# Patient Record
Sex: MALE | Race: WHITE | NOT HISPANIC OR LATINO | Employment: OTHER | RURAL
[De-identification: names, ages, dates, MRNs, and addresses within clinical notes are randomized per-mention and may not be internally consistent; named-entity substitution may affect disease eponyms.]

---

## 2017-02-16 PROBLEM — J44.9 COPD (CHRONIC OBSTRUCTIVE PULMONARY DISEASE): Chronic | Status: ACTIVE | Noted: 2017-02-16

## 2017-02-16 PROBLEM — F41.9 ANXIETY: Chronic | Status: ACTIVE | Noted: 2017-02-16

## 2017-02-16 PROBLEM — F43.10 POSTTRAUMATIC STRESS DISORDER: Status: ACTIVE | Noted: 2017-02-16

## 2017-02-16 PROBLEM — D75.89 MACROCYTOSIS: Status: ACTIVE | Noted: 2017-02-16

## 2017-02-16 PROBLEM — M48.50XA VERTEBRAL COMPRESSION FRACTURE (HCC): Status: ACTIVE | Noted: 2017-02-16

## 2017-02-16 PROBLEM — J30.9 ALLERGIC RHINITIS: Chronic | Status: ACTIVE | Noted: 2017-02-16

## 2017-02-16 PROBLEM — R25.1 TREMOR: Status: ACTIVE | Noted: 2017-02-16

## 2017-02-16 PROBLEM — E87.6 HYPOPOTASSEMIA: Chronic | Status: ACTIVE | Noted: 2017-02-16

## 2017-02-16 PROBLEM — F10.10 ALCOHOL ABUSE: Chronic | Status: ACTIVE | Noted: 2017-02-16

## 2017-02-16 PROBLEM — R09.02 HYPOXEMIA: Status: ACTIVE | Noted: 2017-02-16

## 2017-02-16 PROBLEM — R60.0 EDEMA LEG: Status: ACTIVE | Noted: 2017-02-16

## 2017-02-16 PROBLEM — I10 HYPERTENSION: Chronic | Status: ACTIVE | Noted: 2017-02-16

## 2017-02-16 PROBLEM — R73.01 ELEVATED FASTING GLUCOSE: Chronic | Status: ACTIVE | Noted: 2017-02-16

## 2017-02-16 PROBLEM — Z00.00 WELLNESS EXAMINATION: Status: ACTIVE | Noted: 2017-02-16

## 2017-02-16 PROBLEM — Z85.528 HISTORY OF RENAL CELL CANCER: Status: ACTIVE | Noted: 2017-02-16

## 2017-02-16 PROBLEM — K21.9 GASTROESOPHAGEAL REFLUX DISEASE: Chronic | Status: ACTIVE | Noted: 2017-02-16

## 2017-02-16 PROBLEM — E78.5 HYPERLIPIDEMIA: Chronic | Status: ACTIVE | Noted: 2017-02-16

## 2017-02-16 PROBLEM — M81.0 OSTEOPOROSIS: Status: ACTIVE | Noted: 2017-02-16

## 2017-02-16 PROBLEM — K76.0 FATTY LIVER: Chronic | Status: ACTIVE | Noted: 2017-02-16

## 2017-02-17 ENCOUNTER — OFFICE VISIT (OUTPATIENT)
Dept: FAMILY MEDICINE CLINIC | Facility: CLINIC | Age: 67
End: 2017-02-17

## 2017-02-17 VITALS
RESPIRATION RATE: 18 BRPM | WEIGHT: 270 LBS | DIASTOLIC BLOOD PRESSURE: 74 MMHG | HEIGHT: 72 IN | TEMPERATURE: 97.8 F | OXYGEN SATURATION: 96 % | BODY MASS INDEX: 36.57 KG/M2 | HEART RATE: 88 BPM | SYSTOLIC BLOOD PRESSURE: 136 MMHG

## 2017-02-17 DIAGNOSIS — J44.9 CHRONIC OBSTRUCTIVE PULMONARY DISEASE, UNSPECIFIED COPD TYPE (HCC): Chronic | ICD-10-CM

## 2017-02-17 DIAGNOSIS — E78.5 HYPERLIPIDEMIA, UNSPECIFIED HYPERLIPIDEMIA TYPE: Chronic | ICD-10-CM

## 2017-02-17 DIAGNOSIS — F10.10 ALCOHOL ABUSE: Chronic | ICD-10-CM

## 2017-02-17 DIAGNOSIS — D75.89 MACROCYTOSIS: ICD-10-CM

## 2017-02-17 DIAGNOSIS — R73.01 ELEVATED FASTING GLUCOSE: Chronic | ICD-10-CM

## 2017-02-17 DIAGNOSIS — I10 ESSENTIAL HYPERTENSION: Primary | Chronic | ICD-10-CM

## 2017-02-17 DIAGNOSIS — M81.0 OSTEOPOROSIS: ICD-10-CM

## 2017-02-17 DIAGNOSIS — R25.1 TREMOR: ICD-10-CM

## 2017-02-17 PROCEDURE — 99213 OFFICE O/P EST LOW 20 MIN: CPT | Performed by: FAMILY MEDICINE

## 2017-02-17 RX ORDER — FLUTICASONE PROPIONATE 110 UG/1
1 AEROSOL, METERED RESPIRATORY (INHALATION)
Qty: 12 G | Refills: 0 | Status: SHIPPED | OUTPATIENT
Start: 2017-02-17 | End: 2017-08-09 | Stop reason: SDUPTHER

## 2017-02-17 RX ORDER — FLUTICASONE PROPIONATE 220 UG/1
1 AEROSOL, METERED RESPIRATORY (INHALATION)
COMMUNITY
End: 2017-02-17 | Stop reason: DRUGHIGH

## 2017-02-18 LAB
25(OH)D3+25(OH)D2 SERPL-MCNC: 30.9 NG/ML (ref 30–100)
ALBUMIN SERPL-MCNC: 4.1 G/DL (ref 3.5–5)
ALBUMIN/GLOB SERPL: 1.6 G/DL (ref 1.1–2.5)
ALP SERPL-CCNC: 119 U/L (ref 24–120)
ALT SERPL-CCNC: 67 U/L (ref 0–54)
AST SERPL-CCNC: 40 U/L (ref 7–45)
BASOPHILS # BLD AUTO: 0.03 10*3/MM3 (ref 0–0.2)
BASOPHILS NFR BLD AUTO: 0.2 % (ref 0–2)
BILIRUB SERPL-MCNC: 0.4 MG/DL (ref 0.1–1)
BUN SERPL-MCNC: 15 MG/DL (ref 5–21)
BUN/CREAT SERPL: 15.8 (ref 7–25)
CALCIUM SERPL-MCNC: 9.4 MG/DL (ref 8.4–10.4)
CHLORIDE SERPL-SCNC: 101 MMOL/L (ref 98–110)
CHOLEST SERPL-MCNC: 192 MG/DL (ref 130–200)
CO2 SERPL-SCNC: 31 MMOL/L (ref 24–31)
CREAT SERPL-MCNC: 0.95 MG/DL (ref 0.5–1.4)
EOSINOPHIL # BLD AUTO: 0.33 10*3/MM3 (ref 0–0.7)
EOSINOPHIL NFR BLD AUTO: 2.7 % (ref 0–4)
ERYTHROCYTE [DISTWIDTH] IN BLOOD BY AUTOMATED COUNT: 13.8 % (ref 12–15)
FOLATE SERPL-MCNC: >20 NG/ML
GLOBULIN SER CALC-MCNC: 2.6 GM/DL
GLUCOSE SERPL-MCNC: 89 MG/DL (ref 70–100)
HBA1C MFR BLD: 6.3 %
HCT VFR BLD AUTO: 35.9 % (ref 40–52)
HDLC SERPL-MCNC: 34 MG/DL
HGB BLD-MCNC: 11.4 G/DL (ref 14–18)
IMM GRANULOCYTES # BLD: 0.02 10*3/MM3 (ref 0–0.03)
IMM GRANULOCYTES NFR BLD: 0.2 % (ref 0–5)
LDLC SERPL CALC-MCNC: 113 MG/DL (ref 0–99)
LYMPHOCYTES # BLD AUTO: 4.24 10*3/MM3 (ref 0.72–4.86)
LYMPHOCYTES NFR BLD AUTO: 34.4 % (ref 15–45)
MCH RBC QN AUTO: 31.6 PG (ref 28–32)
MCHC RBC AUTO-ENTMCNC: 31.8 G/DL (ref 33–36)
MCV RBC AUTO: 99.4 FL (ref 82–95)
MONOCYTES # BLD AUTO: 1.11 10*3/MM3 (ref 0.19–1.3)
MONOCYTES NFR BLD AUTO: 9 % (ref 4–12)
NEUTROPHILS # BLD AUTO: 6.58 10*3/MM3 (ref 1.87–8.4)
NEUTROPHILS NFR BLD AUTO: 53.5 % (ref 39–78)
PLATELET # BLD AUTO: 292 10*3/MM3 (ref 130–400)
POTASSIUM SERPL-SCNC: 4.5 MMOL/L (ref 3.5–5.3)
PROT SERPL-MCNC: 6.7 G/DL (ref 6.3–8.7)
RBC # BLD AUTO: 3.61 10*6/MM3 (ref 4.8–5.9)
SODIUM SERPL-SCNC: 138 MMOL/L (ref 135–145)
TRIGL SERPL-MCNC: 224 MG/DL (ref 0–149)
VIT B12 SERPL-MCNC: 654 PG/ML (ref 239–931)
VLDLC SERPL CALC-MCNC: 44.8 MG/DL
WBC # BLD AUTO: 12.31 10*3/MM3 (ref 4.8–10.8)

## 2017-02-19 NOTE — PROGRESS NOTES
Subjective   Felix Bartlett is a 66 y.o. male presenting with chief complaint of:   Chief Complaint   Patient presents with   • COPD   • Hyperlipidemia   • Hypertension   • Allergic Rhinitis       History of Present Illness :  With wife.  Has multiple chronic problems; interval appointment.  One of these problems is HTN.      The HTN has been present for years/it is chronic.  The HTN is assumed essential/without testing needed to look for other.  The HTN is controlled manifest by todays blood pressure and no home monitoring.   Other chronic problem/s to consider:  Asthma/COPD: The has been present for years/over a year.  It is chronic.  The problem is stable. The patient has smoked for years and still smokes. < ppd.  Elevated fasting glucose: This has been present for years/over a year.  It is chronic.  It is controlled. No home accuchecks have yet been requested.  No signs hypoglycmeia manifest as syncope/near syncope or diaphoretic/sweating spisodes.   Hyperlipidemia: This has been present for years/over a year.  It is chronic.   It is controlled.   Labs are done regularly and prove control  Osteoporosis:  This has been present for years/over a year.  It is chronic.  Agrees to another bone density when due.   Tremor: Has had for years/it is chronic.  It is felt to be related to his alcohol use.  He still drinks.   Macrocytosis: This has been known for years/it is chronic.  It is felt to be related to his alcohol use.  We follow B12 levels.     The following portions of the patient's history were reviewed and updated as appropriate: allergies, current medications, past family history, past medical history, past social history, past surgical history and problem list.  TCC migrated if needed/reviewed.      Current Outpatient Prescriptions:   •  calcium-vitamin D (OSCAL-500) 500-200 MG-UNIT per tablet, Take 1 tablet by mouth 2 times daily.  , Disp: , Rfl:   •  Chlorpheniramine-DM (CORICIDIN HBP COUGH/COLD PO), Take 1  tablet by mouth Daily., Disp: , Rfl:   •  colestipol (COLESTID) 1 G tablet, Take 1 tablet by mouth 2 times daily., Disp: , Rfl:   •  fluticasone (FLOVENT HFA) 110 MCG/ACT inhaler, Inhale 1 puff 2 (Two) Times a Day., Disp: 12 g, Rfl: 0  •  losartan (COZAAR) 50 MG tablet, TAKE 1 TABLET BY MOUTH ONCE DAILY, Disp: 90 tablet, Rfl: 1  •  metoprolol tartrate (LOPRESSOR) 50 MG tablet, TAKE ONE-HALF TABLET BY MOUTH TWICE DAILY, Disp: 90 tablet, Rfl: 1  •  olopatadine (PATANASE) 0.6 % solution nasal solution, , Disp: , Rfl:   •  omeprazole (priLOSEC) 20 MG capsule, 20 mg 2 (Two) Times a Day., Disp: , Rfl:   •  potassium chloride (K-DUR,KLOR-CON) 10 MEQ CR tablet, TAKE 1 TABLET BY MOUTH EVERY DAY, Disp: 30 tablet, Rfl: 5  •  simvastatin (ZOCOR) 20 MG tablet, TAKE 1 TABLET BY MOUTH DAILY, Disp: 90 tablet, Rfl: 1  •  tamsulosin (FLOMAX) 0.4 MG capsule 24 hr capsule, TK ONE C PO D AFTER THE SAME MEAL, Disp: , Rfl: 8  •  therapeutic multivitamin-minerals (THERAGRAN-M) tablet, Take 1 tablet by mouth daily., Disp: , Rfl:     No Known Allergies    Review of Systems  GENERAL:  Active/slower with limits, speed, samni for age and desire. Sleep is ok with apnea denied. No fever.  ENDO:  No syncope, near or diaphoretic sweaty spells..  HEENT: No head injury  headache,  No vision change, No hearing loss.  Ears without pain/drainage.  No sore throat.  No nasal/sinus congestion/drainage. No epistaxis.  CHEST: No chest wall tenderness or mass.  Usual cough,  without wheeze, SOB; no hemoptysis.  CV: No chest pain, palpatations, ankle edema.  GI: No heartburn, dysphagia.  No abdominal pain, diarrhea, constipation, rectal bleeding, or melena.    :  Voids without dysuria, or incontience to completion.  ORTHO: No painful/swollen joints but various on /off sore.  No  sore neck or back.  No acute neck or back pain without recent injury.   NEURO: No dizziness, weakness of extremities.  No numbness/parethesias.   PSYCH: No memory loss.  Mood good;  "not anxious, depressed but/and not suicidal.  Tolerated stress.                 No results found for: PSA     Objective   Visit Vitals   • /74 (BP Location: Right leg, Patient Position: Sitting, Cuff Size: Large Adult)   • Pulse 88   • Temp 97.8 °F (36.6 °C) (Oral)   • Resp 18   • Ht 72\" (182.9 cm)   • Wt 270 lb (122 kg)   • SpO2 96%   • BMI 36.62 kg/m2       Physical Exam  GENERAL:  Well nourished/developed in no acute distress. Obese  SKIN: Turgor excellent, without wound, rash, lesion.  HEENT: Normal cephalic without trauma.  Pupils equal round reactive to light. Extraocular motions full without nystagmus.   External canals nonobstructive nontender without reddness. Tymphatic membranes anders with david structures intact.   Oral cavity without growths, exudates, and moist.  Posterior pharnyx without mass, obstruction, reddness.  No thyroidmegaly, mass, tenderness, lymphadenopathy and supple.  CV: Regular rhythm.  No murmur, gallop,  edema. Posterior pulses intact.  No carotid bruits.  CHEST: No chest wall tenderness or mass.   LUNGS: Symmetric motion with clear to auscultation.  No dullness to percussion  ABD: Soft, nontender without mass.   ORTHO: Symmetric extremities without swelling/point tenderness.  Full gross range of motion.    NEURO: CN 2-12 grossly intact.  Symmetric facies. 1/4 x bicep knee equal reflexes.  Rest/intention tremors mostly seen UE.   UE/LE   4/5 strength throughout.  Nonfocal use extremities. Speech clear.  Reduced light touch with monofilament, vibratory sensation with tuning fork; equal toes/distal feet.    PSYCH: Oriented x 3.  Pleasant calm, well kept.  Purposeful/directed conservation with intact short/long gross memory.     Assessment/Plan     1. Essential hypertension  - CBC Auto Differential  - Comprehensive Metabolic Panel  - Hemoglobin A1c  - Lipid Panel    2. Hyperlipidemia, unspecified hyperlipidemia type    3. Chronic obstructive pulmonary disease, unspecified COPD " type  - fluticasone (FLOVENT HFA) 110 MCG/ACT inhaler; Inhale 1 puff 2 (Two) Times a Day.  Dispense: 12 g; Refill: 0    4. Elevated fasting glucose  - CBC Auto Differential  - Comprehensive Metabolic Panel  - Hemoglobin A1c  - Lipid Panel    5. Osteoporosis  - DEXA Bone Density Axial  - Vitamin D 25 hydroxy    6. Alcohol abuse  - DEXA Bone Density Axial  - Vitamin D 25 hydroxy    7. Macrocytosis  - Vitamin B12  - Folate  - CBC Auto Differential    8. Tremor  Alcohol/familial  - Vitamin B12  - Folate  - CBC Auto Differential  - Comprehensive Metabolic Panel  - Hemoglobin A1c  - Lipid Panel      Rx: reviewed.  Changes if above  LAB: reviewed/above, and if orders; today  Wrap-up/other instructions  Regular cardio exercise something everyone should consider and try to do; discussed  Normal weight a goal for everyone; discussed  Healthy diet helpful for weight management, illness prevention; discussed  If over 50-screening exams include men PSA/rectal exam, women mammograms, and  everyone colonoscopy screening for colon cancer.   There are no Patient Instructions on file for this visit.    Follow up: Return in about 6 months (around 8/17/2017).

## 2017-02-24 ENCOUNTER — OFFICE VISIT (OUTPATIENT)
Dept: FAMILY MEDICINE CLINIC | Facility: CLINIC | Age: 67
End: 2017-02-24

## 2017-02-24 VITALS
RESPIRATION RATE: 18 BRPM | HEIGHT: 72 IN | DIASTOLIC BLOOD PRESSURE: 64 MMHG | TEMPERATURE: 98 F | BODY MASS INDEX: 36.57 KG/M2 | WEIGHT: 270 LBS | OXYGEN SATURATION: 96 % | SYSTOLIC BLOOD PRESSURE: 146 MMHG

## 2017-02-24 DIAGNOSIS — G45.9 TRANSIENT CEREBRAL ISCHEMIA, UNSPECIFIED TYPE: ICD-10-CM

## 2017-02-24 DIAGNOSIS — F10.10 ALCOHOL ABUSE: Chronic | ICD-10-CM

## 2017-02-24 DIAGNOSIS — G89.29 CHRONIC BACK PAIN, UNSPECIFIED BACK LOCATION, UNSPECIFIED BACK PAIN LATERALITY: ICD-10-CM

## 2017-02-24 DIAGNOSIS — R09.02 HYPOXEMIA: Primary | ICD-10-CM

## 2017-02-24 DIAGNOSIS — M54.9 CHRONIC BACK PAIN, UNSPECIFIED BACK LOCATION, UNSPECIFIED BACK PAIN LATERALITY: ICD-10-CM

## 2017-02-24 DIAGNOSIS — I10 ESSENTIAL HYPERTENSION: Chronic | ICD-10-CM

## 2017-02-24 PROCEDURE — 99214 OFFICE O/P EST MOD 30 MIN: CPT | Performed by: FAMILY MEDICINE

## 2017-02-24 NOTE — PROGRESS NOTES
Subjective   Felix Bartlett is a 66 y.o. male presenting with chief complaint of:   Chief Complaint   Patient presents with   • Syncope       History of Present Illness :  Alone. Recent visit. Has an acute issue today: came back as he wanted me to know he has nearly passed out 3 times last 6m.    Spells are vague; but feeling of no focus; not going out.   Denies chest pain, palpitations.      Other chronic problem/s to consider:  HTN.  The HTN has been present for years/it is chronic.  The HTN is assumed essential/without testing needed to look for other.  The HTN is controlled manifest by todays blood pressure and some home monitoring.   Alcoholism:  Has had for years/a chronic problem.  Has allegedly cut back.     The following portions of the patient's history were reviewed and updated as appropriate: allergies, current medications, past family history, past medical history, past social history, past surgical history and problem list.  TCC migrated if needed/reviewed.      Current Outpatient Prescriptions:   •  calcium-vitamin D (OSCAL-500) 500-200 MG-UNIT per tablet, Take 1 tablet by mouth 2 times daily.  , Disp: , Rfl:   •  Chlorpheniramine-DM (CORICIDIN HBP COUGH/COLD PO), Take 1 tablet by mouth Daily., Disp: , Rfl:   •  colestipol (COLESTID) 1 G tablet, Take 1 tablet by mouth 2 times daily., Disp: , Rfl:   •  fluticasone (FLOVENT HFA) 110 MCG/ACT inhaler, Inhale 1 puff 2 (Two) Times a Day., Disp: 12 g, Rfl: 0  •  losartan (COZAAR) 50 MG tablet, TAKE 1 TABLET BY MOUTH ONCE DAILY, Disp: 90 tablet, Rfl: 1  •  metoprolol tartrate (LOPRESSOR) 50 MG tablet, TAKE ONE-HALF TABLET BY MOUTH TWICE DAILY, Disp: 90 tablet, Rfl: 1  •  olopatadine (PATANASE) 0.6 % solution nasal solution, , Disp: , Rfl:   •  omeprazole (priLOSEC) 20 MG capsule, 20 mg 2 (Two) Times a Day., Disp: , Rfl:   •  potassium chloride (K-DUR,KLOR-CON) 10 MEQ CR tablet, TAKE 1 TABLET BY MOUTH EVERY DAY, Disp: 30 tablet, Rfl: 5  •  simvastatin (ZOCOR)  20 MG tablet, TAKE 1 TABLET BY MOUTH DAILY, Disp: 90 tablet, Rfl: 1  •  tamsulosin (FLOMAX) 0.4 MG capsule 24 hr capsule, TK ONE C PO D AFTER THE SAME MEAL, Disp: , Rfl: 8  •  therapeutic multivitamin-minerals (THERAGRAN-M) tablet, Take 1 tablet by mouth daily., Disp: , Rfl:     No Known Allergies    Review of Systems  GENERAL:  Active/slower with limits, speed, samni for age and extremity weakness. Sleep is ok. No fever.  ENDO:  No diaphoretic sweaty spells.   HEENT: No head injury or headache,  No vision change,  Same hearing loss.  Ears without pain/drainage.  No sore throat.  No nasal/sinus congestion/drainage. No epistaxis.  CHEST: No chest wall tenderness or mass. No cough, with usual wheeze and mild SOB; no hemoptysis.  CV: No chest pain, palpatations always alittle LE ankle edema.  GI: No heartburn, dysphagia.  No abdominal pain, diarrhea, constipation, rectal bleeding, or melena.    :  Voids without dysuria, or incontience to completion.  ORTHO: No painful/swollen joints but various on /off sore.  No change occ sore neck or back.  No acute neck or back pain without recent injury.   NEURO: No dizziness, weakness of extremities.  No change occ LE numbness/parethesias.   PSYCH: ? On/off memory loss.  Mood good; not anxious, depressed but/and not suicidal.  Tolerated stress.     Results for orders placed or performed in visit on 02/17/17   Vitamin D 25 hydroxy   Result Value Ref Range    25 Hydroxy, Vitamin D 30.9 30.0 - 100.0 ng/mL   Vitamin B12   Result Value Ref Range    Vitamin B-12 654 239 - 931 pg/mL   Folate   Result Value Ref Range    Folate >20.00 >2.75 ng/mL   Comprehensive Metabolic Panel   Result Value Ref Range    Glucose 89 70 - 100 mg/dL    BUN 15 5 - 21 mg/dL    Creatinine 0.95 0.50 - 1.40 mg/dL    eGFR Non African Am 79 >60 mL/min/1.73    eGFR African Am 96 >60 mL/min/1.73    BUN/Creatinine Ratio 15.8 7.0 - 25.0    Sodium 138 135 - 145 mmol/L    Potassium 4.5 3.5 - 5.3 mmol/L    Chloride 101  98 - 110 mmol/L    Total CO2 31.0 24.0 - 31.0 mmol/L    Calcium 9.4 8.4 - 10.4 mg/dL    Total Protein 6.7 6.3 - 8.7 g/dL    Albumin 4.10 3.50 - 5.00 g/dL    Globulin 2.6 gm/dL    A/G Ratio 1.6 1.1 - 2.5 g/dL    Total Bilirubin 0.4 0.1 - 1.0 mg/dL    Alkaline Phosphatase 119 24 - 120 U/L    AST (SGOT) 40 7 - 45 U/L    ALT (SGPT) 67 (H) 0 - 54 U/L   Hemoglobin A1c   Result Value Ref Range    Hemoglobin A1C 6.30 %   Lipid Panel   Result Value Ref Range    Total Cholesterol 192 130 - 200 mg/dL    Triglycerides 224 (H) 0 - 149 mg/dL    HDL Cholesterol 34 (L) >=40 mg/dL    VLDL Cholesterol 44.8 mg/dL    LDL Cholesterol  113 (H) 0 - 99 mg/dL   CBC & Differential   Result Value Ref Range    WBC 12.31 (H) 4.80 - 10.80 10*3/mm3    RBC 3.61 (L) 4.80 - 5.90 10*6/mm3    Hemoglobin 11.4 (L) 14.0 - 18.0 g/dL    Hematocrit 35.9 (L) 40.0 - 52.0 %    MCV 99.4 (H) 82.0 - 95.0 fL    MCH 31.6 28.0 - 32.0 pg    MCHC 31.8 (L) 33.0 - 36.0 g/dL    RDW 13.8 12.0 - 15.0 %    Platelets 292 130 - 400 10*3/mm3    Neutrophil Rel % 53.5 39.0 - 78.0 %    Lymphocyte Rel % 34.4 15.0 - 45.0 %    Monocyte Rel % 9.0 4.0 - 12.0 %    Eosinophil Rel % 2.7 0.0 - 4.0 %    Basophil Rel % 0.2 0.0 - 2.0 %    Neutrophils Absolute 6.58 1.87 - 8.40 10*3/mm3    Lymphocytes Absolute 4.24 0.72 - 4.86 10*3/mm3    Monocytes Absolute 1.11 0.19 - 1.30 10*3/mm3    Eosinophils Absolute 0.33 0.00 - 0.70 10*3/mm3    Basophils Absolute 0.03 0.00 - 0.20 10*3/mm3    Immature Granulocyte Rel % 0.2 0.0 - 5.0 %    Immature Grans Absolute 0.02 0.00 - 0.03 10*3/mm3       Lab Results   Component Value Date    HGBA1C 6.30 02/17/2017       Lab Results   Component Value Date     02/17/2017     05/09/2016     (L) 08/15/2014    K 4.5 02/17/2017    K 4.2 05/09/2016    K 4.2 08/15/2014     02/17/2017     05/09/2016    CL 98 08/15/2014    CO2 31.0 02/17/2017    CO2 27 05/09/2016    CO2 28 08/15/2014    GLUCOSE 120 (H) 05/09/2016    GLUCOSE 152 (H) 08/15/2014     "GLUCOSE 84 08/11/2014    BUN 15 02/17/2017    BUN 11 05/09/2016    BUN 14 08/15/2014    CREATININE 0.95 02/17/2017    CREATININE 1.00 11/22/2016    CREATININE 0.73 05/09/2016    CALCIUM 9.4 02/17/2017    CALCIUM 8.8 05/09/2016    CALCIUM 8.1 (L) 08/15/2014    EGFRCLEARA 107 05/09/2016       No results found for: PSA     Objective   Visit Vitals   • /64 (BP Location: Left arm, Patient Position: Sitting, Cuff Size: Adult)   • Temp 98 °F (36.7 °C) (Oral)   • Resp 18   • Ht 72\" (182.9 cm)   • Wt 270 lb (122 kg)   • SpO2 96%   • BMI 36.62 kg/m2       Physical Exam  GENERAL:  Well nourished/developed in no acute distress. Obese   SKIN: Turgor excellent, without wound, rash, lesion  HEENT: Normal cephalic without trauma.  Pupils equal round reactive to light. Extraocular motions full without nystagmus.   External canals nonobstructive nontender without reddness. Tymphatic membranes anders with david structures intact.   Oral cavity without growths, exudates, and moist.  Posterior pharnyx without mass, obstruction, reddness.  No thyroidmegaly, mass, tenderness, lymphadenopathy and supple.  CV: Regular rhythm.  No murmur, gallop,  edema. Posterior pulses intact.  No carotid bruits.  CHEST: No chest wall tenderness or mass.   LUNGS: Symmetric motion with clear/reduced to auscultation.  ABD: Soft, nontender without mass.   ORTHO: Symmetric extremities without swelling/point tenderness.  Full gross range of motion.    NEURO: CN 2-12 grossly intact.  Symmetric facies. 1/4 x bicep knee equal reflexes.  UE/LE   3/5 strength throughout.  Nonfocal use extremities. Speech clear. Resting/intention tremors UE bilaterally.     PSYCH: Oriented x 3.  Pleasant calm, well kept.  Purposeful/directed conservation with intact short/long gross memory.     Assessment/Plan     1. Hypoxemia  chronic    2. Alcohol abuse  chronic    3. Essential hypertension  controlled    4. Transient cerebral ischemia, unspecified type  ?   - Holter monitor - " 24 hour  - CT Head Without Contrast    5. Chronic back pain, unspecified back location, unspecified back pain laterality  DDD/DJD      Rx: reviewed.  Changes if above  LAB: reviewed/above, and if orders    Patient Instructions   Advise no driving through this problem  Keep apt planned with cardiology      Follow up: Return for 3.27.17 as planned.

## 2017-03-20 ENCOUNTER — TELEPHONE (OUTPATIENT)
Dept: FAMILY MEDICINE CLINIC | Facility: CLINIC | Age: 67
End: 2017-03-20

## 2017-03-20 NOTE — TELEPHONE ENCOUNTER
Pt is wanting Holter report results. We do not have anything yet on this. I called and left a message to cardiology at [facility] to let them know we haven't received results of this yet, and to fax us a copy. I called Pt to let them know maybe tomorrow we may have something to tell them

## 2017-05-18 RX ORDER — METOPROLOL TARTRATE 50 MG/1
TABLET, FILM COATED ORAL
Qty: 90 TABLET | Refills: 1 | Status: SHIPPED | OUTPATIENT
Start: 2017-05-18 | End: 2017-11-13 | Stop reason: SDUPTHER

## 2017-05-18 RX ORDER — SIMVASTATIN 20 MG
TABLET ORAL
Qty: 90 TABLET | Refills: 1 | Status: SHIPPED | OUTPATIENT
Start: 2017-05-18 | End: 2017-11-13 | Stop reason: SDUPTHER

## 2017-06-15 RX ORDER — LOSARTAN POTASSIUM 50 MG/1
TABLET ORAL
Qty: 90 TABLET | Refills: 1 | Status: SHIPPED | OUTPATIENT
Start: 2017-06-15 | End: 2017-12-14 | Stop reason: SDUPTHER

## 2017-06-15 RX ORDER — POTASSIUM CHLORIDE 750 MG/1
TABLET, EXTENDED RELEASE ORAL
Qty: 90 TABLET | Refills: 1 | Status: SHIPPED | OUTPATIENT
Start: 2017-06-15 | End: 2018-01-10 | Stop reason: SDUPTHER

## 2017-07-11 DIAGNOSIS — N40.1 BENIGN NON-NODULAR PROSTATIC HYPERPLASIA WITH LOWER URINARY TRACT SYMPTOMS: Primary | ICD-10-CM

## 2017-07-11 RX ORDER — TAMSULOSIN HYDROCHLORIDE 0.4 MG/1
CAPSULE ORAL
Qty: 30 CAPSULE | Refills: 0 | Status: SHIPPED | OUTPATIENT
Start: 2017-07-11 | End: 2017-08-09 | Stop reason: SDUPTHER

## 2017-08-09 DIAGNOSIS — N40.1 BENIGN NON-NODULAR PROSTATIC HYPERPLASIA WITH LOWER URINARY TRACT SYMPTOMS: ICD-10-CM

## 2017-08-09 DIAGNOSIS — J44.9 CHRONIC OBSTRUCTIVE PULMONARY DISEASE, UNSPECIFIED COPD TYPE (HCC): Chronic | ICD-10-CM

## 2017-08-10 RX ORDER — DEXAMETHASONE 4 MG/1
TABLET ORAL
Qty: 12 G | Refills: 5 | Status: SHIPPED | OUTPATIENT
Start: 2017-08-10

## 2017-08-10 RX ORDER — TAMSULOSIN HYDROCHLORIDE 0.4 MG/1
CAPSULE ORAL
Qty: 30 CAPSULE | Refills: 0 | Status: SHIPPED | OUTPATIENT
Start: 2017-08-10 | End: 2017-09-06 | Stop reason: SDUPTHER

## 2017-08-18 ENCOUNTER — OFFICE VISIT (OUTPATIENT)
Dept: FAMILY MEDICINE CLINIC | Facility: CLINIC | Age: 67
End: 2017-08-18

## 2017-08-18 VITALS
SYSTOLIC BLOOD PRESSURE: 138 MMHG | HEART RATE: 102 BPM | TEMPERATURE: 97.8 F | OXYGEN SATURATION: 96 % | WEIGHT: 294 LBS | HEIGHT: 72 IN | RESPIRATION RATE: 18 BRPM | DIASTOLIC BLOOD PRESSURE: 74 MMHG | BODY MASS INDEX: 39.82 KG/M2

## 2017-08-18 DIAGNOSIS — F10.11 HISTORY OF ALCOHOL ABUSE: ICD-10-CM

## 2017-08-18 DIAGNOSIS — R73.01 ELEVATED FASTING GLUCOSE: Chronic | ICD-10-CM

## 2017-08-18 DIAGNOSIS — K76.0 FATTY LIVER: Chronic | ICD-10-CM

## 2017-08-18 DIAGNOSIS — I10 ESSENTIAL HYPERTENSION: Primary | Chronic | ICD-10-CM

## 2017-08-18 DIAGNOSIS — F41.9 ANXIETY: Chronic | ICD-10-CM

## 2017-08-18 DIAGNOSIS — R60.0 EDEMA OF LOWER EXTREMITY, UNSPECIFIED LATERALITY: ICD-10-CM

## 2017-08-18 DIAGNOSIS — R25.1 TREMOR: ICD-10-CM

## 2017-08-18 DIAGNOSIS — G89.29 CHRONIC BACK PAIN, UNSPECIFIED BACK LOCATION, UNSPECIFIED BACK PAIN LATERALITY: ICD-10-CM

## 2017-08-18 DIAGNOSIS — J44.9 CHRONIC OBSTRUCTIVE PULMONARY DISEASE, UNSPECIFIED COPD TYPE (HCC): Chronic | ICD-10-CM

## 2017-08-18 DIAGNOSIS — M54.9 CHRONIC BACK PAIN, UNSPECIFIED BACK LOCATION, UNSPECIFIED BACK PAIN LATERALITY: ICD-10-CM

## 2017-08-18 PROCEDURE — 99214 OFFICE O/P EST MOD 30 MIN: CPT | Performed by: FAMILY MEDICINE

## 2017-08-18 RX ORDER — FEXOFENADINE HYDROCHLORIDE 60 MG/1
60 TABLET, FILM COATED ORAL 2 TIMES DAILY
COMMUNITY
End: 2018-08-27

## 2017-08-19 LAB
ALBUMIN SERPL-MCNC: 4 G/DL (ref 3.5–5)
ALBUMIN/GLOB SERPL: 1.4 G/DL (ref 1.1–2.5)
ALP SERPL-CCNC: 96 U/L (ref 24–120)
ALT SERPL-CCNC: 64 U/L (ref 0–54)
AST SERPL-CCNC: 41 U/L (ref 7–45)
BASOPHILS # BLD AUTO: 0.03 10*3/MM3 (ref 0–0.2)
BASOPHILS NFR BLD AUTO: 0.2 % (ref 0–2)
BILIRUB SERPL-MCNC: 0.4 MG/DL (ref 0.1–1)
BUN SERPL-MCNC: 10 MG/DL (ref 5–21)
BUN/CREAT SERPL: 11.4 (ref 7–25)
CALCIUM SERPL-MCNC: 9.3 MG/DL (ref 8.4–10.4)
CHLORIDE SERPL-SCNC: 102 MMOL/L (ref 98–110)
CO2 SERPL-SCNC: 25 MMOL/L (ref 24–31)
CREAT SERPL-MCNC: 0.88 MG/DL (ref 0.5–1.4)
EOSINOPHIL # BLD AUTO: 0.43 10*3/MM3 (ref 0–0.7)
EOSINOPHIL NFR BLD AUTO: 3.3 % (ref 0–4)
ERYTHROCYTE [DISTWIDTH] IN BLOOD BY AUTOMATED COUNT: 14.1 % (ref 12–15)
GLOBULIN SER CALC-MCNC: 2.9 GM/DL
GLUCOSE SERPL-MCNC: 109 MG/DL (ref 70–100)
HBA1C MFR BLD: 6.3 %
HCT VFR BLD AUTO: 34.3 % (ref 40–52)
HGB BLD-MCNC: 10.9 G/DL (ref 14–18)
IMM GRANULOCYTES # BLD: 0.03 10*3/MM3 (ref 0–0.03)
IMM GRANULOCYTES NFR BLD: 0.2 % (ref 0–5)
LYMPHOCYTES # BLD AUTO: 5.32 10*3/MM3 (ref 0.72–4.86)
LYMPHOCYTES NFR BLD AUTO: 41.4 % (ref 15–45)
MCH RBC QN AUTO: 31.7 PG (ref 28–32)
MCHC RBC AUTO-ENTMCNC: 31.8 G/DL (ref 33–36)
MCV RBC AUTO: 99.7 FL (ref 82–95)
MONOCYTES # BLD AUTO: 0.99 10*3/MM3 (ref 0.19–1.3)
MONOCYTES NFR BLD AUTO: 7.7 % (ref 4–12)
NEUTROPHILS # BLD AUTO: 6.06 10*3/MM3 (ref 1.87–8.4)
NEUTROPHILS NFR BLD AUTO: 47.2 % (ref 39–78)
PLATELET # BLD AUTO: 303 10*3/MM3 (ref 130–400)
POTASSIUM SERPL-SCNC: 4.4 MMOL/L (ref 3.5–5.3)
PROT SERPL-MCNC: 6.9 G/DL (ref 6.3–8.7)
RBC # BLD AUTO: 3.44 10*6/MM3 (ref 4.8–5.9)
SODIUM SERPL-SCNC: 138 MMOL/L (ref 135–145)
WBC # BLD AUTO: 12.86 10*3/MM3 (ref 4.8–10.8)

## 2017-08-21 DIAGNOSIS — R06.02 SOB (SHORTNESS OF BREATH): Primary | ICD-10-CM

## 2017-08-21 DIAGNOSIS — R60.0 EDEMA OF LOWER EXTREMITY, UNSPECIFIED LATERALITY: ICD-10-CM

## 2017-08-21 PROBLEM — Z01.89 LABORATORY TEST: Status: ACTIVE | Noted: 2017-08-21

## 2017-08-21 RX ORDER — FUROSEMIDE 20 MG/1
20 TABLET ORAL DAILY
Qty: 30 TABLET | Refills: 0 | Status: SHIPPED | OUTPATIENT
Start: 2017-08-21 | End: 2017-09-15 | Stop reason: SDUPTHER

## 2017-08-24 DIAGNOSIS — K76.0 FATTY LIVER: Chronic | ICD-10-CM

## 2017-08-24 DIAGNOSIS — I10 ESSENTIAL HYPERTENSION: Primary | Chronic | ICD-10-CM

## 2017-08-28 ENCOUNTER — RESULTS ENCOUNTER (OUTPATIENT)
Dept: FAMILY MEDICINE CLINIC | Facility: CLINIC | Age: 67
End: 2017-08-28

## 2017-08-28 DIAGNOSIS — I10 ESSENTIAL HYPERTENSION: Chronic | ICD-10-CM

## 2017-08-28 DIAGNOSIS — K76.0 FATTY LIVER: Chronic | ICD-10-CM

## 2017-08-28 LAB
BUN SERPL-MCNC: 14 MG/DL (ref 5–21)
BUN/CREAT SERPL: 14.9 (ref 7–25)
CALCIUM SERPL-MCNC: 9.4 MG/DL (ref 8.4–10.4)
CHLORIDE SERPL-SCNC: 104 MMOL/L (ref 98–110)
CO2 SERPL-SCNC: 28 MMOL/L (ref 24–31)
CREAT SERPL-MCNC: 0.94 MG/DL (ref 0.5–1.4)
GLUCOSE SERPL-MCNC: 102 MG/DL (ref 70–100)
POTASSIUM SERPL-SCNC: 4.2 MMOL/L (ref 3.5–5.3)
SODIUM SERPL-SCNC: 142 MMOL/L (ref 135–145)

## 2017-08-29 ENCOUNTER — TELEPHONE (OUTPATIENT)
Dept: FAMILY MEDICINE CLINIC | Facility: CLINIC | Age: 67
End: 2017-08-29

## 2017-08-29 ENCOUNTER — HOSPITAL ENCOUNTER (OUTPATIENT)
Dept: CARDIOLOGY | Facility: HOSPITAL | Age: 67
Discharge: HOME | End: 2017-08-29
Attending: FAMILY MEDICINE | Admitting: FAMILY MEDICINE

## 2017-08-29 VITALS
BODY MASS INDEX: 39.82 KG/M2 | HEIGHT: 72 IN | DIASTOLIC BLOOD PRESSURE: 74 MMHG | WEIGHT: 294 LBS | SYSTOLIC BLOOD PRESSURE: 138 MMHG

## 2017-08-29 PROCEDURE — 93306 TTE W/DOPPLER COMPLETE: CPT

## 2017-08-29 PROCEDURE — 93306 TTE W/DOPPLER COMPLETE: CPT | Performed by: INTERNAL MEDICINE

## 2017-08-29 NOTE — TELEPHONE ENCOUNTER
[phone]- on callers list; called Nadya.     Told her biggest problems copd, alcohol use past/or now; and below.        Patient Active Problem List    Diagnosis   • Laboratory test* [Z01.89]     Overview Note:     6m CBC, CMP, A1c, iron  12m CBC, CMP, A1c LIPID, TSH, T4, Vit D, PSAs, B12, folate, iron, ferritin, % sat iron, retic count     • TIA (transient ischemic attack) [G45.9]     Overview Note:     2016- two times  2017-one     • Chronic back pain [M54.9, G89.29]   • Wellness examination-done [Z00.00]     Overview Note:     PROCEDURES:  Prostate exam/Rachel/confirmed/1.27.16    Colonoscopy-nl/Gil//7-28-10/5y  EGD/[facility]/Gil/3.4.16  Colonoscopy-polyp/Gil/[facility]/3.18.16/5 yr    SURGERIES:  T&A  Appendix/1959  Scrotum-cyst/1980's  Lap gb+liver bx/Raya/[facility]/7.23.12  L robotic partial nephrectomy (clear cell grade 1 L3vN4L8)/Wykoff/[facility]/8.14.14  Unbmqf-lifzwtgi-arrmw+mesh+omen/Micah//5.9.16    FAMILY HISTORY:  HTN/unknown  Heart/m  DM/none  CA-colon/f  CA-prostate/none  CA-breast/none  CA-other/none    HABITS:  Tobacco-smoker/age 16/1ppd/advised  Tobacco-2nd handed/none  Alcohol/1969-beer/9 a day  Drugs/none    SOCIAL HISTORY:  /1968, 1971  /1970  /  Employment/[employer]/disability  Disability/7-  [employer] vet  [employer] patient  Children/6+1-3    PATIENT EDUCATION:    ADVISED/OFFERED:  Alcohol treatment/11-+  PFTs with-without/12.13.11+    OTHER:  last flu shot/1-27-16//[facility]  last pheumonia/12-12-13/[facility]  last prevnar 13/1-27-16/[facility]    HOSPITAL NOTES: [facility]  5.8.16-5.11.16  abdominal pain-hernia related/post op related  umbilical hernia-incarcarated  hypertension  elevated blood pressure-asymptomatic  renal cell cancer-history/surgically removed  anemia-chronic-benign to date    nephrectomy/renal, ? gi loss influences..  tremor-? alcohol related  insomnia-he says PTSD  post traumatic stress disorcer  anxiety-chronic  LE edema- risks for liver, renal...obviously also      cardiac/cardio-pulomonary...  elevated fasting glucose-watched  obesity  macrocytosis-B12, folate ok..likely alcohol involved  alcohol use      5.8.16/5.9.16 [facility] ER CMP Glu 116 CBC W 12.58 R 3.52 Hgb 11.3 Hct 34.0 MCV 96.6 MCH 32.1  5.8.16/5.9.16 [facility] ER CT abd/pelvis 7 x 9 periumbilical hernia..no obstruction/strangulation  5.8.16/5.9.16 [facility] ER/rashad umbilical hernia    Alleve/naproxen OTC  Benadryl/diphenhydramine OTC  Calcium with Vit D 600 mg/400 international units one by mouth twice each day  Cozaar/losartan 50 mg one by mouth each day  Flonase/fluticasone nasal spray two spray each nostril each day  Kdur/potassium chloride 10 meq one by mouth each day  Lopressor/metoprolol 50 mg one half by mouth twice each day  Oxygen two liters at bedtime  Prilosec/omeprazole 20 mg one by mouth twice each day  ProAir/albuterol HFA 90 mcq inhalations two inhalations four times a day as needed for cough or shortness of breath  Vitamin-mutiple one by mouth each day  Zocor/simvastatin 20 mg one by mouth each day     • History of renal cell cancer [Z85.528]     Overview Note:     9.4.14/9.8.14 - Dr Ramos - Office Notes-path low grade renal cell..negative margins..doing well    9.10.14..ro visit...surgery follow up  hypertension-  post op L nephrectomy-incidential renal cell found on CT  abdominal pain-feeling better  fatty liver-bx proven...  tremor-familial, alcohol  Rx-reviewed  Cozaar 50 mg one half a day  LAB-next time hepatitis A, B, C       • COPD (chronic obstructive pulmonary disease) [J44.9]   • Elevated fasting glucose [R73.01]   • History of alcohol abuse [Z87.898]   • Allergic rhinitis [J30.9]   • Anxiety [F41.9]   • Vertebral compression fracture [M48.50XA]     Overview Note:     12.13.11-since last visit-bachache  6.13.12   ro encounter  UA  IVP  D spine  LS spine  US liver    6.19.12/6.20.12 IVP..neg..flank pain likely gb..referring gb to surgeon  6.18.12/6.20.12 sono liver..gall stones/some gb  thickening..needs/has to see surgeon...surgeon asked to  do liver bx while doing lap gb/re: alcoholism..  6.18.12/6.20.12 LS mild spondylosis..enough for occ lower back pain  6.18.12/6.20.12 plain T spine..maybe mild wedging..would get bone density sometime..minor DJD  7.10.12/7.23.12 - [facility]-bone density L4 -2.6, candidate for treatment..     • Gastroesophageal reflux disease [K21.9]   • Edema leg [R60.0]     Overview Note:     No TCC echo    6.19.14 ro visit..6 month follow up  copd   shortness of breath    orthopnea    leg edema..mutiple likely causes (lung, liver, ? others)    alcohol use    elevated blood pressure-asymptomtic    tremor-benign    hyperlipidemia-zocor treated    overnight oxygen study//will send order, they will schedule.dfw  LAB-reviewed..same  Rx-reviewed..same  CT chest-with/without..SOB, copd    8.2017   ro visit  Lasix 20 added/get echo     • Fatty liver [K76.0]   • Hyperlipidemia [E78.5]   • Hypertension [I10]   • Hypopotassemia [E87.6]   • Hypoxemia [R09.02]     Overview Note:     4.1.15/4.2.15- [facility] home oximetry < equal 88 1.8%, 89% 4.2%..to lilly  4.1.15/4.2.15- [facility]- says recent oximetry in error...await later copy     • Macrocytosis [D75.89]     Overview Note:     05.25.10  initial ro encounter  05.25.10/6.1.10- B12, - Hb 13.4.  12- [facility] - PD flowed dk- CMP B 9 Cr 0.84 W8.7 Hb 12.5 M 101, B12, 355, foalte > 24     • Osteoporosis bd 3.2017 ? 2y [M81.0]     Overview Note:     12.13.11-since last visit-bachache  6.13.12   ro encounter  UA  IVP  D spine  LS spine  US liver    6.19.12/6.20.12 IVP..neg..flank pain likely gb..referring gb to surgeon  6.18.12/6.20.12 sono liver..gall stones/some gb thickening..needs/has to see surgeon...surgeon asked to  do liver bx while doing lap gb/re: alcoholism..  6.18.12/6.20.12 LS mild spondylosis..enough for occ lower back pain  6.18.12/6.20.12 plain T spine..maybe mild wedging..would get  bone density sometime..minor DJD  7.10.12/7.23.12 - [facility]-bone density L4 -2.6, candidate for treatment..  7.10.12 bone density L hip T -1.3  LS4 T -2.6..needs Ca/Vit D and get levels normal..needs Boniva monthly or equal   and nir 18m  3.1.17/Bone density / LS T  -1.8 and hip -0.7- better than last; use Ca/Vit D 600/400 bid some walking, no/little alcohol      • Tremor [R25.1]     Overview Note:     [place]- no cause  Uncle/? dad  5.25.10- initial Ro encounter-awaiting labs..to consider B blocker/other.     5.26.10...Lopressor 50 mg 1/2 bid..trial    12.12.13 ro encounter...tremor worse..  Mysoline 50 one a day    6.19.14 ro visit..6 month follow up  copd   shortness of breath    orthopnea    leg edema..mutiple likely causes (lung, liver, ? others)    alcohol use    elevated blood pressure-asymptomtic    tremor-benign    hyperlipidemia-zocor treated    overnight oxygen study//will send order, they will schedule.dfw  LAB-reviewed..same  Rx-reviewed..same  CT chest-with/without..SOB, copd    9.10.14..ro visit...surgery follow up  hypertension-  post op L nephrectomy-incidential renal cell found on CT  abdominal pain-feeling better  fatty liver-bx proven...  tremor-familial, alcohol  Rx-reviewed  Cozaar 50 mg one half a day  LAB-next time hepatitis A, B, C     • Posttraumatic stress disorder [F43.10]

## 2017-08-30 LAB
BH CV ECHO MEAS - AO MAX PG (FULL): 2.8 MMHG
BH CV ECHO MEAS - AO MAX PG: 10.9 MMHG
BH CV ECHO MEAS - AO MEAN PG (FULL): 2 MMHG
BH CV ECHO MEAS - AO MEAN PG: 6 MMHG
BH CV ECHO MEAS - AO ROOT AREA (BSA CORRECTED): 1.3
BH CV ECHO MEAS - AO ROOT AREA: 8 CM^2
BH CV ECHO MEAS - AO ROOT DIAM: 3.2 CM
BH CV ECHO MEAS - AO V2 MAX: 165 CM/SEC
BH CV ECHO MEAS - AO V2 MEAN: 113 CM/SEC
BH CV ECHO MEAS - AO V2 VTI: 34 CM
BH CV ECHO MEAS - AVA(I,A): 3 CM^2
BH CV ECHO MEAS - AVA(I,D): 3 CM^2
BH CV ECHO MEAS - AVA(V,A): 3.3 CM^2
BH CV ECHO MEAS - AVA(V,D): 3.3 CM^2
BH CV ECHO MEAS - BSA(HAYCOCK): 2.7 M^2
BH CV ECHO MEAS - BSA: 2.5 M^2
BH CV ECHO MEAS - BZI_BMI: 39.9 KILOGRAMS/M^2
BH CV ECHO MEAS - BZI_METRIC_HEIGHT: 182.9 CM
BH CV ECHO MEAS - BZI_METRIC_WEIGHT: 133.4 KG
BH CV ECHO MEAS - CONTRAST EF 4CH: 61.5 ML/M^2
BH CV ECHO MEAS - EDV(CUBED): 79 ML
BH CV ECHO MEAS - EDV(MOD-SP4): 83.6 ML
BH CV ECHO MEAS - EDV(TEICH): 82.6 ML
BH CV ECHO MEAS - EF(CUBED): 67.2 %
BH CV ECHO MEAS - EF(MOD-SP4): 61.5 %
BH CV ECHO MEAS - EF(TEICH): 59 %
BH CV ECHO MEAS - ESV(CUBED): 25.9 ML
BH CV ECHO MEAS - ESV(MOD-SP4): 32.2 ML
BH CV ECHO MEAS - ESV(TEICH): 33.9 ML
BH CV ECHO MEAS - FS: 31 %
BH CV ECHO MEAS - IVS/LVPW: 1
BH CV ECHO MEAS - IVSD: 1.3 CM
BH CV ECHO MEAS - LA DIMENSION: 3.9 CM
BH CV ECHO MEAS - LA/AO: 1.2
BH CV ECHO MEAS - LAT PEAK E' VEL: 8.8 CM/SEC
BH CV ECHO MEAS - LV DIASTOLIC VOL/BSA (35-75): 33.3 ML/M^2
BH CV ECHO MEAS - LV MASS(C)D: 207 GRAMS
BH CV ECHO MEAS - LV MASS(C)DI: 82.5 GRAMS/M^2
BH CV ECHO MEAS - LV MAX PG: 8.1 MMHG
BH CV ECHO MEAS - LV MEAN PG: 4 MMHG
BH CV ECHO MEAS - LV SYSTOLIC VOL/BSA (12-30): 12.8 ML/M^2
BH CV ECHO MEAS - LV V1 MAX: 142 CM/SEC
BH CV ECHO MEAS - LV V1 MEAN: 86.7 CM/SEC
BH CV ECHO MEAS - LV V1 VTI: 26.7 CM
BH CV ECHO MEAS - LVIDD: 4.3 CM
BH CV ECHO MEAS - LVIDS: 3 CM
BH CV ECHO MEAS - LVLD AP4: 8.8 CM
BH CV ECHO MEAS - LVLS AP4: 7.7 CM
BH CV ECHO MEAS - LVOT AREA (M): 3.8 CM^2
BH CV ECHO MEAS - LVOT AREA: 3.8 CM^2
BH CV ECHO MEAS - LVOT DIAM: 2.2 CM
BH CV ECHO MEAS - LVPWD: 1.3 CM
BH CV ECHO MEAS - MED PEAK E' VEL: 7.29 CM/SEC
BH CV ECHO MEAS - MV A MAX VEL: 99.7 CM/SEC
BH CV ECHO MEAS - MV DEC TIME: 0.25 SEC
BH CV ECHO MEAS - MV E MAX VEL: 103 CM/SEC
BH CV ECHO MEAS - MV E/A: 1
BH CV ECHO MEAS - RAP SYSTOLE: 5 MMHG
BH CV ECHO MEAS - RVSP: 20.2 MMHG
BH CV ECHO MEAS - SI(AO): 109 ML/M^2
BH CV ECHO MEAS - SI(CUBED): 21.1 ML/M^2
BH CV ECHO MEAS - SI(LVOT): 40.4 ML/M^2
BH CV ECHO MEAS - SI(MOD-SP4): 20.5 ML/M^2
BH CV ECHO MEAS - SI(TEICH): 19.4 ML/M^2
BH CV ECHO MEAS - SV(AO): 273.4 ML
BH CV ECHO MEAS - SV(CUBED): 53 ML
BH CV ECHO MEAS - SV(LVOT): 101.5 ML
BH CV ECHO MEAS - SV(MOD-SP4): 51.4 ML
BH CV ECHO MEAS - SV(TEICH): 48.7 ML
BH CV ECHO MEAS - TR MAX VEL: 195 CM/SEC
LEFT ATRIUM VOLUME INDEX: 27.5 ML/M2
LEFT ATRIUM VOLUME: 69.1 CM3
LV EF 2D ECHO EST: 60 %

## 2017-08-31 PROBLEM — I50.9 CONGESTIVE HEART FAILURE (HCC): Status: ACTIVE | Noted: 2017-08-31

## 2017-09-06 DIAGNOSIS — N40.1 BENIGN NON-NODULAR PROSTATIC HYPERPLASIA WITH LOWER URINARY TRACT SYMPTOMS: ICD-10-CM

## 2017-09-07 RX ORDER — TAMSULOSIN HYDROCHLORIDE 0.4 MG/1
CAPSULE ORAL
Qty: 30 CAPSULE | Refills: 0 | Status: SHIPPED | OUTPATIENT
Start: 2017-09-07 | End: 2017-10-04 | Stop reason: SDUPTHER

## 2017-09-11 ENCOUNTER — TELEPHONE (OUTPATIENT)
Dept: FAMILY MEDICINE CLINIC | Facility: CLINIC | Age: 67
End: 2017-09-11

## 2017-09-11 PROBLEM — J96.90 RESPIRATORY FAILURE: Status: ACTIVE | Noted: 2017-02-16

## 2017-09-11 NOTE — TELEPHONE ENCOUNTER
Pt's wife called and states pt is having a hard time breathing at night and wants to know if could have a C-Pap order

## 2017-09-11 NOTE — TELEPHONE ENCOUNTER
Yes, has nocturnal O2.  Wife says problem is it comes off at night due to tossing and turning.  [facility] is who he uses.  Will need order for overnight oximetry to send to them

## 2017-09-15 RX ORDER — FUROSEMIDE 20 MG/1
20 TABLET ORAL DAILY
Qty: 30 TABLET | Refills: 5 | Status: SHIPPED | OUTPATIENT
Start: 2017-09-15 | End: 2018-03-04 | Stop reason: SDUPTHER

## 2017-10-04 DIAGNOSIS — N40.1 BENIGN NON-NODULAR PROSTATIC HYPERPLASIA WITH LOWER URINARY TRACT SYMPTOMS: ICD-10-CM

## 2017-10-05 RX ORDER — TAMSULOSIN HYDROCHLORIDE 0.4 MG/1
CAPSULE ORAL
Qty: 30 CAPSULE | Refills: 0 | Status: SHIPPED | OUTPATIENT
Start: 2017-10-05 | End: 2017-11-13 | Stop reason: SDUPTHER

## 2017-10-31 ENCOUNTER — FLU SHOT (OUTPATIENT)
Dept: FAMILY MEDICINE CLINIC | Facility: CLINIC | Age: 67
End: 2017-10-31

## 2017-10-31 DIAGNOSIS — Z23 NEED FOR INFLUENZA VACCINE: ICD-10-CM

## 2017-10-31 PROCEDURE — 90686 IIV4 VACC NO PRSV 0.5 ML IM: CPT | Performed by: FAMILY MEDICINE

## 2017-10-31 PROCEDURE — G0008 ADMIN INFLUENZA VIRUS VAC: HCPCS | Performed by: FAMILY MEDICINE

## 2017-11-13 DIAGNOSIS — N40.1 BENIGN NON-NODULAR PROSTATIC HYPERPLASIA WITH LOWER URINARY TRACT SYMPTOMS: ICD-10-CM

## 2017-11-13 RX ORDER — TAMSULOSIN HYDROCHLORIDE 0.4 MG/1
CAPSULE ORAL
Qty: 30 CAPSULE | Refills: 0 | Status: SHIPPED | OUTPATIENT
Start: 2017-11-13 | End: 2018-01-10 | Stop reason: SDUPTHER

## 2017-11-14 RX ORDER — METOPROLOL TARTRATE 50 MG/1
TABLET, FILM COATED ORAL
Qty: 90 TABLET | Refills: 0 | Status: SHIPPED | OUTPATIENT
Start: 2017-11-14 | End: 2018-02-07 | Stop reason: SDUPTHER

## 2017-11-14 RX ORDER — SIMVASTATIN 20 MG
TABLET ORAL
Qty: 90 TABLET | Refills: 0 | Status: SHIPPED | OUTPATIENT
Start: 2017-11-14 | End: 2018-02-07 | Stop reason: SDUPTHER

## 2017-11-15 ENCOUNTER — OFFICE VISIT (OUTPATIENT)
Dept: FAMILY MEDICINE CLINIC | Facility: CLINIC | Age: 67
End: 2017-11-15

## 2017-11-15 VITALS
WEIGHT: 293 LBS | BODY MASS INDEX: 39.68 KG/M2 | HEIGHT: 72 IN | SYSTOLIC BLOOD PRESSURE: 120 MMHG | DIASTOLIC BLOOD PRESSURE: 60 MMHG | HEART RATE: 92 BPM | OXYGEN SATURATION: 98 % | RESPIRATION RATE: 18 BRPM | TEMPERATURE: 97.9 F

## 2017-11-15 DIAGNOSIS — I50.9 CONGESTIVE HEART FAILURE, UNSPECIFIED CONGESTIVE HEART FAILURE CHRONICITY, UNSPECIFIED CONGESTIVE HEART FAILURE TYPE: ICD-10-CM

## 2017-11-15 DIAGNOSIS — Z72.0 TOBACCO USE: ICD-10-CM

## 2017-11-15 DIAGNOSIS — J44.9 CHRONIC OBSTRUCTIVE PULMONARY DISEASE, UNSPECIFIED COPD TYPE (HCC): Chronic | ICD-10-CM

## 2017-11-15 DIAGNOSIS — J96.91 RESPIRATORY FAILURE WITH HYPOXIA, UNSPECIFIED CHRONICITY (HCC): Primary | ICD-10-CM

## 2017-11-15 DIAGNOSIS — R73.01 ELEVATED FASTING GLUCOSE: Chronic | ICD-10-CM

## 2017-11-15 DIAGNOSIS — R06.02 SHORTNESS OF BREATH: ICD-10-CM

## 2017-11-15 DIAGNOSIS — J30.89 NON-SEASONAL ALLERGIC RHINITIS, UNSPECIFIED CHRONICITY, UNSPECIFIED TRIGGER: Chronic | ICD-10-CM

## 2017-11-15 DIAGNOSIS — M54.9 CHRONIC BACK PAIN, UNSPECIFIED BACK LOCATION, UNSPECIFIED BACK PAIN LATERALITY: ICD-10-CM

## 2017-11-15 DIAGNOSIS — G89.29 CHRONIC BACK PAIN, UNSPECIFIED BACK LOCATION, UNSPECIFIED BACK PAIN LATERALITY: ICD-10-CM

## 2017-11-15 PROCEDURE — 99214 OFFICE O/P EST MOD 30 MIN: CPT | Performed by: FAMILY MEDICINE

## 2017-11-15 NOTE — PATIENT INSTRUCTIONS
"Steps to Quit tobacco     Smoking or other ways of using tobacco can be harmful to your health and can affect almost every organ in your body.  Tobacco puts you, and those around you, at risk for developing many serious chronic diseases. Quitting tobacco is difficult, but it is one of the best things that you can do for your health. It is never too late to quit.    WHAT ARE THE BENEFITS OF QUITTING TOBACCO?  When you quit smoking, you lower your risk of developing serious diseases and conditions, such as:  · Lung cancer or lung disease, such as COPD.  · Heart disease.  · Stroke.  · Heart attack.  · Infertility.  · Osteoporosis and bone fractures.  · Cancer of the mouth  Additionally, symptoms such as coughing, wheezing, and shortness of breath may get better when you quit. You may also find that you get sick less often because your body is stronger at fighting off colds and infections. If you are female/pregnant, quitting smoking can help to reduce your chances of having a baby of low birth weight.    HOW DO I GET READY TO QUIT?  When you decide to quit tobacco, create a plan to make sure that you are successful. Before you quit:  · Pick a date to start the process. Set a date within the next two weeks to give you time to prepare.  · Write down the reasons why you are quitting. Keep this list in places where you will see it often, such as on your bathroom mirror or in your car or wallet.  · Identify the people, places, things, and activities that make you want to smoke (triggers) and avoid them.  ·  If you decide to quit \"cold turkey\" make sure to take these actions:  ¨ Throw away all cigarettes/tobacco at home, at work, and in your car.  ¨ Throw away smoking accessories, such as ashtrays and lighters.  ¨ Clean your car and make sure to empty the ashtray.  ¨ Clean your home, including curtains and carpets.  · If you decide to quit by smoking while using nicotene products  ¨ Get your nicotene patches, gum (or " "alternative) together and ready for the process.   ¨ As you start the patch; 3-4 days later start slowly weaning the cigarette; every time you want a cigarette chew nicotene gum instead.    ¨ Slowly over several weeks wean the cigarettes away; then begin to wean away the nicotene patch which starts a 21 mg, and then comes in 14 mg, and 7 mg strengths.   ¨ Every time you want a cigarette; chew a nicotene gum.   ¨ If this takes 6m; dont stop.   · Tell your family, friends, and coworkers that you are quitting. Support from your loved ones can make quitting easier.  · Call the [place] TOBACCO QUITLINE 0-605-GAIM-YES (1-121.179.1171) or go to their web site www.[facility].il.us/smokefree/sf_quit htm  · We stand ready to help you with medications that you might be able to take that could make your quitting a lot easier and more likely successful.   · Consider a return/repeat visit to go over all of this and be successful at quitting.     WHAT STRATEGIES CAN I USE TO QUIT TOBACCO?   Talk with us about different strategies to quit tobacco. Some strategies include:  · Quitting tobacco altogether instead of gradually lessening how much you smoke over a period of time. Research shows that quitting \"cold turkey\" is more successful than gradually quitting.  But if you chose the slower approach above while smoking; that is ok.   · Attending in-person counseling to help you build problem-solving skills. You are more likely to have success in quitting if you attend several counseling sessions. Even short sessions of 10 minutes can be effective.  · Finding resources and support systems that can help you to quit tobacco and remain smoke-free after you quit. These resources are most helpful when you use them often. They can include:  ¨ Online chats with a counselor.  ¨ Telephone quitlines.  ¨ Printed self-help materials.  ¨ Support groups or group counseling.  ¨ Text messaging programs.  ¨ Mobile phone applications.  · Taking " medicines to help you quit tobacco. (If you are pregnant or breastfeeding, talk with your health care provider first.) Some medicines contain nicotine and some do not. Both types of medicines help with cravings, but the medicines that include nicotine help to relieve withdrawal symptoms. Your health care provider may recommend:  ¨ Nicotine patches, gum, or lozenges.  ¨ Nicotine inhalers or sprays.  ¨ Non-nicotine medicine that is taken by mouth.  Talk with us about combining strategies, such as taking medicines while you are also receiving in-person counseling. Using these two strategies together makes you more likely to succeed in quitting than if you used either strategy on its own.  If you are pregnant or breastfeeding, talk with us about finding counseling or other support strategies to quit smoking. Do not take medicine to help you quit smoking unless told to do so by us or another health care provider.     WHAT THINGS CAN I DO TO MAKE IT EASIER TO QUIT?  Quitting tobacco might feel overwhelming at first, but there is a lot that you can do to make it easier. Take these important actions:  · Reach out to your family and friends and ask that they support and encourage you during this time. Call telephone quitlines, reach out to support groups, or work with a counselor for support.  · Ask people who use tobacco to avoid using it around you.  · Avoid places that trigger you to use tobacco, such as bars, parties, or smoke-break areas at work.  · Spend time around people who do not use tobacco.  · Lessen stress in your life, because stress can be a tobacco trigger for some people. To lessen stress, try:  ¨ Exercising regularly.  ¨ Deep-breathing exercises.  ¨ Yoga.  ¨ Meditating.  ¨ Performing a body scan. This involves closing your eyes, scanning your body from head to toe, and noticing which parts of your body are particularly tense. Purposefully relax the muscles in those areas.  · Download or purchase mobile  phone or tablet apps (applications) that can help you stick to your quit plan by providing reminders, tips, and encouragement. There are many free apps, such as QuitGuide from the CDC (Centers for Disease Control and Prevention). You can find other support for quitting tobacco (tobacco cessation) through smokefree.gov and other websites.    HOW WILL I FEEL WHEN I QUIT  TOBACCO?  Within the first 24 hours of quitting tobacco, you may start to feel some withdrawal symptoms. These symptoms are usually most noticeable 2-3 days after quitting, but they usually do not last beyond 2-3 weeks. Changes or symptoms that you might experience include:  · Mood swings.  · Restlessness, anxiety, or irritation.  · Difficulty concentrating.  · Dizziness.  · Strong cravings for sugary foods in addition to nicotine.  · Mild weight gain.  · Constipation.  · Nausea.  · Coughing or a sore throat.  · Changes in how your medicines work in your body.  · A depressed mood.  · Difficulty sleeping (insomnia).  After the first 2-3 weeks of quitting, you may start to notice more positive results, such as:  · Improved sense of smell and taste.  · Decreased coughing and sore throat.  · Slower heart rate.  · Lower blood pressure.  · Clearer skin.  · The ability to breathe more easily.  · Fewer sick days.  Quitting tobacco is very challenging for most people. Do not get discouraged if you are not successful the first time. Some people need to make many attempts to quit before they achieve long-term success. Do your best to stick to your quit plan, and talk with us if you have questions, problems, or concerns.        Use your prescription nasal spray regularily; especially at going to bed.

## 2017-11-15 NOTE — PROGRESS NOTES
Subjective   Felix Bartlett Tyron is a 67 y.o. male presenting with chief complaint of:   Chief Complaint   Patient presents with   • Shortness of Breath     O2 recert only wears at night        History of Present Illness :  Alone.  Here for primarily an acute issue today; needs ok to continue his oxygen; recent overnight confirmed need asleep.  He says still has SOB on/off during the day especially when active.   Has multiple problems to consider; not interval appointment.  One of these problems is HTN    The HTN has been present for years/it is chronic.  The HTN is assumed essential/without testing needed to look for other.  The HTN is controlled manifest by todays blood pressure and no home monitoring.  Associated illness below.   Other chronic problem/s to consider:   Congestive heart failure/echo changes: This has been present for years/over a year.  It is chronic.  The CHF had features on echo of diastolic dysfunction.  This has been associated with SOB, LE edema on/off.  Allergic rhinitis:  This has been present for years/over a year.  The nasal/sinus congestion on/off has been present for years and is currently stable/ same as usual.  Medications used on/off.  Asthma/COPD/chronic lung disease: The has been present for years/over a year.  It is chronic.  The problem is stable. The patient smoked for years and still smokes.  Respiratory failure:  This has been present over 6 months; it is a chronic condition. It is hypoxic in nature.  It is stable.  It was associated with history of smoking.   It is treated with oxygen.    Chronic back pain:  The pain has been present for years/over a year.   It is chronic.   The pain is stable.  2-3 /10 pain most of time and usually is worse after activities.  The pain limits activities.  The problem has been evaulated and the patient has no desire for more testing/change in approach.   Elevated fasting glucose: This has been present for years/over a year.  It is chronic.  It  is controlled. No home accuchecks have yet been requested yet as only impaired.  No signs hypoglycmeia manifest as syncope/near syncope or diaphoretic/sweating spisodes.  A1c below if available.  Diet loose  Hyperlipidemia: This has been present for years/over a year.  It is chronic.   It is generally controlled.   .  Labs are needed periodic to monitor condition/safetly.   Rx therapy Zocor.    Has an/another acute issue today: none.    The following portions of the patient's history were reviewed and updated as appropriate: allergies, current medications, past family history, past medical history, past social history, past surgical history and problem list.  Records acquired and reviewed; TCC migrated.      Current Outpatient Prescriptions:   •  colestipol (COLESTID) 1 G tablet, Take 1 tablet by mouth 2 times daily., Disp: , Rfl:   •  fexofenadine (ALLEGRA) 60 MG tablet, Take 60 mg by mouth 2 (Two) Times a Day., Disp: , Rfl:   •  FLOVENT  MCG/ACT inhaler, INHALE 1 PUFF BY MOUTH TWICE DAILY, Disp: 12 g, Rfl: 5  •  furosemide (LASIX) 20 MG tablet, Take 1 tablet by mouth Daily., Disp: 30 tablet, Rfl: 5  •  losartan (COZAAR) 50 MG tablet, TAKE 1 TABLET BY MOUTH ONCE DAILY, Disp: 90 tablet, Rfl: 1  •  metoprolol tartrate (LOPRESSOR) 50 MG tablet, TAKE ONE-HALF TABLET BY MOUTH TWICE DAILY, Disp: 90 tablet, Rfl: 0  •  omeprazole (priLOSEC) 20 MG capsule, 20 mg 2 (Two) Times a Day., Disp: , Rfl:   •  potassium chloride (K-DUR,KLOR-CON) 10 MEQ CR tablet, TAKE 1 TABLET BY MOUTH EVERY DAY, Disp: 90 tablet, Rfl: 1  •  simvastatin (ZOCOR) 20 MG tablet, TAKE 1 TABLET BY MOUTH DAILY, Disp: 90 tablet, Rfl: 0  •  tamsulosin (FLOMAX) 0.4 MG capsule 24 hr capsule, TAKE ONE CAPSULE BY MOUTH DAILY AFTER THE SAME MEAL, Disp: 30 capsule, Rfl: 0  •  therapeutic multivitamin-minerals (THERAGRAN-M) tablet, Take 1 tablet by mouth daily., Disp: , Rfl:   •  calcium-vitamin D (OSCAL-500) 500-200 MG-UNIT per tablet, Take 1 tablet by mouth  2 times daily.  , Disp: , Rfl:     No Known Allergies    Review of Systems  GENERAL:  Active/slower with limits, speed, stamina for age and SOB. Sleep is ok. No fever now.  ENDO:  No syncope, near or diaphoretic sweaty spells.  HEENT: No head injury same/occ headache,  No vision change,  Same significant hearing loss.  Ears without pain/drainage.  No sore throat.   Usual nasal/sinus congestion/drainage. No epistaxis.  CHEST: No chest wall tenderness or mass. No cough, without wheeze.  No SOB; no hemoptysis.  CV: No chest pain, palpitations, ankle edema.  GI: No heartburn, dysphagia.  No abdominal pain, diarrhea, constipation.  No rectal bleeding, or melena.    :  Voids without dysuria, or incontinence to completion.  ORTHO: No painful/swollen joints but various on /off sore.  No change daily sore neck or back.  No acute neck or back pain without recent injury.   NEURO: No dizziness, weakness of extremities.  No change some UE/LE numbness/paresthesias.   PSYCH: No memory loss.  Mood good; occ anxious, depressed but/and not suicidal.  Tries to tolerate stress .     Results for orders placed or performed in visit on 08/28/17   Basic metabolic panel   Result Value Ref Range    Glucose 102 (H) 70 - 100 mg/dL    BUN 14 5 - 21 mg/dL    Creatinine 0.94 0.50 - 1.40 mg/dL    eGFR Non African Am 80 >60 mL/min/1.73    eGFR African Am 96 >60 mL/min/1.73    BUN/Creatinine Ratio 14.9 7.0 - 25.0    Sodium 142 135 - 145 mmol/L    Potassium 4.2 3.5 - 5.3 mmol/L    Chloride 104 98 - 110 mmol/L    Total CO2 28.0 24.0 - 31.0 mmol/L    Calcium 9.4 8.4 - 10.4 mg/dL       Lab Results   Component Value Date    HGBA1C 6.30 08/18/2017    HGBA1C 6.30 02/17/2017       Lab Results   Component Value Date     08/28/2017     08/18/2017     02/17/2017    K 4.2 08/28/2017    K 4.4 08/18/2017    K 4.5 02/17/2017     08/28/2017     08/18/2017     02/17/2017    CO2 28.0 08/28/2017    CO2 25.0 08/18/2017    CO2 31.0  "02/17/2017    GLUCOSE 120 (H) 05/09/2016    GLUCOSE 152 (H) 08/15/2014    GLUCOSE 84 08/11/2014    BUN 14 08/28/2017    BUN 10 08/18/2017    BUN 15 02/17/2017    CREATININE 0.94 08/28/2017    CREATININE 0.88 08/18/2017    CREATININE 0.95 02/17/2017    CALCIUM 9.4 08/28/2017    CALCIUM 9.3 08/18/2017    CALCIUM 9.4 02/17/2017    EGFRCLEARA 107 05/09/2016       No results found for: PSA     Lab Results:  CBC:    Lab Results - Last 18 Months  Lab Units 08/18/17  1252 02/17/17  1025   WBC 10*3/mm3 12.86* 12.31*   HEMATOCRIT % 34.3* 35.9*     CMP:    Lab Results - Last 18 Months  Lab Units 08/28/17  1212 08/18/17  1252 02/17/17  1025 11/22/16  0748   SODIUM mmol/L 142 138 138  --    CHLORIDE mmol/L 104 102 101  --    TOTAL CO2, ARTERIAL mmol/L 28.0 25.0 31.0  --    BUN mg/dL 14 10 15  --    CREATININE mg/dL 0.94 0.88 0.95 1.00   EGFR IF NONAFRICN AM mL/min/1.73 80 86 79  --    EGFR IF AFRICN AM mL/min/1.73 96 104 96  --    CALCIUM mg/dL 9.4 9.3 9.4  --      THYROID:No results for input(s): TSH, T3FREE, FREET4 in the last 58218 hours.    Invalid input(s): T3, T4  A1C:    Lab Results - Last 18 Months  Lab Units 08/18/17  1252 02/17/17  1025   HEMOGLOBIN A1C % 6.30 6.30       Objective   /60  Pulse 92  Temp 97.9 °F (36.6 °C) (Oral)   Resp 18  Ht 72\" (182.9 cm)  Wt 293 lb (133 kg)  SpO2 98%  BMI 39.74 kg/m2    Physical Exam  GENERAL:  Well nourished/developed in no acute distress. Obese  SKIN: Turgor excellent, without wound, rash, lesion.  HEENT: Normal cephalic without trauma.  Pupils equal round reactive to light. Extraocular motions full without nystagmus.   External canals nonobstructive nontender without reddness. Tymphatic membranes anders with david structures intact.   Oral cavity without growths, exudates, and moist.  Posterior pharynx without mass, obstruction, redness.  No thyromegaly, mass, tenderness, lymphadenopathy and supple.  CV: Regular rhythm.  No murmur, gallop,  edema. Posterior pulses " intact.  No carotid bruits.  CHEST: No chest wall tenderness or mass.   LUNGS: Symmetric motion with clear to auscultation.  No dullness to percussion  ABD: Soft, nontender without mass.   ORTHO: Symmetric extremities without swelling/point tenderness.  Full gross range of motion.  NEURO: CN 2-12 grossly intact.  Symmetric facies. 1/4 x bicep knee equal reflexes.  UE/LE   3/5 strength throughout.  Nonfocal use extremities. Speech clear.  Reduced light touch with monofilament, vibratory sensation with tuning fork; equal toes/distal feet.    PSYCH: Oriented x 3.  Pleasant calm, well kept.  Purposeful/directed conservation with intact short/long gross memory.     Assessment/Plan     1. Respiratory failure with hypoxia, unspecified chronicity  Chronic/nocturnal and ? other  - Walking Oximetry    2. Non-seasonal allergic rhinitis, unspecified chronicity, unspecified trigger    3. Chronic obstructive pulmonary disease, unspecified COPD type    4. Tobacco use  Still smoking    5. Elevated fasting glucose  watched    6. Shortness of breath  CHF, COPD  - Walking Oximetry    7. Congestive heart failure, unspecified congestive heart failure chronicity, unspecified congestive heart failure type    8. Chronic back pain, unspecified back location, unspecified back pain laterality      Rx: reviewed.  Any other changes above and:   LAB: reviewed/above.  Orders above and:   Wrap-up/other instructions: discussed as applicable  Regular cardio exercise something everyone should consider and try to do; even if health limitations (ie find that exercise UE/LE/cardio that they can tolerate).  Normal weight a goal for everyone.  Healthy diet helpful for weight management, illness prevention.  If over 50-screening exams include men PSA/rectal exam, women mammograms, and  everyone colonoscopy screening for colon cancer.   If a smoker; we actively reviewed the handout sent home with patient (>5 min)  Patient Instructions   Steps to Quit  "tobacco     Smoking or other ways of using tobacco can be harmful to your health and can affect almost every organ in your body.  Tobacco puts you, and those around you, at risk for developing many serious chronic diseases. Quitting tobacco is difficult, but it is one of the best things that you can do for your health. It is never too late to quit.    WHAT ARE THE BENEFITS OF QUITTING TOBACCO?  When you quit smoking, you lower your risk of developing serious diseases and conditions, such as:  · Lung cancer or lung disease, such as COPD.  · Heart disease.  · Stroke.  · Heart attack.  · Infertility.  · Osteoporosis and bone fractures.  · Cancer of the mouth  Additionally, symptoms such as coughing, wheezing, and shortness of breath may get better when you quit. You may also find that you get sick less often because your body is stronger at fighting off colds and infections. If you are female/pregnant, quitting smoking can help to reduce your chances of having a baby of low birth weight.    HOW DO I GET READY TO QUIT?  When you decide to quit tobacco, create a plan to make sure that you are successful. Before you quit:  · Pick a date to start the process. Set a date within the next two weeks to give you time to prepare.  · Write down the reasons why you are quitting. Keep this list in places where you will see it often, such as on your bathroom mirror or in your car or wallet.  · Identify the people, places, things, and activities that make you want to smoke (triggers) and avoid them.  ·  If you decide to quit \"cold turkey\" make sure to take these actions:  ¨ Throw away all cigarettes/tobacco at home, at work, and in your car.  ¨ Throw away smoking accessories, such as ashtrays and lighters.  ¨ Clean your car and make sure to empty the ashtray.  ¨ Clean your home, including curtains and carpets.  · If you decide to quit by smoking while using nicotene products  ¨ Get your nicotene patches, gum (or alternative) together " "and ready for the process.   ¨ As you start the patch; 3-4 days later start slowly weaning the cigarette; every time you want a cigarette chew nicotene gum instead.    ¨ Slowly over several weeks wean the cigarettes away; then begin to wean away the nicotene patch which starts a 21 mg, and then comes in 14 mg, and 7 mg strengths.   ¨ Every time you want a cigarette; chew a nicotene gum.   ¨ If this takes 6m; dont stop.   · Tell your family, friends, and coworkers that you are quitting. Support from your loved ones can make quitting easier.  · Call the [place] TOBACCO QUITLINE 4-268-BPEI-YES (1-248.168.8140) or go to their web site www.idph.[facility].il.us/smokefree/sf_quit htm  · We stand ready to help you with medications that you might be able to take that could make your quitting a lot easier and more likely successful.   · Consider a return/repeat visit to go over all of this and be successful at quitting.     WHAT STRATEGIES CAN I USE TO QUIT TOBACCO?   Talk with us about different strategies to quit tobacco. Some strategies include:  · Quitting tobacco altogether instead of gradually lessening how much you smoke over a period of time. Research shows that quitting \"cold turkey\" is more successful than gradually quitting.  But if you chose the slower approach above while smoking; that is ok.   · Attending in-person counseling to help you build problem-solving skills. You are more likely to have success in quitting if you attend several counseling sessions. Even short sessions of 10 minutes can be effective.  · Finding resources and support systems that can help you to quit tobacco and remain smoke-free after you quit. These resources are most helpful when you use them often. They can include:  ¨ Online chats with a counselor.  ¨ Telephone quitlines.  ¨ Printed self-help materials.  ¨ Support groups or group counseling.  ¨ Text messaging programs.  ¨ Mobile phone applications.  · Taking medicines to help you quit " tobacco. (If you are pregnant or breastfeeding, talk with your health care provider first.) Some medicines contain nicotine and some do not. Both types of medicines help with cravings, but the medicines that include nicotine help to relieve withdrawal symptoms. Your health care provider may recommend:  ¨ Nicotine patches, gum, or lozenges.  ¨ Nicotine inhalers or sprays.  ¨ Non-nicotine medicine that is taken by mouth.  Talk with us about combining strategies, such as taking medicines while you are also receiving in-person counseling. Using these two strategies together makes you more likely to succeed in quitting than if you used either strategy on its own.  If you are pregnant or breastfeeding, talk with us about finding counseling or other support strategies to quit smoking. Do not take medicine to help you quit smoking unless told to do so by us or another health care provider.     WHAT THINGS CAN I DO TO MAKE IT EASIER TO QUIT?  Quitting tobacco might feel overwhelming at first, but there is a lot that you can do to make it easier. Take these important actions:  · Reach out to your family and friends and ask that they support and encourage you during this time. Call telephone quitlines, reach out to support groups, or work with a counselor for support.  · Ask people who use tobacco to avoid using it around you.  · Avoid places that trigger you to use tobacco, such as bars, parties, or smoke-break areas at work.  · Spend time around people who do not use tobacco.  · Lessen stress in your life, because stress can be a tobacco trigger for some people. To lessen stress, try:  ¨ Exercising regularly.  ¨ Deep-breathing exercises.  ¨ Yoga.  ¨ Meditating.  ¨ Performing a body scan. This involves closing your eyes, scanning your body from head to toe, and noticing which parts of your body are particularly tense. Purposefully relax the muscles in those areas.  · Download or purchase mobile phone or tablet apps  (applications) that can help you stick to your quit plan by providing reminders, tips, and encouragement. There are many free apps, such as QuitGuide from the CDC (Centers for Disease Control and Prevention). You can find other support for quitting tobacco (tobacco cessation) through smokefree.gov and other websites.    HOW WILL I FEEL WHEN I QUIT  TOBACCO?  Within the first 24 hours of quitting tobacco, you may start to feel some withdrawal symptoms. These symptoms are usually most noticeable 2-3 days after quitting, but they usually do not last beyond 2-3 weeks. Changes or symptoms that you might experience include:  · Mood swings.  · Restlessness, anxiety, or irritation.  · Difficulty concentrating.  · Dizziness.  · Strong cravings for sugary foods in addition to nicotine.  · Mild weight gain.  · Constipation.  · Nausea.  · Coughing or a sore throat.  · Changes in how your medicines work in your body.  · A depressed mood.  · Difficulty sleeping (insomnia).  After the first 2-3 weeks of quitting, you may start to notice more positive results, such as:  · Improved sense of smell and taste.  · Decreased coughing and sore throat.  · Slower heart rate.  · Lower blood pressure.  · Clearer skin.  · The ability to breathe more easily.  · Fewer sick days.  Quitting tobacco is very challenging for most people. Do not get discouraged if you are not successful the first time. Some people need to make many attempts to quit before they achieve long-term success. Do your best to stick to your quit plan, and talk with us if you have questions, problems, or concerns.        Use your prescription nasal spray regularily; especially at going to bed.       Follow up: Return for lab during/or just before next apt;, Dr Rosario-, as planned;.  Future Appointments  Date Time Provider Department Center   12/18/2017 10:00 AM MD MADELYN [facility] None   2/21/2018 8:10 AM LAB [facility] None   2/23/2018 1:00 PM [facility]  Humberto Rosario MD [facility] None

## 2017-11-17 ENCOUNTER — TELEPHONE (OUTPATIENT)
Dept: FAMILY MEDICINE CLINIC | Facility: CLINIC | Age: 67
End: 2017-11-17

## 2017-11-17 RX ORDER — ESCITALOPRAM OXALATE 20 MG/1
20 TABLET ORAL DAILY
Qty: 30 TABLET | Refills: 5 | Status: SHIPPED | OUTPATIENT
Start: 2017-11-17 | End: 2018-04-24 | Stop reason: SDUPTHER

## 2017-11-17 NOTE — TELEPHONE ENCOUNTER
Pt notified and states that Prozac is a bad medicine and he refuses to take that willing to take another kind would like to try Lexapro since that is what wife takes

## 2017-11-17 NOTE — TELEPHONE ENCOUNTER
Current Outpatient Prescriptions:   •  calcium-vitamin D (OSCAL-500) 500-200 MG-UNIT per tablet, Take 1 tablet by mouth 2 times daily.  , Disp: , Rfl:   •  colestipol (COLESTID) 1 G tablet, Take 1 tablet by mouth 2 times daily., Disp: , Rfl:   •  fexofenadine (ALLEGRA) 60 MG tablet, Take 60 mg by mouth 2 (Two) Times a Day., Disp: , Rfl:   •  FLOVENT  MCG/ACT inhaler, INHALE 1 PUFF BY MOUTH TWICE DAILY, Disp: 12 g, Rfl: 5  •  furosemide (LASIX) 20 MG tablet, Take 1 tablet by mouth Daily., Disp: 30 tablet, Rfl: 5  •  losartan (COZAAR) 50 MG tablet, TAKE 1 TABLET BY MOUTH ONCE DAILY, Disp: 90 tablet, Rfl: 1  •  metoprolol tartrate (LOPRESSOR) 50 MG tablet, TAKE ONE-HALF TABLET BY MOUTH TWICE DAILY, Disp: 90 tablet, Rfl: 0  •  omeprazole (priLOSEC) 20 MG capsule, 20 mg 2 (Two) Times a Day., Disp: , Rfl:   •  potassium chloride (K-DUR,KLOR-CON) 10 MEQ CR tablet, TAKE 1 TABLET BY MOUTH EVERY DAY, Disp: 90 tablet, Rfl: 1  •  simvastatin (ZOCOR) 20 MG tablet, TAKE 1 TABLET BY MOUTH DAILY, Disp: 90 tablet, Rfl: 0  •  tamsulosin (FLOMAX) 0.4 MG capsule 24 hr capsule, TAKE ONE CAPSULE BY MOUTH DAILY AFTER THE SAME MEAL, Disp: 30 capsule, Rfl: 0  •  therapeutic multivitamin-minerals (THERAGRAN-M) tablet, Take 1 tablet by mouth daily., Disp: , Rfl:     prozac 20 mg one a day #30 x 5

## 2017-11-17 NOTE — TELEPHONE ENCOUNTER
Cannot do it unless patient himself agrees/agrees to use a Rx  Is he feeling anxious  Is he feeling depressed  (confirm not suicidal)

## 2017-11-17 NOTE — TELEPHONE ENCOUNTER
Pt notified and states he would be willing to try a med states the little things get to him a lot of the times and feels anxious a lot of the times [facility]

## 2017-11-17 NOTE — TELEPHONE ENCOUNTER
"Vm \"can Dr Rosario give him something to chill? He is mad all the time and and nacho, I cant it no more\"  "

## 2017-11-21 NOTE — PROGRESS NOTES
Does not qualify for continuous oxygen  If SOB bothering him too much  Back to lung and to heart MDs to review.

## 2017-12-14 RX ORDER — LOSARTAN POTASSIUM 50 MG/1
TABLET ORAL
Qty: 90 TABLET | Refills: 1 | Status: SHIPPED | OUTPATIENT
Start: 2017-12-14 | End: 2018-05-20 | Stop reason: SDUPTHER

## 2018-01-10 DIAGNOSIS — N40.1 BENIGN NON-NODULAR PROSTATIC HYPERPLASIA WITH LOWER URINARY TRACT SYMPTOMS: ICD-10-CM

## 2018-01-10 RX ORDER — POTASSIUM CHLORIDE 750 MG/1
TABLET, EXTENDED RELEASE ORAL
Qty: 90 TABLET | Refills: 1 | Status: SHIPPED | OUTPATIENT
Start: 2018-01-10 | End: 2018-07-16 | Stop reason: SDUPTHER

## 2018-01-10 RX ORDER — TAMSULOSIN HYDROCHLORIDE 0.4 MG/1
CAPSULE ORAL
Qty: 30 CAPSULE | Refills: 0 | Status: SHIPPED | OUTPATIENT
Start: 2018-01-10 | End: 2018-02-07 | Stop reason: SDUPTHER

## 2018-01-23 DIAGNOSIS — N28.89 RENAL MASS: Primary | ICD-10-CM

## 2018-01-25 ENCOUNTER — HOSPITAL ENCOUNTER (OUTPATIENT)
Dept: CT IMAGING | Facility: HOSPITAL | Age: 68
Discharge: HOME | End: 2018-01-25
Attending: UROLOGY | Admitting: UROLOGY

## 2018-01-25 LAB — CREAT BLDA-MCNC: 0.9 MG/DL (ref 0.6–1.3)

## 2018-01-25 PROCEDURE — 74178 CT ABD&PLV WO CNTR FLWD CNTR: CPT

## 2018-01-25 PROCEDURE — 0 IOPAMIDOL 61 % SOLUTION: Performed by: UROLOGY

## 2018-01-25 PROCEDURE — 82565 ASSAY OF CREATININE: CPT

## 2018-01-25 RX ADMIN — IOPAMIDOL 100 ML: 612 INJECTION, SOLUTION INTRAVENOUS at 09:15

## 2018-01-29 ENCOUNTER — OFFICE VISIT (OUTPATIENT)
Dept: UROLOGY | Facility: CLINIC | Age: 68
End: 2018-01-29

## 2018-01-29 VITALS — BODY MASS INDEX: 37.93 KG/M2 | TEMPERATURE: 97.5 F | WEIGHT: 280 LBS | HEIGHT: 72 IN

## 2018-01-29 DIAGNOSIS — N28.1 RENAL CYST, ACQUIRED, RIGHT: ICD-10-CM

## 2018-01-29 DIAGNOSIS — C64.9 RENAL CELL CARCINOMA, UNSPECIFIED LATERALITY (HCC): Primary | ICD-10-CM

## 2018-01-29 DIAGNOSIS — R35.1 NOCTURIA: ICD-10-CM

## 2018-01-29 LAB
BILIRUB BLD-MCNC: NEGATIVE MG/DL
CLARITY, POC: CLEAR
COLOR UR: YELLOW
GLUCOSE UR STRIP-MCNC: NEGATIVE MG/DL
KETONES UR QL: NEGATIVE
LEUKOCYTE EST, POC: NEGATIVE
NITRITE UR-MCNC: NEGATIVE MG/ML
PH UR: 5.5 [PH] (ref 5–8)
PROT UR STRIP-MCNC: NEGATIVE MG/DL
RBC # UR STRIP: ABNORMAL /UL
SP GR UR: 1.02 (ref 1–1.03)
UROBILINOGEN UR QL: NORMAL

## 2018-01-29 PROCEDURE — 81003 URINALYSIS AUTO W/O SCOPE: CPT | Performed by: UROLOGY

## 2018-01-29 PROCEDURE — 99214 OFFICE O/P EST MOD 30 MIN: CPT | Performed by: UROLOGY

## 2018-01-29 NOTE — PROGRESS NOTES
Mr. Bartlett is 67 y.o. male    CHIEF COMPLAINT: I am here to follow up on my renal cell carcinoma. I completed my CT.     HPI  Renal Cell Carcinoma  Patient is here for follow up for Renal Cell Carcinoma that in initiated in the left kidney.  It was diagnosed approximately 3 year(s) ago. Severity of disease is described by organ confined disease.    Pathologic Histology is clear cell carcinoma. Previous management includes partial nephrectomy  with a robot assisted laparoscopic technique.   Associated symptoms include no symptoms. He is bothered by frequency and nocturia made worse by his Lasix.         The following portions of the patient's history were reviewed and updated as appropriate: allergies, current medications, past family history, past medical history, past social history, past surgical history and problem list.    Review of Systems   Constitutional: Negative for chills and fever.   Gastrointestinal: Negative for abdominal pain, anal bleeding and blood in stool.   Genitourinary: Negative for flank pain and hematuria.         Current Outpatient Prescriptions:   •  calcium-vitamin D (OSCAL-500) 500-200 MG-UNIT per tablet, Take 1 tablet by mouth 2 times daily.  , Disp: , Rfl:   •  colestipol (COLESTID) 1 G tablet, Take 1 tablet by mouth 2 times daily., Disp: , Rfl:   •  escitalopram (LEXAPRO) 20 MG tablet, Take 1 tablet by mouth Daily., Disp: 30 tablet, Rfl: 5  •  fexofenadine (ALLEGRA) 60 MG tablet, Take 60 mg by mouth 2 (Two) Times a Day., Disp: , Rfl:   •  FLOVENT  MCG/ACT inhaler, INHALE 1 PUFF BY MOUTH TWICE DAILY, Disp: 12 g, Rfl: 5  •  furosemide (LASIX) 20 MG tablet, Take 1 tablet by mouth Daily., Disp: 30 tablet, Rfl: 5  •  losartan (COZAAR) 50 MG tablet, TAKE 1 TABLET BY MOUTH ONCE DAILY, Disp: 90 tablet, Rfl: 1  •  metoprolol tartrate (LOPRESSOR) 50 MG tablet, TAKE ONE-HALF TABLET BY MOUTH TWICE DAILY, Disp: 90 tablet, Rfl: 0  •  omeprazole (priLOSEC) 20 MG capsule, 20 mg 2 (Two) Times a  "Day., Disp: , Rfl:   •  potassium chloride (K-DUR,KLOR-CON) 10 MEQ CR tablet, TAKE 1 TABLET BY MOUTH EVERY DAY, Disp: 90 tablet, Rfl: 1  •  simvastatin (ZOCOR) 20 MG tablet, TAKE 1 TABLET BY MOUTH DAILY, Disp: 90 tablet, Rfl: 0  •  tamsulosin (FLOMAX) 0.4 MG capsule 24 hr capsule, TAKE ONE CAPSULE BY MOUTH DAILY AFTER THE SAME MEAL, Disp: 30 capsule, Rfl: 0  •  therapeutic multivitamin-minerals (THERAGRAN-M) tablet, Take 1 tablet by mouth daily., Disp: , Rfl:     Past Medical History:   Diagnosis Date   • Acid reflux    • Hypertension    • Renal cancer 08/2014    Lt RALPart'l Nx; t1a Clear Cell with negative margin       Past Surgical History:   Procedure Laterality Date   • LAPAROSCOPIC PARTIAL NEPHRECTOMY Left 08/14/2014    Robot Assisted       Social History     Social History   • Marital status:      Spouse name: Melanie   • Number of children: 1   • Years of education: 12     Occupational History   • retired      post office     Social History Main Topics   • Smoking status: Current Every Day Smoker     Packs/day: 2.00     Types: Cigarettes     Start date: 4/30/1966   • Smokeless tobacco: Never Used      Comment: 2-17-17 \"down to 9 cigarettes a day\"   • Alcohol use No   • Drug use: No   • Sexual activity: Defer     Other Topics Concern   • None     Social History Narrative       Family History   Problem Relation Age of Onset   • No Known Problems Father    • No Known Problems Mother        Temp 97.5 °F (36.4 °C)  Ht 182.9 cm (72\")  Wt 127 kg (280 lb)  BMI 37.97 kg/m2    Physical Exam  Constitutional: The patient  is oriented to person, place, and time. They  appear obese without deformity. No distress.   Pulmonary/Chest: Effort normal.   Abdominal: Soft. The patient exhibits no distension and no mass. There is no tenderness. There is no rebound and no guarding. No hernia.   Neurological: Patient is alert and oriented to person, place, and time.   Skin: Skin is warm and dry. Not diaphoretic. "   Psychiatric:  normal mood and affect.   Vitals reviewed.      Results for orders placed or performed in visit on 01/29/18   POC Urinalysis Dipstick, Automated   Result Value Ref Range    Color Yellow Yellow, Straw, Dark Yellow, Chery    Clarity, UA Clear Clear    Glucose, UA Negative Negative, 1000 mg/dL (3+) mg/dL    Bilirubin Negative Negative    Ketones, UA Negative Negative    Specific Gravity  1.020 1.005 - 1.030    Blood, UA Trace (A) Negative    pH, Urine 5.5 5.0 - 8.0    Protein, POC Negative Negative mg/dL    Urobilinogen, UA Normal Normal    Leukocytes Negative Negative    Nitrite, UA Negative Negative     Independent review of CT scan of the abdomen/pelvis The patient has undergone a CT scan of the abdomen and pelvis With contrast. The images are available for me to review as an independent interpretation for evaluation and management.  Assessment of the renal parenchyma with regards to thickness, scarring, symmetry in appearance and function, presence of masses both pre-and postcontrast, and calcifications are noted.  The collecting system with regard to dilatation, presence of calcifications, and masses were reviewed.  The course and caliber the ureters also noted.  The renal vessels and retroperitoneum is inspected for pathology.  The solid viscera and bowel pattern are briefly reviewed, but will also be inspected by the radiologist. The renal pelvis is inspected.  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 This is a stable finding. The right kidney has a simple renal cyst. .      Assessment and Plan  Diagnoses and all orders for this visit:    Renal cell carcinoma, unspecified laterality  -     POC Urinalysis Dipstick, Automated  -     CT Abdomen Kidney With & Without Contrast; Future    Nocturia    Renal cyst, acquired, right    1.  He has no evidence recurrent disease.  #2.Nocturia is explained to the patient is a  manifestation of one the following or a combination of voiding dysfunction, other medical conditions, or a sleep disorder.  Nocturia as a completely isolated symptommore often represents a non-urologic problem or medical condition.  It is explained that as patient's age, they often have a reverse Circadian rhythm voiding pattern in which up to two thirds of the urine in a 24-hour period can be excreted at night.  We also went over sleep disorders of the patient which may affect them.  Volume-related disorders the cause mobilization of peripheral edema are also possible causes of nocturia including the medications used to treat them.  Therefore, treatment must be directed at the cause of the symptom.  I explained we will do our best to evaluate any urologic cause of symptoms, but oftentimes we find no abnormality in voiding dysfunction to correct.  Evaluation options are explained to the patient.  #3. His simple renal cyst is stable.           Felix Ramos MD  01/29/18  10:08 AM      Cc: Bassam Rosario MD

## 2018-02-07 DIAGNOSIS — N40.1 BENIGN NON-NODULAR PROSTATIC HYPERPLASIA WITH LOWER URINARY TRACT SYMPTOMS: ICD-10-CM

## 2018-02-07 RX ORDER — SIMVASTATIN 20 MG
TABLET ORAL
Qty: 90 TABLET | Refills: 0 | Status: SHIPPED | OUTPATIENT
Start: 2018-02-07 | End: 2018-04-24 | Stop reason: SDUPTHER

## 2018-02-07 RX ORDER — TAMSULOSIN HYDROCHLORIDE 0.4 MG/1
CAPSULE ORAL
Qty: 30 CAPSULE | Refills: 0 | Status: SHIPPED | OUTPATIENT
Start: 2018-02-07 | End: 2018-03-04 | Stop reason: SDUPTHER

## 2018-02-07 RX ORDER — METOPROLOL TARTRATE 50 MG/1
TABLET, FILM COATED ORAL
Qty: 90 TABLET | Refills: 0 | Status: SHIPPED | OUTPATIENT
Start: 2018-02-07 | End: 2018-03-06 | Stop reason: SDUPTHER

## 2018-02-16 DIAGNOSIS — M81.0 OSTEOPOROSIS, UNSPECIFIED OSTEOPOROSIS TYPE, UNSPECIFIED PATHOLOGICAL FRACTURE PRESENCE: ICD-10-CM

## 2018-02-16 DIAGNOSIS — F43.10 POSTTRAUMATIC STRESS DISORDER: ICD-10-CM

## 2018-02-16 DIAGNOSIS — E78.5 HYPERLIPIDEMIA, UNSPECIFIED HYPERLIPIDEMIA TYPE: Primary | Chronic | ICD-10-CM

## 2018-02-16 DIAGNOSIS — K76.0 FATTY LIVER: Chronic | ICD-10-CM

## 2018-02-16 DIAGNOSIS — E87.6 HYPOPOTASSEMIA: Chronic | ICD-10-CM

## 2018-02-16 DIAGNOSIS — F41.9 ANXIETY: Chronic | ICD-10-CM

## 2018-02-16 DIAGNOSIS — I50.9 CONGESTIVE HEART FAILURE, UNSPECIFIED CONGESTIVE HEART FAILURE CHRONICITY, UNSPECIFIED CONGESTIVE HEART FAILURE TYPE: ICD-10-CM

## 2018-02-16 DIAGNOSIS — F10.11 HISTORY OF ALCOHOL ABUSE: ICD-10-CM

## 2018-02-16 DIAGNOSIS — Z85.528 HISTORY OF RENAL CELL CANCER: ICD-10-CM

## 2018-02-16 DIAGNOSIS — D75.89 MACROCYTOSIS: ICD-10-CM

## 2018-02-16 DIAGNOSIS — R25.1 TREMOR: ICD-10-CM

## 2018-02-16 DIAGNOSIS — R60.0 EDEMA LEG: ICD-10-CM

## 2018-02-16 DIAGNOSIS — R39.12 POOR URINARY STREAM: ICD-10-CM

## 2018-02-16 DIAGNOSIS — R73.01 ELEVATED FASTING GLUCOSE: Chronic | ICD-10-CM

## 2018-02-16 DIAGNOSIS — I10 ESSENTIAL HYPERTENSION: Chronic | ICD-10-CM

## 2018-02-16 DIAGNOSIS — Z00.00 WELLNESS EXAMINATION: ICD-10-CM

## 2018-02-21 ENCOUNTER — RESULTS ENCOUNTER (OUTPATIENT)
Dept: FAMILY MEDICINE CLINIC | Facility: CLINIC | Age: 68
End: 2018-02-21

## 2018-02-21 DIAGNOSIS — I10 ESSENTIAL HYPERTENSION: Chronic | ICD-10-CM

## 2018-02-21 DIAGNOSIS — I50.9 CONGESTIVE HEART FAILURE, UNSPECIFIED CONGESTIVE HEART FAILURE CHRONICITY, UNSPECIFIED CONGESTIVE HEART FAILURE TYPE: ICD-10-CM

## 2018-02-21 DIAGNOSIS — M81.0 OSTEOPOROSIS, UNSPECIFIED OSTEOPOROSIS TYPE, UNSPECIFIED PATHOLOGICAL FRACTURE PRESENCE: ICD-10-CM

## 2018-02-21 DIAGNOSIS — F43.10 POSTTRAUMATIC STRESS DISORDER: ICD-10-CM

## 2018-02-21 DIAGNOSIS — R25.1 TREMOR: ICD-10-CM

## 2018-02-21 DIAGNOSIS — R60.0 EDEMA LEG: ICD-10-CM

## 2018-02-21 DIAGNOSIS — R39.12 POOR URINARY STREAM: ICD-10-CM

## 2018-02-21 DIAGNOSIS — Z00.00 WELLNESS EXAMINATION: ICD-10-CM

## 2018-02-21 DIAGNOSIS — Z85.528 HISTORY OF RENAL CELL CANCER: ICD-10-CM

## 2018-02-21 DIAGNOSIS — R73.01 ELEVATED FASTING GLUCOSE: Chronic | ICD-10-CM

## 2018-02-21 DIAGNOSIS — E87.6 HYPOPOTASSEMIA: Chronic | ICD-10-CM

## 2018-02-21 DIAGNOSIS — D75.89 MACROCYTOSIS: ICD-10-CM

## 2018-02-21 DIAGNOSIS — F41.9 ANXIETY: Chronic | ICD-10-CM

## 2018-02-21 DIAGNOSIS — F10.11 HISTORY OF ALCOHOL ABUSE: ICD-10-CM

## 2018-02-21 DIAGNOSIS — K76.0 FATTY LIVER: Chronic | ICD-10-CM

## 2018-02-21 DIAGNOSIS — E78.5 HYPERLIPIDEMIA, UNSPECIFIED HYPERLIPIDEMIA TYPE: Chronic | ICD-10-CM

## 2018-02-22 LAB
25(OH)D3+25(OH)D2 SERPL-MCNC: 33.3 NG/ML (ref 30–100)
ALBUMIN SERPL-MCNC: 3.9 G/DL (ref 3.5–5)
ALBUMIN/GLOB SERPL: 1.4 G/DL (ref 1.1–2.5)
ALP SERPL-CCNC: 95 U/L (ref 24–120)
ALT SERPL-CCNC: 28 U/L (ref 0–54)
AST SERPL-CCNC: 22 U/L (ref 7–45)
BASOPHILS # BLD AUTO: 0.05 10*3/MM3 (ref 0–0.2)
BASOPHILS NFR BLD AUTO: 0.4 % (ref 0–2)
BILIRUB SERPL-MCNC: 0.4 MG/DL (ref 0.1–1)
BUN SERPL-MCNC: 9 MG/DL (ref 5–21)
BUN/CREAT SERPL: 11.8 (ref 7–25)
CALCIUM SERPL-MCNC: 9.3 MG/DL (ref 8.4–10.4)
CHLORIDE SERPL-SCNC: 102 MMOL/L (ref 98–110)
CHOLEST SERPL-MCNC: 178 MG/DL (ref 130–200)
CO2 SERPL-SCNC: 30 MMOL/L (ref 24–31)
CREAT SERPL-MCNC: 0.76 MG/DL (ref 0.5–1.4)
EOSINOPHIL # BLD AUTO: 0.32 10*3/MM3 (ref 0–0.7)
EOSINOPHIL NFR BLD AUTO: 2.8 % (ref 0–4)
ERYTHROCYTE [DISTWIDTH] IN BLOOD BY AUTOMATED COUNT: 13.7 % (ref 12–15)
FERRITIN SERPL-MCNC: 11.1 NG/ML (ref 17.9–464)
FOLATE SERPL-MCNC: 6.77 NG/ML
GFR SERPLBLD CREATININE-BSD FMLA CKD-EPI: 102 ML/MIN/1.73
GFR SERPLBLD CREATININE-BSD FMLA CKD-EPI: 123 ML/MIN/1.73
GLOBULIN SER CALC-MCNC: 2.8 GM/DL
GLUCOSE SERPL-MCNC: 103 MG/DL (ref 70–100)
HBA1C MFR BLD: 6.1 %
HCT VFR BLD AUTO: 32 % (ref 40–52)
HCV AB S/CO SERPL IA: <0.1 S/CO RATIO (ref 0–0.9)
HDLC SERPL-MCNC: 31 MG/DL
HGB BLD-MCNC: 10.4 G/DL (ref 14–18)
IMM GRANULOCYTES # BLD: 0.03 10*3/MM3 (ref 0–0.03)
IMM GRANULOCYTES NFR BLD: 0.3 % (ref 0–5)
IRON SATN MFR SERPL: 17 % (ref 20–45)
IRON SERPL-MCNC: 65 MCG/DL (ref 42–180)
LDLC SERPL CALC-MCNC: 112 MG/DL (ref 0–99)
LYMPHOCYTES # BLD AUTO: 3.8 10*3/MM3 (ref 0.72–4.86)
LYMPHOCYTES NFR BLD AUTO: 32.9 % (ref 15–45)
MCH RBC QN AUTO: 30.7 PG (ref 28–32)
MCHC RBC AUTO-ENTMCNC: 32.5 G/DL (ref 33–36)
MCV RBC AUTO: 94.4 FL (ref 82–95)
MONOCYTES # BLD AUTO: 0.84 10*3/MM3 (ref 0.19–1.3)
MONOCYTES NFR BLD AUTO: 7.3 % (ref 4–12)
NEUTROPHILS # BLD AUTO: 6.52 10*3/MM3 (ref 1.87–8.4)
NEUTROPHILS NFR BLD AUTO: 56.3 % (ref 39–78)
NRBC BLD AUTO-RTO: 0 /100 WBC (ref 0–0)
PLATELET # BLD AUTO: 262 10*3/MM3 (ref 130–400)
POTASSIUM SERPL-SCNC: 3.6 MMOL/L (ref 3.5–5.3)
PROT SERPL-MCNC: 6.7 G/DL (ref 6.3–8.7)
PSA SERPL-MCNC: 0.84 NG/ML (ref 0–4)
RBC # BLD AUTO: 3.39 10*6/MM3 (ref 4.8–5.9)
RETICS/RBC NFR AUTO: 2 % (ref 0.6–1.8)
SODIUM SERPL-SCNC: 144 MMOL/L (ref 135–145)
T4 SERPL-MCNC: 6.2 UG/DL (ref 4.5–12)
TIBC SERPL-MCNC: 390 MCG/DL (ref 225–420)
TRIGL SERPL-MCNC: 173 MG/DL (ref 0–149)
TSH SERPL DL<=0.005 MIU/L-ACNC: 0.65 MIU/ML (ref 0.47–4.68)
UIBC SERPL-MCNC: 325 MCG/DL
VIT B12 SERPL-MCNC: 489 PG/ML (ref 239–931)
VLDLC SERPL CALC-MCNC: 34.6 MG/DL
WBC # BLD AUTO: 11.56 10*3/MM3 (ref 4.8–10.8)

## 2018-02-23 ENCOUNTER — OFFICE VISIT (OUTPATIENT)
Dept: FAMILY MEDICINE CLINIC | Facility: CLINIC | Age: 68
End: 2018-02-23

## 2018-02-23 VITALS
RESPIRATION RATE: 18 BRPM | SYSTOLIC BLOOD PRESSURE: 160 MMHG | DIASTOLIC BLOOD PRESSURE: 60 MMHG | WEIGHT: 282 LBS | OXYGEN SATURATION: 94 % | TEMPERATURE: 98.3 F | BODY MASS INDEX: 38.19 KG/M2 | HEART RATE: 100 BPM | HEIGHT: 72 IN

## 2018-02-23 DIAGNOSIS — I10 ESSENTIAL HYPERTENSION: Primary | Chronic | ICD-10-CM

## 2018-02-23 DIAGNOSIS — E87.6 HYPOPOTASSEMIA: Chronic | ICD-10-CM

## 2018-02-23 DIAGNOSIS — J96.91 RESPIRATORY FAILURE WITH HYPOXIA, UNSPECIFIED CHRONICITY (HCC): ICD-10-CM

## 2018-02-23 DIAGNOSIS — J44.9 CHRONIC OBSTRUCTIVE PULMONARY DISEASE, UNSPECIFIED COPD TYPE (HCC): Chronic | ICD-10-CM

## 2018-02-23 DIAGNOSIS — E78.5 HYPERLIPIDEMIA, UNSPECIFIED HYPERLIPIDEMIA TYPE: Chronic | ICD-10-CM

## 2018-02-23 DIAGNOSIS — R73.01 ELEVATED FASTING GLUCOSE: Chronic | ICD-10-CM

## 2018-02-23 DIAGNOSIS — Z86.73 HISTORY OF TIA (TRANSIENT ISCHEMIC ATTACK): ICD-10-CM

## 2018-02-23 DIAGNOSIS — R25.1 TREMOR: ICD-10-CM

## 2018-02-23 DIAGNOSIS — I50.9 CONGESTIVE HEART FAILURE, UNSPECIFIED CONGESTIVE HEART FAILURE CHRONICITY, UNSPECIFIED CONGESTIVE HEART FAILURE TYPE: ICD-10-CM

## 2018-02-23 DIAGNOSIS — K76.0 FATTY LIVER: Chronic | ICD-10-CM

## 2018-02-23 DIAGNOSIS — D64.9 ANEMIA, UNSPECIFIED TYPE: ICD-10-CM

## 2018-02-23 PROCEDURE — 99214 OFFICE O/P EST MOD 30 MIN: CPT | Performed by: FAMILY MEDICINE

## 2018-02-23 NOTE — PROGRESS NOTES
Subjective   Felix Bartlett . is a 67 y.o. male presenting with chief complaint of:   Chief Complaint   Patient presents with   • Hypertension   • Hyperlipidemia   • Congestive Heart Failure   • Transient Ischemic Attack   • Osteoporosis   • Anxiety   • Edema       History of Present Illness :  With aide and wife.   Has multiple chronic problems to consider that might have a bearing on today's issues;  an interval appointment.       1. Essential hypertension    2. Hypopotassemia    3. Hyperlipidemia, unspecified hyperlipidemia type    4. Fatty liver    5. Elevated fasting glucose    6. Chronic obstructive pulmonary disease, unspecified COPD type    7. Anemia, unspecified type    8. Respiratory failure with hypoxia, unspecified chronicity    9. History of TIA (transient ischemic attack)    10. Tremor    11. Congestive heart failure, unspecified congestive heart failure chronicity, unspecified congestive heart failure type        Other chronic problem/s to consider:   HTN.  The HTN has been present for years/it is chronic.  The HTN is assumed essential/without testing needed to look for other.  The HTN is ?controlled manifest by todays blood pressure and no home monitoring.  Associated illness below.   Anemia/iron deficiency: This has been present for years/over a year.  It is chronic.  These has been GI evaulation in the past. There is no current melena, hematochezia. It has been benign to date and stable/watching.  Anxiety: This has been present for years/over a year.  It is chronic.  It is variable.  It is associated with stressors: home/his and his wife health.  Medications being used help.  Medications/Rx change not requested.  Has component of PTSD/from .   Depression: This has been present for years/over a year.  It is chronic.  It is variable.  It is associated with stressors: same.  Medications being used help.  Medications/Rx change not requested.   No suicide ideation/intent.   CHF: Chronic for  years; diastolic on echo. Stable LE edema.    Asthma/COPD/chronic lung disease: The has been present for years/over a year.  It is chronic.  The problem is stabl. The patient smoked for years and has stopped.  Respiratory failure:  This has been present over 6 months; it is a chronic condition. It is hypoxic in nature.  It is stable.  It was associated with history of smoking.   It is treated with oxygen.    Elevated fasting glucose: This has been present for years/over a year.  It is chronic.  It is controlled. No home accuchecks have yet been requested yet as only impaired.  No signs hypoglycmeia manifest as syncope/near syncope or diaphoretic/sweating spisodes.  A1c below if available.  Diet loose.  Hyperlipidemia: This has been present for years/over a year.  It is chronic.   It is generally controlled.   .  Labs are needed periodic to monitor condition/safetly.   Rx therapy Zocor treated.   Fatty liver: This was discovered years ago/it is chronic.  This problem is asymptomatic for pain, nausea.  Previously advised regular labs to follow this and there can be connection with cirrhosis/life threating liver disease.  Advised often helpful to normalize weight.   History CVA/or TIA:  This happened 6359-5244 (two).  This makes it a chronic concern.  It was without bleed.  The neuro deficits are resolved:      Has an/another acute issue today: none.    The following portions of the patient's history were reviewed and updated as appropriate: allergies, current medications, past family history, past medical history, past social history, past surgical history and problem list.  Records acquired and reviewed; TCC migrated.      Current Outpatient Prescriptions:   •  calcium-vitamin D (OSCAL-500) 500-200 MG-UNIT per tablet, Take 1 tablet by mouth 2 times daily.  , Disp: , Rfl:   •  colestipol (COLESTID) 1 G tablet, Take 1 tablet by mouth 2 times daily., Disp: , Rfl:   •  escitalopram (LEXAPRO) 20 MG tablet, Take 1 tablet  by mouth Daily., Disp: 30 tablet, Rfl: 5  •  fexofenadine (ALLEGRA) 60 MG tablet, Take 60 mg by mouth 2 (Two) Times a Day., Disp: , Rfl:   •  FLOVENT  MCG/ACT inhaler, INHALE 1 PUFF BY MOUTH TWICE DAILY, Disp: 12 g, Rfl: 5  •  furosemide (LASIX) 20 MG tablet, Take 1 tablet by mouth Daily., Disp: 30 tablet, Rfl: 5  •  losartan (COZAAR) 50 MG tablet, TAKE 1 TABLET BY MOUTH ONCE DAILY, Disp: 90 tablet, Rfl: 1  •  metoprolol tartrate (LOPRESSOR) 50 MG tablet, TAKE ONE-HALF TABLET BY MOUTH TWICE DAILY, Disp: 90 tablet, Rfl: 0  •  omeprazole (priLOSEC) 20 MG capsule, 20 mg 2 (Two) Times a Day., Disp: , Rfl:   •  potassium chloride (K-DUR,KLOR-CON) 10 MEQ CR tablet, TAKE 1 TABLET BY MOUTH EVERY DAY, Disp: 90 tablet, Rfl: 1  •  simvastatin (ZOCOR) 20 MG tablet, TAKE 1 TABLET BY MOUTH DAILY, Disp: 90 tablet, Rfl: 0  •  tamsulosin (FLOMAX) 0.4 MG capsule 24 hr capsule, TAKE ONE CAPSULE BY MOUTH DAILY AFTER THE SAME MEAL, Disp: 30 capsule, Rfl: 0  •  therapeutic multivitamin-minerals (THERAGRAN-M) tablet, Take 1 tablet by mouth daily., Disp: , Rfl:     No problems with medications.  Refills if needed done    No Known Allergies    Review of Systems  GENERAL:  Inactive/slower with limits, speed, stamina for age and fatigue. Sleep is ok. No fever now.  ENDO:  No syncope, near or diaphoretic sweaty spells.  HEENT: No change occ headache,  No vision change, Same significant hearing loss.  Ears without pain/drainage.  No sore throat.  No significant nasal/sinus congestion/drainage. No epistaxis.  CHEST: No chest wall tenderness or mass. No change cough, with occ wheeze.  Stable/occ SOB; no hemoptysis.  CV: No chest pain, palpitations, stable daily ankle edema.  GI: No heartburn, dysphagia.  No abdominal pain, diarrhea, constipation.  No rectal bleeding, or melena.    EGD/BHP/Gil/3.4.16  Colonoscopy-polyp/Gil//3.18.16/5 yr    :  Voids without dysuria, or  incontinence to completion; sees sony  ORTHO: No  painful/swollen joints but various on /off sore.  No change sore neck or back.  No acute neck or back pain without recent injury.  NEURO: No dizziness, weakness of extremities.  No change UE/LE numbness/paresthesias.   PSYCH: No memory loss.  Mood good; occ anxious, depressed but/and not suicidal.  Tries to tolerate stress .     Results for orders placed or performed in visit on 02/21/18   Hepatitis C Antibody   Result Value Ref Range    Hep C Virus Ab <0.1 0.0 - 0.9 s/co ratio   Vitamin D 25 Hydroxy   Result Value Ref Range    25 Hydroxy, Vitamin D 33.3 30.0 - 100.0 ng/ml   Hemoglobin A1c   Result Value Ref Range    Hemoglobin A1C 6.10 %   Comprehensive metabolic panel   Result Value Ref Range    Glucose 103 (H) 70 - 100 mg/dL    BUN 9 5 - 21 mg/dL    Creatinine 0.76 0.50 - 1.40 mg/dL    eGFR Non African Am 102 >60 mL/min/1.73    eGFR African Am 123 >60 mL/min/1.73    BUN/Creatinine Ratio 11.8 7.0 - 25.0    Sodium 144 135 - 145 mmol/L    Potassium 3.6 3.5 - 5.3 mmol/L    Chloride 102 98 - 110 mmol/L    Total CO2 30.0 24.0 - 31.0 mmol/L    Calcium 9.3 8.4 - 10.4 mg/dL    Total Protein 6.7 6.3 - 8.7 g/dL    Albumin 3.90 3.50 - 5.00 g/dL    Globulin 2.8 gm/dL    A/G Ratio 1.4 1.1 - 2.5 g/dL    Total Bilirubin 0.4 0.1 - 1.0 mg/dL    Alkaline Phosphatase 95 24 - 120 U/L    AST (SGOT) 22 7 - 45 U/L    ALT (SGPT) 28 0 - 54 U/L   Lipid panel   Result Value Ref Range    Total Cholesterol 178 130 - 200 mg/dL    Triglycerides 173 (H) 0 - 149 mg/dL    HDL Cholesterol 31 (L) >=40 mg/dL    VLDL Cholesterol 34.6 mg/dL    LDL Cholesterol  112 (H) 0 - 99 mg/dL   TSH   Result Value Ref Range    TSH 0.652 0.470 - 4.680 mIU/mL   PSA   Result Value Ref Range    PSA 0.838 0.000 - 4.000 ng/mL   T4   Result Value Ref Range    T4, Total 6.2 4.5 - 12.0 ug/dL   Vitamin B12 and Folate   Result Value Ref Range    Vitamin B-12 489 239 - 931 pg/mL    Folate 6.77 >2.75 ng/mL   Ferritin   Result Value Ref Range    Ferritin 11.10 (L) 17.90 - 464.00  ng/mL   Reticulocytes   Result Value Ref Range    Reticulocyte Absolute 2.00 (H) 0.60 - 1.80 %   Iron Profile   Result Value Ref Range    TIBC 390 225 - 420 mcg/dL    UIBC 325 mcg/dL    Iron 65 42 - 180 mcg/dL    Iron Saturation 17 (L) 20 - 45 %   CBC and Differential   Result Value Ref Range    WBC 11.56 (H) 4.80 - 10.80 10*3/mm3    RBC 3.39 (L) 4.80 - 5.90 10*6/mm3    Hemoglobin 10.4 (L) 14.0 - 18.0 g/dL    Hematocrit 32.0 (L) 40.0 - 52.0 %    MCV 94.4 82.0 - 95.0 fL    MCH 30.7 28.0 - 32.0 pg    MCHC 32.5 (L) 33.0 - 36.0 g/dL    RDW 13.7 12.0 - 15.0 %    Platelets 262 130 - 400 10*3/mm3    Neutrophil Rel % 56.3 39.0 - 78.0 %    Lymphocyte Rel % 32.9 15.0 - 45.0 %    Monocyte Rel % 7.3 4.0 - 12.0 %    Eosinophil Rel % 2.8 0.0 - 4.0 %    Basophil Rel % 0.4 0.0 - 2.0 %    Neutrophils Absolute 6.52 1.87 - 8.40 10*3/mm3    Lymphocytes Absolute 3.80 0.72 - 4.86 10*3/mm3    Monocytes Absolute 0.84 0.19 - 1.30 10*3/mm3    Eosinophils Absolute 0.32 0.00 - 0.70 10*3/mm3    Basophils Absolute 0.05 0.00 - 0.20 10*3/mm3    Immature Granulocyte Rel % 0.3 0.0 - 5.0 %    Immature Grans Absolute 0.03 0.00 - 0.03 10*3/mm3    nRBC 0.0 0.0 - 0.0 /100 WBC       Lab Results   Component Value Date    HGBA1C 6.10 02/21/2018    HGBA1C 6.30 08/18/2017    HGBA1C 6.30 02/17/2017       Lab Results   Component Value Date     02/21/2018     08/28/2017     08/18/2017    K 3.6 02/21/2018    K 4.2 08/28/2017    K 4.4 08/18/2017     02/21/2018     08/28/2017     08/18/2017    CO2 30.0 02/21/2018    CO2 28.0 08/28/2017    CO2 25.0 08/18/2017    GLUCOSE 120 (H) 05/09/2016    GLUCOSE 152 (H) 08/15/2014    GLUCOSE 84 08/11/2014    BUN 9 02/21/2018    BUN 14 08/28/2017    BUN 10 08/18/2017    CREATININE 0.76 02/21/2018    CREATININE 0.90 01/25/2018    CREATININE 0.94 08/28/2017    CALCIUM 9.3 02/21/2018    CALCIUM 9.4 08/28/2017    CALCIUM 9.3 08/18/2017    EGFRCLEARA 107 05/09/2016       Lab Results   Component  "Value Date    PSA 0.838 02/21/2018        Lab Results:  CBC:    Lab Results - Last 18 Months  Lab Units 02/21/18  0719 08/18/17  1252 02/17/17  1025   WBC 10*3/mm3 11.56* 12.86* 12.31*   HEMATOCRIT % 32.0* 34.3* 35.9*     CMP:    Lab Results - Last 18 Months  Lab Units 02/21/18  0719 01/25/18  0935 08/28/17  1212 08/18/17  1252 02/17/17  1025 11/22/16  0748   SODIUM mmol/L 144  --  142 138 138  --    CHLORIDE mmol/L 102  --  104 102 101  --    TOTAL CO2, ARTERIAL mmol/L 30.0  --  28.0 25.0 31.0  --    BUN mg/dL 9  --  14 10 15  --    CREATININE mg/dL 0.76 0.90 0.94 0.88 0.95 1.00   EGFR IF NONAFRICN AM mL/min/1.73 102  --  80 86 79  --    EGFR IF AFRICN AM mL/min/1.73 123  --  96 104 96  --    CALCIUM mg/dL 9.3  --  9.4 9.3 9.4  --      THYROID:    Lab Results - Last 18 Months  Lab Units 02/21/18  0719   TSH mIU/mL 0.652     A1C:    Lab Results - Last 18 Months  Lab Units 02/21/18  0719 08/18/17  1252 02/17/17  1025   HEMOGLOBIN A1C % 6.10 6.30 6.30       Objective   /60  Pulse 100  Temp 98.3 °F (36.8 °C) (Oral)   Resp 18  Ht 182.9 cm (72\")  Wt 128 kg (282 lb)  SpO2 94%  BMI 38.25 kg/m2    Physical Exam  GENERAL:  Well nourished/developed in no acute distress. Obese  SKIN: Turgor excellent, without wound, rash, lesion.  HEENT: Normal cephalic without trauma.  Pupils equal round reactive to light. Extraocular motions full without nystagmus.   External canals nonobstructive nontender without reddness. Tymphatic membranes anders with david structures intact.   Oral cavity without growths, exudates, and moist.  Posterior pharynx without mass, obstruction, redness.  No thyromegaly, mass, tenderness, lymphadenopathy and supple.  CV: Regular rhythm.  No murmur, gallop,  edema. Posterior pulses intact.  No carotid bruits.  CHEST: No chest wall tenderness or mass.   LUNGS: Symmetric motion with clear/reduced to auscultation.  No dullness to percussion  ABD: Soft, nontender without mass.   ORTHO: Symmetric " extremities without swelling/point tenderness.  Full gross range of motion.  NEURO: CN 2-12 grossly intact.  Symmetric facies. 1/4 x bicep equal reflexes.  UE/LE   3/5 strength throughout.  Nonfocal use extremities. Speech clear.  Reduced light touch with monofilament, vibratory sensation with tuning fork; equal toes/distal feet.  Intention tremor.     PSYCH: Oriented x 3.  Pleasant calm, well kept.  Purposeful/directed conservation with intact short/long gross memory.     Assessment/Plan     1. Essential hypertension    2. Hypopotassemia    3. Hyperlipidemia, unspecified hyperlipidemia type    4. Fatty liver    5. Elevated fasting glucose    6. Chronic obstructive pulmonary disease, unspecified COPD type    7. Anemia, unspecified type    8. Respiratory failure with hypoxia, unspecified chronicity    9. History of TIA (transient ischemic attack)    10. Tremor    11. Congestive heart failure, unspecified congestive heart failure chronicity, unspecified congestive heart failure type        Rx: reviewed/changes:  Change Lopressor 50 1/2 bid to one bid    LAB: reviewed/orders:   6m CBC, CMP, A1c, iron  12m CBC, CMP, A1c LIPID, TSH, T4, Vit D, PSAs, B12, folate, iron, ferritin, % sat iron, retic count  hemocult x 3    Discussions:   Patient's BMI is above normal parameters. Follow-up plan includes:  exercise counseling and nutrition counseling.  Advised again he needs to work on weight loss.         Patient Instructions   Goal for your blood pressure is 130 range top and 80 range bottom and you feeling better. Use us/or home monitoring to prove this.     Regular cardio exercise something everyone should consider and try to do; even if health limitations (ie find that exercise UE/LE/cardio that they can tolerate). (as we discussed)  Normal weight a goal for everyone (as we discussed)  Healthy diet helpful for weight management, illness prevention (as we discussed)  If over 50-screening exams include men PSA/rectal exam,  women mammograms, and  everyone colonoscopy screening for colon cancer.    The fundamentals for wanting to lose weight are:   1. You need a plan.  Your plan might/should likely not be the same as other people (it's what works for you that works)  2. You need to know how much you want to lose.  For health purposes 10% of your body weight is a good start and often a reasonable amount to lose AND keep off.   3. Your plan will need a start date (after the wedding, etc).    4. I advise a plan for what you want to lose each week and therefore you can come close to knowing how long it will take for you to reach your goal.  5. With the above; then you need to decide how many calories you may eat each day.  Using a phone jonas like [employer], or [employer] will calculate that for you.  We will be happy to do this if you need our help.  We do not advise fasting; calories below 1000 ez a day for anyone.  Stick to your daily calories by controlling sweets, snacks, portion size, and binge eating.   6. You will want to decide how much exercise you can, want or like to do to make this weight loss happen.  Exercise is always important for a weight loss program.   Be sure however and pick one that will not interere with your other health problems.  We would be happy to give you suggestions.  The most effective exercise is something you enjoy as you need to do this regularily.   7. Consider an accountability aide whether it be the phone jonas, a friend joining you on this endeavor, a membership to a gym, a referral to a weight loss program; whatever works for you.               Follow up: Return for lab during/or just before next apt;, Dr Rosario-, 6 m;.  Future Appointments  Date Time Provider Department Center   8/23/2018 8:20 AM LAB [facility] None   8/27/2018 1:00 PM MD MADELYN Judge [facility] None

## 2018-02-23 NOTE — PATIENT INSTRUCTIONS
Goal for your blood pressure is 130 range top and 80 range bottom and you feeling better. Use us/or home monitoring to prove this.     Regular cardio exercise something everyone should consider and try to do; even if health limitations (ie find that exercise UE/LE/cardio that they can tolerate). (as we discussed)  Normal weight a goal for everyone (as we discussed)  Healthy diet helpful for weight management, illness prevention (as we discussed)  If over 50-screening exams include men PSA/rectal exam, women mammograms, and  everyone colonoscopy screening for colon cancer.    The fundamentals for wanting to lose weight are:   1. You need a plan.  Your plan might/should likely not be the same as other people (it's what works for you that works)  2. You need to know how much you want to lose.  For health purposes 10% of your body weight is a good start and often a reasonable amount to lose AND keep off.   3. Your plan will need a start date (after the wedding, etc).    4. I advise a plan for what you want to lose each week and therefore you can come close to knowing how long it will take for you to reach your goal.  5. With the above; then you need to decide how many calories you may eat each day.  Using a phone jonas like [employer], or [employer] will calculate that for you.  We will be happy to do this if you need our help.  We do not advise fasting; calories below 1000 ez a day for anyone.  Stick to your daily calories by controlling sweets, snacks, portion size, and binge eating.   6. You will want to decide how much exercise you can, want or like to do to make this weight loss happen.  Exercise is always important for a weight loss program.   Be sure however and pick one that will not interere with your other health problems.  We would be happy to give you suggestions.  The most effective exercise is something you enjoy as you need to do this regularily.   7. Consider an accountability aide whether it be the  phone jonas, a friend joining you on this endeavor, a membership to a gym, a referral to a weight loss program; whatever works for you.

## 2018-02-25 PROBLEM — Z86.73 HISTORY OF TIA (TRANSIENT ISCHEMIC ATTACK): Status: ACTIVE | Noted: 2017-02-24

## 2018-03-04 DIAGNOSIS — N40.1 BENIGN NON-NODULAR PROSTATIC HYPERPLASIA WITH LOWER URINARY TRACT SYMPTOMS: ICD-10-CM

## 2018-03-05 RX ORDER — TAMSULOSIN HYDROCHLORIDE 0.4 MG/1
CAPSULE ORAL
Qty: 30 CAPSULE | Refills: 0 | Status: SHIPPED | OUTPATIENT
Start: 2018-03-05 | End: 2018-03-30 | Stop reason: SDUPTHER

## 2018-03-06 RX ORDER — FUROSEMIDE 20 MG/1
TABLET ORAL
Qty: 30 TABLET | Refills: 5 | Status: SHIPPED | OUTPATIENT
Start: 2018-03-06 | End: 2018-09-10 | Stop reason: SDUPTHER

## 2018-03-06 RX ORDER — METOPROLOL TARTRATE 50 MG/1
50 TABLET, FILM COATED ORAL EVERY 12 HOURS SCHEDULED
Qty: 180 TABLET | Refills: 1 | Status: SHIPPED | OUTPATIENT
Start: 2018-03-06 | End: 2019-03-05 | Stop reason: SDUPTHER

## 2018-03-06 RX ORDER — METOPROLOL TARTRATE 50 MG/1
50 TABLET, FILM COATED ORAL EVERY 12 HOURS SCHEDULED
Qty: 180 TABLET | Refills: 1 | Status: SHIPPED | OUTPATIENT
Start: 2018-03-06 | End: 2018-03-06 | Stop reason: SDUPTHER

## 2018-03-09 ENCOUNTER — CLINICAL SUPPORT (OUTPATIENT)
Dept: FAMILY MEDICINE CLINIC | Facility: CLINIC | Age: 68
End: 2018-03-09

## 2018-03-09 DIAGNOSIS — D64.9 ANEMIA, UNSPECIFIED TYPE: Primary | ICD-10-CM

## 2018-03-09 DIAGNOSIS — K92.1 BLOOD IN STOOL: Primary | ICD-10-CM

## 2018-03-09 LAB
HEMOCCULT STL QL IA: NEGATIVE
HEMOCCULT STL QL IA: NEGATIVE
HEMOCCULT STL QL IA: POSITIVE

## 2018-03-09 PROCEDURE — G0328 FECAL BLOOD SCRN IMMUNOASSAY: HCPCS | Performed by: FAMILY MEDICINE

## 2018-03-26 NOTE — PROGRESS NOTES
Chief Complaint   Patient presents with   • Rectal Bleeding     had blood in stool per stool card had colon 3-18-16 two polyps       Subjective     HPI    Routine lab work at PCP office found pt to be anemic.  Stool testing for further evaluation showed 1 out 3 samples to be pos for blood.  As the day progresses he experiences abdominal bloating. He has daily BM.  He has experienced fecal urgency since cholecystomy.  Not uncommon for bowels to move up to 4 times per day.  No BRBPR or melena.  Hx of GERD.   Use of Omeprazole controls heartburn/indigestion.  He does have chronic pain to lower abdomen since surgery.    CScope (Dr Cordero) 2016-tubular adenoma- 65 cm  CScope (Dr Cordero) 2010 normal    Endoscopy (Dr Cordero) 2016-duodenal nodule, active esophagitis-bx of nodule showed small intestinal type villi with nonspecific inflammation,     Stools heme positive 1 out of 3    Lab Results   Component Value Date    HGB 10.4 (L) 02/21/2018    HGB 10.9 (L) 08/18/2017    HGB 11.4 (L) 02/17/2017     Lab Results   Component Value Date    MCV 94.4 02/21/2018    MCV 99.7 (H) 08/18/2017    MCV 99.4 (H) 02/17/2017     Lab Results   Component Value Date    BUN 9 02/21/2018    BUN 14 08/28/2017    BUN 10 08/18/2017     Lab Results   Component Value Date    CREATININE 0.76 02/21/2018    CREATININE 0.90 01/25/2018    CREATININE 0.94 08/28/2017         Past Medical History:   Diagnosis Date   • Acid reflux    • Hypertension    • Renal cancer 08/2014    Lt RALPart'l Nx; t1a Clear Cell with negative margin       Past Surgical History:   Procedure Laterality Date   • APPENDECTOMY     • CHOLECYSTECTOMY     • HERNIA REPAIR     • KIDNEY SURGERY     • LAPAROSCOPIC PARTIAL NEPHRECTOMY Left 08/14/2014    Robot Assisted       Outpatient Prescriptions Marked as Taking for the 3/27/18 encounter (Office Visit) with RUBIO Scott   Medication Sig Dispense Refill   • calcium-vitamin D (OSCAL-500) 500-200 MG-UNIT per tablet Take 1 tablet  "by mouth 2 times daily.       • colestipol (COLESTID) 1 G tablet Take 1 tablet by mouth 2 times daily.     • escitalopram (LEXAPRO) 20 MG tablet Take 1 tablet by mouth Daily. 30 tablet 5   • fexofenadine (ALLEGRA) 60 MG tablet Take 60 mg by mouth 2 (Two) Times a Day.     • furosemide (LASIX) 20 MG tablet TAKE 1 TABLET BY MOUTH DAILY 30 tablet 5   • losartan (COZAAR) 50 MG tablet TAKE 1 TABLET BY MOUTH ONCE DAILY 90 tablet 1   • metoprolol tartrate (LOPRESSOR) 50 MG tablet Take 1 tablet by mouth Every 12 (Twelve) Hours. 180 tablet 1   • omeprazole (priLOSEC) 20 MG capsule 20 mg 2 (Two) Times a Day.     • potassium chloride (K-DUR,KLOR-CON) 10 MEQ CR tablet TAKE 1 TABLET BY MOUTH EVERY DAY 90 tablet 1   • simvastatin (ZOCOR) 20 MG tablet TAKE 1 TABLET BY MOUTH DAILY 90 tablet 0   • tamsulosin (FLOMAX) 0.4 MG capsule 24 hr capsule TAKE ONE CAPSULE BY MOUTH DAILY AFTER THE SAME MEAL 30 capsule 0   • therapeutic multivitamin-minerals (THERAGRAN-M) tablet Take 1 tablet by mouth daily.         No Known Allergies    Social History     Social History   • Marital status:      Spouse name: Melanie   • Number of children: 1   • Years of education: 12     Occupational History   • retired      post office     Social History Main Topics   • Smoking status: Current Every Day Smoker     Packs/day: 0.25     Years: 50.00     Types: Cigarettes     Start date: 4/30/1966   • Smokeless tobacco: Never Used      Comment: 2-17-17 \"down to 9 cigarettes a day\"   • Alcohol use No   • Drug use: No   • Sexual activity: Defer     Other Topics Concern   • Not on file     Social History Narrative   • No narrative on file       Family History   Problem Relation Age of Onset   • Colon cancer Father    • No Known Problems Mother        Review of Systems   Constitutional: Negative for fatigue, fever and unexpected weight change.   HENT: Negative for hearing loss, sore throat and voice change.    Eyes: Negative for visual disturbance.   Respiratory: " "Negative for cough, shortness of breath and wheezing.    Cardiovascular: Negative for chest pain and palpitations.   Gastrointestinal: Positive for blood in stool. Negative for abdominal pain and vomiting.   Endocrine: Negative for polydipsia and polyuria.   Genitourinary: Negative for difficulty urinating, dysuria, hematuria and urgency.   Musculoskeletal: Negative for joint swelling and myalgias.   Skin: Negative for color change, rash and wound.   Neurological: Negative for dizziness, tremors, seizures and syncope.   Hematological: Does not bruise/bleed easily.   Psychiatric/Behavioral: Negative for agitation and confusion. The patient is not nervous/anxious.        Objective     Vitals:    03/27/18 0934   BP: 150/70   Pulse: 64   Temp: 97.2 °F (36.2 °C)   SpO2: 97%   Weight: 125 kg (275 lb)   Height: 182.9 cm (72\")     Body mass index is 37.3 kg/m².    Physical Exam   Constitutional: He is oriented to person, place, and time. He appears well-developed and well-nourished. He is cooperative.   HENT:   Head: Normocephalic and atraumatic.   Eyes: Conjunctivae are normal. Pupils are equal, round, and reactive to light. No scleral icterus.   Neck: Normal range of motion. Neck supple. No JVD present. No thyroid mass and no thyromegaly present.   Cardiovascular: Normal rate, regular rhythm and normal heart sounds.  Exam reveals no gallop and no friction rub.    No murmur heard.  Pulmonary/Chest: Effort normal and breath sounds normal. No accessory muscle usage. No respiratory distress. He has no wheezes. He has no rales.   Abdominal: Soft. Normal appearance and bowel sounds are normal. He exhibits no distension, no ascites and no mass. There is no hepatosplenomegaly. There is no tenderness. There is no rebound and no guarding.   Musculoskeletal: Normal range of motion. He exhibits no edema or tenderness.     Vascular Status -  His right foot exhibits normal right foot vasculature  and normal right foot edema. His left " foot exhibits normal left foot vasculature  and normal left foot edema.  Lymphadenopathy:     He has no cervical adenopathy.   Neurological: He is alert and oriented to person, place, and time. He has normal strength. Gait normal.   Skin: Skin is warm, dry and intact. No rash noted.       Imaging Results (most recent)     None          Body mass index is 37.3 kg/m².    Assessment/Plan     Felix was seen today for rectal bleeding.    Diagnoses and all orders for this visit:    Heme positive stool  -     Case Request; Standing  -     Implement Anesthesia Orders Day of Procedure; Standing  -     Obtain Informed Consent; Standing  -     Case Request    History of adenomatous polyp of colon    Family history of colon cancer  Comments:  father dx with rectal cancer age of 62    Other orders  -     Cancel: polyethylene glycol (GoLYTELY) 236 g solution; Take 3,785 mL by mouth 1 (One) Time for 1 dose. Take as directed  -     Cancel: Case Request; Standing  -     Cancel: Implement Anesthesia Orders Day of Procedure; Standing  -     Cancel: Obtain Informed Consent; Standing        ESOPHAGOGASTRODUODENOSCOPY WITH ANESTHESIA (N/A)   If EGD neg may consider further eval with cscope    Patient is to continue all blood pressure and cardiac medications prior to procedure and has been advised to take medications morning of procedure   Pt verbalized understanding    Advised pt to stop ASA day prior to procedure and to stop use of NSAIDs, Fish Oil, and MV 5 days prior to procedure.  Tylenol based products are ok to take.  Pt verbalized understanding.    The risk of the endoscopy were discussed in detail.  We discussed the risk of perforation (one out of 1749-3043, riskier with dilation), bleeding (one out of 500), and the rare risks of infection, adverse reaction to anesthesia, respiratory failure, cardiac failure including MI and adverse reaction to medications, etc.  We discussed consequences that could occur if a risk were to  develop such as the need for hospitalization, blood transfusion, surgical intervention, medications, pain and disability and death.  Alternatives include not doing anything, or pursuing an UGI series which only offers a diagnosis with potential less accuracy compared to egd.  The patient verbalizes understanding and agrees to proceed.    I advised the patient of the risks in continuing to use tobacco, and I provided this patient with smoking cessation educational materials.    During this visit, I spent > 3-10 minutes counseling the patient regarding smoking cessation.    Discussed the patient's BMI with him. BMI is above normal parameters. Follow-up plan includes:  no follow-up required.    There are no Patient Instructions on file for this visit.

## 2018-03-27 ENCOUNTER — OFFICE VISIT (OUTPATIENT)
Dept: GASTROENTEROLOGY | Facility: CLINIC | Age: 68
End: 2018-03-27

## 2018-03-27 VITALS
HEART RATE: 64 BPM | SYSTOLIC BLOOD PRESSURE: 150 MMHG | TEMPERATURE: 97.2 F | WEIGHT: 275 LBS | DIASTOLIC BLOOD PRESSURE: 70 MMHG | BODY MASS INDEX: 37.25 KG/M2 | OXYGEN SATURATION: 97 % | HEIGHT: 72 IN

## 2018-03-27 DIAGNOSIS — Z86.010 HISTORY OF ADENOMATOUS POLYP OF COLON: ICD-10-CM

## 2018-03-27 DIAGNOSIS — Z80.0 FAMILY HISTORY OF COLON CANCER: ICD-10-CM

## 2018-03-27 DIAGNOSIS — R19.5 HEME POSITIVE STOOL: Primary | ICD-10-CM

## 2018-03-27 PROCEDURE — 99214 OFFICE O/P EST MOD 30 MIN: CPT | Performed by: NURSE PRACTITIONER

## 2018-03-28 PROBLEM — R19.5 HEME POSITIVE STOOL: Status: ACTIVE | Noted: 2018-03-28

## 2018-03-30 DIAGNOSIS — N40.1 BENIGN NON-NODULAR PROSTATIC HYPERPLASIA WITH LOWER URINARY TRACT SYMPTOMS: ICD-10-CM

## 2018-03-30 RX ORDER — TAMSULOSIN HYDROCHLORIDE 0.4 MG/1
CAPSULE ORAL
Qty: 30 CAPSULE | Refills: 0 | Status: SHIPPED | OUTPATIENT
Start: 2018-03-30 | End: 2018-04-24 | Stop reason: SDUPTHER

## 2018-04-24 DIAGNOSIS — N40.1 BENIGN NON-NODULAR PROSTATIC HYPERPLASIA WITH LOWER URINARY TRACT SYMPTOMS: ICD-10-CM

## 2018-04-24 RX ORDER — TAMSULOSIN HYDROCHLORIDE 0.4 MG/1
CAPSULE ORAL
Qty: 30 CAPSULE | Refills: 0 | Status: SHIPPED | OUTPATIENT
Start: 2018-04-24 | End: 2018-05-20 | Stop reason: SDUPTHER

## 2018-04-24 RX ORDER — ESCITALOPRAM OXALATE 20 MG/1
20 TABLET ORAL DAILY
Qty: 90 TABLET | Refills: 1 | Status: SHIPPED | OUTPATIENT
Start: 2018-04-24 | End: 2018-11-05 | Stop reason: SDUPTHER

## 2018-04-24 RX ORDER — SIMVASTATIN 20 MG
TABLET ORAL
Qty: 90 TABLET | Refills: 1 | Status: SHIPPED | OUTPATIENT
Start: 2018-04-24 | End: 2018-11-05 | Stop reason: SDUPTHER

## 2018-05-01 ENCOUNTER — TELEPHONE (OUTPATIENT)
Dept: GASTROENTEROLOGY | Facility: CLINIC | Age: 68
End: 2018-05-01

## 2018-05-01 ENCOUNTER — ANESTHESIA EVENT (OUTPATIENT)
Dept: GASTROENTEROLOGY | Facility: HOSPITAL | Age: 68
End: 2018-05-01

## 2018-05-01 ENCOUNTER — ANESTHESIA (OUTPATIENT)
Dept: GASTROENTEROLOGY | Facility: HOSPITAL | Age: 68
End: 2018-05-01

## 2018-05-01 ENCOUNTER — HOSPITAL ENCOUNTER (OUTPATIENT)
Facility: HOSPITAL | Age: 68
Setting detail: HOSPITAL OUTPATIENT SURGERY
Discharge: HOME | End: 2018-05-01
Attending: INTERNAL MEDICINE | Admitting: INTERNAL MEDICINE

## 2018-05-01 VITALS
BODY MASS INDEX: 37.93 KG/M2 | RESPIRATION RATE: 18 BRPM | OXYGEN SATURATION: 99 % | HEART RATE: 65 BPM | DIASTOLIC BLOOD PRESSURE: 57 MMHG | TEMPERATURE: 97.9 F | HEIGHT: 72 IN | WEIGHT: 280 LBS | SYSTOLIC BLOOD PRESSURE: 141 MMHG

## 2018-05-01 DIAGNOSIS — R19.5 HEME POSITIVE STOOL: ICD-10-CM

## 2018-05-01 PROCEDURE — 87081 CULTURE SCREEN ONLY: CPT | Performed by: INTERNAL MEDICINE

## 2018-05-01 PROCEDURE — 25010000002 PROPOFOL 10 MG/ML EMULSION: Performed by: NURSE ANESTHETIST, CERTIFIED REGISTERED

## 2018-05-01 PROCEDURE — 43239 EGD BIOPSY SINGLE/MULTIPLE: CPT | Performed by: INTERNAL MEDICINE

## 2018-05-01 RX ORDER — LIDOCAINE HYDROCHLORIDE 20 MG/ML
INJECTION, SOLUTION INFILTRATION; PERINEURAL AS NEEDED
Status: DISCONTINUED | OUTPATIENT
Start: 2018-05-01 | End: 2018-05-01 | Stop reason: SURG

## 2018-05-01 RX ORDER — SODIUM CHLORIDE 0.9 % (FLUSH) 0.9 %
3 SYRINGE (ML) INJECTION AS NEEDED
Status: DISCONTINUED | OUTPATIENT
Start: 2018-05-01 | End: 2018-05-01 | Stop reason: HOSPADM

## 2018-05-01 RX ORDER — SODIUM CHLORIDE 9 MG/ML
500 INJECTION, SOLUTION INTRAVENOUS CONTINUOUS PRN
Status: DISCONTINUED | OUTPATIENT
Start: 2018-05-01 | End: 2018-05-01 | Stop reason: HOSPADM

## 2018-05-01 RX ORDER — PROPOFOL 10 MG/ML
VIAL (ML) INTRAVENOUS AS NEEDED
Status: DISCONTINUED | OUTPATIENT
Start: 2018-05-01 | End: 2018-05-01 | Stop reason: SURG

## 2018-05-01 RX ADMIN — PROPOFOL 50 MG: 10 INJECTION, EMULSION INTRAVENOUS at 08:51

## 2018-05-01 RX ADMIN — SODIUM CHLORIDE 500 ML: 9 INJECTION, SOLUTION INTRAVENOUS at 08:39

## 2018-05-01 RX ADMIN — PROPOFOL 50 MG: 10 INJECTION, EMULSION INTRAVENOUS at 08:48

## 2018-05-01 RX ADMIN — PROPOFOL 50 MG: 10 INJECTION, EMULSION INTRAVENOUS at 08:47

## 2018-05-01 RX ADMIN — PROPOFOL 50 MG: 10 INJECTION, EMULSION INTRAVENOUS at 08:49

## 2018-05-01 RX ADMIN — LIDOCAINE HYDROCHLORIDE 40 MG: 20 INJECTION, SOLUTION INFILTRATION; PERINEURAL at 08:46

## 2018-05-01 RX ADMIN — PROPOFOL 50 MG: 10 INJECTION, EMULSION INTRAVENOUS at 08:46

## 2018-05-01 NOTE — TELEPHONE ENCOUNTER
"Can we ask the ref  to \"clear him from the cardio/pulm perspective for c-scope  His egd was neg today  W/u for anemia/heme positive  "

## 2018-05-01 NOTE — ANESTHESIA PREPROCEDURE EVALUATION
Anesthesia Evaluation     Patient summary reviewed   history of anesthetic complications: prolonged sedation  NPO Solid Status: > 8 hours  NPO Liquid Status: > 8 hours           Airway   Mallampati: I  TM distance: >3 FB  Neck ROM: full  No difficulty expected  Dental    (+) edentulous    Pulmonary    (+) a smoker Current Smoked day of surgery, COPD,   (-) asthma, sleep apnea  Cardiovascular     Patient on routine beta blocker and Beta blocker given within 24 hours of surgery    (+) hypertension, CHF (diastolic), hyperlipidemia,   (-) past MI, CAD, cardiac stents    ROS comment: Echo:  ·Left ventricular systolic function is normal. Estimated EF = 60%.  ·Left ventricular wall thickness is consistent with mild concentric hypertrophy.  ·Left ventricular diastolic dysfunction (grade I) consistent with impaired relaxation.  ·No evidence of pulmonary hypertension is present.    Neuro/Psych  (+) tremors (agent orange),     (-) seizures, TIA, CVA  GI/Hepatic/Renal/Endo    (+)  GERD,  liver disease,   (-) no renal disease (cancer), diabetes    Musculoskeletal     (+) back pain,   Abdominal    Substance History      OB/GYN          Other      history of cancer (rectal)                    Anesthesia Plan    ASA 3     general   total IV anesthesia  intravenous induction   Anesthetic plan and risks discussed with patient.

## 2018-05-01 NOTE — ANESTHESIA POSTPROCEDURE EVALUATION
Patient: Felix Bartlett .    Procedure Summary     Date:  05/01/18 Room / Location:  [facility] ENDOSCOPY 2 / [facility]    Anesthesia Start:  0841 Anesthesia Stop:  0852    Procedure:  ESOPHAGOGASTRODUODENOSCOPY WITH ANESTHESIA (N/A Esophagus) Diagnosis:       Heme positive stool      (Heme positive stool [R19.5])    Surgeon:  Donnell Cordero DO Provider:  Blake Foster CRNA    Anesthesia Type:  general ASA Status:  3          Anesthesia Type: general  Last vitals  BP   128/60 (05/01/18 0900)   Temp   97.9 °F (36.6 °C) (05/01/18 0823)   Pulse   76 (05/01/18 0900)   Resp   12 (05/01/18 0900)     SpO2   97 % (05/01/18 0900)     Post Anesthesia Care and Evaluation    Patient location during evaluation: PACU  Patient participation: complete - patient participated  Level of consciousness: awake and alert  Pain score: 0  Pain management: adequate  Airway patency: patent  Anesthetic complications: No anesthetic complications  PONV Status: none  Cardiovascular status: hemodynamically stable and acceptable  Respiratory status: acceptable and unassisted  Hydration status: acceptable

## 2018-05-02 LAB — UREASE TISS QL: NEGATIVE

## 2018-05-04 ENCOUNTER — TELEPHONE (OUTPATIENT)
Dept: FAMILY MEDICINE CLINIC | Facility: CLINIC | Age: 68
End: 2018-05-04

## 2018-05-04 DIAGNOSIS — J44.9 CHRONIC OBSTRUCTIVE PULMONARY DISEASE, UNSPECIFIED COPD TYPE (HCC): Primary | Chronic | ICD-10-CM

## 2018-05-20 DIAGNOSIS — N40.1 BENIGN NON-NODULAR PROSTATIC HYPERPLASIA WITH LOWER URINARY TRACT SYMPTOMS: ICD-10-CM

## 2018-05-21 RX ORDER — TAMSULOSIN HYDROCHLORIDE 0.4 MG/1
CAPSULE ORAL
Qty: 30 CAPSULE | Refills: 0 | Status: SHIPPED | OUTPATIENT
Start: 2018-05-21 | End: 2018-07-16 | Stop reason: SDUPTHER

## 2018-05-21 RX ORDER — LOSARTAN POTASSIUM 50 MG/1
TABLET ORAL
Qty: 90 TABLET | Refills: 1 | Status: SHIPPED | OUTPATIENT
Start: 2018-05-21 | End: 2018-12-05 | Stop reason: SDUPTHER

## 2018-06-13 NOTE — TELEPHONE ENCOUNTER
Cassandra, can you get me an order. MD didn't have one in EPIC. (They called here checking on it.) Thanks!

## 2018-06-15 NOTE — TELEPHONE ENCOUNTER
Appointment for Pulmonary done as follows:     7.12.18  PFT at 1:30pm and Dr. Wilkins at 2pm.   Patient is notified. Pulmonology has requested a letter from the  [facility] doctors office stating 1) what the procedure is, 2) the type of anesthesia  and 3) the surgery date or that the surgery will be scheduled after clearance. If you want to do the letter and let me know when done and I'll be happy to fax to them at [phone]. (FYI Their fax is very hard to send to.) Thank you.

## 2018-07-16 DIAGNOSIS — N40.1 BENIGN NON-NODULAR PROSTATIC HYPERPLASIA WITH LOWER URINARY TRACT SYMPTOMS: ICD-10-CM

## 2018-07-17 RX ORDER — POTASSIUM CHLORIDE 750 MG/1
TABLET, EXTENDED RELEASE ORAL
Qty: 90 TABLET | Refills: 1 | Status: SHIPPED | OUTPATIENT
Start: 2018-07-17 | End: 2019-01-07 | Stop reason: SDUPTHER

## 2018-07-17 RX ORDER — TAMSULOSIN HYDROCHLORIDE 0.4 MG/1
CAPSULE ORAL
Qty: 30 CAPSULE | Refills: 0 | Status: SHIPPED | OUTPATIENT
Start: 2018-07-17 | End: 2018-08-12 | Stop reason: SDUPTHER

## 2018-08-12 DIAGNOSIS — N40.1 BENIGN NON-NODULAR PROSTATIC HYPERPLASIA WITH LOWER URINARY TRACT SYMPTOMS: ICD-10-CM

## 2018-08-13 RX ORDER — TAMSULOSIN HYDROCHLORIDE 0.4 MG/1
CAPSULE ORAL
Qty: 30 CAPSULE | Refills: 0 | Status: SHIPPED | OUTPATIENT
Start: 2018-08-13 | End: 2019-03-28 | Stop reason: SDUPTHER

## 2018-08-13 RX ORDER — TAMSULOSIN HYDROCHLORIDE 0.4 MG/1
CAPSULE ORAL
Qty: 30 CAPSULE | Refills: 0 | Status: SHIPPED | OUTPATIENT
Start: 2018-08-13 | End: 2018-08-27 | Stop reason: SDUPTHER

## 2018-08-21 DIAGNOSIS — R73.01 ELEVATED FASTING GLUCOSE: Chronic | ICD-10-CM

## 2018-08-21 DIAGNOSIS — D64.9 ANEMIA, UNSPECIFIED TYPE: ICD-10-CM

## 2018-08-21 DIAGNOSIS — E87.6 HYPOPOTASSEMIA: Primary | Chronic | ICD-10-CM

## 2018-08-23 ENCOUNTER — RESULTS ENCOUNTER (OUTPATIENT)
Dept: FAMILY MEDICINE CLINIC | Facility: CLINIC | Age: 68
End: 2018-08-23

## 2018-08-23 DIAGNOSIS — D64.9 ANEMIA, UNSPECIFIED TYPE: ICD-10-CM

## 2018-08-23 DIAGNOSIS — R73.01 ELEVATED FASTING GLUCOSE: Chronic | ICD-10-CM

## 2018-08-23 DIAGNOSIS — E87.6 HYPOPOTASSEMIA: Chronic | ICD-10-CM

## 2018-08-23 LAB
ALBUMIN SERPL-MCNC: 3.8 G/DL (ref 3.5–5)
ALBUMIN/GLOB SERPL: 1.3 G/DL (ref 1.1–2.5)
ALP SERPL-CCNC: 97 U/L (ref 24–120)
ALT SERPL-CCNC: 22 U/L (ref 0–54)
AST SERPL-CCNC: 20 U/L (ref 7–45)
BASOPHILS # BLD AUTO: 0.04 10*3/MM3 (ref 0–0.2)
BASOPHILS NFR BLD AUTO: 0.4 % (ref 0–2)
BILIRUB SERPL-MCNC: 0.3 MG/DL (ref 0.1–1)
BUN SERPL-MCNC: 12 MG/DL (ref 5–21)
BUN/CREAT SERPL: 16.7 (ref 7–25)
CALCIUM SERPL-MCNC: 9.2 MG/DL (ref 8.4–10.4)
CHLORIDE SERPL-SCNC: 103 MMOL/L (ref 98–110)
CO2 SERPL-SCNC: 31 MMOL/L (ref 24–31)
CREAT SERPL-MCNC: 0.72 MG/DL (ref 0.5–1.4)
EOSINOPHIL # BLD AUTO: 0.19 10*3/MM3 (ref 0–0.7)
EOSINOPHIL NFR BLD AUTO: 2.1 % (ref 0–4)
ERYTHROCYTE [DISTWIDTH] IN BLOOD BY AUTOMATED COUNT: 14 % (ref 12–15)
GLOBULIN SER CALC-MCNC: 2.9 GM/DL
GLUCOSE SERPL-MCNC: 118 MG/DL (ref 70–100)
HBA1C MFR BLD: 6.1 %
HCT VFR BLD AUTO: 33.1 % (ref 40–52)
HGB BLD-MCNC: 10.4 G/DL (ref 14–18)
IMM GRANULOCYTES # BLD: 0.04 10*3/MM3 (ref 0–0.03)
IMM GRANULOCYTES NFR BLD: 0.4 % (ref 0–5)
IRON SERPL-MCNC: 55 MCG/DL (ref 42–180)
LYMPHOCYTES # BLD AUTO: 2.87 10*3/MM3 (ref 0.72–4.86)
LYMPHOCYTES NFR BLD AUTO: 31.3 % (ref 15–45)
MCH RBC QN AUTO: 30.3 PG (ref 28–32)
MCHC RBC AUTO-ENTMCNC: 31.4 G/DL (ref 33–36)
MCV RBC AUTO: 96.5 FL (ref 82–95)
MONOCYTES # BLD AUTO: 0.59 10*3/MM3 (ref 0.19–1.3)
MONOCYTES NFR BLD AUTO: 6.4 % (ref 4–12)
NEUTROPHILS # BLD AUTO: 5.45 10*3/MM3 (ref 1.87–8.4)
NEUTROPHILS NFR BLD AUTO: 59.4 % (ref 39–78)
NRBC BLD AUTO-RTO: 0 /100 WBC (ref 0–0)
PLATELET # BLD AUTO: 284 10*3/MM3 (ref 130–400)
POTASSIUM SERPL-SCNC: 3.9 MMOL/L (ref 3.5–5.3)
PROT SERPL-MCNC: 6.7 G/DL (ref 6.3–8.7)
RBC # BLD AUTO: 3.43 10*6/MM3 (ref 4.8–5.9)
SODIUM SERPL-SCNC: 143 MMOL/L (ref 135–145)
WBC # BLD AUTO: 9.18 10*3/MM3 (ref 4.8–10.8)

## 2018-08-27 ENCOUNTER — OFFICE VISIT (OUTPATIENT)
Dept: FAMILY MEDICINE CLINIC | Facility: CLINIC | Age: 68
End: 2018-08-27

## 2018-08-27 VITALS
WEIGHT: 281 LBS | HEIGHT: 72 IN | DIASTOLIC BLOOD PRESSURE: 80 MMHG | SYSTOLIC BLOOD PRESSURE: 150 MMHG | BODY MASS INDEX: 38.06 KG/M2 | HEART RATE: 92 BPM | OXYGEN SATURATION: 96 % | TEMPERATURE: 97.9 F | RESPIRATION RATE: 18 BRPM

## 2018-08-27 DIAGNOSIS — I10 HYPERTENSION, UNSPECIFIED TYPE: Chronic | ICD-10-CM

## 2018-08-27 DIAGNOSIS — K21.9 GASTROESOPHAGEAL REFLUX DISEASE, ESOPHAGITIS PRESENCE NOT SPECIFIED: Chronic | ICD-10-CM

## 2018-08-27 DIAGNOSIS — E78.5 HYPERLIPIDEMIA, UNSPECIFIED HYPERLIPIDEMIA TYPE: Chronic | ICD-10-CM

## 2018-08-27 DIAGNOSIS — R73.01 ELEVATED FASTING GLUCOSE: Chronic | ICD-10-CM

## 2018-08-27 DIAGNOSIS — M79.606 PAIN OF LOWER EXTREMITY, UNSPECIFIED LATERALITY: ICD-10-CM

## 2018-08-27 DIAGNOSIS — M81.0 OSTEOPOROSIS, UNSPECIFIED OSTEOPOROSIS TYPE, UNSPECIFIED PATHOLOGICAL FRACTURE PRESENCE: ICD-10-CM

## 2018-08-27 DIAGNOSIS — J44.9 CHRONIC OBSTRUCTIVE PULMONARY DISEASE, UNSPECIFIED COPD TYPE (HCC): Primary | Chronic | ICD-10-CM

## 2018-08-27 DIAGNOSIS — D64.9 ANEMIA, UNSPECIFIED TYPE: ICD-10-CM

## 2018-08-27 DIAGNOSIS — I50.9 CONGESTIVE HEART FAILURE, UNSPECIFIED CONGESTIVE HEART FAILURE CHRONICITY, UNSPECIFIED CONGESTIVE HEART FAILURE TYPE: ICD-10-CM

## 2018-08-27 DIAGNOSIS — IMO0001 COMPRESSION FRACTURE OF VERTEBRA WITH ROUTINE HEALING, SUBSEQUENT ENCOUNTER: ICD-10-CM

## 2018-08-27 DIAGNOSIS — F41.9 ANXIETY: Chronic | ICD-10-CM

## 2018-08-27 DIAGNOSIS — K76.0 FATTY LIVER: Chronic | ICD-10-CM

## 2018-08-27 DIAGNOSIS — Z72.0 TOBACCO USE: ICD-10-CM

## 2018-08-27 PROCEDURE — 99214 OFFICE O/P EST MOD 30 MIN: CPT | Performed by: FAMILY MEDICINE

## 2018-08-27 NOTE — PROGRESS NOTES
Subjective   Felix Bartlett Sr. is a 68 y.o. male presenting with chief complaint of:   Chief Complaint   Patient presents with   • Hypertension     Patient is here for a 6 month follow-up.   • Hyperlipidemia   • Congestive Heart Failure   • Knee Pain     Patient states that he is having bilateral knee pain that hurts more when he stands up.       History of Present Illness :  With sitter/wife.   Has multiple chronic problems to consider that might have a bearing on today's issues;  an interval appointment.       Chronic/acute problems to review today:   1. Chronic obstructive pulmonary disease, unspecified COPD type (CMS/HCC): chronic/stable with same occ cough, mild sob with exertion (sedentary nature); still some smoking.    2. Osteoporosis, unspecified osteoporosis type, unspecified pathological fracture presence: chronic/stable: 3.1.17/Bone density / LS T  -1.8 and hip -0.7- better than last; use Ca/Vit D 600/400 bid some walking, no/little alcohol.  Not using alcohol.     3. Elevated fasting glucose: chronic/stable and watching with suggestion less sweets/diet and some walking.    4. Anxiety: chronic/stable despite stressors and feels lexapro helps.    5. Gastroesophageal reflux disease, esophagitis presence not specified: chronic/stable without dysphagia, heartburn.    6. Fatty liver: chronic/stable liver labs since not drinking.  Labs followed.    7. Hypertension, unspecified type: chronic/variable manifest by bps here and last.  No pattern high.   8. Hyperlipidemia, unspecified hyperlipidemia type: chronic/stable with zocor treatment and lab proof improved numbers.    9. Compression fracture of vertebra with routine healing, subsequent encounter: chronic/stable without new back pain.     10. Congestive heart failure, unspecified congestive heart failure chronicity, unspecified congestive heart failure type (CMS/HCC): chronic/stable with improved LE edema, tolerated mild SOB.    11. Anemia, unspecified type:  chronic/stable and benign to date.  No melena, hematochezia.    12. Tobacco use: chronic/improved as smoking less.    13. Pain of lower extremity, unspecified laterality: acute/chronic; having more pain in lower back, running down back of R leg and knee.       Has an/another acute issue today: none.    The following portions of the patient's history were reviewed and updated as appropriate: allergies, current medications, past family history, past medical history, past social history, past surgical history and problem list.  Records acquired and reviewed; TCC migrated.      Current Outpatient Prescriptions:   •  calcium-vitamin D (OSCAL-500) 500-200 MG-UNIT per tablet, Take 1 tablet by mouth 2 times daily.  , Disp: , Rfl:   •  colestipol (COLESTID) 1 G tablet, Take 1 tablet by mouth 2 times daily., Disp: , Rfl:   •  escitalopram (LEXAPRO) 20 MG tablet, TAKE 1 TABLET BY MOUTH DAILY, Disp: 90 tablet, Rfl: 1  •  FLOVENT  MCG/ACT inhaler, INHALE 1 PUFF BY MOUTH TWICE DAILY, Disp: 12 g, Rfl: 5  •  furosemide (LASIX) 20 MG tablet, TAKE 1 TABLET BY MOUTH DAILY, Disp: 30 tablet, Rfl: 5  •  losartan (COZAAR) 50 MG tablet, TAKE 1 TABLET BY MOUTH ONCE DAILY, Disp: 90 tablet, Rfl: 1  •  metoprolol tartrate (LOPRESSOR) 50 MG tablet, Take 1 tablet by mouth Every 12 (Twelve) Hours., Disp: 180 tablet, Rfl: 1  •  omeprazole (priLOSEC) 20 MG capsule, 20 mg 2 (Two) Times a Day., Disp: , Rfl:   •  potassium chloride (K-DUR,KLOR-CON) 10 MEQ CR tablet, TAKE 1 TABLET BY MOUTH EVERY DAY, Disp: 90 tablet, Rfl: 1  •  simvastatin (ZOCOR) 20 MG tablet, TAKE 1 TABLET BY MOUTH DAILY, Disp: 90 tablet, Rfl: 1  •  tamsulosin (FLOMAX) 0.4 MG capsule 24 hr capsule, TAKE ONE CAPSULE BY MOUTH DAILY AFTER THE SAME MEAL, Disp: 30 capsule, Rfl: 0  •  therapeutic multivitamin-minerals (THERAGRAN-M) tablet, Take 1 tablet by mouth daily., Disp: , Rfl:      No problems with medications  Refills if needed done    No Known Allergies    Review of  Systems  GENERAL:  Inactive/slower with limits, speed, stamina for age and fatigue. Sleep is ok. No fever now.  ENDO:  No syncope, near or diaphoretic sweaty spells.  HEENT: No change occ headache,  No vision change, Same significant hearing loss.  Ears without pain/drainage.  No sore throat.  No significant nasal/sinus congestion/drainage. No epistaxis.  CHEST: No chest wall tenderness or mass. No change cough, with occ wheeze.  Stable/occ SOB; no hemoptysis.  CV: No chest pain, palpitations, stable daily ankle edema.  GI: No heartburn, dysphagia.  No abdominal pain, diarrhea, constipation.  No rectal bleeding, or melena.    :  Voids without dysuria, or  incontinence to completion; sees sony  ORTHO: No painful/swollen joints but various on /off sore.  No change sore neck; more pain lower back.  No acute neck or back pain without recent injury.  NEURO: No dizziness, weakness of extremities.  No change UE/LE numbness/paresthesias.   PSYCH: No memory loss.  Mood good; occ anxious, depressed but/and not suicidal.  Tries to tolerate stress   Screening:  Mammogram: NA  Bone density: 3.2.2017 [facility]/Ts LS -1.8 hip -0.5  Low dose CT chest: none-offered  GI:   Colonoscopy-polyp/Gil/CARLY/3.18.16/5 yr  EGD-neg/Gil/CARLY/5.1.18  Prostate: sony  Usual lab order  6m CBC, CMP, A1c, iron  12m CBC, CMP, A1c LIPID, TSH, T4, Vit D, PSAs, B12, folate, iron, ferritin, % sat iron, retic count    Results for orders placed or performed in visit on 08/23/18   Hemoglobin A1c   Result Value Ref Range    Hemoglobin A1C 6.10 %   Iron   Result Value Ref Range    Iron 55 42 - 180 mcg/dL   Comprehensive Metabolic Panel   Result Value Ref Range    Glucose 118 (H) 70 - 100 mg/dL    BUN 12 5 - 21 mg/dL    Creatinine 0.72 0.50 - 1.40 mg/dL    eGFR Non African Am 108 >60 mL/min/1.73    eGFR African Am 131 >60 mL/min/1.73    BUN/Creatinine Ratio 16.7 7.0 - 25.0    Sodium 143 135 - 145 mmol/L    Potassium 3.9 3.5 - 5.3 mmol/L    Chloride 103 98 -  110 mmol/L    Total CO2 31.0 24.0 - 31.0 mmol/L    Calcium 9.2 8.4 - 10.4 mg/dL    Total Protein 6.7 6.3 - 8.7 g/dL    Albumin 3.80 3.50 - 5.00 g/dL    Globulin 2.9 gm/dL    A/G Ratio 1.3 1.1 - 2.5 g/dL    Total Bilirubin 0.3 0.1 - 1.0 mg/dL    Alkaline Phosphatase 97 24 - 120 U/L    AST (SGOT) 20 7 - 45 U/L    ALT (SGPT) 22 0 - 54 U/L   CBC & Differential   Result Value Ref Range    WBC 9.18 4.80 - 10.80 10*3/mm3    RBC 3.43 (L) 4.80 - 5.90 10*6/mm3    Hemoglobin 10.4 (L) 14.0 - 18.0 g/dL    Hematocrit 33.1 (L) 40.0 - 52.0 %    MCV 96.5 (H) 82.0 - 95.0 fL    MCH 30.3 28.0 - 32.0 pg    MCHC 31.4 (L) 33.0 - 36.0 g/dL    RDW 14.0 12.0 - 15.0 %    Platelets 284 130 - 400 10*3/mm3    Neutrophil Rel % 59.4 39.0 - 78.0 %    Lymphocyte Rel % 31.3 15.0 - 45.0 %    Monocyte Rel % 6.4 4.0 - 12.0 %    Eosinophil Rel % 2.1 0.0 - 4.0 %    Basophil Rel % 0.4 0.0 - 2.0 %    Neutrophils Absolute 5.45 1.87 - 8.40 10*3/mm3    Lymphocytes Absolute 2.87 0.72 - 4.86 10*3/mm3    Monocytes Absolute 0.59 0.19 - 1.30 10*3/mm3    Eosinophils Absolute 0.19 0.00 - 0.70 10*3/mm3    Basophils Absolute 0.04 0.00 - 0.20 10*3/mm3    Immature Granulocyte Rel % 0.4 0.0 - 5.0 %    Immature Grans Absolute 0.04 (H) 0.00 - 0.03 10*3/mm3    nRBC 0.0 0.0 - 0.0 /100 WBC       Lab Results   Component Value Date    PSA 0.838 02/21/2018        Lab Results:  CBC:    Lab Results - Last 18 Months  Lab Units 08/23/18  0816 02/21/18  0719 08/18/17  1252   WBC 10*3/mm3  --  11.56* 12.86*   HEMOGLOBIN g/dL 10.4* 10.4* 10.9*   HEMATOCRIT % 33.1* 32.0* 34.3*   PLATELETS 10*3/mm3 284 262 303   IRON mcg/dL 55 65  --       BMP/CMP:    Lab Results - Last 18 Months  Lab Units 08/23/18  0816 02/21/18  0719 01/25/18  0935 08/28/17  1212 08/18/17  1252   SODIUM mmol/L 143 144  --  142 138   POTASSIUM mmol/L 3.9 3.6  --  4.2 4.4   CHLORIDE mmol/L 103 102  --  104 102   TOTAL CO2, ARTERIAL mmol/L 31.0 30.0  --  28.0 25.0   GLUCOSE mg/dL 118* 103*  --  102* 109*   BUN mg/dL 12 9   "--  14 10   CREATININE mg/dL 0.72 0.76 0.90 0.94 0.88   EGFR IF NONAFRICN AM mL/min/1.73 108 102  --  80 86   EGFR IF AFRICN AM mL/min/1.73 131 123  --  96 104   CALCIUM mg/dL 9.2 9.3  --  9.4 9.3     HEPATIC:    Lab Results - Last 18 Months  Lab Units 08/23/18  0816 02/21/18  0719 08/18/17  1252   ALT (SGPT) U/L 22 28 64*   AST (SGOT) U/L 20 22 41   ALK PHOS U/L 97 95 96     THYROID:    Lab Results - Last 18 Months  Lab Units 02/21/18  0719   TSH mIU/mL 0.652     A1C:    Lab Results - Last 18 Months  Lab Units 08/23/18  0816 02/21/18  0719 08/18/17  1252   HEMOGLOBIN A1C % 6.10 6.10 6.30     PSA:    Lab Results - Last 18 Months  Lab Units 02/21/18  0719   PSA ng/mL 0.838       Objective   /80 (BP Location: Left arm, Patient Position: Sitting, Cuff Size: Adult)   Pulse 92   Temp 97.9 °F (36.6 °C) (Oral)   Resp 18   Ht 182.9 cm (72\")   Wt 127 kg (281 lb)   SpO2 96%   BMI 38.11 kg/m²   Body mass index is 38.11 kg/m².    Physical Exam  GENERAL:  Well nourished/developed in no acute distress. Obese  SKIN: Turgor excellent, without wound, rash, lesion.  HEENT: Normal cephalic without trauma.  Pupils equal round reactive to light. Extraocular motions full without nystagmus.   External canals nonobstructive nontender without reddness. Tymphatic membranes anders with david structures intact.   Oral cavity without growths, exudates, and moist.  Posterior pharynx without mass, obstruction, redness.  No thyromegaly, mass, tenderness, lymphadenopathy and supple.  CV: Regular rhythm.  No murmur, gallop,  edema. Posterior pulses intact.  No carotid bruits.  CHEST: No chest wall tenderness or mass.   LUNGS: Symmetric motion with clear/reduced to auscultation.  No dullness to percussion  ABD: Soft, nontender without mass.   ORTHO: Symmetric extremities without swelling/point tenderness.  Full gross range of motion.  NEURO: CN 2-12 grossly intact.  Symmetric facies. 1/4 x bicep equal reflexes.  UE/LE   3/5 strength " throughout.  Nonfocal use extremities. Speech clear.  Reduced light touch with monofilament, vibratory sensation with tuning fork; equal toes/distal feet.  Intention tremor.     PSYCH: Oriented x 3.  Pleasant calm, well kept.  Purposeful/directed conservation with intact short/long gross    Assessment/Plan     1. Chronic obstructive pulmonary disease, unspecified COPD type (CMS/HCC)    2. Osteoporosis, unspecified osteoporosis type, unspecified pathological fracture presence    3. Elevated fasting glucose    4. Anxiety    5. Gastroesophageal reflux disease, esophagitis presence not specified    6. Fatty liver    7. Hypertension, unspecified type    8. Hyperlipidemia, unspecified hyperlipidemia type    9. Compression fracture of vertebra with routine healing, subsequent encounter    10. Congestive heart failure, unspecified congestive heart failure chronicity, unspecified congestive heart failure type (CMS/HCC)    11. Anemia, unspecified type    12. Tobacco use    13. Pain of lower extremity, unspecified laterality    overall chronic problems are stable  Acute pain back, RLE, knee; ? How much knee/back.     Rx: reviewed/changes:  same    LAB/Testing/Referrals: reviewed/orders:   Today:   Orders Placed This Encounter   Procedures   • Ambulatory Referral to Orthopedic Surgery       Discussions:     Body mass index is 38.11 kg/m².   Patient's Body mass index is 38.11 kg/m². BMI is above normal parameters. Recommendations include: exercise counseling, nutrition counseling and as able.  smoker  I advised Felix of the risks of continuing to use tobacco, and I provided him with tobacco cessation educational materials in the After Visit Summary.     During this visit, I spent 5 minutes counseling the patient regarding tobacco cessation.      Patient Instructions   Cut off for your Tylenol would be 2000 mg /24 hr      Follow up: No Follow-up on file.  Future Appointments  Date Time Provider Department Center   2/22/2019 8:35  AM [facility] None   3/1/2019 1:15 PM Bassam Rosario MD [facility] None

## 2018-09-10 RX ORDER — FUROSEMIDE 20 MG/1
TABLET ORAL
Qty: 30 TABLET | Refills: 5 | Status: SHIPPED | OUTPATIENT
Start: 2018-09-10 | End: 2019-06-26 | Stop reason: SDUPTHER

## 2018-10-10 DIAGNOSIS — N40.1 BENIGN NON-NODULAR PROSTATIC HYPERPLASIA WITH LOWER URINARY TRACT SYMPTOMS: ICD-10-CM

## 2018-10-10 RX ORDER — TAMSULOSIN HYDROCHLORIDE 0.4 MG/1
CAPSULE ORAL
Qty: 30 CAPSULE | Refills: 4 | Status: SHIPPED | OUTPATIENT
Start: 2018-10-10 | End: 2019-04-25 | Stop reason: SDUPTHER

## 2018-11-06 RX ORDER — ESCITALOPRAM OXALATE 20 MG/1
20 TABLET ORAL DAILY
Qty: 90 TABLET | Refills: 3 | Status: SHIPPED | OUTPATIENT
Start: 2018-11-06 | End: 2019-11-20 | Stop reason: SDUPTHER

## 2018-11-06 RX ORDER — SIMVASTATIN 20 MG
TABLET ORAL
Qty: 90 TABLET | Refills: 3 | Status: SHIPPED | OUTPATIENT
Start: 2018-11-06 | End: 2019-11-20 | Stop reason: SDUPTHER

## 2018-12-05 RX ORDER — LOSARTAN POTASSIUM 50 MG/1
TABLET ORAL
Qty: 90 TABLET | Refills: 3 | Status: SHIPPED | OUTPATIENT
Start: 2018-12-05 | End: 2019-12-23

## 2019-01-08 RX ORDER — POTASSIUM CHLORIDE 750 MG/1
TABLET, EXTENDED RELEASE ORAL
Qty: 90 TABLET | Refills: 0 | Status: SHIPPED | OUTPATIENT
Start: 2019-01-08 | End: 2019-04-08 | Stop reason: SDUPTHER

## 2019-02-21 DIAGNOSIS — C64.9 RENAL CELL CARCINOMA, UNSPECIFIED LATERALITY (HCC): Primary | ICD-10-CM

## 2019-02-22 DIAGNOSIS — M81.0 OSTEOPOROSIS, UNSPECIFIED OSTEOPOROSIS TYPE, UNSPECIFIED PATHOLOGICAL FRACTURE PRESENCE: ICD-10-CM

## 2019-02-22 DIAGNOSIS — D64.9 ANEMIA, UNSPECIFIED TYPE: ICD-10-CM

## 2019-02-22 DIAGNOSIS — Z12.5 SCREENING PSA (PROSTATE SPECIFIC ANTIGEN): ICD-10-CM

## 2019-02-22 DIAGNOSIS — E78.5 HYPERLIPIDEMIA, UNSPECIFIED HYPERLIPIDEMIA TYPE: Primary | Chronic | ICD-10-CM

## 2019-02-22 DIAGNOSIS — I10 HYPERTENSION, UNSPECIFIED TYPE: Chronic | ICD-10-CM

## 2019-02-22 DIAGNOSIS — R73.01 ELEVATED FASTING GLUCOSE: Chronic | ICD-10-CM

## 2019-02-22 DIAGNOSIS — D75.89 MACROCYTOSIS: ICD-10-CM

## 2019-02-26 ENCOUNTER — HOSPITAL ENCOUNTER (OUTPATIENT)
Dept: CT IMAGING | Facility: HOSPITAL | Age: 69
End: 2019-02-26

## 2019-02-28 ENCOUNTER — HOSPITAL ENCOUNTER (OUTPATIENT)
Dept: CT IMAGING | Facility: HOSPITAL | Age: 69
End: 2019-02-28

## 2019-03-05 DIAGNOSIS — N40.1 BENIGN NON-NODULAR PROSTATIC HYPERPLASIA WITH LOWER URINARY TRACT SYMPTOMS: ICD-10-CM

## 2019-03-05 RX ORDER — METOPROLOL TARTRATE 50 MG/1
TABLET, FILM COATED ORAL
Qty: 180 TABLET | Refills: 3 | Status: SHIPPED | OUTPATIENT
Start: 2019-03-05 | End: 2020-03-18

## 2019-03-05 RX ORDER — TAMSULOSIN HYDROCHLORIDE 0.4 MG/1
CAPSULE ORAL
Qty: 30 CAPSULE | Refills: 0 | OUTPATIENT
Start: 2019-03-05

## 2019-03-23 LAB
25(OH)D3+25(OH)D2 SERPL-MCNC: 27.7 NG/ML (ref 30–100)
ALBUMIN SERPL-MCNC: 3.4 G/DL (ref 3.5–5)
ALBUMIN/GLOB SERPL: 1.3 G/DL (ref 1.1–2.5)
ALP SERPL-CCNC: 99 U/L (ref 24–120)
ALT SERPL-CCNC: 33 U/L (ref 0–54)
AST SERPL-CCNC: 37 U/L (ref 7–45)
BASOPHILS # BLD AUTO: 0.04 10*3/MM3 (ref 0–0.2)
BASOPHILS NFR BLD AUTO: 0.4 % (ref 0–2)
BILIRUB SERPL-MCNC: 0.5 MG/DL (ref 0.1–1)
BUN SERPL-MCNC: 9 MG/DL (ref 5–21)
BUN/CREAT SERPL: 12.3 (ref 7–25)
CALCIUM SERPL-MCNC: 8.9 MG/DL (ref 8.4–10.4)
CHLORIDE SERPL-SCNC: 101 MMOL/L (ref 98–110)
CHOLEST SERPL-MCNC: 191 MG/DL (ref 130–200)
CHOLEST/HDLC SERPL: 6.82 {RATIO}
CO2 SERPL-SCNC: 32 MMOL/L (ref 24–31)
CREAT SERPL-MCNC: 0.73 MG/DL (ref 0.5–1.4)
EOSINOPHIL # BLD AUTO: 0.33 10*3/MM3 (ref 0–0.7)
EOSINOPHIL NFR BLD AUTO: 3.3 % (ref 0–4)
ERYTHROCYTE [DISTWIDTH] IN BLOOD BY AUTOMATED COUNT: 13.2 % (ref 12–15)
FERRITIN SERPL-MCNC: 48.9 NG/ML (ref 17.9–464)
FOLATE SERPL-MCNC: 8.8 NG/ML (ref 4.78–24.2)
GLOBULIN SER CALC-MCNC: 2.7 GM/DL
GLUCOSE SERPL-MCNC: 105 MG/DL (ref 70–100)
HBA1C MFR BLD: 6.1 % (ref 4.8–5.6)
HCT VFR BLD AUTO: 33.7 % (ref 40–52)
HDLC SERPL-MCNC: 28 MG/DL
HGB BLD-MCNC: 11 G/DL (ref 14–18)
IMM GRANULOCYTES # BLD AUTO: 0.03 10*3/MM3 (ref 0–0.05)
IMM GRANULOCYTES NFR BLD AUTO: 0.3 % (ref 0–5)
IRON SATN MFR SERPL: 36 % (ref 20–45)
IRON SERPL-MCNC: 109 MCG/DL (ref 42–180)
LDLC SERPL CALC-MCNC: 118 MG/DL (ref 0–99)
LYMPHOCYTES # BLD AUTO: 4.08 10*3/MM3 (ref 0.72–4.86)
LYMPHOCYTES NFR BLD AUTO: 40.2 % (ref 15–45)
MCH RBC QN AUTO: 33.1 PG (ref 28–32)
MCHC RBC AUTO-ENTMCNC: 32.6 G/DL (ref 33–36)
MCV RBC AUTO: 101.5 FL (ref 82–95)
MONOCYTES # BLD AUTO: 0.66 10*3/MM3 (ref 0.19–1.3)
MONOCYTES NFR BLD AUTO: 6.5 % (ref 4–12)
NEUTROPHILS # BLD AUTO: 5.01 10*3/MM3 (ref 1.87–8.4)
NEUTROPHILS NFR BLD AUTO: 49.3 % (ref 39–78)
NRBC BLD AUTO-RTO: 0 /100 WBC (ref 0–0)
PLATELET # BLD AUTO: 254 10*3/MM3 (ref 130–400)
POTASSIUM SERPL-SCNC: 3.9 MMOL/L (ref 3.5–5.3)
PROT SERPL-MCNC: 6.1 G/DL (ref 6.3–8.7)
PSA SERPL-MCNC: 0.77 NG/ML (ref 0–4)
RBC # BLD AUTO: 3.32 10*6/MM3 (ref 4.8–5.9)
RETICS/RBC NFR AUTO: 1.93 % (ref 0.6–1.8)
SODIUM SERPL-SCNC: 141 MMOL/L (ref 135–145)
T4 SERPL-MCNC: 5.9 UG/DL (ref 4.5–12)
TIBC SERPL-MCNC: 302 MCG/DL (ref 225–420)
TRIGL SERPL-MCNC: 224 MG/DL (ref 0–149)
TSH SERPL DL<=0.005 MIU/L-ACNC: 0.41 MIU/ML (ref 0.47–4.68)
UIBC SERPL-MCNC: 193 MCG/DL
VIT B12 SERPL-MCNC: 497 PG/ML (ref 239–931)
VLDLC SERPL CALC-MCNC: 44.8 MG/DL
WBC # BLD AUTO: 10.15 10*3/MM3 (ref 4.8–10.8)

## 2019-03-28 ENCOUNTER — OFFICE VISIT (OUTPATIENT)
Dept: FAMILY MEDICINE CLINIC | Facility: CLINIC | Age: 69
End: 2019-03-28

## 2019-03-28 VITALS
TEMPERATURE: 97.7 F | DIASTOLIC BLOOD PRESSURE: 76 MMHG | OXYGEN SATURATION: 95 % | HEART RATE: 100 BPM | SYSTOLIC BLOOD PRESSURE: 140 MMHG | WEIGHT: 293 LBS | RESPIRATION RATE: 18 BRPM | HEIGHT: 72 IN | BODY MASS INDEX: 39.68 KG/M2

## 2019-03-28 DIAGNOSIS — D64.9 ANEMIA, UNSPECIFIED TYPE: ICD-10-CM

## 2019-03-28 DIAGNOSIS — E78.2 MIXED HYPERLIPIDEMIA: Chronic | ICD-10-CM

## 2019-03-28 DIAGNOSIS — R60.0 EDEMA LEG: ICD-10-CM

## 2019-03-28 DIAGNOSIS — Z12.2 ENCOUNTER FOR SCREENING FOR LUNG CANCER: ICD-10-CM

## 2019-03-28 DIAGNOSIS — I10 ESSENTIAL HYPERTENSION: Chronic | ICD-10-CM

## 2019-03-28 DIAGNOSIS — K21.9 GASTROESOPHAGEAL REFLUX DISEASE, ESOPHAGITIS PRESENCE NOT SPECIFIED: Chronic | ICD-10-CM

## 2019-03-28 DIAGNOSIS — R73.01 ELEVATED FASTING GLUCOSE: Chronic | ICD-10-CM

## 2019-03-28 DIAGNOSIS — J44.9 CHRONIC OBSTRUCTIVE PULMONARY DISEASE, UNSPECIFIED COPD TYPE (HCC): Chronic | ICD-10-CM

## 2019-03-28 DIAGNOSIS — R25.1 TREMOR: ICD-10-CM

## 2019-03-28 DIAGNOSIS — J96.91 RESPIRATORY FAILURE WITH HYPOXIA, UNSPECIFIED CHRONICITY (HCC): ICD-10-CM

## 2019-03-28 DIAGNOSIS — Z87.891 PERSONAL HISTORY OF NICOTINE DEPENDENCE: ICD-10-CM

## 2019-03-28 DIAGNOSIS — K76.0 FATTY LIVER: Chronic | ICD-10-CM

## 2019-03-28 DIAGNOSIS — I50.32 CHRONIC DIASTOLIC CONGESTIVE HEART FAILURE (HCC): ICD-10-CM

## 2019-03-28 PROCEDURE — 99213 OFFICE O/P EST LOW 20 MIN: CPT | Performed by: FAMILY MEDICINE

## 2019-03-28 RX ORDER — FERROUS SULFATE 325(65) MG
325 TABLET ORAL
COMMUNITY

## 2019-03-28 RX ORDER — SENNOSIDES 8.6 MG
650 CAPSULE ORAL 2 TIMES DAILY
COMMUNITY

## 2019-03-28 RX ORDER — PRIMIDONE 50 MG/1
50 TABLET ORAL NIGHTLY
Qty: 30 TABLET | Refills: 5 | Status: SHIPPED | OUTPATIENT
Start: 2019-03-28 | End: 2019-09-19 | Stop reason: SDUPTHER

## 2019-03-28 RX ORDER — DIPHENHYDRAMINE HCL 25 MG
25 CAPSULE ORAL 2 TIMES DAILY
COMMUNITY

## 2019-03-28 NOTE — PROGRESS NOTES
Subjective   Felix Bartlett Tyron is a 68 y.o. male presenting with chief complaint of:   Chief Complaint   Patient presents with   • COPD     6 month follow-up.   • Hypertension       History of Present Illness :  With wife.   Has multiple chronic problems to consider that might have a bearing on today's issues;  an interval appointment.       Chronic/acute problems reviewed today:   1. Mixed hyperlipidemia: Chronic/stable.  Tolerated use of statin with labs showing improved lipid values and tolerant liver labs. No muscle aches unexpected.      2. Essential hypertension: Chronic/stable. Stable here/if home blood pressures.  No significant chest pain, SOB, LE edema, orthopnea, near syncope, dizziness/light headness.      3. Chronic diastolic congestive heart failure (CMS/HCC): Chronic/stable.  Denies significant sob, orthopnea, leg edema, weight gain. Tolerates leg edema.  Aware of influence diet/salt and watching weight at home.       4. Chronic obstructive pulmonary disease, unspecified COPD type (CMS/HCC): Chronic/stable mild occ cough, sob, wheeze.  Rx helps.  No longer smoking.        5. Respiratory failure with hypoxia, unspecified chronicity (CMS/HCC): chronic/stable.  Gets by with simply using oxygen when he is asleep.   6. Gastroesophageal reflux disease, esophagitis presence not specified: Chronic/stable.  Controlled heartburn, reflux; no dysphagia, melena.  Rx helps.     7. Fatty liver : Chronic/stable.  Aware treatment involves normalizing weight; usually including low fat diet and regular exercise.  Aware condition can go on to cirrhosis and death.  Stable occ liver labs and past imaging.  This was contributed to in the past by drinking; something he stopped 3 years ago.     8. Anemia, unspecified type: chronic/benign to date.  NO rectal bleeding, melena.     9. Elevated fasting glucose: Chronic/stable. No problem/pattern hypoglycemia/hyperglycemia manifest by poly- dypsia, phagia, uria, or sweats,  diaphoretic episodes, syncope/near.     10. Tremor: Chronic/worse.  He used to use of medicine that made him drowsy; it helped and he ran out.  He got from [facility].  Course he used to use alcohol and had an alcohol tremor.   11. Edema leg-CHF/d, obesity, copd, cirrhosis chronic/variable.  He is tolerant of today's leg edema and does not want to add additional Lasix from the 20 he is on now.   12. Personal history of nicotine dependence chronic/less; he is only smoking 5 cigarettes a day now.  He does qualify for low-dose CT.   13. Encounter for screening for lung cancer chronic/40+-pack-year history.     Has an/another acute issue today: none.    The following portions of the patient's history were reviewed and updated as appropriate: allergies, current medications, past family history, past medical history, past social history, past surgical history and problem list.      Current Outpatient Medications:   •  acetaminophen (TYLENOL) 650 MG 8 hr tablet, Take 650 mg by mouth 2 (Two) Times a Day., Disp: , Rfl:   •  calcium-vitamin D (OSCAL-500) 500-200 MG-UNIT per tablet, Take 1 tablet by mouth 2 times daily.  , Disp: , Rfl:   •  diphenhydrAMINE (BENADRYL) 25 mg capsule, Take 25 mg by mouth 2 (Two) Times a Day., Disp: , Rfl:   •  escitalopram (LEXAPRO) 20 MG tablet, TAKE 1 TABLET BY MOUTH DAILY, Disp: 90 tablet, Rfl: 3  •  ferrous sulfate 325 (65 FE) MG tablet, Take 325 mg by mouth Daily With Breakfast., Disp: , Rfl:   •  FLOVENT  MCG/ACT inhaler, INHALE 1 PUFF BY MOUTH TWICE DAILY, Disp: 12 g, Rfl: 5  •  FLUTICASONE PROPIONATE, NASAL, NA, 1 spray into the nostril(s) as directed by provider Daily., Disp: , Rfl:   •  furosemide (LASIX) 20 MG tablet, TAKE 1 TABLET BY MOUTH DAILY, Disp: 30 tablet, Rfl: 5  •  KETOTIFEN FUMARATE OP, Apply 1 drop to eye(s) as directed by provider As Needed., Disp: , Rfl:   •  losartan (COZAAR) 50 MG tablet, TAKE 1 TABLET BY MOUTH ONCE DAILY, Disp: 90 tablet, Rfl: 3  •  metoprolol  tartrate (LOPRESSOR) 50 MG tablet, TAKE 1 TABLET BY MOUTH EVERY 12 HOURS, Disp: 180 tablet, Rfl: 3  •  Multiple Vitamins-Minerals (CENTRUM SILVER 50+MEN) tablet, Take 1 tablet by mouth Daily., Disp: , Rfl:   •  omeprazole (priLOSEC) 20 MG capsule, 20 mg 2 (Two) Times a Day., Disp: , Rfl:   •  potassium chloride (K-DUR,KLOR-CON) 10 MEQ CR tablet, TAKE 1 TABLET BY MOUTH EVERY DAY, Disp: 90 tablet, Rfl: 0  •  simvastatin (ZOCOR) 20 MG tablet, TAKE 1 TABLET BY MOUTH DAILY, Disp: 90 tablet, Rfl: 3  •  tamsulosin (FLOMAX) 0.4 MG capsule 24 hr capsule, TAKE ONE CAPSULE BY MOUTH DAILY AFTER THE SAME MEAL, Disp: 30 capsule, Rfl: 4    No problems with medications.  Refills if needed done    No Known Allergies    Review of Systems  GENERAL:  Inactive/slower with limits, speed, stamina for age and fatigue. Sleep is ok. No fever now.  ENDO:  No syncope, near or diaphoretic sweaty spells.  HEENT: No change occ headache,  No vision change, Same significant hearing loss.  Ears without pain/drainage.  No sore throat.  No significant nasal/sinus congestion/drainage. No epistaxis.  CHEST: No chest wall tenderness or mass. No change cough, with occ wheeze.  Stable/occ SOB; no hemoptysis.  CV: No chest pain, palpitations, stable daily ankle edema.  GI: No heartburn, dysphagia.  No abdominal pain, diarrhea, constipation.  No rectal bleeding, or melena.    :  Voids without dysuria, or  incontinence to completion; sees sony  ORTHO: No painful/swollen joints but various on /off sore.  No change sore neck; more pain lower back.  No acute neck or back pain without recent injury.  NEURO: No dizziness, weakness of extremities.  No change UE/LE numbness/paresthesias.   Continued RUE > LUE tremor.   PSYCH: No memory loss.  Mood good; occ anxious, depressed but/and not suicidal.  Tries to tolerate stress   Screening:  Mammogram: NA  Bone density: 3.2.2017 [facility]/Ts LS -1.8 hip -0.5  Low dose CT chest: regular 2014-offered  GI:    Colonoscopy-polyp/[facility]//3.18.16/5 yr  EGD-neg/[facility]//5.1.18  Prostate: sony confirmed 3.28.19  Usual lab order  6m CBC, CMP, A1c, iron  12m CBC, CMP, A1c LIPID, TSH, T4, Vit D, PSAs, B12, folate, iron, ferritin, % sat iron, retic count       Lab Results:  Results for orders placed or performed in visit on 02/22/19   Comprehensive Metabolic Panel   Result Value Ref Range    Glucose 105 (H) 70 - 100 mg/dL    BUN 9 5 - 21 mg/dL    Creatinine 0.73 0.50 - 1.40 mg/dL    eGFR Non African Am 106 >60 mL/min/1.73    eGFR African Am 129 >60 mL/min/1.73    BUN/Creatinine Ratio 12.3 7.0 - 25.0    Sodium 141 135 - 145 mmol/L    Potassium 3.9 3.5 - 5.3 mmol/L    Chloride 101 98 - 110 mmol/L    Total CO2 32.0 (H) 24.0 - 31.0 mmol/L    Calcium 8.9 8.4 - 10.4 mg/dL    Total Protein 6.1 (L) 6.3 - 8.7 g/dL    Albumin 3.40 (L) 3.50 - 5.00 g/dL    Globulin 2.7 gm/dL    A/G Ratio 1.3 1.1 - 2.5 g/dL    Total Bilirubin 0.5 0.1 - 1.0 mg/dL    Alkaline Phosphatase 99 24 - 120 U/L    AST (SGOT) 37 7 - 45 U/L    ALT (SGPT) 33 0 - 54 U/L   Hemoglobin A1c   Result Value Ref Range    Hemoglobin A1C 6.10 (H) 4.80 - 5.60 %   Lipid Panel w/ Chol/HDL Ratio   Result Value Ref Range    Total Cholesterol 191 130 - 200 mg/dL    Triglycerides 224 (H) 0 - 149 mg/dL    HDL Cholesterol 28 (L) >=40 mg/dL    VLDL Cholesterol 44.8 mg/dL    LDL Cholesterol  118 (H) 0 - 99 mg/dL    Chol/HDL Ratio 6.82    TSH   Result Value Ref Range    TSH 0.409 (L) 0.470 - 4.680 mIU/mL   T4   Result Value Ref Range    T4, Total 5.9 4.5 - 12.0 ug/dL   Vitamin D 25 hydroxy   Result Value Ref Range    25 Hydroxy, Vitamin D 27.7 (L) 30.0 - 100.0 ng/ml   PSA Screen   Result Value Ref Range    PSA 0.767 0.000 - 4.000 ng/mL   Folate   Result Value Ref Range    Folate 8.80 4.78 - 24.20 ng/mL   Vitamin B12   Result Value Ref Range    Vitamin B-12 497 239 - 931 pg/mL   Iron and TIBC   Result Value Ref Range    TIBC 302 225 - 420 mcg/dL    UIBC 193 mcg/dL    Iron 109 42 - 180  mcg/dL    Iron Saturation 36 20 - 45 %   Ferritin   Result Value Ref Range    Ferritin 48.90 17.90 - 464.00 ng/mL   Reticulocytes   Result Value Ref Range    Reticulocyte Absolute 1.93 (H) 0.60 - 1.80 %   CBC & Differential   Result Value Ref Range    WBC 10.15 4.80 - 10.80 10*3/mm3    RBC 3.32 (L) 4.80 - 5.90 10*6/mm3    Hemoglobin 11.0 (L) 14.0 - 18.0 g/dL    Hematocrit 33.7 (L) 40.0 - 52.0 %    .5 (H) 82.0 - 95.0 fL    MCH 33.1 (H) 28.0 - 32.0 pg    MCHC 32.6 (L) 33.0 - 36.0 g/dL    RDW 13.2 12.0 - 15.0 %    Platelets 254 130 - 400 10*3/mm3    Neutrophil Rel % 49.3 39.0 - 78.0 %    Lymphocyte Rel % 40.2 15.0 - 45.0 %    Monocyte Rel % 6.5 4.0 - 12.0 %    Eosinophil Rel % 3.3 0.0 - 4.0 %    Basophil Rel % 0.4 0.0 - 2.0 %    Neutrophils Absolute 5.01 1.87 - 8.40 10*3/mm3    Lymphocytes Absolute 4.08 0.72 - 4.86 10*3/mm3    Monocytes Absolute 0.66 0.19 - 1.30 10*3/mm3    Eosinophils Absolute 0.33 0.00 - 0.70 10*3/mm3    Basophils Absolute 0.04 0.00 - 0.20 10*3/mm3    Immature Granulocyte Rel % 0.3 0.0 - 5.0 %    Immature Grans Absolute 0.03 0.00 - 0.05 10*3/mm3    nRBC 0.0 0.0 - 0.0 /100 WBC       A1C:  Lab Results - Last 18 Months   Lab Units 03/22/19  0948 08/23/18  0816 02/21/18  0719   HEMOGLOBIN A1C % 6.10* 6.10 6.10     PSA:  Lab Results - Last 18 Months   Lab Units 03/22/19  0948 02/21/18  0719   PSA ng/mL 0.767 0.838     CBC:  Lab Results - Last 18 Months   Lab Units 03/22/19  0948 08/23/18  0816 02/21/18  0719   WBC 10*3/mm3 10.15 9.18 11.56*   HEMOGLOBIN g/dL 11.0* 10.4* 10.4*   HEMATOCRIT % 33.7* 33.1* 32.0*   PLATELETS 10*3/mm3 254 284 262   IRON mcg/dL 109 55 65      BMP/CMP:  Lab Results - Last 18 Months   Lab Units 03/22/19  0948 08/23/18  0816 02/21/18  0719 01/25/18  0935   SODIUM mmol/L 141 143 144  --    POTASSIUM mmol/L 3.9 3.9 3.6  --    CHLORIDE mmol/L 101 103 102  --    TOTAL CO2 mmol/L 32.0* 31.0 30.0  --    GLUCOSE mg/dL 105* 118* 103*  --    BUN mg/dL 9 12 9  --    CREATININE mg/dL  "0.73 0.72 0.76 0.90   EGFR IF NONAFRICN AM mL/min/1.73 106 108 102  --    EGFR IF AFRICN AM mL/min/1.73 129 131 123  --    CALCIUM mg/dL 8.9 9.2 9.3  --      HEPATIC:  Lab Results - Last 18 Months   Lab Units 03/22/19  0948 08/23/18  0816 02/21/18  0719   ALT (SGPT) U/L 33 22 28   AST (SGOT) U/L 37 20 22   ALK PHOS U/L 99 97 95     THYROID:  Lab Results - Last 18 Months   Lab Units 03/22/19  0948 02/21/18  0719   TSH mIU/mL 0.409* 0.652       Objective   /76 (BP Location: Left arm, Patient Position: Sitting, Cuff Size: Large Adult)   Pulse 100   Temp 97.7 °F (36.5 °C) (Oral)   Resp 18   Ht 182.9 cm (72\")   Wt 133 kg (293 lb)   SpO2 95%   BMI 39.74 kg/m²   Body mass index is 39.74 kg/m².    Physical Exam  GENERAL:  Well nourished/developed in no acute distress. Obese  SKIN: Turgor excellent, without wound, rash, lesion.  HEENT: Normal cephalic without trauma.  Pupils equal round reactive to light. Extraocular motions full without nystagmus.   External canals nonobstructive nontender without reddness. Tymphatic membranes anders with david structures intact.   Oral cavity without growths, exudates, and moist.  Posterior pharynx without mass, obstruction, redness.  No thyromegaly, mass, tenderness, lymphadenopathy and supple.  CV: Regular rhythm.  No murmur, gallop,  edema. Posterior pulses intact.  No carotid bruits.  CHEST: No chest wall tenderness or mass.   LUNGS: Symmetric motion with clear/reduced to auscultation.  No dullness to percussion  ABD: Soft, nontender without mass.   ORTHO: Symmetric extremities without swelling/point tenderness.  Full gross range of motion.  NEURO: CN 2-12 grossly intact.  Symmetric facies. 1/4 x bicep equal reflexes.  UE/LE   3/5 strength throughout.  Nonfocal use extremities. Speech clear.  Reduced light touch with monofilament, vibratory sensation with tuning fork; equal toes/distal feet.  Intention tremor.     PSYCH: Oriented x 3.  Pleasant calm, well kept. "  Purposeful/directed conservation with intact short/long gross    Assessment/Plan     1. Mixed hyperlipidemia    2. Essential hypertension    3. Chronic diastolic congestive heart failure (CMS/HCC)    4. Chronic obstructive pulmonary disease, unspecified COPD type (CMS/HCC)    5. Respiratory failure with hypoxia, unspecified chronicity (CMS/HCC)    6. Gastroesophageal reflux disease, esophagitis presence not specified    7. Fatty liver    8. Anemia, unspecified type    9. Elevated fasting glucose    10. Tremor    11. Edema leg-CHF/d, obesity, copd, cirrhosis    12. Personal history of nicotine dependence    13. Encounter for screening for lung cancer        Rx: reviewed/changes:  New Medications Ordered This Visit   Medications   • primidone (MYSOLINE) 50 MG tablet     Sig: Take 1 tablet by mouth Every Night.     Dispense:  30 tablet     Refill:  5       LAB/Testing/Referrals: reviewed/orders:   Today:   Orders Placed This Encounter   Procedures   • CT Chest Low Dose Wo     Chronic/recurrent labs above or change to:   same     Discussions:   Doing better  Decrease smoking and stay off alcohol  Body mass index is 39.74 kg/m².  Patient's Body mass index is 39.74 kg/m². BMI is above normal parameters. Recommendations include: exercise counseling, nutrition counseling and as able.    Tobacco use reviewed:  smoker  Smoker (uses cigarettes) I advised Felix of the risks of continuing to use tobacco, and I provided him with tobacco cessation educational materials in the After Visit Summary.     During this visit, I spent 5 minutes counseling the patient regarding tobacco cessation.      There are no Patient Instructions on file for this visit.    Follow up: Return for lab;, Dr Rosario-, 6 m;.  Future Appointments   Date Time Provider Department Center   9/30/2019 10:00 AM [facility] None   10/3/2019  2:15 PM Bassam Rosario [place] [facility] [place] [facility] [place]

## 2019-04-08 RX ORDER — POTASSIUM CHLORIDE 750 MG/1
TABLET, EXTENDED RELEASE ORAL
Qty: 90 TABLET | Refills: 1 | Status: SHIPPED | OUTPATIENT
Start: 2019-04-08 | End: 2019-10-21 | Stop reason: SDUPTHER

## 2019-04-15 ENCOUNTER — HOSPITAL ENCOUNTER (OUTPATIENT)
Dept: CT IMAGING | Facility: HOSPITAL | Age: 69
Discharge: HOME | End: 2019-04-15

## 2019-04-15 ENCOUNTER — HOSPITAL ENCOUNTER (OUTPATIENT)
Dept: CT IMAGING | Facility: HOSPITAL | Age: 69
Discharge: HOME | End: 2019-04-15
Admitting: FAMILY MEDICINE

## 2019-04-15 DIAGNOSIS — C64.9 RENAL CELL CARCINOMA, UNSPECIFIED LATERALITY (HCC): ICD-10-CM

## 2019-04-15 LAB — CREAT BLDA-MCNC: 0.8 MG/DL (ref 0.6–1.3)

## 2019-04-15 PROCEDURE — G0297 LDCT FOR LUNG CA SCREEN: HCPCS

## 2019-04-15 PROCEDURE — 74178 CT ABD&PLV WO CNTR FLWD CNTR: CPT

## 2019-04-15 PROCEDURE — 82565 ASSAY OF CREATININE: CPT

## 2019-04-15 PROCEDURE — 25010000002 IOPAMIDOL 61 % SOLUTION: Performed by: UROLOGY

## 2019-04-15 RX ADMIN — IOPAMIDOL 100 ML: 612 INJECTION, SOLUTION INTRAVENOUS at 14:45

## 2019-04-17 NOTE — PROGRESS NOTES
Mr. Bartlett is 68 y.o. male    CHIEF COMPLAINT: I am here for follow up on renal cell carcinoma.     HPI  Follow-up renal cell carcinoma  Location: Left kidney  Quality: Clear-cell renal cell carcinoma  Severity: Organ confined disease with negative margin at time of surgery no evidence of recurrence.  Duration: Diagnosed in 2014  Timing: Unknown onset  Context: CT done for non-urologic consideration.  This was an incidental finding.  Modifying factors: Patient underwent partial nephrectomy said no evidence of recurrent disease.  Associated signs or symptoms: No flank pain or hematuria.  History related to this issue:   -08/2014: Left robot-assisted laparoscopic partial nephrectomy  Final Diagnosis  Left kidney, wedge resection:        A.     Renal clear cell carcinoma, Racheal nuclear grade 1.        B.     Greatest tumor dimension equals 3.9 cm.         C.      All margins are free of tumor with closest tumor measured at 0.1 mm  from the                     parenchymal margin.        D.     No extension beyond renal capsule.         E.     Pathologic classification:  pT1a.    Other issues to be addressed at this visit:   -Patient with known BPH and nocturia.  Both of these are stable for him.  He has some frequency urgency decreased force of his stream is improved on tamsulosin.  He does think this affects the nocturia as he gets up at least 3-4 times each night.        The following portions of the patient's history were reviewed and updated as appropriate: allergies, current medications, past family history, past medical history, past social history, past surgical history and problem list.      Review of Systems   Constitutional: Negative for chills and fever.   Gastrointestinal: Negative for abdominal pain, anal bleeding and blood in stool.   Genitourinary: Negative for dysuria, frequency, hematuria and urgency.           Current Outpatient Medications:   •  acetaminophen (TYLENOL) 650 MG 8 hr tablet, Take 650  mg by mouth 2 (Two) Times a Day., Disp: , Rfl:   •  calcium-vitamin D (OSCAL-500) 500-200 MG-UNIT per tablet, Take 1 tablet by mouth 2 times daily.  , Disp: , Rfl:   •  diphenhydrAMINE (BENADRYL) 25 mg capsule, Take 25 mg by mouth 2 (Two) Times a Day., Disp: , Rfl:   •  escitalopram (LEXAPRO) 20 MG tablet, TAKE 1 TABLET BY MOUTH DAILY, Disp: 90 tablet, Rfl: 3  •  ferrous sulfate 325 (65 FE) MG tablet, Take 325 mg by mouth Daily With Breakfast., Disp: , Rfl:   •  FLOVENT  MCG/ACT inhaler, INHALE 1 PUFF BY MOUTH TWICE DAILY, Disp: 12 g, Rfl: 5  •  FLUTICASONE PROPIONATE, NASAL, NA, 1 spray into the nostril(s) as directed by provider Daily., Disp: , Rfl:   •  furosemide (LASIX) 20 MG tablet, TAKE 1 TABLET BY MOUTH DAILY, Disp: 30 tablet, Rfl: 5  •  KETOTIFEN FUMARATE OP, Apply 1 drop to eye(s) as directed by provider As Needed., Disp: , Rfl:   •  losartan (COZAAR) 50 MG tablet, TAKE 1 TABLET BY MOUTH ONCE DAILY, Disp: 90 tablet, Rfl: 3  •  metoprolol tartrate (LOPRESSOR) 50 MG tablet, TAKE 1 TABLET BY MOUTH EVERY 12 HOURS, Disp: 180 tablet, Rfl: 3  •  Multiple Vitamins-Minerals (CENTRUM SILVER 50+MEN) tablet, Take 1 tablet by mouth Daily., Disp: , Rfl:   •  omeprazole (priLOSEC) 20 MG capsule, 20 mg 2 (Two) Times a Day., Disp: , Rfl:   •  potassium chloride (K-DUR,KLOR-CON) 10 MEQ CR tablet, TAKE 1 TABLET BY MOUTH EVERY DAY, Disp: 90 tablet, Rfl: 1  •  primidone (MYSOLINE) 50 MG tablet, Take 1 tablet by mouth Every Night., Disp: 30 tablet, Rfl: 5  •  simvastatin (ZOCOR) 20 MG tablet, TAKE 1 TABLET BY MOUTH DAILY, Disp: 90 tablet, Rfl: 3  •  tamsulosin (FLOMAX) 0.4 MG capsule 24 hr capsule, Take 1 capsule by mouth Daily., Disp: 30 capsule, Rfl: 11    Past Medical History:   Diagnosis Date   • Acid reflux    • Arthritis    • Asthma    • COPD (chronic obstructive pulmonary disease) (CMS/HCC)    • Hyperlipidemia    • Hypertension    • Oxygen dependent     at hs   • Renal cancer (CMS/HCC) 08/2014    Lt RALPart'l Nx;  "t1a Clear Cell with negative margin       Past Surgical History:   Procedure Laterality Date   • APPENDECTOMY     • CHOLECYSTECTOMY     • ENDOSCOPY N/A 5/1/2018    Procedure: ESOPHAGOGASTRODUODENOSCOPY WITH ANESTHESIA;  Surgeon: Donnell Cordero DO;  Location: [facility] ENDOSCOPY;  Service: Gastroenterology   • HERNIA REPAIR     • KIDNEY SURGERY     • LAPAROSCOPIC PARTIAL NEPHRECTOMY Left 08/14/2014    Robot Assisted       Social History     Socioeconomic History   • Marital status:      Spouse name: Melanie   • Number of children: 1   • Years of education: 12   • Highest education level: Not on file   Occupational History   • Occupation: retired     Comment: post office   Tobacco Use   • Smoking status: Current Every Day Smoker     Packs/day: 0.25     Years: 50.00     Pack years: 12.50     Types: Cigarettes     Start date: 4/30/1966   • Smokeless tobacco: Never Used   • Tobacco comment: 8/27/18 Smoking 5 cigarettes per day.   Substance and Sexual Activity   • Alcohol use: No   • Drug use: No   • Sexual activity: Defer       Family History   Problem Relation Age of Onset   • Colon cancer Father    • Heart disease Mother          Temp 98.2 °F (36.8 °C)   Ht 182.9 cm (72\")   Wt 132 kg (290 lb)   BMI 39.33 kg/m²       Physical Exam  Constitutional:  They  appear well-developed and well-nourished. There are no obvious deformities. No distress.   Pulmonary/Chest: Effort normal.   GI: Soft. The patient exhibits no distension and no mass. There is no tenderness. There is no rebound and no guarding. No hernia.   Neurological: Patient is alert and oriented to person, place, and time.   Skin: Skin is warm and dry. Not diaphoretic.   Psychiatric:  normal mood and affect. Not agitated.         Data  Results for orders placed or performed in visit on 04/25/19   POC Urinalysis Dipstick, Multipro   Result Value Ref Range    Color Yellow Yellow, Straw, Dark Yellow, Chery    Clarity, UA Clear Clear    Glucose, UA Negative " Negative, 1000 mg/dL (3+) mg/dL    Bilirubin Negative Negative    Ketones, UA Negative Negative    Specific Gravity  1.025 1.005 - 1.030    Blood, UA Small (A) Negative    pH, Urine 6.0 5.0 - 8.0    Protein, POC Trace (A) Negative mg/dL    Urobilinogen, UA Normal Normal    Nitrite, UA Negative Negative    Leukocytes Negative Negative     CT ABDOMEN PELVIS W WO CONTRAST- 4/15/2019 2:43 PM CDT     HISTORY: renal cell carcinoma; C64.9-Malignant neoplasm of unspecified  kidney, except renal pelvis      COMPARISON: 01/25/2018     DOSE LENGTH PRODUCT: 3286 mGy cm. Automated exposure control was also  utilized to decrease patient radiation dose.     TECHNIQUE: Axial images of the abdomen and pelvis are obtained pre- and  post-IV contrast. Dual phase imaging through the kidneys performed after  IV contrast.     FINDINGS:  There are nonobstructing right intrarenal stones measuring 5  mm or less. No ureteral stones or hydronephrosis is identified. There is  a stable 2.5 cm nonenhancing right renal cyst. There is a stable fatty  attenuated change of the inferior left kidney. At the site of wedge  resection likely due to fat necrosis. There are no new renal lesions.  Fat is again seen within the mildly distended bladder. Prostate is  borderline prominent measuring 5.3 cm in width. There is no pathologic  retroperitoneal lymphadenopathy. There are stable left adrenal nodules.  There is no right adrenal nodule.      There are calcified granuloma within the liver and the spleen. Stable  accessory splenule seen adjacent to the tail of the pancreas.  Gallbladder is absent. There is no free intraperitoneal air or loculated  abscess. There is no evidence for bowel obstruction. Evidence of the  umbilical hernia repair with mesh in place. Fatty containing ventral  midline hernia defects are seen above the umbilicus. There is mild  vascular calcification.     The visible lung bases are clear.     No focal destructive osseous lesions are  identified.     IMPRESSION:  1. Stable fat necrosis of the inferior pole of the left kidney. Similar  right renal cyst. Nonobstructing right intrarenal stones. No  hydronephrosis. No enhancing renal lesion. Similar chyluria.  2. Stable left adrenal nodules.   This report was finalized on 04/15/2019 15:21 by Dr. Rosa Morales MD.    These images were made available to me to review independently.  I did review the radiologist's report described above with regard to the urologic findings. No new masses.   /Felix Ramos MD        4/15/19 Chest CT  Technique:  Unenhanced CT chest performed utilizing low-dose protocol technique.     For this CT exam, one or more of the following dose reduction techniques  was employed:  -automated exposure control  -mA and/or kVp adjustment for patient size  -iterative reconstruction     DLP 84 mGy-cm     Findings:  No lymphadenopathy. Granulomatous jo calcification in the mediastinum  and subcarinal space. Minimal atherosclerosis of the thoracic aorta  without aneurysm or dissection. Coronary artery atherosclerotic  calcification.     No pericardial effusion.     Several tiny scattered calcified granulomas. No noncalcified nodules. No  endobronchial mass. No consolidation. No pleural mass or pleural fluid.     Imaging through the upper abdomen shows a small left adrenal adenoma,  stable and consistent with adenoma. Cholecystectomy changes.     Scoliosis. No suspicious bone lesions.        IMPRESSION:  Lung RADS category 1, negative. Follow-up in 12 months is advised. Old  granulomatous disease.  This report was finalized on 04/15/2019 14:48 by Dr Raoul Miller, .    Patient's Body mass index is 39.33 kg/m². BMI is above normal parameters. Recommendations include: educational material.      Assessment and Plan  Diagnoses and all orders for this visit:    Renal cell carcinoma, unspecified laterality (CMS/HCC)  -     POC Urinalysis Dipstick, Multipro  -     US Renal Bilateral;  Future    Nocturia    Benign non-nodular prostatic hyperplasia with lower urinary tract symptoms  -     tamsulosin (FLOMAX) 0.4 MG capsule 24 hr capsule; Take 1 capsule by mouth Daily.    -He has no evidence of recurrent disease.  -He has benign prostatic hyperplasia with obstructive symptoms as well as nocturia that is only partially related to this.  We will continue him on tamsulosin.  Both of these chronic urologic issues are stable at this point.    (Please note that portions of this note were completed with a voice recognition program.)  Felix Ramos MD  04/25/19  2:55 PM      Cc: Bassam Rosario MD

## 2019-04-25 ENCOUNTER — OFFICE VISIT (OUTPATIENT)
Dept: UROLOGY | Facility: CLINIC | Age: 69
End: 2019-04-25

## 2019-04-25 VITALS — TEMPERATURE: 98.2 F | BODY MASS INDEX: 39.28 KG/M2 | WEIGHT: 290 LBS | HEIGHT: 72 IN

## 2019-04-25 DIAGNOSIS — C64.9 RENAL CELL CARCINOMA, UNSPECIFIED LATERALITY (HCC): Primary | ICD-10-CM

## 2019-04-25 DIAGNOSIS — R35.1 NOCTURIA: ICD-10-CM

## 2019-04-25 DIAGNOSIS — N40.1 BENIGN NON-NODULAR PROSTATIC HYPERPLASIA WITH LOWER URINARY TRACT SYMPTOMS: ICD-10-CM

## 2019-04-25 LAB
BILIRUB BLD-MCNC: NEGATIVE MG/DL
CLARITY, POC: CLEAR
COLOR UR: YELLOW
GLUCOSE UR STRIP-MCNC: NEGATIVE MG/DL
KETONES UR QL: NEGATIVE
LEUKOCYTE EST, POC: NEGATIVE
NITRITE UR-MCNC: NEGATIVE MG/ML
PH UR: 6 [PH] (ref 5–8)
PROT UR STRIP-MCNC: ABNORMAL MG/DL
RBC # UR STRIP: ABNORMAL /UL
SP GR UR: 1.02 (ref 1–1.03)
UROBILINOGEN UR QL: NORMAL

## 2019-04-25 PROCEDURE — 99214 OFFICE O/P EST MOD 30 MIN: CPT | Performed by: UROLOGY

## 2019-04-25 PROCEDURE — 81003 URINALYSIS AUTO W/O SCOPE: CPT | Performed by: UROLOGY

## 2019-04-25 RX ORDER — TAMSULOSIN HYDROCHLORIDE 0.4 MG/1
1 CAPSULE ORAL DAILY
Qty: 30 CAPSULE | Refills: 11 | Status: SHIPPED | OUTPATIENT
Start: 2019-04-25 | End: 2019-05-28 | Stop reason: SDUPTHER

## 2019-04-25 NOTE — PATIENT INSTRUCTIONS

## 2019-04-26 DIAGNOSIS — N40.1 BENIGN NON-NODULAR PROSTATIC HYPERPLASIA WITH LOWER URINARY TRACT SYMPTOMS: ICD-10-CM

## 2019-04-29 RX ORDER — TAMSULOSIN HYDROCHLORIDE 0.4 MG/1
CAPSULE ORAL
Qty: 30 CAPSULE | Refills: 0 | Status: SHIPPED | OUTPATIENT
Start: 2019-04-29 | End: 2019-05-28 | Stop reason: SDUPTHER

## 2019-05-02 DIAGNOSIS — N40.1 BENIGN NON-NODULAR PROSTATIC HYPERPLASIA WITH LOWER URINARY TRACT SYMPTOMS: ICD-10-CM

## 2019-05-03 RX ORDER — TAMSULOSIN HYDROCHLORIDE 0.4 MG/1
CAPSULE ORAL
Qty: 30 CAPSULE | Refills: 0 | Status: SHIPPED | OUTPATIENT
Start: 2019-05-03 | End: 2019-05-27 | Stop reason: SDUPTHER

## 2019-05-27 DIAGNOSIS — N40.1 BENIGN NON-NODULAR PROSTATIC HYPERPLASIA WITH LOWER URINARY TRACT SYMPTOMS: ICD-10-CM

## 2019-05-28 RX ORDER — TAMSULOSIN HYDROCHLORIDE 0.4 MG/1
CAPSULE ORAL
Qty: 30 CAPSULE | Refills: 2 | Status: SHIPPED | OUTPATIENT
Start: 2019-05-28 | End: 2019-08-18 | Stop reason: SDUPTHER

## 2019-06-27 RX ORDER — FUROSEMIDE 20 MG/1
TABLET ORAL
Qty: 30 TABLET | Refills: 0 | Status: SHIPPED | OUTPATIENT
Start: 2019-06-27 | End: 2019-07-21 | Stop reason: SDUPTHER

## 2019-07-22 RX ORDER — FUROSEMIDE 20 MG/1
TABLET ORAL
Qty: 30 TABLET | Refills: 0 | Status: SHIPPED | OUTPATIENT
Start: 2019-07-22 | End: 2019-08-18 | Stop reason: SDUPTHER

## 2019-07-23 NOTE — TELEPHONE ENCOUNTER
Problem: Swallowing Difficulty (NC-1.1)  Goal: Minimize aspiration risk  Outcome: Progressing She is wanting to know if we can tell her anything about his condition. She lives 6 hrs away and wants to know what all is wrong with him.

## 2019-08-18 DIAGNOSIS — N40.1 BENIGN NON-NODULAR PROSTATIC HYPERPLASIA WITH LOWER URINARY TRACT SYMPTOMS: ICD-10-CM

## 2019-08-19 RX ORDER — FUROSEMIDE 20 MG/1
TABLET ORAL
Qty: 30 TABLET | Refills: 0 | Status: SHIPPED | OUTPATIENT
Start: 2019-08-19 | End: 2019-09-19 | Stop reason: SDUPTHER

## 2019-08-19 RX ORDER — TAMSULOSIN HYDROCHLORIDE 0.4 MG/1
CAPSULE ORAL
Qty: 30 CAPSULE | Refills: 5 | Status: SHIPPED | OUTPATIENT
Start: 2019-08-19 | End: 2020-02-17

## 2019-09-19 DIAGNOSIS — R25.1 TREMOR: ICD-10-CM

## 2019-09-19 RX ORDER — FUROSEMIDE 20 MG/1
TABLET ORAL
Qty: 30 TABLET | Refills: 5 | Status: SHIPPED | OUTPATIENT
Start: 2019-09-19 | End: 2020-03-18

## 2019-09-19 RX ORDER — PRIMIDONE 50 MG/1
50 TABLET ORAL NIGHTLY
Qty: 30 TABLET | Refills: 5 | Status: SHIPPED | OUTPATIENT
Start: 2019-09-19 | End: 2020-03-18

## 2019-09-24 DIAGNOSIS — I50.32 CHRONIC DIASTOLIC CONGESTIVE HEART FAILURE (HCC): ICD-10-CM

## 2019-09-24 DIAGNOSIS — J44.9 CHRONIC OBSTRUCTIVE PULMONARY DISEASE, UNSPECIFIED COPD TYPE (HCC): ICD-10-CM

## 2019-09-24 DIAGNOSIS — R73.01 ELEVATED FASTING GLUCOSE: ICD-10-CM

## 2019-09-24 DIAGNOSIS — D64.9 ANEMIA, UNSPECIFIED TYPE: ICD-10-CM

## 2019-09-24 DIAGNOSIS — I10 ESSENTIAL HYPERTENSION: Primary | ICD-10-CM

## 2019-09-30 ENCOUNTER — RESULTS ENCOUNTER (OUTPATIENT)
Dept: FAMILY MEDICINE CLINIC | Facility: CLINIC | Age: 69
End: 2019-09-30

## 2019-09-30 DIAGNOSIS — I10 ESSENTIAL HYPERTENSION: ICD-10-CM

## 2019-09-30 DIAGNOSIS — D64.9 ANEMIA, UNSPECIFIED TYPE: ICD-10-CM

## 2019-09-30 DIAGNOSIS — J44.9 CHRONIC OBSTRUCTIVE PULMONARY DISEASE, UNSPECIFIED COPD TYPE (HCC): ICD-10-CM

## 2019-09-30 DIAGNOSIS — R73.01 ELEVATED FASTING GLUCOSE: ICD-10-CM

## 2019-09-30 DIAGNOSIS — I50.32 CHRONIC DIASTOLIC CONGESTIVE HEART FAILURE (HCC): ICD-10-CM

## 2019-10-02 LAB
ALBUMIN SERPL-MCNC: 3.9 G/DL (ref 3.5–5.2)
ALBUMIN/GLOB SERPL: 1.6 G/DL
ALP SERPL-CCNC: 115 U/L (ref 39–117)
ALT SERPL-CCNC: 18 U/L (ref 1–41)
AST SERPL-CCNC: 23 U/L (ref 1–40)
BASOPHILS # BLD AUTO: 0.05 10*3/MM3 (ref 0–0.2)
BASOPHILS NFR BLD AUTO: 0.4 % (ref 0–1.5)
BILIRUB SERPL-MCNC: 0.3 MG/DL (ref 0.2–1.2)
BUN SERPL-MCNC: 4 MG/DL (ref 8–23)
BUN/CREAT SERPL: 5.3 (ref 7–25)
CALCIUM SERPL-MCNC: 8.8 MG/DL (ref 8.6–10.5)
CHLORIDE SERPL-SCNC: 101 MMOL/L (ref 98–107)
CO2 SERPL-SCNC: 32.6 MMOL/L (ref 22–29)
CREAT SERPL-MCNC: 0.75 MG/DL (ref 0.76–1.27)
EOSINOPHIL # BLD AUTO: 0.21 10*3/MM3 (ref 0–0.4)
EOSINOPHIL NFR BLD AUTO: 1.9 % (ref 0.3–6.2)
ERYTHROCYTE [DISTWIDTH] IN BLOOD BY AUTOMATED COUNT: 12.9 % (ref 12.3–15.4)
GLOBULIN SER CALC-MCNC: 2.5 GM/DL
GLUCOSE SERPL-MCNC: 121 MG/DL (ref 65–99)
HBA1C MFR BLD: 5.8 % (ref 4.8–5.6)
HCT VFR BLD AUTO: 35.2 % (ref 37.5–51)
HGB BLD-MCNC: 11.6 G/DL (ref 13–17.7)
IMM GRANULOCYTES # BLD AUTO: 0.05 10*3/MM3 (ref 0–0.05)
IMM GRANULOCYTES NFR BLD AUTO: 0.4 % (ref 0–0.5)
IRON SERPL-MCNC: 78 MCG/DL (ref 59–158)
LYMPHOCYTES # BLD AUTO: 3.58 10*3/MM3 (ref 0.7–3.1)
LYMPHOCYTES NFR BLD AUTO: 32.1 % (ref 19.6–45.3)
MCH RBC QN AUTO: 32.6 PG (ref 26.6–33)
MCHC RBC AUTO-ENTMCNC: 33 G/DL (ref 31.5–35.7)
MCV RBC AUTO: 98.9 FL (ref 79–97)
MONOCYTES # BLD AUTO: 0.58 10*3/MM3 (ref 0.1–0.9)
MONOCYTES NFR BLD AUTO: 5.2 % (ref 5–12)
NEUTROPHILS # BLD AUTO: 6.67 10*3/MM3 (ref 1.7–7)
NEUTROPHILS NFR BLD AUTO: 60 % (ref 42.7–76)
NRBC BLD AUTO-RTO: 0 /100 WBC (ref 0–0.2)
PLATELET # BLD AUTO: 273 10*3/MM3 (ref 140–450)
POTASSIUM SERPL-SCNC: 3.5 MMOL/L (ref 3.5–5.2)
PROT SERPL-MCNC: 6.4 G/DL (ref 6–8.5)
RBC # BLD AUTO: 3.56 10*6/MM3 (ref 4.14–5.8)
SODIUM SERPL-SCNC: 147 MMOL/L (ref 136–145)
WBC # BLD AUTO: 11.14 10*3/MM3 (ref 3.4–10.8)

## 2019-10-03 ENCOUNTER — OFFICE VISIT (OUTPATIENT)
Dept: FAMILY MEDICINE CLINIC | Facility: CLINIC | Age: 69
End: 2019-10-03

## 2019-10-03 VITALS
HEART RATE: 68 BPM | HEIGHT: 72 IN | SYSTOLIC BLOOD PRESSURE: 122 MMHG | DIASTOLIC BLOOD PRESSURE: 66 MMHG | OXYGEN SATURATION: 99 % | BODY MASS INDEX: 38.6 KG/M2 | RESPIRATION RATE: 16 BRPM | WEIGHT: 285 LBS | TEMPERATURE: 97.9 F

## 2019-10-03 DIAGNOSIS — E78.2 MIXED HYPERLIPIDEMIA: Chronic | ICD-10-CM

## 2019-10-03 DIAGNOSIS — E87.6 HYPOPOTASSEMIA: Chronic | ICD-10-CM

## 2019-10-03 DIAGNOSIS — I10 ESSENTIAL HYPERTENSION: Primary | Chronic | ICD-10-CM

## 2019-10-03 DIAGNOSIS — K21.9 GASTROESOPHAGEAL REFLUX DISEASE, ESOPHAGITIS PRESENCE NOT SPECIFIED: Chronic | ICD-10-CM

## 2019-10-03 DIAGNOSIS — R73.01 ELEVATED FASTING GLUCOSE: Chronic | ICD-10-CM

## 2019-10-03 DIAGNOSIS — J44.9 CHRONIC OBSTRUCTIVE PULMONARY DISEASE, UNSPECIFIED COPD TYPE (HCC): Chronic | ICD-10-CM

## 2019-10-03 DIAGNOSIS — R60.0 EDEMA LEG: ICD-10-CM

## 2019-10-03 DIAGNOSIS — R25.1 TREMOR: ICD-10-CM

## 2019-10-03 DIAGNOSIS — I50.32 CHRONIC DIASTOLIC CONGESTIVE HEART FAILURE (HCC): ICD-10-CM

## 2019-10-03 DIAGNOSIS — D64.9 ANEMIA, UNSPECIFIED TYPE: ICD-10-CM

## 2019-10-03 DIAGNOSIS — Z23 NEEDS FLU SHOT: ICD-10-CM

## 2019-10-03 DIAGNOSIS — K76.0 FATTY LIVER: Chronic | ICD-10-CM

## 2019-10-03 DIAGNOSIS — M81.0 OSTEOPOROSIS, UNSPECIFIED OSTEOPOROSIS TYPE, UNSPECIFIED PATHOLOGICAL FRACTURE PRESENCE: ICD-10-CM

## 2019-10-03 DIAGNOSIS — F41.9 ANXIETY: Chronic | ICD-10-CM

## 2019-10-03 PROCEDURE — 90653 IIV ADJUVANT VACCINE IM: CPT | Performed by: FAMILY MEDICINE

## 2019-10-03 PROCEDURE — 99213 OFFICE O/P EST LOW 20 MIN: CPT | Performed by: FAMILY MEDICINE

## 2019-10-03 PROCEDURE — G0008 ADMIN INFLUENZA VIRUS VAC: HCPCS | Performed by: FAMILY MEDICINE

## 2019-10-03 NOTE — PROGRESS NOTES
Subjective   Felix Bartlett Tyron is a 69 y.o. male presenting with chief complaint of:   Chief Complaint   Patient presents with   • COPD   • Hyperlipidemia   • Hypertension       History of Present Illness :  With wife.       Has multiple chronic problems to consider that might have a bearing on today's issues;  an interval appointment.       Chronic/acute problems reviewed today:   1. Essential hypertension: Chronic/stable. Stable here/if home blood pressures.  No significant chest pain, SOB, LE edema, orthopnea, near syncope, dizziness/light headness.      2. Mixed hyperlipidemia: Chronic/stable.  Tolerated use of Rx with labs showing improved lipid values and tolerant liver labs. No muscle aches unexpected.      3. Chronic diastolic congestive heart failure (CMS/HCC): Chronic/stable.  Denies significant sob, orthopnea, leg edema, weight gain.  Aware of influence diet/salt and watching weight at home.       4. Chronic obstructive pulmonary disease, unspecified COPD type (CMS/HCC): Chronic/stable mild occ cough, sob, wheeze.  Rx helps.   After years; no smoking.       5. Gastroesophageal reflux disease, esophagitis presence not specified: Chronic/stable.  Controlled heartburn, reflux; no dysphagia, melena.  Rx helps.     6. Fatty liver: Chronic/stable.  Aware treatment involves normalizing weight; usually including low fat diet and regular exercise.  Aware condition can go on to cirrhosis and death.  Stable occ liver labs and past imaging.       7. Osteoporosis, unspecified osteoporosis type, unspecified pathological fracture presence: Chronic/stable.  Last bone density/if below.   Has briefly used Rx.  ?  further bone density screening when due.      8. Anemia, unspecified type: Chronic/variable: This has been present for years/over a year.  It is chronic.  There has been GI evaulation in the past. There is no current melena, hematochezia. It has been benign to date and stable/treating when needed/watching as  primary source appears to be chronic gi loss/benign.     9. Tremor: chronic/better since less drinking.    10. Hypopotassemia: chronic/variable and influenced by lasix   11. Elevated fasting glucose: Chronic/variable over time.   No problem/pattern hypoglycemia/hyperglycemia manifest by poly- dypsia, phagia, uria, or sweats, diaphoretic episodes, syncope/near recently.      12. Edema leg-CHF/d, obesity, copd, cirrhosis: chronic/better lately.  Recently better.  Causes include: those listed.      13. Anxiet: Chronic/stable.  On/off anxiety tolerated somewhat.  Rx helps and not interested in Rx change. Stress ongoing taking care of wife.      14. Needs flu shot      Has an/another acute issue today: none.    The following portions of the patient's history were reviewed and updated as appropriate: allergies, current medications, past family history, past medical history, past social history, past surgical history and problem list.      Current Outpatient Medications:   •  acetaminophen (TYLENOL) 650 MG 8 hr tablet, Take 650 mg by mouth 2 (Two) Times a Day., Disp: , Rfl:   •  calcium-vitamin D (OSCAL-500) 500-200 MG-UNIT per tablet, Take 1 tablet by mouth 2 times daily.  , Disp: , Rfl:   •  diphenhydrAMINE (BENADRYL) 25 mg capsule, Take 25 mg by mouth 2 (Two) Times a Day., Disp: , Rfl:   •  escitalopram (LEXAPRO) 20 MG tablet, TAKE 1 TABLET BY MOUTH DAILY, Disp: 90 tablet, Rfl: 3  •  ferrous sulfate 325 (65 FE) MG tablet, Take 325 mg by mouth Daily With Breakfast., Disp: , Rfl:   •  FLUTICASONE PROPIONATE, NASAL, NA, 1 spray into the nostril(s) as directed by provider Daily., Disp: , Rfl:   •  furosemide (LASIX) 20 MG tablet, TAKE 1 TABLET BY MOUTH DAILY, Disp: 30 tablet, Rfl: 5  •  KETOTIFEN FUMARATE OP, Apply 1 drop to eye(s) as directed by provider As Needed., Disp: , Rfl:   •  losartan (COZAAR) 50 MG tablet, TAKE 1 TABLET BY MOUTH ONCE DAILY, Disp: 90 tablet, Rfl: 3  •  metoprolol tartrate (LOPRESSOR) 50 MG tablet,  TAKE 1 TABLET BY MOUTH EVERY 12 HOURS, Disp: 180 tablet, Rfl: 3  •  Multiple Vitamins-Minerals (CENTRUM SILVER 50+MEN) tablet, Take 1 tablet by mouth Daily., Disp: , Rfl:   •  omeprazole (priLOSEC) 20 MG capsule, 20 mg 2 (Two) Times a Day., Disp: , Rfl:   •  potassium chloride (K-DUR,KLOR-CON) 10 MEQ CR tablet, TAKE 1 TABLET BY MOUTH EVERY DAY, Disp: 90 tablet, Rfl: 1  •  primidone (MYSOLINE) 50 MG tablet, TAKE 1 TABLET BY MOUTH EVERY NIGHT, Disp: 30 tablet, Rfl: 5  •  simvastatin (ZOCOR) 20 MG tablet, TAKE 1 TABLET BY MOUTH DAILY, Disp: 90 tablet, Rfl: 3  •  tamsulosin (FLOMAX) 0.4 MG capsule 24 hr capsule, TAKE ONE CAPSULE BY MOUTH DAILY AFTER THE SAME MEAL, Disp: 30 capsule, Rfl: 5  •  FLOVENT  MCG/ACT inhaler, INHALE 1 PUFF BY MOUTH TWICE DAILY, Disp: 12 g, Rfl: 5    No problems with medications.  Refills if needed done    No Known Allergies    Review of Systems  GENERAL:  Inactive/slower with limits, speed, stamina for age and fatigue. Sleep is ok. No fever now.  ENDO:  No syncope, near or diaphoretic sweaty spells.  HEENT: No change occ headache.   No vision change.   Same significant hearing loss.  Ears without pain/drainage.  No sore throat.  No significant nasal/sinus congestion/drainage. No epistaxis.  CHEST: No chest wall tenderness or mass. No change cough, with occ wheeze.  Stable/occ SOB; no hemoptysis.  CV: No chest pain, palpitations, less daily ankle edema.  GI: No heartburn, dysphagia.  No abdominal pain, diarrhea, constipation.  No rectal bleeding, or melena.    :  Voids without dysuria, or  incontinence to completion; sees sony  ORTHO: No painful/swollen joints but various on /off sore.  No change sore neck; more pain lower back.  No acute neck or back pain without recent injury.  NEURO: No dizziness, weakness of extremities.  No change UE/LE numbness/paresthesias.   Continued RUE > LUE tremor.   PSYCH: No memory loss.  Mood good; occ anxious, depressed but/and not suicidal.  Tries to  tolerate stress   Screening:  Mammogram: NA  Bone density: 3.2.2017 [facility]/Ts LS -1.8 hip -0.5-offered  Low dose CT chest: regular 2014-offered  GI:   Colonoscopy-polyp/Gil//3.18.16/5 yr  EGD-neg/Gil//5.1.18  Prostate: sony confirmed 3.28.19  Usual lab order  6m CBC, CMP, A1c, iron  12m CBC, CMP, A1c LIPID, TSH, T4, Vit D, PSAs, B12, folate, iron, ferritin, % sat iron, retic count       Lab Results:  Results for orders placed or performed in visit on 09/30/19   Comprehensive metabolic panel   Result Value Ref Range    Glucose 121 (H) 65 - 99 mg/dL    BUN 4 (L) 8 - 23 mg/dL    Creatinine 0.75 (L) 0.76 - 1.27 mg/dL    eGFR Non African Am 103 >60 mL/min/1.73    eGFR African Am 124 >60 mL/min/1.73    BUN/Creatinine Ratio 5.3 (L) 7.0 - 25.0    Sodium 147 (H) 136 - 145 mmol/L    Potassium 3.5 3.5 - 5.2 mmol/L    Chloride 101 98 - 107 mmol/L    Total CO2 32.6 (H) 22.0 - 29.0 mmol/L    Calcium 8.8 8.6 - 10.5 mg/dL    Total Protein 6.4 6.0 - 8.5 g/dL    Albumin 3.90 3.50 - 5.20 g/dL    Globulin 2.5 gm/dL    A/G Ratio 1.6 g/dL    Total Bilirubin 0.3 0.2 - 1.2 mg/dL    Alkaline Phosphatase 115 39 - 117 U/L    AST (SGOT) 23 1 - 40 U/L    ALT (SGPT) 18 1 - 41 U/L   Hemoglobin A1c   Result Value Ref Range    Hemoglobin A1C 5.80 (H) 4.80 - 5.60 %   Iron   Result Value Ref Range    Iron 78 59 - 158 mcg/dL   CBC & Differential   Result Value Ref Range    WBC 11.14 (H) 3.40 - 10.80 10*3/mm3    RBC 3.56 (L) 4.14 - 5.80 10*6/mm3    Hemoglobin 11.6 (L) 13.0 - 17.7 g/dL    Hematocrit 35.2 (L) 37.5 - 51.0 %    MCV 98.9 (H) 79.0 - 97.0 fL    MCH 32.6 26.6 - 33.0 pg    MCHC 33.0 31.5 - 35.7 g/dL    RDW 12.9 12.3 - 15.4 %    Platelets 273 140 - 450 10*3/mm3    Neutrophil Rel % 60.0 42.7 - 76.0 %    Lymphocyte Rel % 32.1 19.6 - 45.3 %    Monocyte Rel % 5.2 5.0 - 12.0 %    Eosinophil Rel % 1.9 0.3 - 6.2 %    Basophil Rel % 0.4 0.0 - 1.5 %    Neutrophils Absolute 6.67 1.70 - 7.00 10*3/mm3    Lymphocytes Absolute 3.58 (H) 0.70 - 3.10  "10*3/mm3    Monocytes Absolute 0.58 0.10 - 0.90 10*3/mm3    Eosinophils Absolute 0.21 0.00 - 0.40 10*3/mm3    Basophils Absolute 0.05 0.00 - 0.20 10*3/mm3    Immature Granulocyte Rel % 0.4 0.0 - 0.5 %    Immature Grans Absolute 0.05 0.00 - 0.05 10*3/mm3    nRBC 0.0 0.0 - 0.2 /100 WBC       A1C:  Lab Results - Last 18 Months   Lab Units 10/01/19  1043 03/22/19  0948 08/23/18  0816   HEMOGLOBIN A1C % 5.80* 6.10* 6.10     PSA:  Lab Results - Last 18 Months   Lab Units 03/22/19  0948   PSA ng/mL 0.767     CBC:  Lab Results - Last 18 Months   Lab Units 10/01/19  1043 03/22/19  0948 08/23/18  0816   WBC 10*3/mm3 11.14* 10.15 9.18   HEMOGLOBIN g/dL 11.6* 11.0* 10.4*   HEMATOCRIT % 35.2* 33.7* 33.1*   PLATELETS 10*3/mm3 273 254 284   IRON mcg/dL 78 109 55      BMP/CMP:  Lab Results - Last 18 Months   Lab Units 10/01/19  1043 04/15/19  1440 03/22/19  0948 08/23/18  0816   SODIUM mmol/L 147*  --  141 143   POTASSIUM mmol/L 3.5  --  3.9 3.9   CHLORIDE mmol/L 101  --  101 103   TOTAL CO2 mmol/L 32.6*  --  32.0* 31.0   GLUCOSE mg/dL 121*  --  105* 118*   BUN mg/dL 4*  --  9 12   CREATININE mg/dL 0.75* 0.80 0.73 0.72   EGFR IF NONAFRICN AM mL/min/1.73 103  --  106 108   EGFR IF AFRICN AM mL/min/1.73 124  --  129 131   CALCIUM mg/dL 8.8  --  8.9 9.2     HEPATIC:  Lab Results - Last 18 Months   Lab Units 10/01/19  1043 03/22/19  0948 08/23/18  0816   ALT (SGPT) U/L 18 33 22   AST (SGOT) U/L 23 37 20   ALK PHOS U/L 115 99 97     THYROID:  Lab Results - Last 18 Months   Lab Units 03/22/19  0948   TSH mIU/mL 0.409*       Objective   /66   Pulse 68   Temp 97.9 °F (36.6 °C)   Resp 16   Ht 182.9 cm (72\")   Wt 129 kg (285 lb)   SpO2 99%   BMI 38.65 kg/m²   Body mass index is 38.65 kg/m².    Physical Exam  GENERAL:  Well nourished/developed in no acute distress. Obese  SKIN: Turgor excellent, without wound, rash, lesion.  HEENT: Normal cephalic without trauma.  Pupils equal round reactive to light. Extraocular motions full " without nystagmus.   External canals nonobstructive nontender without reddness. Tymphatic membranes anders with david structures intact.   Oral cavity without growths, exudates, and moist.  Posterior pharynx without mass, obstruction, redness.  No thyromegaly, mass, tenderness, lymphadenopathy and supple.  CV: Regular rhythm.  No murmur, gallop,  edema. Posterior pulses intact.  No carotid bruits.  CHEST: No chest wall tenderness or mass.   LUNGS: Symmetric motion with clear/reduced to auscultation.  No dullness to percussion  ABD: Soft, nontender without mass.   PROSTATE: No visible/palpable lesion/tenderness and anal tone intact/full.  Prostate 30 gm/smooth and nontender.  No rectal mass within reach. Stool on glove brown.   ORTHO: Symmetric extremities without swelling/point tenderness.  Full gross range of motion.  NEURO: CN 2-12 grossly intact.  Symmetric facies. 1/4 x bicep equal reflexes.  UE/LE   3/5 strength throughout.  Nonfocal use extremities. Speech clear.  Reduced light touch with monofilament, vibratory sensation with tuning fork; equal toes/distal feet.  Intention tremor.     PSYCH: Oriented x 3.  Pleasant calm, well kept.  Purposeful/directed conservation with intact short/long gross       Assessment/Plan     1. Essential hypertension    2. Mixed hyperlipidemia    3. Chronic diastolic congestive heart failure (CMS/HCC)    4. Chronic obstructive pulmonary disease, unspecified COPD type (CMS/HCC)    5. Gastroesophageal reflux disease, esophagitis presence not specified    6. Fatty liver    7. Osteoporosis, unspecified osteoporosis type, unspecified pathological fracture presence    8. Anemia, unspecified type    9. Tremor    10. Hypopotassemia    11. Elevated fasting glucose    12. Edema leg-CHF/d, obesity, copd, cirrhosis    13. Anxiety    14. Needs flu shot        Rx: reviewed/changes:  No orders of the defined types were placed in this encounter.      LAB/Testing/Referrals: reviewed/orders:   Today:    Orders Placed This Encounter   Procedures   • Fluad Tri 63yr ([id number])     Chronic/recurrent labs above or change to:   same    Discussions:   Lung cancer screening  Osteoporosis screening/following    Body mass index is 38.65 kg/m².  Patient's Body mass index is 38.65 kg/m². BMI is above normal parameters. Recommendations include: exercise counseling, nutrition counseling and as able would help.      Tobacco use reviewed:    Non-smoker    There are no Patient Instructions on file for this visit.    Follow up: Return for lab during/or just before next apt;, Dr Rosario-, 6 m;.  Future Appointments   Date Time Provider Department Center   4/6/2020 11:35 AM LAB [facility] None   4/9/2020  1:15 PM Bassam Rosario MD [facility] None

## 2019-10-03 NOTE — PROGRESS NOTES
Verbal per Dr Rosario Flu shot, pt gave verbal and written consent given, VIS given to patient. Flu vaccine given in L deltoid, pt tolerated well.

## 2019-10-21 RX ORDER — POTASSIUM CHLORIDE 750 MG/1
TABLET, EXTENDED RELEASE ORAL
Qty: 90 TABLET | Refills: 0 | Status: SHIPPED | OUTPATIENT
Start: 2019-10-21 | End: 2020-01-23

## 2019-11-21 RX ORDER — SIMVASTATIN 20 MG
TABLET ORAL
Qty: 90 TABLET | Refills: 0 | Status: SHIPPED | OUTPATIENT
Start: 2019-11-21 | End: 2020-02-17

## 2019-11-21 RX ORDER — ESCITALOPRAM OXALATE 20 MG/1
20 TABLET ORAL DAILY
Qty: 90 TABLET | Refills: 0 | Status: SHIPPED | OUTPATIENT
Start: 2019-11-21 | End: 2020-02-17

## 2019-12-23 RX ORDER — LOSARTAN POTASSIUM 50 MG/1
TABLET ORAL
Qty: 90 TABLET | Refills: 3 | Status: SHIPPED | OUTPATIENT
Start: 2019-12-23 | End: 2020-04-08 | Stop reason: SDUPTHER

## 2020-01-23 RX ORDER — POTASSIUM CHLORIDE 750 MG/1
TABLET, EXTENDED RELEASE ORAL
Qty: 90 TABLET | Refills: 0 | Status: SHIPPED | OUTPATIENT
Start: 2020-01-23 | End: 2020-04-08 | Stop reason: SDUPTHER

## 2020-02-17 DIAGNOSIS — E78.2 MIXED HYPERLIPIDEMIA: Primary | ICD-10-CM

## 2020-02-17 DIAGNOSIS — N40.1 BENIGN NON-NODULAR PROSTATIC HYPERPLASIA WITH LOWER URINARY TRACT SYMPTOMS: ICD-10-CM

## 2020-02-17 DIAGNOSIS — I10 ESSENTIAL HYPERTENSION: ICD-10-CM

## 2020-02-17 RX ORDER — SIMVASTATIN 20 MG
TABLET ORAL
Qty: 90 TABLET | Refills: 0 | Status: SHIPPED | OUTPATIENT
Start: 2020-02-17 | End: 2020-04-08 | Stop reason: SDUPTHER

## 2020-02-17 RX ORDER — TAMSULOSIN HYDROCHLORIDE 0.4 MG/1
CAPSULE ORAL
Qty: 30 CAPSULE | Refills: 5 | Status: SHIPPED | OUTPATIENT
Start: 2020-02-17 | End: 2020-04-08 | Stop reason: SDUPTHER

## 2020-02-17 RX ORDER — ESCITALOPRAM OXALATE 20 MG/1
20 TABLET ORAL DAILY
Qty: 90 TABLET | Refills: 0 | Status: SHIPPED | OUTPATIENT
Start: 2020-02-17 | End: 2020-04-08 | Stop reason: SDUPTHER

## 2020-03-18 DIAGNOSIS — R25.1 TREMOR: ICD-10-CM

## 2020-03-18 RX ORDER — METOPROLOL TARTRATE 50 MG/1
TABLET, FILM COATED ORAL
Qty: 180 TABLET | Refills: 3 | Status: SHIPPED | OUTPATIENT
Start: 2020-03-18 | End: 2020-04-08 | Stop reason: SDUPTHER

## 2020-03-18 RX ORDER — PRIMIDONE 50 MG/1
50 TABLET ORAL NIGHTLY
Qty: 30 TABLET | Refills: 5 | Status: SHIPPED | OUTPATIENT
Start: 2020-03-18 | End: 2020-04-08 | Stop reason: SDUPTHER

## 2020-03-18 RX ORDER — FUROSEMIDE 20 MG/1
TABLET ORAL
Qty: 30 TABLET | Refills: 5 | Status: SHIPPED | OUTPATIENT
Start: 2020-03-18 | End: 2020-04-20 | Stop reason: SDUPTHER

## 2020-03-25 ENCOUNTER — APPOINTMENT (OUTPATIENT)
Dept: ULTRASOUND IMAGING | Facility: HOSPITAL | Age: 70
End: 2020-03-25

## 2020-04-03 DIAGNOSIS — I10 ESSENTIAL HYPERTENSION: ICD-10-CM

## 2020-04-03 DIAGNOSIS — E55.9 VITAMIN D DEFICIENCY: ICD-10-CM

## 2020-04-03 DIAGNOSIS — J44.9 CHRONIC OBSTRUCTIVE PULMONARY DISEASE, UNSPECIFIED COPD TYPE (HCC): ICD-10-CM

## 2020-04-03 DIAGNOSIS — D75.89 MACROCYTOSIS: ICD-10-CM

## 2020-04-03 DIAGNOSIS — M81.0 OSTEOPOROSIS, UNSPECIFIED OSTEOPOROSIS TYPE, UNSPECIFIED PATHOLOGICAL FRACTURE PRESENCE: ICD-10-CM

## 2020-04-03 DIAGNOSIS — I50.32 CHRONIC DIASTOLIC CONGESTIVE HEART FAILURE (HCC): ICD-10-CM

## 2020-04-03 DIAGNOSIS — Z12.5 PROSTATE CANCER SCREENING: ICD-10-CM

## 2020-04-03 DIAGNOSIS — E53.8 VITAMIN B12 DEFICIENCY: ICD-10-CM

## 2020-04-03 DIAGNOSIS — K76.0 FATTY LIVER: ICD-10-CM

## 2020-04-03 DIAGNOSIS — R73.01 ELEVATED FASTING GLUCOSE: ICD-10-CM

## 2020-04-03 DIAGNOSIS — E78.2 MIXED HYPERLIPIDEMIA: Primary | ICD-10-CM

## 2020-04-03 DIAGNOSIS — Z00.00 WELLNESS EXAMINATION: ICD-10-CM

## 2020-04-03 DIAGNOSIS — D64.9 ANEMIA, UNSPECIFIED TYPE: ICD-10-CM

## 2020-04-06 ENCOUNTER — RESULTS ENCOUNTER (OUTPATIENT)
Dept: FAMILY MEDICINE CLINIC | Facility: CLINIC | Age: 70
End: 2020-04-06

## 2020-04-06 DIAGNOSIS — K76.0 FATTY LIVER: ICD-10-CM

## 2020-04-06 DIAGNOSIS — I50.32 CHRONIC DIASTOLIC CONGESTIVE HEART FAILURE (HCC): ICD-10-CM

## 2020-04-06 DIAGNOSIS — I10 ESSENTIAL HYPERTENSION: ICD-10-CM

## 2020-04-06 DIAGNOSIS — E53.8 VITAMIN B12 DEFICIENCY: ICD-10-CM

## 2020-04-06 DIAGNOSIS — D75.89 MACROCYTOSIS: ICD-10-CM

## 2020-04-06 DIAGNOSIS — E55.9 VITAMIN D DEFICIENCY: ICD-10-CM

## 2020-04-06 DIAGNOSIS — R73.01 ELEVATED FASTING GLUCOSE: ICD-10-CM

## 2020-04-06 DIAGNOSIS — M81.0 OSTEOPOROSIS, UNSPECIFIED OSTEOPOROSIS TYPE, UNSPECIFIED PATHOLOGICAL FRACTURE PRESENCE: ICD-10-CM

## 2020-04-06 DIAGNOSIS — E78.2 MIXED HYPERLIPIDEMIA: ICD-10-CM

## 2020-04-06 DIAGNOSIS — Z12.5 PROSTATE CANCER SCREENING: ICD-10-CM

## 2020-04-06 DIAGNOSIS — D64.9 ANEMIA, UNSPECIFIED TYPE: ICD-10-CM

## 2020-04-06 DIAGNOSIS — J44.9 CHRONIC OBSTRUCTIVE PULMONARY DISEASE, UNSPECIFIED COPD TYPE (HCC): ICD-10-CM

## 2020-04-06 DIAGNOSIS — Z00.00 WELLNESS EXAMINATION: ICD-10-CM

## 2020-04-07 LAB
25(OH)D3+25(OH)D2 SERPL-MCNC: 41 NG/ML (ref 30–100)
ALBUMIN SERPL-MCNC: 3.6 G/DL (ref 3.5–5.2)
ALBUMIN/GLOB SERPL: 1.3 G/DL
ALP SERPL-CCNC: 107 U/L (ref 39–117)
ALT SERPL-CCNC: 15 U/L (ref 1–41)
AST SERPL-CCNC: 20 U/L (ref 1–40)
BASOPHILS # BLD AUTO: 0.04 10*3/MM3 (ref 0–0.2)
BASOPHILS NFR BLD AUTO: 0.3 % (ref 0–1.5)
BILIRUB SERPL-MCNC: 0.5 MG/DL (ref 0.2–1.2)
BUN SERPL-MCNC: 3 MG/DL (ref 8–23)
BUN/CREAT SERPL: 3.9 (ref 7–25)
CALCIUM SERPL-MCNC: 8.5 MG/DL (ref 8.6–10.5)
CHLORIDE SERPL-SCNC: 98 MMOL/L (ref 98–107)
CHOLEST SERPL-MCNC: 179 MG/DL (ref 0–200)
CO2 SERPL-SCNC: 35.4 MMOL/L (ref 22–29)
CREAT SERPL-MCNC: 0.76 MG/DL (ref 0.76–1.27)
EOSINOPHIL # BLD AUTO: 0.22 10*3/MM3 (ref 0–0.4)
EOSINOPHIL NFR BLD AUTO: 1.8 % (ref 0.3–6.2)
ERYTHROCYTE [DISTWIDTH] IN BLOOD BY AUTOMATED COUNT: 13.2 % (ref 12.3–15.4)
FERRITIN SERPL-MCNC: 110 NG/ML (ref 30–400)
FOLATE SERPL-MCNC: 4.93 NG/ML (ref 4.78–24.2)
GLOBULIN SER CALC-MCNC: 2.7 GM/DL
GLUCOSE SERPL-MCNC: 111 MG/DL (ref 65–99)
HBA1C MFR BLD: 5 % (ref 4.8–5.6)
HCT VFR BLD AUTO: 35.4 % (ref 37.5–51)
HDLC SERPL-MCNC: 29 MG/DL (ref 40–60)
HGB BLD-MCNC: 11.9 G/DL (ref 13–17.7)
IMM GRANULOCYTES # BLD AUTO: 0.02 10*3/MM3 (ref 0–0.05)
IMM GRANULOCYTES NFR BLD AUTO: 0.2 % (ref 0–0.5)
IRON SATN MFR SERPL: 32 % (ref 20–50)
IRON SERPL-MCNC: 101 MCG/DL (ref 59–158)
LDLC SERPL CALC-MCNC: 104 MG/DL (ref 0–100)
LYMPHOCYTES # BLD AUTO: 3.82 10*3/MM3 (ref 0.7–3.1)
LYMPHOCYTES NFR BLD AUTO: 31.2 % (ref 19.6–45.3)
MCH RBC QN AUTO: 32.8 PG (ref 26.6–33)
MCHC RBC AUTO-ENTMCNC: 33.6 G/DL (ref 31.5–35.7)
MCV RBC AUTO: 97.5 FL (ref 79–97)
MONOCYTES # BLD AUTO: 0.61 10*3/MM3 (ref 0.1–0.9)
MONOCYTES NFR BLD AUTO: 5 % (ref 5–12)
NEUTROPHILS # BLD AUTO: 7.52 10*3/MM3 (ref 1.7–7)
NEUTROPHILS NFR BLD AUTO: 61.5 % (ref 42.7–76)
NRBC BLD AUTO-RTO: 0 /100 WBC (ref 0–0.2)
PLATELET # BLD AUTO: 263 10*3/MM3 (ref 140–450)
POTASSIUM SERPL-SCNC: 2.9 MMOL/L (ref 3.5–5.2)
PROT SERPL-MCNC: 6.3 G/DL (ref 6–8.5)
PSA SERPL-MCNC: 0.79 NG/ML (ref 0–4)
RBC # BLD AUTO: 3.63 10*6/MM3 (ref 4.14–5.8)
RETICS/RBC NFR AUTO: 2.38 % (ref 0.7–1.9)
SODIUM SERPL-SCNC: 143 MMOL/L (ref 136–145)
T4 SERPL-MCNC: 7.6 UG/DL (ref 4.5–12)
TIBC SERPL-MCNC: 316 MCG/DL
TRIGL SERPL-MCNC: 228 MG/DL (ref 0–150)
TSH SERPL DL<=0.005 MIU/L-ACNC: 0.7 UIU/ML (ref 0.27–4.2)
UIBC SERPL-MCNC: 215 MCG/DL (ref 112–346)
VIT B12 SERPL-MCNC: 602 PG/ML (ref 211–946)
VLDLC SERPL CALC-MCNC: 45.6 MG/DL
WBC # BLD AUTO: 12.23 10*3/MM3 (ref 3.4–10.8)

## 2020-04-08 DIAGNOSIS — E78.2 MIXED HYPERLIPIDEMIA: ICD-10-CM

## 2020-04-08 DIAGNOSIS — R25.1 TREMOR: ICD-10-CM

## 2020-04-08 DIAGNOSIS — I10 ESSENTIAL HYPERTENSION: ICD-10-CM

## 2020-04-08 DIAGNOSIS — R60.0 EDEMA LEG: Primary | ICD-10-CM

## 2020-04-08 DIAGNOSIS — N40.1 BENIGN NON-NODULAR PROSTATIC HYPERPLASIA WITH LOWER URINARY TRACT SYMPTOMS: ICD-10-CM

## 2020-04-08 RX ORDER — METOPROLOL TARTRATE 50 MG/1
50 TABLET, FILM COATED ORAL EVERY 12 HOURS
Qty: 180 TABLET | Refills: 3 | Status: SHIPPED | OUTPATIENT
Start: 2020-04-08 | End: 2021-03-22

## 2020-04-08 RX ORDER — PRIMIDONE 50 MG/1
50 TABLET ORAL NIGHTLY
Qty: 90 TABLET | Refills: 3 | Status: SHIPPED | OUTPATIENT
Start: 2020-04-08 | End: 2020-04-09

## 2020-04-08 RX ORDER — TAMSULOSIN HYDROCHLORIDE 0.4 MG/1
1 CAPSULE ORAL DAILY
Qty: 90 CAPSULE | Refills: 3 | Status: SHIPPED | OUTPATIENT
Start: 2020-04-08 | End: 2021-03-22

## 2020-04-08 RX ORDER — LOSARTAN POTASSIUM 50 MG/1
50 TABLET ORAL DAILY
Qty: 90 TABLET | Refills: 3 | Status: SHIPPED | OUTPATIENT
Start: 2020-04-08 | End: 2021-03-22

## 2020-04-08 RX ORDER — ESCITALOPRAM OXALATE 20 MG/1
20 TABLET ORAL DAILY
Qty: 90 TABLET | Refills: 3 | Status: SHIPPED | OUTPATIENT
Start: 2020-04-08 | End: 2021-03-22

## 2020-04-08 RX ORDER — POTASSIUM CHLORIDE 750 MG/1
10 TABLET, EXTENDED RELEASE ORAL 2 TIMES DAILY
Qty: 180 TABLET | Refills: 3 | Status: SHIPPED | OUTPATIENT
Start: 2020-04-08 | End: 2021-03-22

## 2020-04-08 RX ORDER — SIMVASTATIN 20 MG
20 TABLET ORAL DAILY
Qty: 90 TABLET | Refills: 3 | Status: SHIPPED | OUTPATIENT
Start: 2020-04-08 | End: 2021-03-22

## 2020-04-09 ENCOUNTER — OFFICE VISIT (OUTPATIENT)
Dept: FAMILY MEDICINE CLINIC | Facility: CLINIC | Age: 70
End: 2020-04-09

## 2020-04-09 VITALS
WEIGHT: 267.2 LBS | BODY MASS INDEX: 36.19 KG/M2 | HEART RATE: 74 BPM | OXYGEN SATURATION: 96 % | SYSTOLIC BLOOD PRESSURE: 170 MMHG | HEIGHT: 72 IN | DIASTOLIC BLOOD PRESSURE: 73 MMHG

## 2020-04-09 DIAGNOSIS — M54.9 CHRONIC BACK PAIN, UNSPECIFIED BACK LOCATION, UNSPECIFIED BACK PAIN LATERALITY: ICD-10-CM

## 2020-04-09 DIAGNOSIS — I50.32 CHRONIC DIASTOLIC CONGESTIVE HEART FAILURE (HCC): ICD-10-CM

## 2020-04-09 DIAGNOSIS — E78.2 MIXED HYPERLIPIDEMIA: Chronic | ICD-10-CM

## 2020-04-09 DIAGNOSIS — I10 ESSENTIAL HYPERTENSION: Chronic | ICD-10-CM

## 2020-04-09 DIAGNOSIS — E87.6 HYPOPOTASSEMIA: ICD-10-CM

## 2020-04-09 DIAGNOSIS — D64.9 ANEMIA, UNSPECIFIED TYPE: ICD-10-CM

## 2020-04-09 DIAGNOSIS — R03.0 ELEVATED BLOOD PRESSURE READING: ICD-10-CM

## 2020-04-09 DIAGNOSIS — K21.9 GASTROESOPHAGEAL REFLUX DISEASE, ESOPHAGITIS PRESENCE NOT SPECIFIED: Chronic | ICD-10-CM

## 2020-04-09 DIAGNOSIS — R25.1 TREMOR: ICD-10-CM

## 2020-04-09 DIAGNOSIS — G89.29 CHRONIC BACK PAIN, UNSPECIFIED BACK LOCATION, UNSPECIFIED BACK PAIN LATERALITY: ICD-10-CM

## 2020-04-09 DIAGNOSIS — J44.9 CHRONIC OBSTRUCTIVE PULMONARY DISEASE, UNSPECIFIED COPD TYPE (HCC): Chronic | ICD-10-CM

## 2020-04-09 PROCEDURE — G2012 BRIEF CHECK IN BY MD/QHP: HCPCS | Performed by: FAMILY MEDICINE

## 2020-04-09 RX ORDER — PRIMIDONE 50 MG/1
50 TABLET ORAL 2 TIMES DAILY
Qty: 180 TABLET | Refills: 3 | Status: SHIPPED | OUTPATIENT
Start: 2020-04-09 | End: 2021-03-22

## 2020-04-09 NOTE — PROGRESS NOTES
Subjective   Felix Bartlett Tyron is a 69 y.o. male presenting with chief complaint of:   Chief Complaint   Patient presents with   • Hypertension   • Hyperlipidemia   • COPD       History of Present Illness :  Alone.  Telephone visit necessary due to COVID19 emergency.      Has multiple chronic problems to consider that might have a bearing on today's issues;  an interval appointment.       Chronic/acute problems reviewed today:   1. Hypopotassemia: chronic/recurrent low K influenced by diuretics; recent low and called and told to increase replacement.    2. Tremor: chronic/variable better since no longer drinking.  ADLS ok.     3. Essential hypertension: Chronic/unstable. Above targets today/ with home blood pressures.  No significant chest pain, SOB, LE edema, orthopnea, near syncope, dizziness/light headness.  Agrees to follow next few days and let us know if stays elevated.      4. Mixed hyperlipidemia: Chronic/stable.  Tolerated use of Rx with labs showing improved lipid values and tolerant liver labs. No muscle aches unexpected.      5. Chronic diastolic congestive heart failure (CMS/HCC): Chronic/stable.  Denies significant sob, orthopnea, leg edema, weight gain.  Aware of influence diet/salt and watching weight at home.       6. Chronic obstructive pulmonary disease, unspecified COPD type (CMS/HCC): Chronic/stable mild occ cough, sob, wheeze.  Rx helps.  After years; less smoking.        7. Gastroesophageal reflux disease, esophagitis presence not specified: Chronic/stable.  Controlled heartburn, reflux without dysphagia, melena.  Rx needed needed-doing ok.      8. Chronic back pain, unspecified back location, unspecified back pain laterality: : chronic/variable 1-3/10 lower back pain with infrequent/same minimal radiation to LE.  No change LE numbness.  Has bladder/bowel control. No desire to change approach of care.  History compression fractures.      9. Anemia, unspecified type: Chronic or past  history/stable for sometime: This has been present before.    There has been GI evaulation in the past. There is no current melena, hematochezia. It has been benign to date and stable/treating/watching.  Contributing comorbidities to date: iron deficiency.  Has had partial nephrectomy.      10. Elevated blood pressure reading: acute/above; see above     Has an/another acute issue today: none.    The following portions of the patient's history were reviewed and updated as appropriate: allergies, current medications, past family history, past medical history, past social history, past surgical history and problem list.      Current Outpatient Medications:   •  acetaminophen (TYLENOL) 650 MG 8 hr tablet, Take 650 mg by mouth 2 (Two) Times a Day., Disp: , Rfl:   •  calcium-vitamin D (OSCAL-500) 500-200 MG-UNIT per tablet, Take 1 tablet by mouth 2 times daily.  , Disp: , Rfl:   •  diphenhydrAMINE (BENADRYL) 25 mg capsule, Take 25 mg by mouth 2 (Two) Times a Day., Disp: , Rfl:   •  escitalopram (LEXAPRO) 20 MG tablet, Take 1 tablet by mouth Daily., Disp: 90 tablet, Rfl: 3  •  ferrous sulfate 325 (65 FE) MG tablet, Take 325 mg by mouth Daily With Breakfast., Disp: , Rfl:   •  FLOVENT  MCG/ACT inhaler, INHALE 1 PUFF BY MOUTH TWICE DAILY, Disp: 12 g, Rfl: 5  •  FLUTICASONE PROPIONATE, NASAL, NA, 1 spray into the nostril(s) as directed by provider Daily., Disp: , Rfl:   •  furosemide (LASIX) 20 MG tablet, TAKE 1 TABLET BY MOUTH DAILY, Disp: 30 tablet, Rfl: 5  •  KETOTIFEN FUMARATE OP, Apply 1 drop to eye(s) as directed by provider As Needed., Disp: , Rfl:   •  losartan (COZAAR) 50 MG tablet, Take 1 tablet by mouth Daily., Disp: 90 tablet, Rfl: 3  •  metoprolol tartrate (LOPRESSOR) 50 MG tablet, Take 1 tablet by mouth Every 12 (Twelve) Hours., Disp: 180 tablet, Rfl: 3  •  Multiple Vitamins-Minerals (CENTRUM SILVER 50+MEN) tablet, Take 1 tablet by mouth Daily., Disp: , Rfl:   •  O2 (OXYGEN), Inhale 1 (One) Time., Disp: ,  Rfl:   •  omeprazole (priLOSEC) 20 MG capsule, 20 mg 2 (Two) Times a Day., Disp: , Rfl:   •  potassium chloride (K-DUR,KLOR-CON) 10 MEQ CR tablet, Take 1 tablet by mouth 2 (Two) Times a Day., Disp: 180 tablet, Rfl: 3  •  primidone (MYSOLINE) 50 MG tablet, Take 1 tablet by mouth Every Night. (Patient taking differently: Take 50 mg by mouth Every Morning.), Disp: 90 tablet, Rfl: 3  •  simvastatin (ZOCOR) 20 MG tablet, Take 1 tablet by mouth Daily., Disp: 90 tablet, Rfl: 3  •  tamsulosin (FLOMAX) 0.4 MG capsule 24 hr capsule, Take 1 capsule by mouth Daily., Disp: 90 capsule, Rfl: 3    No problems with medications.  Refills if needed done    No Known Allergies    Review of Systems  GENERAL:  Inactive/slower with limits, speed, stamina for age and fatigue. Sleep is ok. No fever now.  ENDO:  No syncope, near or diaphoretic sweaty spells.  HEENT: No change occ headache.   No vision change.   Same significant hearing loss.  Ears without pain/drainage.  No sore throat.  No significant nasal/sinus congestion/drainage. No epistaxis.  CHEST: No chest wall tenderness or mass. No change cough, with occ wheeze.  Stable/occ SOB; no hemoptysis.  CV: No chest pain, palpitations, less daily ankle edema.  GI: No heartburn, dysphagia.  No abdominal pain, diarrhea, constipation.  No rectal bleeding, or melena.    :  Voids without dysuria, or  incontinence to completion; sees sony  ORTHO: No painful/swollen joints but various on /off sore.  No change sore neck; more pain lower back.  No acute neck or back pain without recent injury.  NEURO: No dizziness, weakness of extremities.  No change UE/LE numbness/paresthesias.   Continued RUE > LUE tremor.   PSYCH: No memory loss.  Mood good; occ anxious, depressed but/and not suicidal.  Tries to tolerate stress   Screening:  Mammogram: NA  Bone density: 3.2.2017 [facility]/Ts LS -1.8 hip -0.5-offered  Low dose CT chest: regular 2014-offered  GI:   Colonoscopy-polyp/Gil/[facility]/3.18.16/5  yr  EGD-neg/Gil/[facility]/5.1.18  Prostate: sony confirmed 4.9.20  sony confirmed 3.28.19  Usual lab order  6m CBC, CMP, A1c, iron  12m CBC, CMP, A1c LIPID, TSH, T4, Vit D, PSAs, B12, folate, iron, ferritin, % sat iron, retic count    Lab Results:  Results for orders placed or performed in visit on 04/06/20   Comprehensive metabolic panel   Result Value Ref Range    Glucose 111 (H) 65 - 99 mg/dL    BUN 3 (L) 8 - 23 mg/dL    Creatinine 0.76 0.76 - 1.27 mg/dL    eGFR Non African Am 101 >60 mL/min/1.73    eGFR African Am 123 >60 mL/min/1.73    BUN/Creatinine Ratio 3.9 (L) 7.0 - 25.0    Sodium 143 136 - 145 mmol/L    Potassium 2.9 (L) 3.5 - 5.2 mmol/L    Chloride 98 98 - 107 mmol/L    Total CO2 35.4 (H) 22.0 - 29.0 mmol/L    Calcium 8.5 (L) 8.6 - 10.5 mg/dL    Total Protein 6.3 6.0 - 8.5 g/dL    Albumin 3.60 3.50 - 5.20 g/dL    Globulin 2.7 gm/dL    A/G Ratio 1.3 g/dL    Total Bilirubin 0.5 0.2 - 1.2 mg/dL    Alkaline Phosphatase 107 39 - 117 U/L    AST (SGOT) 20 1 - 40 U/L    ALT (SGPT) 15 1 - 41 U/L   Hemoglobin A1c   Result Value Ref Range    Hemoglobin A1C 5.00 4.80 - 5.60 %   Lipid panel   Result Value Ref Range    Total Cholesterol 179 0 - 200 mg/dL    Triglycerides 228 (H) 0 - 150 mg/dL    HDL Cholesterol 29 (L) 40 - 60 mg/dL    VLDL Cholesterol 45.6 mg/dL    LDL Cholesterol  104 (H) 0 - 100 mg/dL   TSH   Result Value Ref Range    TSH 0.701 0.270 - 4.200 uIU/mL   T4   Result Value Ref Range    T4, Total 7.6 4.5 - 12.0 ug/dL   Vitamin D 25 Hydroxy   Result Value Ref Range    25 Hydroxy, Vitamin D 41.0 30.0 - 100.0 ng/ml   PSA Screen   Result Value Ref Range    PSA 0.791 0.000 - 4.000 ng/mL   Vitamin B12   Result Value Ref Range    Vitamin B-12 602 211 - 946 pg/mL   Folate   Result Value Ref Range    Folate 4.93 4.78 - 24.20 ng/mL   Ferritin   Result Value Ref Range    Ferritin 110.00 30.00 - 400.00 ng/mL   Iron and TIBC   Result Value Ref Range    TIBC 316 mcg/dL    UIBC 215 112 - 346 mcg/dL    Iron 101 59 - 158  mcg/dL    Iron Saturation 32 20 - 50 %   Reticulocytes   Result Value Ref Range    Reticulocyte Absolute 2.38 (H) 0.70 - 1.90 %   CBC & Differential   Result Value Ref Range    WBC 12.23 (H) 3.40 - 10.80 10*3/mm3    RBC 3.63 (L) 4.14 - 5.80 10*6/mm3    Hemoglobin 11.9 (L) 13.0 - 17.7 g/dL    Hematocrit 35.4 (L) 37.5 - 51.0 %    MCV 97.5 (H) 79.0 - 97.0 fL    MCH 32.8 26.6 - 33.0 pg    MCHC 33.6 31.5 - 35.7 g/dL    RDW 13.2 12.3 - 15.4 %    Platelets 263 140 - 450 10*3/mm3    Neutrophil Rel % 61.5 42.7 - 76.0 %    Lymphocyte Rel % 31.2 19.6 - 45.3 %    Monocyte Rel % 5.0 5.0 - 12.0 %    Eosinophil Rel % 1.8 0.3 - 6.2 %    Basophil Rel % 0.3 0.0 - 1.5 %    Neutrophils Absolute 7.52 (H) 1.70 - 7.00 10*3/mm3    Lymphocytes Absolute 3.82 (H) 0.70 - 3.10 10*3/mm3    Monocytes Absolute 0.61 0.10 - 0.90 10*3/mm3    Eosinophils Absolute 0.22 0.00 - 0.40 10*3/mm3    Basophils Absolute 0.04 0.00 - 0.20 10*3/mm3    Immature Granulocyte Rel % 0.2 0.0 - 0.5 %    Immature Grans Absolute 0.02 0.00 - 0.05 10*3/mm3    nRBC 0.0 0.0 - 0.2 /100 WBC       A1C:  Lab Results - Last 18 Months   Lab Units 04/06/20  1025 10/01/19  1043 03/22/19  0948   HEMOGLOBIN A1C % 5.00 5.80* 6.10*     PSA:  Lab Results - Last 18 Months   Lab Units 04/06/20  1025 03/22/19  0948   PSA ng/mL 0.791 0.767     CBC:  Lab Results - Last 18 Months   Lab Units 04/06/20  1025 10/01/19  1043 03/22/19  0948   WBC 10*3/mm3 12.23* 11.14* 10.15   HEMOGLOBIN g/dL 11.9* 11.6* 11.0*   HEMATOCRIT % 35.4* 35.2* 33.7*   PLATELETS 10*3/mm3 263 273 254   IRON mcg/dL 101 78 109      BMP/CMP:  Lab Results - Last 18 Months   Lab Units 04/06/20  1025 10/01/19  1043 04/15/19  1440 03/22/19  0948   SODIUM mmol/L 143 147*  --  141   POTASSIUM mmol/L 2.9* 3.5  --  3.9   CHLORIDE mmol/L 98 101  --  101   TOTAL CO2 mmol/L 35.4* 32.6*  --  32.0*   GLUCOSE mg/dL 111* 121*  --  105*   BUN mg/dL 3* 4*  --  9   CREATININE mg/dL 0.76 0.75* 0.80 0.73   EGFR IF NONAFRICN AM mL/min/1.73 101 103   "--  106   EGFR IF AFRICN AM mL/min/1.73 123 124  --  129   CALCIUM mg/dL 8.5* 8.8  --  8.9     HEPATIC:  Lab Results - Last 18 Months   Lab Units 04/06/20  1025 10/01/19  1043 03/22/19  0948   ALT (SGPT) U/L 15 18 33   AST (SGOT) U/L 20 23 37   ALK PHOS U/L 107 115 99     THYROID:  Lab Results - Last 18 Months   Lab Units 04/06/20  1025 03/22/19  0948   TSH uIU/mL 0.701 0.409*       Objective   /73   Pulse 74   Ht 182.9 cm (72\")   Wt 121 kg (267 lb 3.2 oz)   SpO2 96%   BMI 36.24 kg/m²   Body mass index is 36.24 kg/m².    Physical Exam  None: tele/health    Assessment/Plan     1. Hypopotassemia    2. Tremor    3. Essential hypertension    4. Mixed hyperlipidemia    5. Chronic diastolic congestive heart failure (CMS/HCC)    6. Chronic obstructive pulmonary disease, unspecified COPD type (CMS/HCC)    7. Gastroesophageal reflux disease, esophagitis presence not specified    8. Chronic back pain, unspecified back location, unspecified back pain laterality    9. Anemia, unspecified type    10. Elevated blood pressure reading        Rx: reviewed/changes:  New Medications Ordered This Visit   Medications   • primidone (MYSOLINE) 50 MG tablet     Sig: Take 1 tablet by mouth 2 (Two) Times a Day.     Dispense:  180 tablet     Refill:  3     1 AM, 1 noon     LAB/Testing/Referrals: reviewed/orders:   Today:   Orders Placed This Encounter   Procedures   • Basic Metabolic Panel     Chronic/recurrent labs above or change to:   Same; except BMP next week     Discussions:   Potassium recheck next week  Increase mysoline AM add noon  COVID19 advice given to shelter in place, how to acquire groceries/Rx and other suggestions (included using mask and how to use mask if out in public/around public); advised of importance to avoid catching with age/comorbidities.    Tobacco use reviewed:    Smoker  Felix SILVESTRE Bartlett Sr.  reports that he has been smoking cigarettes. He started smoking about 53 years ago. He has a 12.50 pack-year " smoking history. He has never used smokeless tobacco.. I have educated him on the risk of diseases from using tobacco products such as cancer, COPD and heart diease.     I advised him to quit and he is not willing to quit.    I spent 30 second mention as has done before minutes counseling the patient.      There are no Patient Instructions on file for this visit.    Follow up: Return for lab;, Dr Rosario-, as planned;, 6 m;.  Future Appointments   Date Time Provider Department Center   4/29/2020  9:30 AM [facility] US 1 [facility]AD US BI [facility]   7/9/2020  2:00 PM Bassam Rosario MD [facility] None       This visit has been rescheduled as a phone visit to comply with patient safety concerns in accordance with CDC recommendations. Total time of discussion was 15 minutes.

## 2020-04-20 RX ORDER — FUROSEMIDE 20 MG/1
20 TABLET ORAL DAILY
Qty: 90 TABLET | Refills: 3 | Status: SHIPPED | OUTPATIENT
Start: 2020-04-20 | End: 2021-05-11

## 2020-04-29 ENCOUNTER — HOSPITAL ENCOUNTER (OUTPATIENT)
Dept: ULTRASOUND IMAGING | Facility: HOSPITAL | Age: 70
Discharge: HOME | End: 2020-04-29
Admitting: UROLOGY

## 2020-04-29 DIAGNOSIS — C64.9 RENAL CELL CARCINOMA, UNSPECIFIED LATERALITY (HCC): ICD-10-CM

## 2020-04-29 PROCEDURE — 76775 US EXAM ABDO BACK WALL LIM: CPT

## 2020-05-08 ENCOUNTER — TELEPHONE (OUTPATIENT)
Dept: FAMILY MEDICINE CLINIC | Facility: CLINIC | Age: 70
End: 2020-05-08

## 2020-05-08 NOTE — TELEPHONE ENCOUNTER
Was due 4.2020 but with his overall health/wife's health prefer he wait until June or even July assuming COVID risk ruma lower then    Attempted to call pt and left vm, will send to Dr Rosario, but will probably order and go over this on his next office visit? 7-9-20  Will find out and advise

## 2020-05-08 NOTE — TELEPHONE ENCOUNTER
Patient called stating he received a letter in the mail addressed from [facility] advising him to contact his PCP to schedule a lung cancer screening. Please advise. Patient would like a call back to discuss, phone number is [phone]

## 2020-09-25 ENCOUNTER — TELEPHONE (OUTPATIENT)
Dept: UROLOGY | Facility: CLINIC | Age: 70
End: 2020-09-25

## 2020-09-25 NOTE — TELEPHONE ENCOUNTER
Pt has an appt with Dr. Ramos on Tuesday and was supposed to have a JESSICA before his appointment. I called Radiology and scheduled his JESSICA for Monday 9/28 at 0800 at [facility]. NPO after Midnight. I LVM notifying patient of all of this. I asked that he call me back to let me know if he would be able to keep appt. I advised, if he was unable to keep appt for imaging on Monday we would need to reschedule his appt for Tuesday until he would get his JESSICA done.

## 2020-09-28 ENCOUNTER — APPOINTMENT (OUTPATIENT)
Dept: ULTRASOUND IMAGING | Facility: HOSPITAL | Age: 70
End: 2020-09-28

## 2020-10-01 ENCOUNTER — FLU SHOT (OUTPATIENT)
Dept: FAMILY MEDICINE CLINIC | Facility: CLINIC | Age: 70
End: 2020-10-01

## 2020-10-01 DIAGNOSIS — Z23 NEED FOR INFLUENZA VACCINATION: ICD-10-CM

## 2020-10-01 PROCEDURE — 90694 VACC AIIV4 NO PRSRV 0.5ML IM: CPT | Performed by: FAMILY MEDICINE

## 2020-10-01 PROCEDURE — G0008 ADMIN INFLUENZA VIRUS VAC: HCPCS | Performed by: FAMILY MEDICINE

## 2020-11-02 ENCOUNTER — HOSPITAL ENCOUNTER (OUTPATIENT)
Dept: ULTRASOUND IMAGING | Facility: HOSPITAL | Age: 70
Discharge: HOME | End: 2020-11-02
Admitting: UROLOGY

## 2020-11-02 DIAGNOSIS — C64.9 RENAL CELL CARCINOMA, UNSPECIFIED LATERALITY (HCC): ICD-10-CM

## 2020-11-02 PROCEDURE — 76775 US EXAM ABDO BACK WALL LIM: CPT

## 2020-11-02 NOTE — PROGRESS NOTES
Mr. Bartlett is 70 y.o. male    CHIEF COMPLAINT: 1 year follow up Renal Cell Carcinoma. Imaging was done at [facility].     HPI    Follow-up renal cell carcinoma  Location: Left kidney  Quality: Clear-cell renal cell carcinoma  Severity: Organ confined disease with negative margin at time of surgery no evidence of recurrence.  Duration: Diagnosed in 2014  Timing: Unknown onset  Context: CT done for non-urologic consideration.  This was an incidental finding.  Modifying factors: Patient underwent partial nephrectomy said no evidence of recurrent disease.  Associated signs or symptoms: No flank pain or hematuria.  History related to this issue:   -08/2014: Left robot-assisted laparoscopic partial nephrectomy  Final Diagnosis  Left kidney, wedge resection:        A.     Renal clear cell carcinoma, Racheal nuclear grade 1.        B.     Greatest tumor dimension equals 3.9 cm.         C.      All margins are free of tumor with closest tumor measured at 0.1 mm  from the                     parenchymal margin.        D.     No extension beyond renal capsule.         E.     Pathologic classification:  pT1a.      Other issues to be addressed at this visit:   -He also has lower urinary tract symptoms that are secondary to BPH.  Most bothersome symptom is nocturia.  He does think tamsulosin has helped the other symptoms just not the latter.      The following portions of the patient's history were reviewed and updated as appropriate: allergies, current medications, past family history, past medical history, past social history, past surgical history and problem list.      Review of Systems   Constitutional: Negative for chills and fever.   Gastrointestinal: Negative for abdominal pain, anal bleeding and blood in stool.   Genitourinary: Negative for dysuria, frequency, hematuria and urgency.         Current Outpatient Medications:   •  acetaminophen (TYLENOL) 650 MG 8 hr tablet, Take 650 mg by mouth 2 (Two) Times a Day., Disp: ,  Rfl:   •  calcium-vitamin D (OSCAL-500) 500-200 MG-UNIT per tablet, Take 1 tablet by mouth 2 times daily.  , Disp: , Rfl:   •  diphenhydrAMINE (BENADRYL) 25 mg capsule, Take 25 mg by mouth 2 (Two) Times a Day., Disp: , Rfl:   •  escitalopram (LEXAPRO) 20 MG tablet, Take 1 tablet by mouth Daily., Disp: 90 tablet, Rfl: 3  •  ferrous sulfate 325 (65 FE) MG tablet, Take 325 mg by mouth Daily With Breakfast., Disp: , Rfl:   •  FLOVENT  MCG/ACT inhaler, INHALE 1 PUFF BY MOUTH TWICE DAILY, Disp: 12 g, Rfl: 5  •  FLUTICASONE PROPIONATE, NASAL, NA, 1 spray into the nostril(s) as directed by provider Daily., Disp: , Rfl:   •  furosemide (LASIX) 20 MG tablet, Take 1 tablet by mouth Daily., Disp: 90 tablet, Rfl: 3  •  KETOTIFEN FUMARATE OP, Apply 1 drop to eye(s) as directed by provider As Needed., Disp: , Rfl:   •  losartan (COZAAR) 50 MG tablet, Take 1 tablet by mouth Daily., Disp: 90 tablet, Rfl: 3  •  metoprolol tartrate (LOPRESSOR) 50 MG tablet, Take 1 tablet by mouth Every 12 (Twelve) Hours., Disp: 180 tablet, Rfl: 3  •  Multiple Vitamins-Minerals (CENTRUM SILVER 50+MEN) tablet, Take 1 tablet by mouth Daily., Disp: , Rfl:   •  O2 (OXYGEN), Inhale 1 (One) Time., Disp: , Rfl:   •  omeprazole (priLOSEC) 20 MG capsule, 20 mg 2 (Two) Times a Day., Disp: , Rfl:   •  potassium chloride (K-DUR,KLOR-CON) 10 MEQ CR tablet, Take 1 tablet by mouth 2 (Two) Times a Day., Disp: 180 tablet, Rfl: 3  •  primidone (MYSOLINE) 50 MG tablet, Take 1 tablet by mouth 2 (Two) Times a Day., Disp: 180 tablet, Rfl: 3  •  simvastatin (ZOCOR) 20 MG tablet, Take 1 tablet by mouth Daily., Disp: 90 tablet, Rfl: 3  •  tamsulosin (FLOMAX) 0.4 MG capsule 24 hr capsule, Take 1 capsule by mouth Daily., Disp: 90 capsule, Rfl: 3    Past Medical History:   Diagnosis Date   • Acid reflux    • Arthritis    • Asthma    • COPD (chronic obstructive pulmonary disease) (CMS/HCC)    • Hyperlipidemia    • Hypertension    • Oxygen dependent     at hs   • Renal  "cancer (CMS/HCC) 08/2014    Lt RALPart'l Nx; t1a Clear Cell with negative margin       Past Surgical History:   Procedure Laterality Date   • APPENDECTOMY     • CHOLECYSTECTOMY     • ENDOSCOPY N/A 5/1/2018    Procedure: ESOPHAGOGASTRODUODENOSCOPY WITH ANESTHESIA;  Surgeon: Donnell Cordero DO;  Location[facility] ENDOSCOPY;  Service: Gastroenterology   • HERNIA REPAIR     • KIDNEY SURGERY     • LAPAROSCOPIC PARTIAL NEPHRECTOMY Left 08/14/2014    Robot Assisted       Social History     Socioeconomic History   • Marital status:      Spouse name: Melanie   • Number of children: 1   • Years of education: 12   • Highest education level: Not on file   Occupational History   • Occupation: retired     Comment: post office   Tobacco Use   • Smoking status: Current Every Day Smoker     Packs/day: 0.25     Years: 50.00     Pack years: 12.50     Types: Cigarettes     Start date: 4/30/1966   • Smokeless tobacco: Never Used   • Tobacco comment: 8/27/18 Smoking 5 cigarettes per day.   Substance and Sexual Activity   • Alcohol use: No   • Drug use: No   • Sexual activity: Defer       Family History   Problem Relation Age of Onset   • Colon cancer Father    • Heart disease Mother          Temp 97.2 °F (36.2 °C)   Ht 182.9 cm (72\")   Wt 121 kg (267 lb)   BMI 36.21 kg/m²       Physical Exam  Constitutional: Patient is without distress or deformity.  Vital signs are reviewed as above.    Neuro: No confusion; No disorientation; Alert and oriented  Pulmonary: No respiratory distress.   Skin: No pallor or diaphoresis        Data  Results for orders placed or performed in visit on 11/05/20   POC Urinalysis Dipstick, Multipro    Specimen: Urine   Result Value Ref Range    Color Yellow Yellow, Straw, Dark Yellow, Chery    Clarity, UA Clear Clear    Glucose, UA Negative Negative, 1000 mg/dL (3+) mg/dL    Bilirubin Negative Negative    Ketones, UA Negative Negative    Specific Gravity  1.005 1.005 - 1.030    Blood, UA Negative Negative "    pH, Urine 6.0 5.0 - 8.0    Protein, POC Negative Negative mg/dL    Urobilinogen, UA Normal Normal    Nitrite, UA Negative Negative    Leukocytes Negative Negative         Imaging Results (Last 7 Days)     ** No results found for the last 168 hours. **      RENAL ULTRASOUND COMPLETE 11/2/2020 10:07 AM CST     REASON FOR EXAM: renal cell carcinoma, unspecified; C64.9-Malignant  neoplasm of unspecified kidney, except renal pelvis       COMPARISON: April 29, 2020       TECHNIQUE: Multiple longitudinal and transverse realtime sonographic  images of the kidneys and urinary bladder are obtained.      FINDINGS:      RIGHT KIDNEY: 5.2 x 5.2 x 11.6 cm. Normal in size, shape, contour and  position. A 2.5 x 2.5 2.5 cm hypoechoic cyst is present near the upper  pole of the right kidney this is unchanged. The central echo complex is  compact with no evidence for hydronephrosis. No nephrolithiasis or  abnormal perinephric fluid collections . No hydroureter.      LEFT KIDNEY: 4.6 x 5.9 x 10.7 cm. Normal in size, shape, contour and  position. Area of fat necrosis is noted from prior wedge resection in  the lower pole of the left kidney. This is 2.9 x 2.7 x 3.7 cm. This is  unchanged from April 29, 2020. The central echo complex is compact with  no evidence for hydronephrosis. No nephrolithiasis or abnormal  perinephric fluid collections . No hydroureter.      PELVIS: The bladder is mildly distended with anechoic urine and  demonstrates no significant wall thickening or internal echogenicities.  There is no surrounding ascites.      IMPRESSION:  1. Area of fat necrosis lower pole the left kidney unchanged from April 29, 2020.  2. Stable right renal cyst.        This report was finalized on 11/02/2020 11:17 by Dr. Brandon Kirkpatrick MD.    These images were made available to me to review independently.  I also reviewed the radiologist's report described above with regard to the urologic findings.   /Felix Ramos  MD          Assessment and Plan  Diagnoses and all orders for this visit:    1. Renal cell carcinoma, unspecified laterality (CMS/HCC) (Primary)  -     POC Urinalysis Dipstick, Multipro    2. Nocturia      -No evidence recurrent disease  -Nocturia persists but he said he is able to fall back to sleep pretty quickly.  I do think he has some element of sleep apnea that adds to this        (Please note that portions of this note were completed with a voice recognition program.)  Felix Ramos MD  11/05/20  14:46 CST

## 2020-11-05 ENCOUNTER — OFFICE VISIT (OUTPATIENT)
Dept: UROLOGY | Facility: CLINIC | Age: 70
End: 2020-11-05

## 2020-11-05 VITALS — WEIGHT: 267 LBS | TEMPERATURE: 97.2 F | BODY MASS INDEX: 36.16 KG/M2 | HEIGHT: 72 IN

## 2020-11-05 DIAGNOSIS — R35.1 NOCTURIA: ICD-10-CM

## 2020-11-05 DIAGNOSIS — C64.9 RENAL CELL CARCINOMA, UNSPECIFIED LATERALITY (HCC): Primary | ICD-10-CM

## 2020-11-05 LAB
BILIRUB BLD-MCNC: NEGATIVE MG/DL
CLARITY, POC: CLEAR
COLOR UR: YELLOW
GLUCOSE UR STRIP-MCNC: NEGATIVE MG/DL
KETONES UR QL: NEGATIVE
LEUKOCYTE EST, POC: NEGATIVE
NITRITE UR-MCNC: NEGATIVE MG/ML
PH UR: 6 [PH] (ref 5–8)
PROT UR STRIP-MCNC: NEGATIVE MG/DL
RBC # UR STRIP: NEGATIVE /UL
SP GR UR: 1 (ref 1–1.03)
UROBILINOGEN UR QL: NORMAL

## 2020-11-05 PROCEDURE — 81003 URINALYSIS AUTO W/O SCOPE: CPT | Performed by: UROLOGY

## 2020-11-05 PROCEDURE — 99213 OFFICE O/P EST LOW 20 MIN: CPT | Performed by: UROLOGY

## 2021-03-21 DIAGNOSIS — R60.0 EDEMA LEG: ICD-10-CM

## 2021-03-21 DIAGNOSIS — E78.2 MIXED HYPERLIPIDEMIA: ICD-10-CM

## 2021-03-21 DIAGNOSIS — N40.1 BENIGN NON-NODULAR PROSTATIC HYPERPLASIA WITH LOWER URINARY TRACT SYMPTOMS: ICD-10-CM

## 2021-03-21 DIAGNOSIS — R25.1 TREMOR: ICD-10-CM

## 2021-03-21 DIAGNOSIS — I10 ESSENTIAL HYPERTENSION: ICD-10-CM

## 2021-03-22 RX ORDER — LOSARTAN POTASSIUM 50 MG/1
TABLET ORAL
Qty: 90 TABLET | Refills: 3 | Status: SHIPPED | OUTPATIENT
Start: 2021-03-22 | End: 2022-03-09 | Stop reason: SDUPTHER

## 2021-03-22 RX ORDER — SIMVASTATIN 20 MG
TABLET ORAL
Qty: 90 TABLET | Refills: 3 | Status: SHIPPED | OUTPATIENT
Start: 2021-03-22 | End: 2022-03-09 | Stop reason: SDUPTHER

## 2021-03-22 RX ORDER — METOPROLOL TARTRATE 50 MG/1
TABLET, FILM COATED ORAL
Qty: 180 TABLET | Refills: 3 | Status: SHIPPED | OUTPATIENT
Start: 2021-03-22 | End: 2022-03-09 | Stop reason: SDUPTHER

## 2021-03-22 RX ORDER — TAMSULOSIN HYDROCHLORIDE 0.4 MG/1
CAPSULE ORAL
Qty: 90 CAPSULE | Refills: 3 | Status: SHIPPED | OUTPATIENT
Start: 2021-03-22 | End: 2021-05-11

## 2021-03-22 RX ORDER — POTASSIUM CHLORIDE 750 MG/1
TABLET, EXTENDED RELEASE ORAL
Qty: 180 TABLET | Refills: 3 | Status: SHIPPED | OUTPATIENT
Start: 2021-03-22 | End: 2022-03-09 | Stop reason: SDUPTHER

## 2021-03-22 RX ORDER — PRIMIDONE 50 MG/1
TABLET ORAL
Qty: 180 TABLET | Refills: 3 | Status: SHIPPED | OUTPATIENT
Start: 2021-03-22 | End: 2022-03-09 | Stop reason: SDUPTHER

## 2021-03-22 RX ORDER — ESCITALOPRAM OXALATE 20 MG/1
TABLET ORAL
Qty: 90 TABLET | Refills: 3 | Status: SHIPPED | OUTPATIENT
Start: 2021-03-22 | End: 2022-03-09 | Stop reason: SDUPTHER

## 2021-04-09 PROBLEM — Z86.010 HISTORY OF ADENOMATOUS POLYP OF COLON: Status: ACTIVE | Noted: 2021-04-09

## 2021-04-09 PROBLEM — Z80.0 FAMILY HISTORY OF COLON CANCER: Status: ACTIVE | Noted: 2021-04-09

## 2021-04-09 PROBLEM — Z86.0101 HISTORY OF ADENOMATOUS POLYP OF COLON: Status: ACTIVE | Noted: 2021-04-09

## 2021-04-21 ENCOUNTER — OFFICE VISIT (OUTPATIENT)
Dept: FAMILY MEDICINE CLINIC | Facility: CLINIC | Age: 71
End: 2021-04-21

## 2021-04-21 VITALS
OXYGEN SATURATION: 97 % | DIASTOLIC BLOOD PRESSURE: 76 MMHG | TEMPERATURE: 97.8 F | HEART RATE: 69 BPM | SYSTOLIC BLOOD PRESSURE: 132 MMHG

## 2021-04-21 DIAGNOSIS — J44.9 CHRONIC OBSTRUCTIVE PULMONARY DISEASE, UNSPECIFIED COPD TYPE (HCC): Chronic | ICD-10-CM

## 2021-04-21 DIAGNOSIS — Z87.891 PERSONAL HISTORY OF NICOTINE DEPENDENCE: Primary | ICD-10-CM

## 2021-04-21 DIAGNOSIS — Z12.2 ENCOUNTER FOR SCREENING FOR LUNG CANCER: ICD-10-CM

## 2021-04-21 DIAGNOSIS — K21.9 GASTROESOPHAGEAL REFLUX DISEASE, UNSPECIFIED WHETHER ESOPHAGITIS PRESENT: Chronic | ICD-10-CM

## 2021-04-21 DIAGNOSIS — Z12.11 ENCOUNTER FOR SCREENING FOR MALIGNANT NEOPLASM OF COLON: ICD-10-CM

## 2021-04-21 DIAGNOSIS — D64.9 ANEMIA, UNSPECIFIED TYPE: ICD-10-CM

## 2021-04-21 DIAGNOSIS — Z12.5 ENCOUNTER FOR PROSTATE CANCER SCREENING: ICD-10-CM

## 2021-04-21 DIAGNOSIS — R60.0 EDEMA LEG: ICD-10-CM

## 2021-04-21 DIAGNOSIS — I50.32 CHRONIC DIASTOLIC CONGESTIVE HEART FAILURE (HCC): ICD-10-CM

## 2021-04-21 DIAGNOSIS — K76.0 FATTY LIVER: Chronic | ICD-10-CM

## 2021-04-21 DIAGNOSIS — E78.2 MIXED HYPERLIPIDEMIA: Chronic | ICD-10-CM

## 2021-04-21 DIAGNOSIS — E87.6 HYPOPOTASSEMIA: Chronic | ICD-10-CM

## 2021-04-21 DIAGNOSIS — R73.01 ELEVATED FASTING GLUCOSE: Chronic | ICD-10-CM

## 2021-04-21 DIAGNOSIS — I10 ESSENTIAL HYPERTENSION: Chronic | ICD-10-CM

## 2021-04-21 DIAGNOSIS — M19.90 ARTHRITIS: ICD-10-CM

## 2021-04-21 DIAGNOSIS — F41.9 ANXIETY: Chronic | ICD-10-CM

## 2021-04-21 DIAGNOSIS — E55.9 VITAMIN D DEFICIENCY: ICD-10-CM

## 2021-04-21 PROCEDURE — 99214 OFFICE O/P EST MOD 30 MIN: CPT | Performed by: FAMILY MEDICINE

## 2021-04-21 RX ORDER — OMEGA-3S/DHA/EPA/FISH OIL/D3 300MG-1000
400 CAPSULE ORAL
COMMUNITY

## 2021-04-21 RX ORDER — FLUTICASONE PROPIONATE 50 MCG
SPRAY, SUSPENSION (ML) NASAL
COMMUNITY
Start: 2021-01-21

## 2021-04-21 NOTE — PROGRESS NOTES
Subjective   Felix Bartlett Tyron is a 70 y.o. male presenting with chief complaint of:   Chief Complaint   Patient presents with   • Follow-up   • Hand Pain       History of Present Illness :  With wife.       Has multiple chronic problems to consider that might have a bearing on today's issues;  an interval appointment.       Chronic/acute problems reviewed today:   1. Essential hypertension Chronic/stable. Stable here past/no recent home blood pressures.  No significant chest pain, SOB, LE edema, orthopnea, near syncope, dizziness/light headness.   Recent Vitals       4/9/2020 11/5/2020 4/21/2021       BP:  170/73  --  132/76     Pulse:  74  --  69     Temp:  --  97.2 °F (36.2 °C)  97.8 °F (36.6 °C)     Weight:  121 kg (267 lb 3.2 oz)  121 kg (267 lb)  --     BMI (Calculated):  36.2  36.2  --            2. Mixed hyperlipidemia Chronic/stable.  Tolerated use of Rx with labs showing improved lipid values and tolerant liver labs. No muscle aches unexpected.      3. Chronic diastolic congestive heart failure (CMS/HCC) Chronic/stable.  Denies significant sob, orthopnea, leg edema, weight gain.  Aware of influence diet/salt and watching weight at home.       4. Elevated fasting glucose Chronic/stable.  No problem/pattern hypoglycemia/hyperglycemia manifest by poly- dypsia, phagia, uria, or sweats, diaphoretic episodes, syncope/near.     5. Gastroesophageal reflux disease, unspecified whether esophagitis present Chronic/stable.  Controlled heartburn, reflux without dysphagia, melena.  Rx used, periods not used proven needed with symptoms -currently doing ok.      6. Fatty liver Chronic/stable.  Aware treatment involves normalizing weight; usually including low fat diet and regular exercise.  Aware condition can go on to cirrhosis and death.  Stable occ liver labs and past imaging.       7. Hypopotassemia-diuretic Chronic/variable high or low K as uses diuretic; has to have lab monitoring.  No significant fatigue or muscle  cramps     8. Anemia, unspecified type Chronic or past history/stable last checked: This has been present before.    There has been GI evaulation in the past. There is no current melena, hematochezia. It has been benign to date and stable/watching.  Contributing comorbidities to date: benign.     9. Anxiety Chronic/variable:  On/off anxiety tolerated somewhat.  Rx helps and not interested in Rx change. Stress ongoing day to day and taking care of invalid wife.      10. Chronic obstructive pulmonary disease, unspecified COPD type (CMS/HCC) Chronic/stable mild occ cough, sob, wheeze.  Rx helps.   No smoking.       11. Edema leg-CHF/d, obesity, copd, cirrhosis chronic/variable.  Recently better.  Causes include: Those listed.      12. Arthritis Chronic/stable.  Various on/off joint pains/soreness/stiffness.  Particular joint problems with various and particularly recently hands. Some hand and finger swelling.  Treats mainly with reduced activity, Rx listed, Tylenol.  No  NSAIDs, and no injections.      13. Vitamin D deficiency chronic/variable up/down with past labs and risk to run low especially in winter. Lab monitored.      14. Encounter for prostate cancer screening chronic ongoing need to review his risk for prostate cancer. Sees Dr. Ramos regularly having had renal cell cancer and surgery for this. Says he is voiding okay     Has an/another acute issue today: none.    The following portions of the patient's history were reviewed and updated as appropriate: allergies, current medications, past family history, past medical history, past social history, past surgical history and problem list.      Current Outpatient Medications:   •  acetaminophen (TYLENOL) 650 MG 8 hr tablet, Take 650 mg by mouth 2 (Two) Times a Day., Disp: , Rfl:   •  calcium-vitamin D (OSCAL-500) 500-200 MG-UNIT per tablet, Take 1 tablet by mouth 2 times daily.  , Disp: , Rfl:   •  cholecalciferol (VITAMIN D3) 10 MCG (400 UNIT) tablet, Take 400  Units by mouth., Disp: , Rfl:   •  diphenhydrAMINE (BENADRYL) 25 mg capsule, Take 25 mg by mouth 2 (Two) Times a Day., Disp: , Rfl:   •  escitalopram (LEXAPRO) 20 MG tablet, TAKE 1 TABLET DAILY, Disp: 90 tablet, Rfl: 3  •  ferrous sulfate 325 (65 FE) MG tablet, Take 325 mg by mouth Daily With Breakfast., Disp: , Rfl: -using  •  FLOVENT  MCG/ACT inhaler, INHALE 1 PUFF BY MOUTH TWICE DAILY, Disp: 12 g, Rfl: 5  •  fluticasone (FLONASE) 50 MCG/ACT nasal spray, , Disp: , Rfl:   •  furosemide (LASIX) 20 MG tablet, Take 1 tablet by mouth Daily., Disp: 90 tablet, Rfl: 3  •  KETOTIFEN FUMARATE OP, Apply 1 drop to eye(s) as directed by provider As Needed., Disp: , Rfl:   •  KLOR-CON 10 MEQ CR tablet, TAKE 1 TABLET TWICE A DAY, Disp: 180 tablet, Rfl: 3  •  losartan (COZAAR) 50 MG tablet, TAKE 1 TABLET DAILY, Disp: 90 tablet, Rfl: 3  •  metoprolol tartrate (LOPRESSOR) 50 MG tablet, TAKE 1 TABLET EVERY 12     HOURS, Disp: 180 tablet, Rfl: 3  •  Multiple Vitamins-Minerals (CENTRUM SILVER 50+MEN) tablet, Take 1 tablet by mouth Daily., Disp: , Rfl:   •  O2 (OXYGEN), Inhale 1 (One) Time., Disp: , Rfl: -HS  •  omeprazole (priLOSEC) 20 MG capsule, 20 mg 2 (Two) Times a Day., Disp: , Rfl:   •  primidone (MYSOLINE) 50 MG tablet, TAKE 1 TABLET IN THE       MORNING AND AT NOON (TWICE A DAY)., Disp: 180 tablet, Rfl: 3  •  simvastatin (ZOCOR) 20 MG tablet, TAKE 1 TABLET DAILY, Disp: 90 tablet, Rfl: 3  •  tamsulosin (FLOMAX) 0.4 MG capsule 24 hr capsule, TAKE 1 CAPSULE DAILY, Disp: 90 capsule, Rfl: 3  •  FLUTICASONE PROPIONATE, NASAL, NA, 1 spray into the nostril(s) as directed by provider Daily., Disp: , Rfl:     No problems with medications.    No Known Allergies    Review of Systems  GENERAL:  Inactive/slower with limits, speed, stamina for age and fatigue. Sleep is ok. No fever now.  ENDO:  No syncope, near or diaphoretic sweaty spells.  HEENT: No change occ headache.   No vision change.   Same significant hearing loss.  Ears  without pain/drainage.  No sore throat.  No significant nasal/sinus congestion/drainage. No epistaxis.  CHEST: No chest wall tenderness or mass. No change cough, with occ wheeze.  Stable/occ SOB; no hemoptysis.  CV: No chest pain, palpitations, less daily ankle edema.  GI: No heartburn, dysphagia.  No abdominal pain, diarrhea, constipation.  No rectal bleeding, or melena.    :  Voids without dysuria, or  incontinence to completion; sees sony  ORTHO: No painful/swollen joints but various on /off sore.  No change sore neck; more pain lower back.  No acute neck or back pain without recent injury.  NEURO: No dizziness, weakness of extremities.  No change UE/LE numbness/paresthesias.   Continued RUE > LUE tremor.   PSYCH: No memory loss.  Mood good; occ anxious, depressed but/and not suicidal.  Tries to tolerate stress   Screening:  Mammogram: NA  Bone density: 3.2.2017 [facility]/Ts LS -1.8 hip -0.5-offered  Lung cancer: Tobacco-smoker/age 16/1ppd/advised Low dose CT chest:   IMPRESSION:  Lung RADS category 1, negative. Follow-up in 12 months is advised. Old  granulomatous disease.-offered  GI:   Colonoscopy-polyp/Gil/CARLY/3.18.16/5 yr  EGD-neg/Gil/CARLY/5.1.18  Prostate: sony confirmed 4.21.21  sony confirmed 4.9.20  sony confirmed 3.28.19  Usual lab order  6m CBC, CMP, A1c, iron  12m CBC, CMP, A1c LIPID, TSH, T4, Vit D, PSAs, B12, folate, iron, ferritin, % sat iron, retic count    Data reviewed:   Last cardiac testing:   Echo:   Results for orders placed in visit on 08/21/17    Adult Transthoracic Echo Complete    Interpretation Summary  · Left ventricular systolic function is normal. Estimated EF = 60%.  · Left ventricular wall thickness is consistent with mild concentric hypertrophy.  · Left ventricular diastolic dysfunction (grade I) consistent with impaired relaxation.  · No evidence of pulmonary hypertension is present.    Lab Results:  Results for orders placed or performed in visit on 11/05/20   POC  Urinalysis Dipstick, Multipro    Specimen: Urine   Result Value Ref Range    Color Yellow Yellow, Straw, Dark Yellow, Chery    Clarity, UA Clear Clear    Glucose, UA Negative Negative, 1000 mg/dL (3+) mg/dL    Bilirubin Negative Negative    Ketones, UA Negative Negative    Specific Gravity  1.005 1.005 - 1.030    Blood, UA Negative Negative    pH, Urine 6.0 5.0 - 8.0    Protein, POC Negative Negative mg/dL    Urobilinogen, UA Normal Normal    Nitrite, UA Negative Negative    Leukocytes Negative Negative       A1C:  Lab Results - Last 18 Months   Lab Units 04/06/20  1025   HEMOGLOBIN A1C % 5.00     LIPID:  Lab Results - Last 18 Months   Lab Units 04/06/20  1025   CHOLESTEROL mg/dL 179   LDL CHOL mg/dL 104*   HDL CHOL mg/dL 29*   TRIGLYCERIDES mg/dL 228*     PSA:  Lab Results - Last 18 Months   Lab Units 04/06/20  1025   PSA ng/mL 0.791     CBC:  Lab Results - Last 18 Months   Lab Units 04/06/20  1025   WBC 10*3/mm3 12.23*   HEMOGLOBIN g/dL 11.9*   HEMATOCRIT % 35.4*   PLATELETS 10*3/mm3 263   IRON mcg/dL 101      BMP/CMP:  Lab Results - Last 18 Months   Lab Units 04/06/20  1025   SODIUM mmol/L 143   POTASSIUM mmol/L 2.9*   CHLORIDE mmol/L 98   TOTAL CO2 mmol/L 35.4*   GLUCOSE mg/dL 111*   BUN mg/dL 3*   CREATININE mg/dL 0.76   EGFR IF NONAFRICN AM mL/min/1.73 101   EGFR IF AFRICN AM mL/min/1.73 123   CALCIUM mg/dL 8.5*     HEPATIC:  Lab Results - Last 18 Months   Lab Units 04/06/20  1025   ALT (SGPT) U/L 15   AST (SGOT) U/L 20   ALK PHOS U/L 107     THYROID:  Lab Results - Last 18 Months   Lab Units 04/06/20  1025   TSH uIU/mL 0.701       Objective   /76   Pulse 69   Temp 97.8 °F (36.6 °C) (Infrared)   SpO2 97%   There is no height or weight on file to calculate BMI.    Recent Vitals       4/9/2020 11/5/2020 4/21/2021       BP:  170/73  --  132/76     Pulse:  74  --  69     Temp:  --  97.2 °F (36.2 °C)  97.8 °F (36.6 °C)     Weight:  121 kg (267 lb 3.2 oz)  121 kg (267 lb)  --     BMI (Calculated):  36.2   36.2  --           Physical Exam  GENERAL:  Well nourished/developed in no acute distress. Obese  SKIN: Turgor excellent, without wound, rash, lesion.  HEENT: Normal cephalic without trauma.  Pupils equal round reactive to light. Extraocular motions full without nystagmus.   External canals nonobstructive nontender without reddness. Tymphatic membranes anders with david structures intact.   Oral cavity without growths, exudates, and moist.  Posterior pharynx without mass, obstruction, redness.  No thyromegaly, mass, tenderness, lymphadenopathy and supple.  CV: Regular rhythm.  No murmur, gallop,  edema. Posterior pulses intact.  No carotid bruits.  CHEST: No chest wall tenderness or mass.   LUNGS: Symmetric motion with clear/reduced to auscultation.  No dullness to percussion  ABD: Soft, nontender without mass.   PROSTATE: No visible/palpable lesion/tenderness and anal tone intact/full.  Prostate 30 gm/smooth and nontender.  No rectal mass within reach. Stool on glove brown.   ORTHO: Symmetric extremities without swelling/point tenderness.  Full gross range of motion.  NEURO: CN 2-12 grossly intact.  Symmetric facies. 1/4 x bicep equal reflexes.  UE/LE   3/5 strength throughout.  Nonfocal use extremities. Speech clear.  Reduced light touch with monofilament, vibratory sensation with tuning fork; equal toes/distal feet.  Intention tremor.     PSYCH: Oriented x 3.  Pleasant calm, well kept.  Purposeful/directed conservation with intact short/long gross      Assessment/Plan     1. Essential hypertension    2. Mixed hyperlipidemia    3. Chronic diastolic congestive heart failure (CMS/HCC)    4. Elevated fasting glucose    5. Gastroesophageal reflux disease, unspecified whether esophagitis present    6. Fatty liver    7. Hypopotassemia-diuretic    8. Anemia, unspecified type    9. Anxiety    10. Chronic obstructive pulmonary disease, unspecified COPD type (CMS/HCC)    11. Edema leg-CHF/d, obesity, copd, cirrhosis    12.  Arthritis    13. Vitamin D deficiency    14. Encounter for prostate cancer screening      Discussion:  Start with labs; copy [facility]  Keep apt sony  Needs colonoscopy; he is to consider    Medical decision issues:   Data review above:   Rx: reviewed and decisions:   Same Rx for now     Orders placed:   LAB/Testing/Referrals: reviewed/orders:   Today:   Orders Placed This Encounter   Procedures   • Comprehensive metabolic panel   • Lipid Panel With LDL/HDL Ratio   • TSH   • Hemoglobin A1c   • Sedimentation Rate   • C-reactive Protein   • Vitamin B12   • Folate   • Iron Profile   • Vitamin D 25 Hydroxy   • Rheumatoid Arthritis Expanded Panel   • PSA Screen   • CBC and Differential     Chronic/recurrent labs above or change to:   same   Health maintenance:   There is no height or weight on file to calculate BMI.  Patient's There is no height or weight on file to calculate BMI. BMI is within normal parameters. No follow-up required..    Tobacco use reviewed:    Felix Bartlett Sr.  reports that he has been smoking cigarettes. He started smoking about 55 years ago. He has a 12.50 pack-year smoking history. He has never used smokeless tobacco.. I have educated him on the risk of diseases from using tobacco products such as cancer, COPD and heart disease.     I advised him to quit and he is not willing to quit.    I spent 1 minutes counseling the patient; as mentioned/discussed so many times before    There are no Patient Instructions on file for this visit.    Follow up: Return for lab today and lab/Dr Rosario.  Future Appointments   Date Time Provider Department Center   5/4/2021 12:45 PM Miguel Hastings APRN [facility]   10/21/2021  1:45 PM Bassam Rosario MD [facility]

## 2021-04-22 NOTE — PROGRESS NOTES
"When typing I noticed   A. Behind on gi/colonoscopy \"has appt May with GI\"  B. Behind on low dose CT chest ([facility]  Offer; I put in orders     Called pt and advised he is willing    "

## 2021-04-23 LAB
25(OH)D3+25(OH)D2 SERPL-MCNC: 26.6 NG/ML (ref 30–100)
ALBUMIN SERPL-MCNC: 3.9 G/DL (ref 3.7–4.7)
ALBUMIN/GLOB SERPL: 1.4 {RATIO} (ref 1.2–2.2)
ALP SERPL-CCNC: 100 IU/L (ref 39–117)
ALT SERPL-CCNC: 22 IU/L (ref 0–44)
AST SERPL-CCNC: 27 IU/L (ref 0–40)
BASOPHILS # BLD AUTO: 0.1 X10E3/UL (ref 0–0.2)
BASOPHILS NFR BLD AUTO: 1 %
BILIRUB SERPL-MCNC: 0.2 MG/DL (ref 0–1.2)
BUN SERPL-MCNC: 10 MG/DL (ref 8–27)
BUN/CREAT SERPL: 9 (ref 10–24)
CALCIUM SERPL-MCNC: 9.3 MG/DL (ref 8.6–10.2)
CCP IGA+IGG SERPL IA-ACNC: 6 UNITS (ref 0–19)
CHLORIDE SERPL-SCNC: 102 MMOL/L (ref 96–106)
CHOLEST SERPL-MCNC: 209 MG/DL (ref 100–199)
CO2 SERPL-SCNC: 28 MMOL/L (ref 20–29)
CREAT SERPL-MCNC: 1.12 MG/DL (ref 0.76–1.27)
CRP SERPL-MCNC: 3 MG/L (ref 0–10)
EOSINOPHIL # BLD AUTO: 0.5 X10E3/UL (ref 0–0.4)
EOSINOPHIL NFR BLD AUTO: 3 %
ERYTHROCYTE [DISTWIDTH] IN BLOOD BY AUTOMATED COUNT: 12.6 % (ref 11.6–15.4)
ERYTHROCYTE [SEDIMENTATION RATE] IN BLOOD BY WESTERGREN METHOD: 15 MM/HR (ref 0–30)
FOLATE SERPL-MCNC: 6.3 NG/ML
GLOBULIN SER CALC-MCNC: 2.8 G/DL (ref 1.5–4.5)
GLUCOSE SERPL-MCNC: 93 MG/DL (ref 65–99)
HBA1C MFR BLD: 6.2 % (ref 4.8–5.6)
HCT VFR BLD AUTO: 32.8 % (ref 37.5–51)
HDLC SERPL-MCNC: 30 MG/DL
HGB BLD-MCNC: 10.7 G/DL (ref 13–17.7)
IMM GRANULOCYTES # BLD AUTO: 0.1 X10E3/UL (ref 0–0.1)
IMM GRANULOCYTES NFR BLD AUTO: 1 %
IRON SATN MFR SERPL: 46 % (ref 15–55)
IRON SERPL-MCNC: 132 UG/DL (ref 38–169)
LDLC SERPL CALC-MCNC: 121 MG/DL (ref 0–99)
LDLC/HDLC SERPL: 4 RATIO (ref 0–3.6)
LYMPHOCYTES # BLD AUTO: 5.7 X10E3/UL (ref 0.7–3.1)
LYMPHOCYTES NFR BLD AUTO: 43 %
MCH RBC QN AUTO: 32.3 PG (ref 26.6–33)
MCHC RBC AUTO-ENTMCNC: 32.6 G/DL (ref 31.5–35.7)
MCV RBC AUTO: 99 FL (ref 79–97)
MONOCYTES # BLD AUTO: 0.9 X10E3/UL (ref 0.1–0.9)
MONOCYTES NFR BLD AUTO: 7 %
NEUTROPHILS # BLD AUTO: 6.1 X10E3/UL (ref 1.4–7)
NEUTROPHILS NFR BLD AUTO: 45 %
PLATELET # BLD AUTO: 253 X10E3/UL (ref 150–450)
POTASSIUM SERPL-SCNC: 4.5 MMOL/L (ref 3.5–5.2)
PROT SERPL-MCNC: 6.7 G/DL (ref 6–8.5)
RBC # BLD AUTO: 3.31 X10E6/UL (ref 4.14–5.8)
RHEUMATOID FACT SERPL-ACNC: <10 IU/ML (ref 0–13.9)
SODIUM SERPL-SCNC: 142 MMOL/L (ref 134–144)
TIBC SERPL-MCNC: 287 UG/DL (ref 250–450)
TRIGL SERPL-MCNC: 327 MG/DL (ref 0–149)
TSH SERPL DL<=0.005 MIU/L-ACNC: 0.86 UIU/ML (ref 0.45–4.5)
UIBC SERPL-MCNC: 155 UG/DL (ref 111–343)
VIT B12 SERPL-MCNC: 480 PG/ML (ref 232–1245)
VLDLC SERPL CALC-MCNC: 58 MG/DL (ref 5–40)
WBC # BLD AUTO: 13.2 X10E3/UL (ref 3.4–10.8)

## 2021-05-03 ENCOUNTER — TELEPHONE (OUTPATIENT)
Dept: FAMILY MEDICINE CLINIC | Facility: CLINIC | Age: 71
End: 2021-05-03

## 2021-05-03 NOTE — TELEPHONE ENCOUNTER
PATIENT CALLED AND STATED THAT HE WOULD LIKE A CALLBACK FROM DR. MCKEON NURSE TO GO OVER SOME QUESTIONS HE HAS. NO FURTHER INFO PROVIDED.    CALLBACK: [phone]

## 2021-05-03 NOTE — TELEPHONE ENCOUNTER
Patient had question on what he was supposed to  tomorrow which is a hemocult test kit. He has an appointment tomorrow with GI to see about getting a colonoscopy done.  I advised him to have GI appointment first, they may advise he does not need since he will be getting scheduled for colonoscpoy.

## 2021-05-10 NOTE — PROGRESS NOTES
Chief Complaint   Patient presents with   • Colon Cancer Screening     last colon 03/2016- hx of polyps       PCP: Bassam Rosario MD  REFER: Bassam Rosario MD    Subjective     HPI    Felix Bartlett Sr. is a 71 y.o. male who presents to office for preventative maintenance.  There is  a personal history of colon polyps.  There is not a history of colon cancer.  He does not have complaints of nausea/vomiting, change in bowels, weight loss, no BRBPR, no melena.  There is not a family history of colon cancer.  There is not a family history of colon polyps.  His last colonoscopy-2016 .  Bowels do move on regular basis.    Endoscopy (Dr Cordero) 2018-negative    Endoscopy (Dr Cordero) 2016-duodenal nodule, active esophagitis-bx of nodule showed small intestinal type villi with nonspecific inflammation,      CScope (Dr Cordero) 2016-tubular adenoma- 65 cm  CScope (Dr Cordero) 2010 normal    Past Medical History:   Diagnosis Date   • Acid reflux    • Arthritis    • Asthma    • COPD (chronic obstructive pulmonary disease) (CMS/HCC)    • Hyperlipidemia    • Hypertension    • Oxygen dependent     at hs   • Renal cancer (CMS/HCC) 08/2014    Lt RALPart'l Nx; t1a Clear Cell with negative margin     Past Surgical History:   Procedure Laterality Date   • APPENDECTOMY     • CHOLECYSTECTOMY     • ENDOSCOPY N/A 5/1/2018    Procedure: ESOPHAGOGASTRODUODENOSCOPY WITH ANESTHESIA;  Surgeon: Donnell Cordero DO;  Location: [facility] ENDOSCOPY;  Service: Gastroenterology   • HERNIA REPAIR     • KIDNEY SURGERY     • LAPAROSCOPIC PARTIAL NEPHRECTOMY Left 08/14/2014    Robot Assisted     Outpatient Medications Marked as Taking for the 5/11/21 encounter (Office Visit) with Miguel Hastings APRN   Medication Sig Dispense Refill   • acetaminophen (TYLENOL) 650 MG 8 hr tablet Take 650 mg by mouth 2 (Two) Times a Day.     • calcium-vitamin D (OSCAL-500) 500-200 MG-UNIT per tablet Take 1 tablet by mouth 2 times daily.       •  cholecalciferol (VITAMIN D3) 10 MCG (400 UNIT) tablet Take 400 Units by mouth.     • diphenhydrAMINE (BENADRYL) 25 mg capsule Take 25 mg by mouth 2 (Two) Times a Day.     • escitalopram (LEXAPRO) 20 MG tablet TAKE 1 TABLET DAILY 90 tablet 3   • ferrous sulfate 325 (65 FE) MG tablet Take 325 mg by mouth Daily With Breakfast.     • FLOVENT  MCG/ACT inhaler INHALE 1 PUFF BY MOUTH TWICE DAILY 12 g 5   • fluticasone (FLONASE) 50 MCG/ACT nasal spray      • FLUTICASONE PROPIONATE, NASAL, NA 1 spray into the nostril(s) as directed by provider Daily.     • KETOTIFEN FUMARATE OP Apply 1 drop to eye(s) as directed by provider As Needed.     • KLOR-CON 10 MEQ CR tablet TAKE 1 TABLET TWICE A  tablet 3   • losartan (COZAAR) 50 MG tablet TAKE 1 TABLET DAILY 90 tablet 3   • metoprolol tartrate (LOPRESSOR) 50 MG tablet TAKE 1 TABLET EVERY 12     HOURS 180 tablet 3   • Multiple Vitamins-Minerals (CENTRUM SILVER 50+MEN) tablet Take 1 tablet by mouth Daily.     • O2 (OXYGEN) Inhale 1 (One) Time.     • omeprazole (priLOSEC) 20 MG capsule 20 mg 2 (Two) Times a Day.     • primidone (MYSOLINE) 50 MG tablet TAKE 1 TABLET IN THE       MORNING AND AT NOON (TWICE A DAY). 180 tablet 3   • simvastatin (ZOCOR) 20 MG tablet TAKE 1 TABLET DAILY 90 tablet 3   • [DISCONTINUED] furosemide (LASIX) 20 MG tablet Take 1 tablet by mouth Daily. 90 tablet 3   • [DISCONTINUED] tamsulosin (FLOMAX) 0.4 MG capsule 24 hr capsule TAKE 1 CAPSULE DAILY 90 capsule 3     No Known Allergies  Social History     Socioeconomic History   • Marital status:      Spouse name: Melanie   • Number of children: 1   • Years of education: 12   • Highest education level: Not on file   Tobacco Use   • Smoking status: Current Every Day Smoker     Packs/day: 0.25     Years: 50.00     Pack years: 12.50     Types: Cigarettes     Start date: 4/30/1966   • Smokeless tobacco: Never Used   • Tobacco comment: 8/27/18 Smoking 5 cigarettes per day.   Substance and Sexual  Activity   • Alcohol use: No   • Drug use: No   • Sexual activity: Defer     Review of Systems   Constitutional: Negative for unexpected weight change.   Respiratory: Negative for shortness of breath.    Cardiovascular: Negative for chest pain.   Gastrointestinal: Negative for abdominal pain and anal bleeding.     Objective   Vitals:    05/11/21 1402   BP: 162/42   Pulse: 70   Temp: 97.5 °F (36.4 °C)   SpO2: 99%     Physical Exam  Constitutional:       Appearance: Normal appearance. He is well-developed.   Eyes:      General: No scleral icterus.  Cardiovascular:      Rate and Rhythm: Regular rhythm.      Heart sounds: Normal heart sounds. No murmur heard.     Pulmonary:      Effort: Pulmonary effort is normal. No accessory muscle usage.      Breath sounds: Normal breath sounds.   Abdominal:      General: Bowel sounds are normal. There is no distension.      Palpations: Abdomen is soft. There is no mass.      Tenderness: There is no abdominal tenderness. There is no guarding or rebound.   Skin:     General: Skin is warm and dry.      Coloration: Skin is not jaundiced.   Neurological:      Mental Status: He is alert.   Psychiatric:         Behavior: Behavior is cooperative.       Imaging Results (Most Recent)     None        Body mass index is 37.84 kg/m².    Assessment/Plan   Diagnoses and all orders for this visit:    1. History of adenomatous polyp of colon (Primary)  -     Case Request; Standing  -     Implement Anesthesia Orders Day of Procedure; Standing  -     Obtain Informed Consent; Standing    Other orders  -     sodium-potassium-magnesium sulfates (Suprep Bowel Prep Kit) 17.5-3.13-1.6 GM/177ML solution oral solution; Take as directed  Dispense: 177 mL; Refill: 0      COLONOSCOPY WITH ANESTHESIA (N/A)    Patient is to continue all blood pressure and cardiac medications prior to procedure and has been advised to take medications morning of procedure   Pt verbalized understanding     Advised pt to stop use of  NSAIDs, Fish Oil, and MV 5 days prior to procedure, per Dr Cordero protocol.  Tylenol based products are ok to take.  Pt verbalized understanding.     All risks, benefits, alternatives, and indications of colonoscopy procedure have been discussed with the patient. Risks to include perforation of the colon requiring possible surgery or colostomy, risk of bleeding from biopsies or removal of colon tissue, possibility of missing a colon polyp or cancer, or adverse drug reaction.  Benefits to include the diagnosis and management of disease of the colon and rectum. Alternatives to include barium enema, radiographic evaluation, lab testing or no intervention. He verbalizes understanding and agrees.     Precautions are currently being put in place due to COVID-19.  I have explained to Felix Bartlett Sr. they will be required to undergo COVID testing prior to their procedure.  Felix Bartlett Sr. verbalized understanding and was willing to proceed.         Miguel Hastings, APRN  05/11/21        There are no Patient Instructions on file for this visit.

## 2021-05-11 ENCOUNTER — OFFICE VISIT (OUTPATIENT)
Dept: GASTROENTEROLOGY | Facility: CLINIC | Age: 71
End: 2021-05-11

## 2021-05-11 VITALS
SYSTOLIC BLOOD PRESSURE: 162 MMHG | TEMPERATURE: 97.5 F | DIASTOLIC BLOOD PRESSURE: 42 MMHG | HEART RATE: 70 BPM | HEIGHT: 72 IN | WEIGHT: 279 LBS | OXYGEN SATURATION: 99 % | BODY MASS INDEX: 37.79 KG/M2

## 2021-05-11 DIAGNOSIS — Z86.010 HISTORY OF ADENOMATOUS POLYP OF COLON: Primary | ICD-10-CM

## 2021-05-11 PROCEDURE — S0260 H&P FOR SURGERY: HCPCS | Performed by: NURSE PRACTITIONER

## 2021-05-11 RX ORDER — SODIUM, POTASSIUM,MAG SULFATES 17.5-3.13G
SOLUTION, RECONSTITUTED, ORAL ORAL
Qty: 177 ML | Refills: 0 | Status: SHIPPED | OUTPATIENT
Start: 2021-05-11

## 2021-05-13 ENCOUNTER — HOSPITAL ENCOUNTER (OUTPATIENT)
Dept: CT IMAGING | Facility: HOSPITAL | Age: 71
Discharge: HOME | End: 2021-05-13
Admitting: FAMILY MEDICINE

## 2021-05-13 PROCEDURE — 71271 CT THORAX LUNG CANCER SCR C-: CPT

## 2021-06-02 ENCOUNTER — TELEPHONE (OUTPATIENT)
Dept: GASTROENTEROLOGY | Facility: CLINIC | Age: 71
End: 2021-06-02

## 2021-06-30 ENCOUNTER — TELEPHONE (OUTPATIENT)
Dept: GASTROENTEROLOGY | Facility: CLINIC | Age: 71
End: 2021-06-30

## 2021-06-30 NOTE — TELEPHONE ENCOUNTER
Spoke with patient this morning - patient stated he cancelled this procedure Monday Night and was suppose to call back - never did.   Reschedule for 07/14/2021 at 10:45am     Mailed new instructions.

## 2021-07-12 ENCOUNTER — TELEPHONE (OUTPATIENT)
Dept: GASTROENTEROLOGY | Facility: CLINIC | Age: 71
End: 2021-07-12

## 2021-07-12 NOTE — TELEPHONE ENCOUNTER
Unfortunately,  he  was unable to keep his colonoscopy  Procedure with Dr. Donnell Cordero.  This is the third time patient has cancelled or rescheduled his procedure with us. Patient does not have anyone that can drive him to and from the hospital.       Sent letter to Bassam Rosaroi MD

## 2021-09-10 ENCOUNTER — TELEPHONE (OUTPATIENT)
Dept: FAMILY MEDICINE CLINIC | Facility: CLINIC | Age: 71
End: 2021-09-10

## 2021-09-10 NOTE — TELEPHONE ENCOUNTER
Hub to Read- Called and left message for patient. Patient is due for Medicare Wellness Visit, call was to offer scheduling of Medicare Wellness Visit if interested. If patient is interested, Hub can schedule.

## 2021-10-21 ENCOUNTER — OFFICE VISIT (OUTPATIENT)
Dept: FAMILY MEDICINE CLINIC | Facility: CLINIC | Age: 71
End: 2021-10-21

## 2021-10-21 VITALS
TEMPERATURE: 97.8 F | WEIGHT: 279 LBS | RESPIRATION RATE: 18 BRPM | OXYGEN SATURATION: 98 % | DIASTOLIC BLOOD PRESSURE: 56 MMHG | HEART RATE: 70 BPM | HEIGHT: 72 IN | SYSTOLIC BLOOD PRESSURE: 120 MMHG | BODY MASS INDEX: 37.79 KG/M2

## 2021-10-21 DIAGNOSIS — I50.32 CHRONIC DIASTOLIC CONGESTIVE HEART FAILURE (HCC): ICD-10-CM

## 2021-10-21 DIAGNOSIS — G89.29 CHRONIC BACK PAIN, UNSPECIFIED BACK LOCATION, UNSPECIFIED BACK PAIN LATERALITY: ICD-10-CM

## 2021-10-21 DIAGNOSIS — E78.2 MIXED HYPERLIPIDEMIA: Chronic | ICD-10-CM

## 2021-10-21 DIAGNOSIS — I10 PRIMARY HYPERTENSION: Chronic | ICD-10-CM

## 2021-10-21 DIAGNOSIS — R73.01 ELEVATED FASTING GLUCOSE: Chronic | ICD-10-CM

## 2021-10-21 DIAGNOSIS — D64.9 ANEMIA, UNSPECIFIED TYPE: ICD-10-CM

## 2021-10-21 DIAGNOSIS — M54.9 CHRONIC BACK PAIN, UNSPECIFIED BACK LOCATION, UNSPECIFIED BACK PAIN LATERALITY: ICD-10-CM

## 2021-10-21 DIAGNOSIS — E55.9 VITAMIN D DEFICIENCY: ICD-10-CM

## 2021-10-21 DIAGNOSIS — K21.9 GASTROESOPHAGEAL REFLUX DISEASE, UNSPECIFIED WHETHER ESOPHAGITIS PRESENT: Chronic | ICD-10-CM

## 2021-10-21 DIAGNOSIS — E87.6 HYPOPOTASSEMIA: Chronic | ICD-10-CM

## 2021-10-21 DIAGNOSIS — F41.9 ANXIETY: Chronic | ICD-10-CM

## 2021-10-21 DIAGNOSIS — J44.9 CHRONIC OBSTRUCTIVE PULMONARY DISEASE, UNSPECIFIED COPD TYPE (HCC): Chronic | ICD-10-CM

## 2021-10-21 PROCEDURE — 99214 OFFICE O/P EST MOD 30 MIN: CPT | Performed by: FAMILY MEDICINE

## 2021-10-21 RX ORDER — TAMSULOSIN HYDROCHLORIDE 0.4 MG/1
1 CAPSULE ORAL DAILY
COMMUNITY
Start: 2021-09-17 | End: 2022-03-09 | Stop reason: SDUPTHER

## 2021-10-21 NOTE — PROGRESS NOTES
Subjective   Felix Bartlett Tyron is a 71 y.o. male presenting with chief complaint of:   Chief Complaint   Patient presents with   • Hypertension     6 month follow-up   • Hyperlipidemia       History of Present Illness :  Alone.       Has multiple chronic problems to consider that might have a bearing on today's issues;  an interval appointment.       Chronic/acute problems reviewed today:   1. Elevated fasting glucose: Chronic/stable.  No problem/pattern hypoglycemia/hyperglycemia manifest by poly- dypsia, phagia, uria, or sweats, diaphoretic episodes, syncope/near.     2. Chronic diastolic congestive heart failure (HCC) Chronic/stable.  Denies significant sob, orthopnea, leg edema, weight gain.  Aware of influence diet/salt and watching weight at home.       3. Mixed hyperlipidemia Chronic/stable.  Tolerated use of Rx with labs showing improved lipid values and tolerant liver labs. No muscle aches unexpected.      4. Primary hypertension Chronic/stable. Stable here past/no recent home blood pressures.  No significant chest pain, SOB, LE edema, orthopnea, near syncope, dizziness/light headness.   Recent Vitals       4/21/2021 5/11/2021 10/21/2021       BP: 132/76 162/42 120/56     Pulse: 69 70 70     Temp: 97.8 °F (36.6 °C) 97.5 °F (36.4 °C) 97.8 °F (36.6 °C)     Weight: -- 127 kg (279 lb) 127 kg (279 lb)     BMI (Calculated): -- 37.8 37.8            5. Gastroesophageal reflux disease, unspecified whether esophagitis present Chronic/stable.  Controlled heartburn, reflux without dysphagia, melena.  Rx used, periods not used proven needed with symptoms -currently doing ok.      6. Hypopotassemia-diuretic Chronic/variable high or low K as uses diuretic; has to have lab monitoring.  No significant fatigue or muscle cramps     7. Anemia, unspecified type Chronic or past history/stable awhile: This has been present before.    There has been GI evaulation in the past. There is no current melena, hematochezia. It has been  benign to date and stable/watching.  Contributing comorbidities to date: benign.     8. Anxiety Chronic/variable:  On/off anxiety tolerated well.  Rx helps and not interested in Rx change. Stress ongoing day-to-day particularly taking care of his wife.      9. Chronic back pain, unspecified back location, unspecified back pain laterality : chronic/variable 0-2/10 lower back pain with infrequent/same radiation to LE.  No change LE numbness.  Has bladder/bowel control. No desire to change approach of care.      10. Chronic obstructive pulmonary disease, unspecified COPD type (HCC) Chronic/stable mild occ cough, sob, wheeze.  Rx no longer needed.   Quit smoking.       11. Vitamin D deficiency chronic/variable up/down with past labs and/or risk to run low especially in winter. Lab monitored.        Has an/another acute issue today: none.    The following portions of the patient's history were reviewed and updated as appropriate: allergies, current medications, past family history, past medical history, past social history, past surgical history and problem list.      Current Outpatient Medications:   •  acetaminophen (TYLENOL) 650 MG 8 hr tablet, Take 650 mg by mouth 2 (Two) Times a Day., Disp: , Rfl:   •  calcium-vitamin D (OSCAL-500) 500-200 MG-UNIT per tablet, Take 1 tablet by mouth 2 times daily.  , Disp: , Rfl:   •  cholecalciferol (VITAMIN D3) 10 MCG (400 UNIT) tablet, Take 400 Units by mouth., Disp: , Rfl:   •  diphenhydrAMINE (BENADRYL) 25 mg capsule, Take 25 mg by mouth 2 (Two) Times a Day., Disp: , Rfl:   •  escitalopram (LEXAPRO) 20 MG tablet, TAKE 1 TABLET DAILY, Disp: 90 tablet, Rfl: 3  •  ferrous sulfate 325 (65 FE) MG tablet, Take 325 mg by mouth Daily With Breakfast., Disp: , Rfl:   •  FLOVENT  MCG/ACT inhaler, INHALE 1 PUFF BY MOUTH TWICE DAILY, Disp: 12 g, Rfl: 5  •  fluticasone (FLONASE) 50 MCG/ACT nasal spray, , Disp: , Rfl:   •  FLUTICASONE PROPIONATE, NASAL, NA, 1 spray into the nostril(s)  as directed by provider Daily., Disp: , Rfl:   •  KETOTIFEN FUMARATE OP, Apply 1 drop to eye(s) as directed by provider As Needed., Disp: , Rfl:   •  KLOR-CON 10 MEQ CR tablet, TAKE 1 TABLET TWICE A DAY, Disp: 180 tablet, Rfl: 3  •  losartan (COZAAR) 50 MG tablet, TAKE 1 TABLET DAILY, Disp: 90 tablet, Rfl: 3  •  metoprolol tartrate (LOPRESSOR) 50 MG tablet, TAKE 1 TABLET EVERY 12     HOURS, Disp: 180 tablet, Rfl: 3  •  Multiple Vitamins-Minerals (CENTRUM SILVER 50+MEN) tablet, Take 1 tablet by mouth Daily., Disp: , Rfl:   •  O2 (OXYGEN), Inhale 1 (One) Time., Disp: , Rfl:   •  omeprazole (priLOSEC) 20 MG capsule, 20 mg 2 (Two) Times a Day., Disp: , Rfl:   •  primidone (MYSOLINE) 50 MG tablet, TAKE 1 TABLET IN THE       MORNING AND AT NOON (TWICE A DAY)., Disp: 180 tablet, Rfl: 3  •  simvastatin (ZOCOR) 20 MG tablet, TAKE 1 TABLET DAILY, Disp: 90 tablet, Rfl: 3  •  sodium-potassium-magnesium sulfates (Suprep Bowel Prep Kit) 17.5-3.13-1.6 GM/177ML solution oral solution, Take as directed, Disp: 177 mL, Rfl: 0  •  tamsulosin (FLOMAX) 0.4 MG capsule 24 hr capsule, 1 capsule Daily., Disp: , Rfl:     No problems with medications.    No Known Allergies    Review of Systems  GENERAL:  Inactive/slower with limits, speed, stamina for age and fatigue. Sleep is ok. No fever now.  ENDO:  No syncope, near or diaphoretic sweaty spells.  HEENT: No change occ headache.   No vision change.   Same significant hearing loss.  Ears without pain/drainage.  No sore throat.  No significant nasal/sinus congestion/drainage. No epistaxis.  CHEST: No chest wall tenderness or mass. No change cough, with occ wheeze.  Stable/occ SOB; no hemoptysis.  CV: No chest pain, palpitations, less daily ankle edema.  GI: No heartburn, dysphagia.  No abdominal pain, diarrhea, constipation.  No rectal bleeding, or melena.    :  Voids without dysuria, or  incontinence to completion; sees sony  ORTHO: No painful/swollen joints but various on /off sore.  No  change sore neck; more pain lower back.  No acute neck or back pain without recent injury.  NEURO: No dizziness, weakness of extremities.  No change UE/LE numbness/paresthesias.   Continued RUE > LUE tremor.   PSYCH: No memory loss.  Mood good; occ anxious, depressed but/and not suicidal.  Tries to tolerate stress   Screening:  Mammogram: NA  Bone density: 3.2.2017 [facility]/Ts LS -1.8 hip -0.5-offered  Low dose CT chest: regular 2014-offered  GI:   Colonoscopy-polyp/Gil/CARLY/3.18.16/5 yr  EGD-neg/Gil/CARLY/5.1.18  Prostate: sony confirmed 10.21.21  sony confirmed 4.9.20  sony confirmed 3.28.19  Usual lab order  6m CBC, CMP, A1c, iron  12m CBC, CMP, A1c LIPID, TSH, T4, Vit D, PSAs, B12, folate, iron, ferritin, % sat iron, retic count      Data reviewed:   Recent admit/ER/MD visits: care everwhere reviewed  Last cardiac testing:   Echo:   Results for orders placed in visit on 08/21/17    Adult Transthoracic Echo Complete    Interpretation Summary  · Left ventricular systolic function is normal. Estimated EF = 60%.  · Left ventricular wall thickness is consistent with mild concentric hypertrophy.  · Left ventricular diastolic dysfunction (grade I) consistent with impaired relaxation.  · No evidence of pulmonary hypertension is present.    Lab Results:  Results for orders placed or performed in visit on 04/21/21   Comprehensive Metabolic Panel   Result Value Ref Range    Glucose 93 65 - 99 mg/dL    BUN 10 8 - 27 mg/dL    Creatinine 1.12 0.76 - 1.27 mg/dL    eGFR Non African Am 65 >59 mL/min/1.73    eGFR African Am 75 >59 mL/min/1.73    BUN/Creatinine Ratio 9 (L) 10 - 24    Sodium 142 134 - 144 mmol/L    Potassium 4.5 3.5 - 5.2 mmol/L    Chloride 102 96 - 106 mmol/L    Total CO2 28 20 - 29 mmol/L    Calcium 9.3 8.6 - 10.2 mg/dL    Total Protein 6.7 6.0 - 8.5 g/dL    Albumin 3.9 3.7 - 4.7 g/dL    Globulin 2.8 1.5 - 4.5 g/dL    A/G Ratio 1.4 1.2 - 2.2    Total Bilirubin 0.2 0.0 - 1.2 mg/dL    Alkaline Phosphatase 100 39  - 117 IU/L    AST (SGOT) 27 0 - 40 IU/L    ALT (SGPT) 22 0 - 44 IU/L   Lipid Panel With LDL / HDL Ratio   Result Value Ref Range    Total Cholesterol 209 (H) 100 - 199 mg/dL    Triglycerides 327 (H) 0 - 149 mg/dL    HDL Cholesterol 30 (L) >39 mg/dL    VLDL Cholesterol Chava 58 (H) 5 - 40 mg/dL    LDL Chol Calc (NIH) 121 (H) 0 - 99 mg/dL    LDL/HDL RATIO 4.0 (H) 0.0 - 3.6 ratio   Iron Profile   Result Value Ref Range    TIBC 287 250 - 450 ug/dL    UIBC 155 111 - 343 ug/dL    Iron 132 38 - 169 ug/dL    Iron Saturation 46 15 - 55 %   Rheumatoid Arthritis Expanded Panel   Result Value Ref Range    RA Latex Turbid <10.0 0.0 - 13.9 IU/mL    C-Reactive Protein 3 0 - 10 mg/L    CCP Antibodies IgG/IgA 6 0 - 19 units    Sed Rate 15 0 - 30 mm/hr   TSH   Result Value Ref Range    TSH 0.859 0.450 - 4.500 uIU/mL   Hemoglobin A1c   Result Value Ref Range    Hemoglobin A1C 6.2 (H) 4.8 - 5.6 %   Folate   Result Value Ref Range    Folate 6.3 >3.0 ng/mL   Vitamin D 25 Hydroxy   Result Value Ref Range    25 Hydroxy, Vitamin D 26.6 (L) 30.0 - 100.0 ng/mL   Vitamin B12   Result Value Ref Range    Vitamin B-12 480 232 - 1,245 pg/mL   CBC & Differential   Result Value Ref Range    WBC 13.2 (H) 3.4 - 10.8 x10E3/uL    RBC 3.31 (L) 4.14 - 5.80 x10E6/uL    Hemoglobin 10.7 (L) 13.0 - 17.7 g/dL    Hematocrit 32.8 (L) 37.5 - 51.0 %    MCV 99 (H) 79 - 97 fL    MCH 32.3 26.6 - 33.0 pg    MCHC 32.6 31.5 - 35.7 g/dL    RDW 12.6 11.6 - 15.4 %    Platelets 253 150 - 450 x10E3/uL    Neutrophil Rel % 45 Not Estab. %    Lymphocyte Rel % 43 Not Estab. %    Monocyte Rel % 7 Not Estab. %    Eosinophil Rel % 3 Not Estab. %    Basophil Rel % 1 Not Estab. %    Neutrophils Absolute 6.1 1.4 - 7.0 x10E3/uL    Lymphocytes Absolute 5.7 (H) 0.7 - 3.1 x10E3/uL    Monocytes Absolute 0.9 0.1 - 0.9 x10E3/uL    Eosinophils Absolute 0.5 (H) 0.0 - 0.4 x10E3/uL    Basophils Absolute 0.1 0.0 - 0.2 x10E3/uL    Immature Granulocyte Rel % 1 Not Estab. %    Immature Grans Absolute  "0.1 0.0 - 0.1 x10E3/uL       A1C:  Lab Results - Last 18 Months   Lab Units 04/21/21  1321   HEMOGLOBIN A1C % 6.2*     LIPID:  Lab Results - Last 18 Months   Lab Units 04/21/21  1321   CHOLESTEROL mg/dL 209*   LDL CHOL mg/dL 121*   HDL CHOL mg/dL 30*   TRIGLYCERIDES mg/dL 327*     PSA:No results for input(s): PSA in the last 05600 hours.  CBC:  Lab Results - Last 18 Months   Lab Units 04/21/21  1321   WBC x10E3/uL 13.2*   HEMOGLOBIN g/dL 10.7*   HEMATOCRIT % 32.8*   PLATELETS x10E3/uL 253   IRON ug/dL 132      BMP/CMP:  Lab Results - Last 18 Months   Lab Units 04/21/21  1321   SODIUM mmol/L 142   POTASSIUM mmol/L 4.5   CHLORIDE mmol/L 102   TOTAL CO2 mmol/L 28   GLUCOSE mg/dL 93   BUN mg/dL 10   CREATININE mg/dL 1.12   EGFR IF NONAFRICN AM mL/min/1.73 65   EGFR IF AFRICN AM mL/min/1.73 75   CALCIUM mg/dL 9.3     HEPATIC:  Lab Results - Last 18 Months   Lab Units 04/21/21  1321   ALT (SGPT) IU/L 22   AST (SGOT) IU/L 27   ALK PHOS IU/L 100     THYROID:  Lab Results - Last 18 Months   Lab Units 04/21/21  1321   TSH uIU/mL 0.859       Objective   /56 (BP Location: Left arm, Patient Position: Sitting, Cuff Size: Large Adult)   Pulse 70   Temp 97.8 °F (36.6 °C) (Infrared)   Resp 18   Ht 182.9 cm (72\")   Wt 127 kg (279 lb)   SpO2 98%   BMI 37.84 kg/m²   Body mass index is 37.84 kg/m².    Recent Vitals       4/21/2021 5/11/2021 10/21/2021       BP: 132/76 162/42 120/56     Pulse: 69 70 70     Temp: 97.8 °F (36.6 °C) 97.5 °F (36.4 °C) 97.8 °F (36.6 °C)     Weight: -- 127 kg (279 lb) 127 kg (279 lb)     BMI (Calculated): -- 37.8 37.8           Physical Exam  GENERAL:  Well nourished/developed in no acute distress. Obese  SKIN: Turgor excellent, without wound, rash, lesion.  HEENT: Normal cephalic without trauma.  Pupils equal round reactive to light. Extraocular motions full without nystagmus.   External canals nonobstructive nontender without reddness. Tymphatic membranes anders with david structures intact.   " Oral cavity without growths, exudates, and moist.  Posterior pharynx without mass, obstruction, redness.  No thyromegaly, mass, tenderness, lymphadenopathy and supple.  CV: Regular rhythm.  No murmur, gallop,  edema. Posterior pulses intact.  No carotid bruits.  CHEST: No chest wall tenderness or mass.   LUNGS: Symmetric motion with clear/reduced to auscultation.  No dullness to percussion  ABD: Soft, nontender without mass.   PROSTATE: No visible/palpable lesion/tenderness and anal tone intact/full.  Prostate 30 gm/smooth and nontender.  No rectal mass within reach. Stool on glove brown.   ORTHO: Symmetric extremities without swelling/point tenderness.  Full gross range of motion.  NEURO: CN 2-12 grossly intact.  Symmetric facies. 1/4 x bicep equal reflexes.  UE/LE   3/5 strength throughout.  Nonfocal use extremities. Speech clear.  Reduced light touch with monofilament, vibratory sensation with tuning fork; equal toes/distal feet.  Intention tremor.     PSYCH: Oriented x 3.  Pleasant calm, well kept.  Purposeful/directed conservation with intact short/long gross    Assessment/Plan     1. Elevated fasting glucose    2. Chronic diastolic congestive heart failure (HCC)    3. Mixed hyperlipidemia    4. Primary hypertension    5. Gastroesophageal reflux disease, unspecified whether esophagitis present    6. Hypopotassemia-diuretic    7. Anemia, unspecified type    8. Anxiety    9. Chronic back pain, unspecified back location, unspecified back pain laterality    10. Chronic obstructive pulmonary disease, unspecified COPD type ([facility])    11. Vitamin D deficiency        Discussion:  Diabetic goals; especially with some regular walking and trying to lower his intake of concentrated sweets  Lower his salt intake to 2 to 3 g; taught him how to look at labels  Goals for blood pressure; hopefully he can monitor from home  Encouraged that he quit smoking; if he doesn't need daily inhalers will watch  Control heartburn or  reported    Vaccine reviewed: today none; later covid booster, wellness visit to review  Screening reviewed/updated    Medical decision issues:   Data review above:   Rx: reviewed and decisions:   Same Rx for now     Visit today involved chronic significant medical problems or differentials and/or intensive drug monitoring: ie potential to cause serious morbidity or death:   No orders of the defined types were placed in this encounter.      Orders placed:   LAB/Testing/Referrals: reviewed/orders:   Today:   Orders Placed This Encounter   Procedures   • Vitamin B12   • Folate   • Insulin, Free & Total, Serum   • Comprehensive Metabolic Panel   • TSH   • Vitamin D 25 Hydroxy   • CBC & Differential     Chronic/recurrent labs above or change to:   same     Health maintenance:   Body mass index is 37.84 kg/m².  Patient's Body mass index is 37.84 kg/m². indicating that he is obese (BMI >30). Obesity-related health conditions include the following: diabetes mellitus. Obesity is unchanged. BMI is is above average; BMI management plan is completed. We discussed portion control and increasing exercise..      Tobacco use reviewed:   Felix Bartlett Sr.  reports that he has been smoking cigarettes. He started smoking about 55 years ago. He has a 12.50 pack-year smoking history. He has never used smokeless tobacco..    There are no Patient Instructions on file for this visit.    Follow up: Return for lab today; then lab/Dr rosario 6m.  Future Appointments   Date Time Provider Department Center   4/19/2022  8:40 AM LAB [facility]   4/21/2022 11:30 AM Bassam Rosario [facility]

## 2021-10-26 ENCOUNTER — TELEPHONE (OUTPATIENT)
Dept: FAMILY MEDICINE CLINIC | Facility: CLINIC | Age: 71
End: 2021-10-26

## 2021-10-26 NOTE — TELEPHONE ENCOUNTER
Caller: Felix Bartlett Sr.    Relationship: Self    Best call back number: [phone]     What is the best time to reach you: ANYTIME     Who are you requesting to speak with (clinical staff, provider,  specific staff member): CLINICAL STAFF    Do you know the name of the person who called: PATIENT    What was the call regarding: LAB ORDERS    Do you require a callback: YES

## 2021-10-27 NOTE — TELEPHONE ENCOUNTER
Pt called requesting lab results from last week.  Lab results were printed off [facility] site and the holdup are the insulin levels.

## 2021-10-31 LAB
25(OH)D3+25(OH)D2 SERPL-MCNC: 25.4 NG/ML (ref 30–100)
ALBUMIN SERPL-MCNC: 4.2 G/DL (ref 3.7–4.7)
ALBUMIN/GLOB SERPL: 1.8 {RATIO} (ref 1.2–2.2)
ALP SERPL-CCNC: 104 IU/L (ref 44–121)
ALT SERPL-CCNC: 20 IU/L (ref 0–44)
AST SERPL-CCNC: 18 IU/L (ref 0–40)
BASOPHILS # BLD AUTO: 0 X10E3/UL (ref 0–0.2)
BASOPHILS NFR BLD AUTO: 0 %
BILIRUB SERPL-MCNC: 0.2 MG/DL (ref 0–1.2)
BUN SERPL-MCNC: 8 MG/DL (ref 8–27)
BUN/CREAT SERPL: 8 (ref 10–24)
CALCIUM SERPL-MCNC: 9.1 MG/DL (ref 8.6–10.2)
CHLORIDE SERPL-SCNC: 102 MMOL/L (ref 96–106)
CO2 SERPL-SCNC: 24 MMOL/L (ref 20–29)
CREAT SERPL-MCNC: 1.05 MG/DL (ref 0.76–1.27)
EOSINOPHIL # BLD AUTO: 0.3 X10E3/UL (ref 0–0.4)
EOSINOPHIL NFR BLD AUTO: 2 %
ERYTHROCYTE [DISTWIDTH] IN BLOOD BY AUTOMATED COUNT: 12.6 % (ref 11.6–15.4)
FOLATE SERPL-MCNC: 8.7 NG/ML
GLOBULIN SER CALC-MCNC: 2.4 G/DL (ref 1.5–4.5)
GLUCOSE SERPL-MCNC: 87 MG/DL (ref 65–99)
HCT VFR BLD AUTO: 30.7 % (ref 37.5–51)
HGB BLD-MCNC: 10.3 G/DL (ref 13–17.7)
IMM GRANULOCYTES # BLD AUTO: 0 X10E3/UL (ref 0–0.1)
IMM GRANULOCYTES NFR BLD AUTO: 0 %
INSULIN FREE SERPL-ACNC: 13 UU/ML
INSULIN SERPL-ACNC: 13 UU/ML
LYMPHOCYTES # BLD AUTO: 5 X10E3/UL (ref 0.7–3.1)
LYMPHOCYTES NFR BLD AUTO: 38 %
MCH RBC QN AUTO: 32.8 PG (ref 26.6–33)
MCHC RBC AUTO-ENTMCNC: 33.6 G/DL (ref 31.5–35.7)
MCV RBC AUTO: 98 FL (ref 79–97)
MONOCYTES # BLD AUTO: 0.8 X10E3/UL (ref 0.1–0.9)
MONOCYTES NFR BLD AUTO: 6 %
NEUTROPHILS # BLD AUTO: 7 X10E3/UL (ref 1.4–7)
NEUTROPHILS NFR BLD AUTO: 54 %
PLATELET # BLD AUTO: 261 X10E3/UL (ref 150–450)
POTASSIUM SERPL-SCNC: 4.4 MMOL/L (ref 3.5–5.2)
PROT SERPL-MCNC: 6.6 G/DL (ref 6–8.5)
RBC # BLD AUTO: 3.14 X10E6/UL (ref 4.14–5.8)
SODIUM SERPL-SCNC: 138 MMOL/L (ref 134–144)
TSH SERPL DL<=0.005 MIU/L-ACNC: 0.75 UIU/ML (ref 0.45–4.5)
VIT B12 SERPL-MCNC: 491 PG/ML (ref 232–1245)
WBC # BLD AUTO: 13.1 X10E3/UL (ref 3.4–10.8)

## 2021-11-04 ENCOUNTER — TELEPHONE (OUTPATIENT)
Dept: FAMILY MEDICINE CLINIC | Facility: CLINIC | Age: 71
End: 2021-11-04

## 2021-11-04 NOTE — TELEPHONE ENCOUNTER
Caller: Felix Bartlett Sr.    Relationship to patient: Self    Best call back number: [phone]    Patient is needing to know what dosage of the vitamin D he is supposed to be taking ? Please advise.

## 2021-12-06 ENCOUNTER — TELEPHONE (OUTPATIENT)
Dept: FAMILY MEDICINE CLINIC | Facility: CLINIC | Age: 71
End: 2021-12-06

## 2021-12-06 DIAGNOSIS — G89.29 CHRONIC BACK PAIN, UNSPECIFIED BACK LOCATION, UNSPECIFIED BACK PAIN LATERALITY: ICD-10-CM

## 2021-12-06 DIAGNOSIS — I50.32 CHRONIC DIASTOLIC CONGESTIVE HEART FAILURE: Primary | ICD-10-CM

## 2021-12-06 DIAGNOSIS — J44.9 CHRONIC OBSTRUCTIVE PULMONARY DISEASE, UNSPECIFIED COPD TYPE: ICD-10-CM

## 2021-12-06 DIAGNOSIS — M19.90 ARTHRITIS: ICD-10-CM

## 2021-12-06 DIAGNOSIS — R60.0 EDEMA LEG: ICD-10-CM

## 2021-12-06 DIAGNOSIS — R25.1 TREMOR: ICD-10-CM

## 2021-12-06 DIAGNOSIS — M54.9 CHRONIC BACK PAIN, UNSPECIFIED BACK LOCATION, UNSPECIFIED BACK PAIN LATERALITY: ICD-10-CM

## 2022-01-06 NOTE — TELEPHONE ENCOUNTER
Again attempted to call jillian moscoso, to return call that she started on 12-6-21, to advised for the patient to get the medical supplies she requested for the patient, would need to do a telehealth face to face for meet medicare requirements

## 2022-01-27 ENCOUNTER — TELEMEDICINE (OUTPATIENT)
Dept: FAMILY MEDICINE CLINIC | Facility: CLINIC | Age: 72
End: 2022-01-27

## 2022-01-27 DIAGNOSIS — R06.02 SOB (SHORTNESS OF BREATH): ICD-10-CM

## 2022-01-27 DIAGNOSIS — I50.32 CHRONIC DIASTOLIC CONGESTIVE HEART FAILURE: ICD-10-CM

## 2022-01-27 DIAGNOSIS — J96.91 RESPIRATORY FAILURE WITH HYPOXIA, UNSPECIFIED CHRONICITY: ICD-10-CM

## 2022-01-27 DIAGNOSIS — R60.0 EDEMA LEG: ICD-10-CM

## 2022-01-27 DIAGNOSIS — K21.9 GASTROESOPHAGEAL REFLUX DISEASE, UNSPECIFIED WHETHER ESOPHAGITIS PRESENT: Chronic | ICD-10-CM

## 2022-01-27 DIAGNOSIS — J44.9 CHRONIC OBSTRUCTIVE PULMONARY DISEASE, UNSPECIFIED COPD TYPE: Chronic | ICD-10-CM

## 2022-01-27 DIAGNOSIS — R26.9 GAIT DIFFICULTY: ICD-10-CM

## 2022-01-27 DIAGNOSIS — G89.29 CHRONIC BACK PAIN, UNSPECIFIED BACK LOCATION, UNSPECIFIED BACK PAIN LATERALITY: ICD-10-CM

## 2022-01-27 DIAGNOSIS — M54.9 CHRONIC BACK PAIN, UNSPECIFIED BACK LOCATION, UNSPECIFIED BACK PAIN LATERALITY: ICD-10-CM

## 2022-01-27 PROCEDURE — 99213 OFFICE O/P EST LOW 20 MIN: CPT | Performed by: FAMILY MEDICINE

## 2022-01-28 PROBLEM — R26.9 GAIT DIFFICULTY: Status: ACTIVE | Noted: 2022-01-28

## 2022-01-28 PROBLEM — R06.02 SOB (SHORTNESS OF BREATH): Status: ACTIVE | Noted: 2022-01-28

## 2022-01-28 NOTE — PROGRESS NOTES
Subjective   Felix Bartlett Sr. is a 71 y.o. male presenting with chief complaint of:   No chief complaint on file.      You have chosen to receive care through a telehealth visit.  Do you consent to use a video/audio connection for your medical care today? Yes    Able to complete visit using a video connection to the patient. Total time of visit was 15 minutes.      History of Present Illness :  With aide from Cristine moscoso.  Here for primarily an acute issue today; of needing face to face visit to help with hospital bed.       Has multiple chronic problems to consider that might have a bearing on today's issues; not an interval appointment.       Chronic/acute problems reviewed today: all of the problems below would benefit from a hospital bed to aide in positioning/pain control/oxygenation while sleeping  1. Chronic back pain, unspecified back location, unspecified back pain laterality : chronic/variable 103/10 lower back pain with infrequent/same radiation to UE/LE.  No change LE numbness.  Has bladder/bowel control. No desire for surgery/to change approach of care.      2. Edema leg-CHF/d, obesity, copd, cirrhosis chronic/variable.  Recently variable.  Causes include: pulmonary/cardiac and ? Venous issues.  Legs up help.      3. Gait difficulty Chronic/stable:.  Ongoing issues with difficulties it results in difficulty with walking or gait.  No recent falls.  No recent injuries.  Uses to help gait: occ cane/often holding on.     4. Gastroesophageal reflux disease, unspecified whether esophagitis present Chronic/stable.  Controlled heartburn, reflux without dysphagia, melena.  Rx used, periods not used proven needed with symptoms -currently doing ok but with HOB up at night      5. Chronic diastolic congestive heart failure (HCC) Chronic/stable.  Denies significant sob, orthopnea, leg edema, weight gain.  Aware of influence diet/salt and watching weight at home.       6. Chronic obstructive pulmonary disease,  unspecified COPD type (HCC) Chronic/stable mild occ cough, sob, wheeze.  Rx helps.   No longer smoking.       7. SOB (shortness of breath) chronic variable levels of shortness of breath worse with exertion; improved with head up positioning during the night.  A hospital bed would help this   8. Respiratory failure with hypoxia, unspecified chronicity (HCC) chronic variable levels of hypoxemia and shortness of breath for which he periodically uses or does not use oxygen.  A hospital bed would help position him for better breathing during the night     Has an/another acute issue today: none.    The following portions of the patient's history were reviewed and updated as appropriate: allergies, current medications, past family history, past medical history, past social history, past surgical history and problem list.      Current Outpatient Medications:   •  acetaminophen (TYLENOL) 650 MG 8 hr tablet, Take 650 mg by mouth 2 (Two) Times a Day., Disp: , Rfl:   •  calcium-vitamin D (OSCAL-500) 500-200 MG-UNIT per tablet, Take 1 tablet by mouth 2 times daily.  , Disp: , Rfl:   •  cholecalciferol (VITAMIN D3) 10 MCG (400 UNIT) tablet, Take 400 Units by mouth., Disp: , Rfl:   •  diphenhydrAMINE (BENADRYL) 25 mg capsule, Take 25 mg by mouth 2 (Two) Times a Day., Disp: , Rfl:   •  escitalopram (LEXAPRO) 20 MG tablet, TAKE 1 TABLET DAILY, Disp: 90 tablet, Rfl: 3  •  ferrous sulfate 325 (65 FE) MG tablet, Take 325 mg by mouth Daily With Breakfast., Disp: , Rfl:   •  FLOVENT  MCG/ACT inhaler, INHALE 1 PUFF BY MOUTH TWICE DAILY, Disp: 12 g, Rfl: 5  •  fluticasone (FLONASE) 50 MCG/ACT nasal spray, , Disp: , Rfl:   •  FLUTICASONE PROPIONATE, NASAL, NA, 1 spray into the nostril(s) as directed by provider Daily., Disp: , Rfl:   •  KETOTIFEN FUMARATE OP, Apply 1 drop to eye(s) as directed by provider As Needed., Disp: , Rfl:   •  KLOR-CON 10 MEQ CR tablet, TAKE 1 TABLET TWICE A DAY, Disp: 180 tablet, Rfl: 3  •  losartan (COZAAR)  50 MG tablet, TAKE 1 TABLET DAILY, Disp: 90 tablet, Rfl: 3  •  metoprolol tartrate (LOPRESSOR) 50 MG tablet, TAKE 1 TABLET EVERY 12     HOURS, Disp: 180 tablet, Rfl: 3  •  Multiple Vitamins-Minerals (CENTRUM SILVER 50+MEN) tablet, Take 1 tablet by mouth Daily., Disp: , Rfl:   •  O2 (OXYGEN), Inhale 1 (One) Time., Disp: , Rfl:   •  omeprazole (priLOSEC) 20 MG capsule, 20 mg 2 (Two) Times a Day., Disp: , Rfl:   •  primidone (MYSOLINE) 50 MG tablet, TAKE 1 TABLET IN THE       MORNING AND AT NOON (TWICE A DAY)., Disp: 180 tablet, Rfl: 3  •  simvastatin (ZOCOR) 20 MG tablet, TAKE 1 TABLET DAILY, Disp: 90 tablet, Rfl: 3  •  sodium-potassium-magnesium sulfates (Suprep Bowel Prep Kit) 17.5-3.13-1.6 GM/177ML solution oral solution, Take as directed, Disp: 177 mL, Rfl: 0  •  tamsulosin (FLOMAX) 0.4 MG capsule 24 hr capsule, 1 capsule Daily., Disp: , Rfl:     No problems with medications.  Refills if needed done    No Known Allergies    Review of Systems  GENERAL:  Inactive/slower with limits, speed, stamina for age and fatigue. Sleep is ok with HOB up. No fever now/recent.  ENDO:  No syncope, near or diaphoretic sweaty spells.  HEENT: No change occ headache.   No vision change.   Same significant hearing loss.  Ears without pain/drainage.  No sore throat.  No significant nasal/sinus congestion/drainage. No epistaxis.  CHEST: No chest wall tenderness or mass. No change cough, with occ wheeze.  Stable/occ SOB; no hemoptysis.  CV: No chest pain, palpitations, less daily ankle edema.  GI: No heartburn, dysphagia.  No abdominal pain, diarrhea, constipation.  No rectal bleeding, or melena.    :  Voids without dysuria, or  incontinence to completion; sees sony  ORTHO: No painful/swollen joints but various on /off sore.  No change sore neck; more pain lower back.  No acute neck or back pain without recent injury.  NEURO: No dizziness, weakness of extremities.  No change UE/LE numbness/paresthesias.   Continued RUE > LUE tremor.    PSYCH: No memory loss.  Mood good; occ anxious, depressed but/and not suicidal.  Tries to tolerate stress   Screening:  Mammogram: NA  Bone density: 3.2.2017 [facility]/Ts LS -1.8 hip -0.5-offered  Low dose CT chest: regular 2014-offered  GI:   Colonoscopy-polyp/Gil//3.18.16/5 yr  EGD-neg/Gil//5.1.18  Prostate: sony last 11.5.20  sony confirmed 3.28.19  Usual lab order  6m CBC, CMP, A1c, iron  12m CBC, CMP, A1c LIPID, TSH, T4, Vit D, PSAs, B12, folate, iron, ferritin, % sat iron, retic count    Lab Results:  Results for orders placed or performed in visit on 10/21/21   Vitamin B12    Specimen: Blood    Blood  Release to nelia   Result Value Ref Range    Vitamin B-12 491 232 - 1,245 pg/mL   Folate    Specimen: Blood    Blood  Release to nelia   Result Value Ref Range    Folate 8.7 >3.0 ng/mL   Insulin, Free & Total, Serum    Specimen: Blood    Blood  Release to nelia   Result Value Ref Range    Insulin, Free 13 uU/mL    Insulin 13 uU/mL   Comprehensive Metabolic Panel    Specimen: Blood    Blood  Release to nelia   Result Value Ref Range    Glucose 87 65 - 99 mg/dL    BUN 8 8 - 27 mg/dL    Creatinine 1.05 0.76 - 1.27 mg/dL    eGFR Non African Am 70 >59 mL/min/1.73    eGFR African Am 81 >59 mL/min/1.73    BUN/Creatinine Ratio 8 (L) 10 - 24    Sodium 138 134 - 144 mmol/L    Potassium 4.4 3.5 - 5.2 mmol/L    Chloride 102 96 - 106 mmol/L    Total CO2 24 20 - 29 mmol/L    Calcium 9.1 8.6 - 10.2 mg/dL    Total Protein 6.6 6.0 - 8.5 g/dL    Albumin 4.2 3.7 - 4.7 g/dL    Globulin 2.4 1.5 - 4.5 g/dL    A/G Ratio 1.8 1.2 - 2.2    Total Bilirubin 0.2 0.0 - 1.2 mg/dL    Alkaline Phosphatase 104 44 - 121 IU/L    AST (SGOT) 18 0 - 40 IU/L    ALT (SGPT) 20 0 - 44 IU/L   TSH    Specimen: Blood    Blood  Release to nelia   Result Value Ref Range    TSH 0.752 0.450 - 4.500 uIU/mL   Vitamin D 25 Hydroxy    Specimen: Blood    Blood  Release to nelia   Result Value Ref Range    25 Hydroxy, Vitamin D 25.4 (L) 30.0 - 100.0 ng/mL   CBC &  Differential    Specimen: Blood    Blood  Release to nelia   Result Value Ref Range    WBC 13.1 (H) 3.4 - 10.8 x10E3/uL    RBC 3.14 (L) 4.14 - 5.80 x10E6/uL    Hemoglobin 10.3 (L) 13.0 - 17.7 g/dL    Hematocrit 30.7 (L) 37.5 - 51.0 %    MCV 98 (H) 79 - 97 fL    MCH 32.8 26.6 - 33.0 pg    MCHC 33.6 31.5 - 35.7 g/dL    RDW 12.6 11.6 - 15.4 %    Platelets 261 150 - 450 x10E3/uL    Neutrophil Rel % 54 Not Estab. %    Lymphocyte Rel % 38 Not Estab. %    Monocyte Rel % 6 Not Estab. %    Eosinophil Rel % 2 Not Estab. %    Basophil Rel % 0 Not Estab. %    Neutrophils Absolute 7.0 1.4 - 7.0 x10E3/uL    Lymphocytes Absolute 5.0 (H) 0.7 - 3.1 x10E3/uL    Monocytes Absolute 0.8 0.1 - 0.9 x10E3/uL    Eosinophils Absolute 0.3 0.0 - 0.4 x10E3/uL    Basophils Absolute 0.0 0.0 - 0.2 x10E3/uL    Immature Granulocyte Rel % 0 Not Estab. %    Immature Grans Absolute 0.0 0.0 - 0.1 x10E3/uL       A1C:  Lab Results - Last 18 Months   Lab Units 04/21/21  1321   HEMOGLOBIN A1C % 6.2*     PSA:No results for input(s): PSA in the last 30802 hours.  CBC:  Lab Results - Last 18 Months   Lab Units 10/21/21  1350 04/21/21  1321   WBC x10E3/uL 13.1* 13.2*   HEMOGLOBIN g/dL 10.3* 10.7*   HEMATOCRIT % 30.7* 32.8*   PLATELETS x10E3/uL 261 253   IRON ug/dL  --  132      BMP/CMP:  Lab Results - Last 18 Months   Lab Units 10/21/21  1350 04/21/21  1321   SODIUM mmol/L 138 142   POTASSIUM mmol/L 4.4 4.5   CHLORIDE mmol/L 102 102   TOTAL CO2 mmol/L 24 28   BUN mg/dL 8 10   CREATININE mg/dL 1.05 1.12   EGFR IF NONAFRICN AM mL/min/1.73 70 65   EGFR IF AFRICN AM mL/min/1.73 81 75   CALCIUM mg/dL 9.1 9.3     HEPATIC:  Lab Results - Last 18 Months   Lab Units 10/21/21  1350 04/21/21  1321   ALT (SGPT) IU/L 20 22   AST (SGOT) IU/L 18 27   ALK PHOS IU/L 104 100     THYROID:  Lab Results - Last 18 Months   Lab Units 10/21/21  1350 04/21/21  1321   TSH uIU/mL 0.752 0.859       Objective   There were no vitals taken for this visit.  There is no height or weight on file  to calculate BMI.    Physical Exam  Constitutional:       General: He is not in acute distress.     Appearance: Normal appearance. He is obese.   Eyes:      Extraocular Movements: Extraocular movements intact.      Conjunctiva/sclera: Conjunctivae normal.      Pupils: Pupils are equal, round, and reactive to light.   Musculoskeletal:      Cervical back: Neck supple.   Neurological:      Mental Status: He is alert and oriented to person, place, and time. Mental status is at baseline.       Assessment/Plan     1. Chronic back pain, unspecified back location, unspecified back pain laterality    2. Edema leg-CHF/d, obesity, copd, cirrhosis    3. Gait difficulty    4. Gastroesophageal reflux disease, unspecified whether esophagitis present    5. Chronic diastolic congestive heart failure (HCC)    6. Chronic obstructive pulmonary disease, unspecified COPD type (HCC)    7. SOB (shortness of breath)    8. Respiratory failure with hypoxia, unspecified chronicity (HCC)      Face to face for hospital bed with rails/supplies.  The patient needs to have this/these to prevent medical complications of hypoxemia (COPD, CHF), leg edema, GI reflux, back pain. Using a hospital bed/rails is/are part of a comprehensive health care plan ongoing.   Diagnosis for this is: those above    Please note; these medical conditions require positioning of her body in ways not feasible with an ordinary bed.  Elevation of the head upper body less than 30 degrees does not help his medical conditions including shortness of breath and pain.  Please note a hospital bed will make the attempts at transfers much safer.  Please note this patient needs frequent body position changes as he is at extreme risk for decubiti.    Rx: reviewed/changes:  No orders of the defined types were placed in this encounter.    LAB/Testing/Referrals: reviewed/orders:   Today:   No orders of the defined types were placed in this encounter.    Chronic/recurrent labs above or  change to:   same    Discussions:     There is no height or weight on file to calculate BMI.  Patient's There is no height or weight on file to calculate BMI. indicating that he is obese (BMI >30). Obesity-related health conditions include the following: lower extremity venous stasis disease. Obesity is unchanged. BMI is is above average; BMI management plan is completed. We discussed portion control and increasing exercise..    Tobacco use reviewed:    Non-smoker  Felix Bartlett Sr.  reports that he has been smoking cigarettes. He started smoking about 55 years ago. He has a 12.50 pack-year smoking history. He has never used smokeless tobacco..    There are no Patient Instructions on file for this visit.    Follow up: Return for lab;, Dr Rosario-murtaza; when able.  Future Appointments   Date Time Provider Department Center   4/19/2022  8:40 AM [facility] ERYN BORGES [facility]   4/21/2022 11:30 AM Bassam Rosario MD [facility]

## 2022-03-09 DIAGNOSIS — E78.2 MIXED HYPERLIPIDEMIA: ICD-10-CM

## 2022-03-09 DIAGNOSIS — R25.1 TREMOR: ICD-10-CM

## 2022-03-09 DIAGNOSIS — I10 ESSENTIAL HYPERTENSION: ICD-10-CM

## 2022-03-09 DIAGNOSIS — R60.0 EDEMA LEG: ICD-10-CM

## 2022-03-09 RX ORDER — ESCITALOPRAM OXALATE 20 MG/1
20 TABLET ORAL DAILY
Qty: 90 TABLET | Refills: 3 | Status: SHIPPED | OUTPATIENT
Start: 2022-03-09 | End: 2023-02-20 | Stop reason: SDUPTHER

## 2022-03-09 RX ORDER — LOSARTAN POTASSIUM 50 MG/1
50 TABLET ORAL DAILY
Qty: 90 TABLET | Refills: 3 | Status: SHIPPED | OUTPATIENT
Start: 2022-03-09 | End: 2023-02-20 | Stop reason: SDUPTHER

## 2022-03-09 RX ORDER — SIMVASTATIN 20 MG
20 TABLET ORAL DAILY
Qty: 90 TABLET | Refills: 3 | Status: SHIPPED | OUTPATIENT
Start: 2022-03-09 | End: 2023-02-20 | Stop reason: SDUPTHER

## 2022-03-09 RX ORDER — POTASSIUM CHLORIDE 750 MG/1
10 TABLET, EXTENDED RELEASE ORAL 2 TIMES DAILY
Qty: 180 TABLET | Refills: 3 | Status: SHIPPED | OUTPATIENT
Start: 2022-03-09 | End: 2023-02-20 | Stop reason: SDUPTHER

## 2022-03-09 RX ORDER — TAMSULOSIN HYDROCHLORIDE 0.4 MG/1
1 CAPSULE ORAL DAILY
Qty: 90 CAPSULE | Refills: 3 | Status: SHIPPED | OUTPATIENT
Start: 2022-03-09 | End: 2023-02-20 | Stop reason: SDUPTHER

## 2022-03-09 RX ORDER — PRIMIDONE 50 MG/1
50 TABLET ORAL 2 TIMES DAILY
Qty: 180 TABLET | Refills: 3 | Status: SHIPPED | OUTPATIENT
Start: 2022-03-09 | End: 2023-02-20 | Stop reason: SDUPTHER

## 2022-03-09 RX ORDER — METOPROLOL TARTRATE 50 MG/1
50 TABLET, FILM COATED ORAL EVERY 12 HOURS
Qty: 180 TABLET | Refills: 3 | Status: SHIPPED | OUTPATIENT
Start: 2022-03-09 | End: 2023-02-20 | Stop reason: SDUPTHER

## 2022-03-09 NOTE — TELEPHONE ENCOUNTER
Rx Refill Note  Requested Prescriptions     Pending Prescriptions Disp Refills   • primidone (MYSOLINE) 50 MG tablet 180 tablet 3     Sig: Take 1 tablet by mouth 2 (Two) Times a Day.   • losartan (COZAAR) 50 MG tablet 90 tablet 3     Sig: Take 1 tablet by mouth Daily.   • escitalopram (LEXAPRO) 20 MG tablet 90 tablet 3     Sig: Take 1 tablet by mouth Daily.   • metoprolol tartrate (LOPRESSOR) 50 MG tablet 180 tablet 3     Sig: Take 1 tablet by mouth Every 12 (Twelve) Hours.   • potassium chloride (KLOR-CON) 10 MEQ CR tablet 180 tablet 3     Sig: Take 1 tablet by mouth 2 (Two) Times a Day.   • tamsulosin (FLOMAX) 0.4 MG capsule 24 hr capsule 90 capsule 3     Sig: Take 1 capsule by mouth Daily.   • simvastatin (ZOCOR) 20 MG tablet 90 tablet 3     Sig: Take 1 tablet by mouth Daily.      Last office visit with prescribing clinician: 10/21/2021      Next office visit with prescribing clinician: 4/21/2022            Brittanie Avilez MA  03/09/22, 15:38 CST

## 2022-04-28 ENCOUNTER — OFFICE VISIT (OUTPATIENT)
Dept: FAMILY MEDICINE CLINIC | Facility: CLINIC | Age: 72
End: 2022-04-28

## 2022-04-28 VITALS
TEMPERATURE: 98.2 F | WEIGHT: 278 LBS | OXYGEN SATURATION: 97 % | DIASTOLIC BLOOD PRESSURE: 58 MMHG | HEART RATE: 74 BPM | SYSTOLIC BLOOD PRESSURE: 130 MMHG | BODY MASS INDEX: 37.7 KG/M2

## 2022-04-28 DIAGNOSIS — E78.2 MIXED HYPERLIPIDEMIA: Chronic | ICD-10-CM

## 2022-04-28 DIAGNOSIS — K76.0 FATTY LIVER: ICD-10-CM

## 2022-04-28 DIAGNOSIS — G89.29 CHRONIC BACK PAIN, UNSPECIFIED BACK LOCATION, UNSPECIFIED BACK PAIN LATERALITY: ICD-10-CM

## 2022-04-28 DIAGNOSIS — Z00.00 WELLNESS EXAMINATION: ICD-10-CM

## 2022-04-28 DIAGNOSIS — N28.9 RENAL INSUFFICIENCY: ICD-10-CM

## 2022-04-28 DIAGNOSIS — Z12.2 ENCOUNTER FOR SCREENING FOR LUNG CANCER: ICD-10-CM

## 2022-04-28 DIAGNOSIS — H61.23 BILATERAL IMPACTED CERUMEN: ICD-10-CM

## 2022-04-28 DIAGNOSIS — D64.9 ANEMIA, UNSPECIFIED TYPE: ICD-10-CM

## 2022-04-28 DIAGNOSIS — L60.9 NAIL PROBLEM: ICD-10-CM

## 2022-04-28 DIAGNOSIS — J44.9 CHRONIC OBSTRUCTIVE PULMONARY DISEASE, UNSPECIFIED COPD TYPE: Chronic | ICD-10-CM

## 2022-04-28 DIAGNOSIS — R73.01 ELEVATED FASTING GLUCOSE: ICD-10-CM

## 2022-04-28 DIAGNOSIS — M54.9 CHRONIC BACK PAIN, UNSPECIFIED BACK LOCATION, UNSPECIFIED BACK PAIN LATERALITY: ICD-10-CM

## 2022-04-28 DIAGNOSIS — Z87.891 PERSONAL HISTORY OF NICOTINE DEPENDENCE: ICD-10-CM

## 2022-04-28 DIAGNOSIS — E87.6 HYPOPOTASSEMIA: Chronic | ICD-10-CM

## 2022-04-28 DIAGNOSIS — I50.32 CHRONIC DIASTOLIC CONGESTIVE HEART FAILURE: ICD-10-CM

## 2022-04-28 DIAGNOSIS — I10 PRIMARY HYPERTENSION: Chronic | ICD-10-CM

## 2022-04-28 DIAGNOSIS — R60.0 EDEMA LEG: ICD-10-CM

## 2022-04-28 DIAGNOSIS — K21.9 GASTROESOPHAGEAL REFLUX DISEASE, UNSPECIFIED WHETHER ESOPHAGITIS PRESENT: Chronic | ICD-10-CM

## 2022-04-28 DIAGNOSIS — Z12.5 ENCOUNTER FOR PROSTATE CANCER SCREENING: ICD-10-CM

## 2022-04-28 PROCEDURE — 1159F MED LIST DOCD IN RCRD: CPT | Performed by: FAMILY MEDICINE

## 2022-04-28 PROCEDURE — 1170F FXNL STATUS ASSESSED: CPT | Performed by: FAMILY MEDICINE

## 2022-04-28 PROCEDURE — G0439 PPPS, SUBSEQ VISIT: HCPCS | Performed by: FAMILY MEDICINE

## 2022-04-29 DIAGNOSIS — Z12.11 ENCOUNTER FOR SCREENING FOR MALIGNANT NEOPLASM OF COLON: Primary | ICD-10-CM

## 2022-04-29 LAB
ALBUMIN SERPL-MCNC: 3.8 G/DL (ref 3.5–5.2)
ALBUMIN/GLOB SERPL: 1.5 G/DL
ALP SERPL-CCNC: 102 U/L (ref 39–117)
ALT SERPL-CCNC: 16 U/L (ref 1–41)
AST SERPL-CCNC: 13 U/L (ref 1–40)
BASOPHILS # BLD AUTO: 0.04 10*3/MM3 (ref 0–0.2)
BASOPHILS NFR BLD AUTO: 0.3 % (ref 0–1.5)
BILIRUB SERPL-MCNC: 0.2 MG/DL (ref 0–1.2)
BUN SERPL-MCNC: 9 MG/DL (ref 8–23)
BUN/CREAT SERPL: 8.5 (ref 7–25)
CALCIUM SERPL-MCNC: 9.4 MG/DL (ref 8.6–10.5)
CHLORIDE SERPL-SCNC: 105 MMOL/L (ref 98–107)
CO2 SERPL-SCNC: 26 MMOL/L (ref 22–29)
CREAT SERPL-MCNC: 1.06 MG/DL (ref 0.76–1.27)
EGFRCR SERPLBLD CKD-EPI 2021: 74.1 ML/MIN/1.73
EOSINOPHIL # BLD AUTO: 0.36 10*3/MM3 (ref 0–0.4)
EOSINOPHIL NFR BLD AUTO: 2.6 % (ref 0.3–6.2)
ERYTHROCYTE [DISTWIDTH] IN BLOOD BY AUTOMATED COUNT: 12.4 % (ref 12.3–15.4)
FOLATE SERPL-MCNC: 7.36 NG/ML (ref 4.78–24.2)
GLOBULIN SER CALC-MCNC: 2.5 GM/DL
GLUCOSE SERPL-MCNC: 92 MG/DL (ref 65–99)
HCT VFR BLD AUTO: 30.8 % (ref 37.5–51)
HGB BLD-MCNC: 10.2 G/DL (ref 13–17.7)
IMM GRANULOCYTES # BLD AUTO: 0.04 10*3/MM3 (ref 0–0.05)
IMM GRANULOCYTES NFR BLD AUTO: 0.3 % (ref 0–0.5)
IRON SATN MFR SERPL: 31 % (ref 20–50)
IRON SERPL-MCNC: 93 MCG/DL (ref 59–158)
LYMPHOCYTES # BLD AUTO: 6 10*3/MM3 (ref 0.7–3.1)
LYMPHOCYTES NFR BLD AUTO: 44 % (ref 19.6–45.3)
MCH RBC QN AUTO: 33.2 PG (ref 26.6–33)
MCHC RBC AUTO-ENTMCNC: 33.1 G/DL (ref 31.5–35.7)
MCV RBC AUTO: 100.3 FL (ref 79–97)
MONOCYTES # BLD AUTO: 0.78 10*3/MM3 (ref 0.1–0.9)
MONOCYTES NFR BLD AUTO: 5.7 % (ref 5–12)
NEUTROPHILS # BLD AUTO: 6.43 10*3/MM3 (ref 1.7–7)
NEUTROPHILS NFR BLD AUTO: 47.1 % (ref 42.7–76)
NRBC BLD AUTO-RTO: 0 /100 WBC (ref 0–0.2)
PLATELET # BLD AUTO: 243 10*3/MM3 (ref 140–450)
POTASSIUM SERPL-SCNC: 3.8 MMOL/L (ref 3.5–5.2)
PROT SERPL-MCNC: 6.3 G/DL (ref 6–8.5)
PSA SERPL-MCNC: 0.94 NG/ML (ref 0–4)
RBC # BLD AUTO: 3.07 10*6/MM3 (ref 4.14–5.8)
SODIUM SERPL-SCNC: 140 MMOL/L (ref 136–145)
TIBC SERPL-MCNC: 301 MCG/DL
TSH SERPL DL<=0.005 MIU/L-ACNC: 0.97 UIU/ML (ref 0.27–4.2)
UIBC SERPL-MCNC: 208 MCG/DL (ref 112–346)
URATE SERPL-MCNC: 5 MG/DL (ref 3.4–7)
VIT B12 SERPL-MCNC: 496 PG/ML (ref 211–946)
WBC # BLD AUTO: 13.65 10*3/MM3 (ref 3.4–10.8)

## 2022-05-02 NOTE — PROGRESS NOTES
QUICK REFERENCE INFORMATION:  The ABCs of the Annual Wellness Visit    Subsequent Medicare Wellness Visit     HEALTH RISK ASSESSMENT    : 1950    Recent Hospitalizations:  No hospitalization(s) within the last year..  ccc      Current Medical Providers:  Patient Care Team:  Bassam Rosario MD as PCP - General  Bassam Rosario MD as PCP - Family Medicine  Felix Ramos MD as Consulting Physician (Urology)  Donnell Cordero DO as Consulting Physician (Gastroenterology)  Edie Vigil APRN as Nurse Practitioner (Pulmonary Disease)        Smoking Status:  Social History     Tobacco Use   Smoking Status Current Every Day Smoker   • Packs/day: 0.25   • Years: 50.00   • Pack years: 12.50   • Types: Cigarettes   • Start date: 1966   Smokeless Tobacco Never Used   Tobacco Comment    16 Smoking 5 cigarettes per day.       Alcohol Consumption:  Social History     Substance and Sexual Activity   Alcohol Use No       Depression Screen:   PHQ-2/PHQ-9 Depression Screening 2022   Retired Total Score -   Little Interest or Pleasure in Doing Things 0-->not at all   Feeling Down, Depressed or Hopeless 0-->not at all   PHQ-9: Brief Depression Severity Measure Score 0       Health Habits and Functional and Cognitive Screening:  Functional & Cognitive Status 2022   Do you have difficulty preparing food and eating? (No Data)   Do you have difficulty bathing yourself, getting dressed or grooming yourself? No   Do you have difficulty using the toilet? No   Do you have difficulty moving around from place to place? No   Do you have trouble with steps or getting out of a bed or a chair? No   Current Diet Well Balanced Diet   Dental Exam Not up to date   Eye Exam Not up to date   Exercise (times per week) 0 times per week   Do you need help using the phone?  No   Are you deaf or do you have serious difficulty hearing?  No   Do you need help with transportation? Yes   Do you need help shopping?  No   Do you need help preparing meals?  Yes   Do you need help with housework?  Yes   Do you need help with laundry? No   Do you need help taking your medications? No   Do you need help managing money? No   Do you ever drive or ride in a car without wearing a seat belt? No   Have you felt unusual stress, anger or loneliness in the last month? No   Who do you live with? Spouse   If you need help, do you have trouble finding someone available to you? No   Have you been bothered in the last four weeks by sexual problems? No   Do you have difficulty concentrating, remembering or making decisions? No           Does the patient have evidence of cognitive impairment? Yes    Asiprin use counseling: Does not need ASA (and currently is not on it)      Recent Lab Results:       Lab Results   Component Value Date    HGBA1C 6.2 (H) 04/21/2021     Lab Results   Component Value Date    TRIG 327 (H) 04/21/2021    HDL 30 (L) 04/21/2021    VLDL 58 (H) 04/21/2021    LDLHDL 4.0 (H) 04/21/2021           Age-appropriate Screening Schedule:  Refer to the list below for future screening recommendations based on patient's age, sex and/or medical conditions. Orders for these recommended tests are listed in the plan section. The patient has been provided with a written plan.    Health Maintenance   Topic Date Due   • TDAP/TD VACCINES (1 - Tdap) Never done   • ZOSTER VACCINE (1 of 2) Never done   • DXA SCAN  03/01/2019   • LIPID PANEL  04/21/2022   • INFLUENZA VACCINE  08/01/2022        Subjective   History of Present Illness    Felix Bartlett  is a 72 y.o. male who presents for an Annual Wellness Visit.    The following portions of the patient's history were reviewed and updated as appropriate: allergies, current medications, past family history, past medical history, past social history, past surgical history and problem list.    Outpatient Medications Prior to Visit   Medication Sig Dispense Refill   • acetaminophen (TYLENOL) 650 MG 8 hr  tablet Take 650 mg by mouth 2 (Two) Times a Day.     • calcium-vitamin D (OSCAL-500) 500-200 MG-UNIT per tablet Take 1 tablet by mouth 2 times daily.       • cholecalciferol (VITAMIN D3) 10 MCG (400 UNIT) tablet Take 400 Units by mouth.     • diphenhydrAMINE (BENADRYL) 25 mg capsule Take 25 mg by mouth 2 (Two) Times a Day.     • escitalopram (LEXAPRO) 20 MG tablet Take 1 tablet by mouth Daily. 90 tablet 3   • ferrous sulfate 325 (65 FE) MG tablet Take 325 mg by mouth Daily With Breakfast.     • FLOVENT  MCG/ACT inhaler INHALE 1 PUFF BY MOUTH TWICE DAILY 12 g 5   • fluticasone (FLONASE) 50 MCG/ACT nasal spray      • KETOTIFEN FUMARATE OP Apply 1 drop to eye(s) as directed by provider As Needed.     • losartan (COZAAR) 50 MG tablet Take 1 tablet by mouth Daily. 90 tablet 3   • metoprolol tartrate (LOPRESSOR) 50 MG tablet Take 1 tablet by mouth Every 12 (Twelve) Hours. 180 tablet 3   • Multiple Vitamins-Minerals (CENTRUM SILVER 50+MEN) tablet Take 1 tablet by mouth Daily.     • O2 (OXYGEN) Inhale 1 (One) Time.     • omeprazole (priLOSEC) 20 MG capsule 20 mg 2 (Two) Times a Day.     • potassium chloride (KLOR-CON) 10 MEQ CR tablet Take 1 tablet by mouth 2 (Two) Times a Day. 180 tablet 3   • primidone (MYSOLINE) 50 MG tablet Take 1 tablet by mouth 2 (Two) Times a Day. 180 tablet 3   • simvastatin (ZOCOR) 20 MG tablet Take 1 tablet by mouth Daily. 90 tablet 3   • sodium-potassium-magnesium sulfates (Suprep Bowel Prep Kit) 17.5-3.13-1.6 GM/177ML solution oral solution Take as directed 177 mL 0   • tamsulosin (FLOMAX) 0.4 MG capsule 24 hr capsule Take 1 capsule by mouth Daily. 90 capsule 3   • FLUTICASONE PROPIONATE, NASAL, NA 1 spray into the nostril(s) as directed by provider Daily.       No facility-administered medications prior to visit.       Patient Active Problem List   Diagnosis   • Wellness examination-done   • History of renal cell cancer-sony   • COPD (chronic obstructive pulmonary disease) (HCC)   •  Elevated fasting glucose   • History of alcohol abuse   • Allergic rhinitis   • Anxiety   • Vertebral compression fracture (HCC)   • Gastroesophageal reflux disease   • Edema leg-CHF/d, obesity, copd, cirrhosis   • Fatty liver   • Hyperlipidemia-statin   • Hypertension   • Hypopotassemia-diuretic   • Respiratory failure-hypoxic (compliant night)   • Macrocytosis   • Osteoporosis bd 3.2017 ? 2y   • Tremor   • Posttraumatic stress disorder   • History of TIA (transient ischemic attack)   • Chronic back pain   • Laboratory test*   • Congestive heart failure-diastolic   • Tobacco use: 5.2021/12m   • Anemia: chronic iron loss#   • Heme positive stool   • Pain in leg, unspecified   • Family history of colon cancer   • History of adenomatous polyp of colon   • Bile salt-induced diarrhea   • RUQ pain   • S/P laparoscopic cholecystectomy   • Gait difficulty   • SOB (shortness of breath)       Advance Care Planning:  ACP discussion was held with the patient during this visit. Patient has an advance directive (not in EMR), copy requested.    Identification of Risk Factors:  Risk factors include: Alcohol Misuse  Colon Cancer Screening  Fall Risk  Immunizations Discussed/Encouraged (specific immunizations; Tdap, Influenza, Pneumococcal 23, Shingrix, COVID19 and and newer pneumovax )  Obesity/Overweight   Prostate Cancer Screening .    Review of Systems  GENERAL:  Inactive/slower with limits, speed, stamina for age and fatigue. Sleep is ok with HOB up. No fever now/recent.  ENDO:  No syncope, near or diaphoretic sweaty spells.  HEENT: No change occ headache.   No vision change.   Same significant hearing loss.  Ears without pain/drainage.  No sore throat.  No significant nasal/sinus congestion/drainage. No epistaxis.  CHEST: No chest wall tenderness or mass. No change cough, with occ wheeze.  Stable/occ SOB; no hemoptysis.  CV: No chest pain, palpitations, less daily ankle edema.  GI: No heartburn, dysphagia.  No abdominal pain,  diarrhea, constipation.  No rectal bleeding, or melena.    :  Voids without dysuria, or incontinence to completion; sees sony  ORTHO: No painful/swollen joints but various on /off sore.  No change sore neck; more pain lower back.  No acute neck or back pain without recent injury.  NEURO: No dizziness, weakness of extremities.  No change UE/LE numbness/paresthesias.   Continued RUE > LUE tremor.   PSYCH: No memory loss.  Mood good; occ anxious, depressed but/and not suicidal.  Tries to tolerate stress   Screening:  Mammogram: NA  Bone density: 3.2.2017 [facility]/Ts LS -1.8 hip -0.5-offered  Low dose CT chest:  5.13.21-12m  4.15.20  regular 2014-offered  GI:   Colonoscopy-polyp/Gil/CARLY/3.18.16/5 yr  EGD-neg/Gil/CARLY/5.1.18  Prostate: advised sony 4.28.22  sony last 11.5.20  sony confirmed 3.28.19  Usual lab order  6m CBC, CMP, A1c, iron  12m CBC, CMP, A1c LIPID, TSH, T4, Vit D, PSAs, B12, folate, iron, ferritin, % sat iron, retic count    Compared to one year ago, the patient feels his physical health is the same.  Compared to one year ago, the patient feels his mental health is the same.    Objective     Physical Exam    Vitals:    04/28/22 1143   BP: 130/58   Pulse: 74   Temp: 98.2 °F (36.8 °C)   SpO2: 97%   Weight: 126 kg (278 lb)       BMI has not been calculated during today's encounter.   GENERAL:  Well nourished/developed in no acute distress. Obese  SKIN: Turgor excellent, without wound, rash, lesion.  HEENT: Normal cephalic without trauma.  Pupils equal round reactive to light. Extraocular motions full without nystagmus.   External canals moderate wax but nonobstructive nontender without reddness. Tymphatic membranes seen anders with david structures intact.   Oral cavity without growths, exudates, and moist.  Posterior pharynx without mass, obstruction, redness.  No thyromegaly, mass, tenderness, lymphadenopathy and supple.  CV: Regular rhythm.  No murmur, gallop,  edema. Posterior pulses intact.  No  carotid bruits.  CHEST: No chest wall tenderness or mass.   LUNGS: Symmetric motion with clear/reduced to auscultation.  No dullness to percussion  ABD: Soft, nontender without mass.   ORTHO: Symmetric extremities without swelling/point tenderness.  Full gross range of motion.  NEURO: CN 2-12 grossly intact.  Symmetric facies. 1/4 x bicep equal reflexes.  UE/LE   3/5 strength throughout.  Nonfocal use extremities. Speech clear.  Reduced light touch with monofilament, vibratory sensation with tuning fork; equal toes/distal feet.  Intention tremor.     PSYCH: Oriented x 3.  Pleasant calm, well kept.  Purposeful/directed conservation with intact short/long gross    Assessment/Plan   Patient Self-Management and Personalized Health Advice  The patient has been provided with information about: diet, exercise, weight management and fall prevention and preventive services including:   · Annual Wellness Visit (AWV).    Visit Diagnoses:    ICD-10-CM ICD-9-CM   1. Personal history of nicotine dependence  Z87.891 V15.82   2. Encounter for prostate cancer screening  Z12.5 V76.44   3. Encounter for screening for lung cancer  Z12.2 V76.0   4. Anemia, unspecified type  D64.9 285.9   5. Fatty liver  K76.0 571.8   6. Elevated fasting glucose  R73.01 790.21   7. Chronic back pain, unspecified back location, unspecified back pain laterality  M54.9 724.5    G89.29 338.29   8. Renal insufficiency  N28.9 593.9   9. Bilateral impacted cerumen  H61.23 380.4   10. Nail problem  L60.9 703.9   11. Primary hypertension  I10 401.9   12. Mixed hyperlipidemia  E78.2 272.2   13. Chronic diastolic congestive heart failure (HCC)  I50.32 428.32     428.0   14. Gastroesophageal reflux disease, unspecified whether esophagitis present  K21.9 530.81   15. Hypopotassemia-diuretic  E87.6 276.8   16. Chronic obstructive pulmonary disease, unspecified COPD type (HCC)  J44.9 496   17. Edema leg-CHF/d, obesity, copd, cirrhosis  R60.0 782.3   18. Wellness  examination-done  Z00.00 V70.0       Orders Placed This Encounter   Procedures   • Comprehensive Metabolic Panel     Order Specific Question:   Release to patient     Answer:   Immediate     Order Specific Question:   [facility] Has the patient fasted?     Answer:   No   • Iron Profile     Order Specific Question:   [facility] Has the patient fasted?     Answer:   No   • Vitamin B12     Order Specific Question:   Release to patient     Answer:   Immediate     Order Specific Question:   [facility] Has the patient fasted?     Answer:   No   • Folate     Order Specific Question:   Release to patient     Answer:   Immediate     Order Specific Question:   [facility] Has the patient fasted?     Answer:   No   • TSH     Order Specific Question:   Release to patient     Answer:   Immediate     Order Specific Question:   [facility] Has the patient fasted?     Answer:   No   • PSA Screen     Order Specific Question:   Release to patient     Answer:   Immediate     Order Specific Question:   [facility] Has the patient fasted?     Answer:   No   • Uric Acid     Order Specific Question:   Release to patient     Answer:   Immediate     Order Specific Question:   [facility] Has the patient fasted?     Answer:   No   • Ambulatory Referral to Podiatry     Referral Priority:   Routine     Referral Type:   Consultation     Referral Reason:   Specialty Services Required     Requested Specialty:   Podiatry     Number of Visits Requested:   1   • Ambulatory Referral to ENT (Otolaryngology)     Referral Priority:   Routine     Referral Type:   Consultation     Referral Reason:   Specialty Services Required     Requested Specialty:   Otolaryngology     Number of Visits Requested:   1   • CBC & Differential     Order Specific Question:   Manual Differential     Answer:   No     Order Specific Question:   [facility] Has the patient fasted?     Answer:   No       Outpatient Encounter Medications as of 4/28/2022   Medication Sig Dispense Refill   • acetaminophen  (TYLENOL) 650 MG 8 hr tablet Take 650 mg by mouth 2 (Two) Times a Day.     • calcium-vitamin D (OSCAL-500) 500-200 MG-UNIT per tablet Take 1 tablet by mouth 2 times daily.       • cholecalciferol (VITAMIN D3) 10 MCG (400 UNIT) tablet Take 400 Units by mouth.     • diphenhydrAMINE (BENADRYL) 25 mg capsule Take 25 mg by mouth 2 (Two) Times a Day.     • escitalopram (LEXAPRO) 20 MG tablet Take 1 tablet by mouth Daily. 90 tablet 3   • ferrous sulfate 325 (65 FE) MG tablet Take 325 mg by mouth Daily With Breakfast.     • FLOVENT  MCG/ACT inhaler INHALE 1 PUFF BY MOUTH TWICE DAILY 12 g 5   • fluticasone (FLONASE) 50 MCG/ACT nasal spray      • KETOTIFEN FUMARATE OP Apply 1 drop to eye(s) as directed by provider As Needed.     • losartan (COZAAR) 50 MG tablet Take 1 tablet by mouth Daily. 90 tablet 3   • metoprolol tartrate (LOPRESSOR) 50 MG tablet Take 1 tablet by mouth Every 12 (Twelve) Hours. 180 tablet 3   • Multiple Vitamins-Minerals (CENTRUM SILVER 50+MEN) tablet Take 1 tablet by mouth Daily.     • O2 (OXYGEN) Inhale 1 (One) Time.     • omeprazole (priLOSEC) 20 MG capsule 20 mg 2 (Two) Times a Day.     • potassium chloride (KLOR-CON) 10 MEQ CR tablet Take 1 tablet by mouth 2 (Two) Times a Day. 180 tablet 3   • primidone (MYSOLINE) 50 MG tablet Take 1 tablet by mouth 2 (Two) Times a Day. 180 tablet 3   • simvastatin (ZOCOR) 20 MG tablet Take 1 tablet by mouth Daily. 90 tablet 3   • sodium-potassium-magnesium sulfates (Suprep Bowel Prep Kit) 17.5-3.13-1.6 GM/177ML solution oral solution Take as directed 177 mL 0   • tamsulosin (FLOMAX) 0.4 MG capsule 24 hr capsule Take 1 capsule by mouth Daily. 90 capsule 3   • FLUTICASONE PROPIONATE, NASAL, NA 1 spray into the nostril(s) as directed by provider Daily.       No facility-administered encounter medications on file as of 4/28/2022.       Reviewed use of high risk medication in the elderly: yes  Reviewed for potential of harmful drug interactions in the elderly:  yes    Follow Up:  Return for lab;, Dr Rosario-, 6 m;.     An After Visit Summary and PPPS with all of these plans were given to the patient.

## 2022-05-21 NOTE — PROGRESS NOTES
"Chief Complaint   Patient presents with   • Colonoscopy     Had colon 3-18-16 2 polyps 5 year recall had to cancel last colon did not have a ride       PCP: Bassam Rosario MD  REFER: Bassam Rosario MD    Subjective     HPI    Felix Bartlett Sr. is a 72 y.o. male who presents to office for preventative maintenance.  There is  a personal history of colon polyps.  There is not a history of colon cancer.  He does not have complaints of nausea/vomiting, change in bowels, weight loss, no BRBPR.  He does described stool as being occasionally \"black\" but states this only happens after taking iron.   There is a family history of colon cancer in first degree relative-father.    There is not a family history of colon polyps in first degree relative.  His last colonoscopy-2016 .  Bowels do move on regular basis.    UPPER GI ENDOSCOPY (05/01/2018 08:41)    SCANNED - COLONOSCOPY (03/18/2016 00:00)-tubular adenoma - 5 years    Lab Results - Last 18 Months   Lab Units 04/28/22  1108 10/21/21  1350 04/21/21  1321   HEMOGLOBIN g/dL 10.2* 10.3* 10.7*   HEMATOCRIT % 30.8* 30.7* 32.8*   MCV fL 100.3* 98* 99*   WBC 10*3/mm3 13.65* 13.1* 13.2*   PLATELETS 10*3/mm3 243 261 253       Lab Results - Last 18 Months   Lab Units 04/28/22  1108 10/21/21  1350 04/21/21  1321   GLUCOSE mg/dL 92 87 93   SODIUM mmol/L 140 138 142   POTASSIUM mmol/L 3.8 4.4 4.5   CREATININE mg/dL 1.06 1.05 1.12   BUN mg/dL 9 8 10   BUN / CREAT RATIO  8.5 8* 9*   ALK PHOS U/L 102 104 100   ALT (SGPT) U/L 16 20 22   AST (SGOT) U/L 13 18 27   BILIRUBIN mg/dL 0.2 0.2 0.2   ALBUMIN g/dL 3.80 4.2 3.9       Lab Results - Last 18 Months   Lab Units 04/28/22  1108 10/21/21  1350 04/21/21  1321   TIBC mcg/dL 301  --  287   IRON SATURATION % 31  --  46   TSH uIU/mL 0.972 0.752 0.859   FOLATE ng/mL 7.36 8.7 6.3         Past Medical History:   Diagnosis Date   • Acid reflux    • Arthritis    • Asthma    • COPD (chronic obstructive pulmonary disease) (HCC)    • " Hyperlipidemia    • Hypertension    • Oxygen dependent     at hs   • Renal cancer (HCC) 08/2014    Lt RALPart'l Nx; t1a Clear Cell with negative margin     Past Surgical History:   Procedure Laterality Date   • APPENDECTOMY     • CHOLECYSTECTOMY     • ENDOSCOPY N/A 5/1/2018    Procedure: ESOPHAGOGASTRODUODENOSCOPY WITH ANESTHESIA;  Surgeon: Donnell Cordero DO;  Location: [facility] ENDOSCOPY;  Service: Gastroenterology   • HERNIA REPAIR     • KIDNEY SURGERY     • LAPAROSCOPIC PARTIAL NEPHRECTOMY Left 08/14/2014    Robot Assisted     Outpatient Medications Marked as Taking for the 5/23/22 encounter (Office Visit) with Miguel Hastings APRN   Medication Sig Dispense Refill   • acetaminophen (TYLENOL) 650 MG 8 hr tablet Take 650 mg by mouth 2 (Two) Times a Day.     • calcium-vitamin D (OSCAL-500) 500-200 MG-UNIT per tablet Take 1 tablet by mouth 2 times daily.       • cholecalciferol (VITAMIN D3) 10 MCG (400 UNIT) tablet Take 400 Units by mouth.     • diphenhydrAMINE (BENADRYL) 25 mg capsule Take 25 mg by mouth 2 (Two) Times a Day.     • escitalopram (LEXAPRO) 20 MG tablet Take 1 tablet by mouth Daily. 90 tablet 3   • ferrous sulfate 325 (65 FE) MG tablet Take 325 mg by mouth Daily With Breakfast.     • FLOVENT  MCG/ACT inhaler INHALE 1 PUFF BY MOUTH TWICE DAILY 12 g 5   • fluticasone (FLONASE) 50 MCG/ACT nasal spray      • FLUTICASONE PROPIONATE, NASAL, NA 1 spray into the nostril(s) as directed by provider Daily.     • KETOTIFEN FUMARATE OP Apply 1 drop to eye(s) as directed by provider As Needed.     • losartan (COZAAR) 50 MG tablet Take 1 tablet by mouth Daily. 90 tablet 3   • metoprolol tartrate (LOPRESSOR) 50 MG tablet Take 1 tablet by mouth Every 12 (Twelve) Hours. 180 tablet 3   • Multiple Vitamins-Minerals (CENTRUM SILVER 50+MEN) tablet Take 1 tablet by mouth Daily.     • omeprazole (priLOSEC) 20 MG capsule 20 mg 2 (Two) Times a Day.     • potassium chloride (KLOR-CON) 10 MEQ CR tablet Take 1 tablet  by mouth 2 (Two) Times a Day. 180 tablet 3   • primidone (MYSOLINE) 50 MG tablet Take 1 tablet by mouth 2 (Two) Times a Day. 180 tablet 3   • simvastatin (ZOCOR) 20 MG tablet Take 1 tablet by mouth Daily. 90 tablet 3   • tamsulosin (FLOMAX) 0.4 MG capsule 24 hr capsule Take 1 capsule by mouth Daily. 90 capsule 3     No Known Allergies  Social History     Socioeconomic History   • Marital status:      Spouse name: Melanie   • Number of children: 1   • Years of education: 12   Tobacco Use   • Smoking status: Current Every Day Smoker     Packs/day: 0.25     Years: 50.00     Pack years: 12.50     Types: Cigarettes     Start date: 4/30/1966   • Smokeless tobacco: Never Used   • Tobacco comment: 8/27/18 Smoking 5 cigarettes per day.   Vaping Use   • Vaping Use: Never used   Substance and Sexual Activity   • Alcohol use: No   • Drug use: No   • Sexual activity: Defer     Review of Systems   Constitutional: Negative for unexpected weight change.   Respiratory: Negative for shortness of breath.    Cardiovascular: Negative for chest pain.   Gastrointestinal: Negative for abdominal pain and anal bleeding.     Objective   Vitals:    05/23/22 1006   BP: 126/60   Pulse: 68   Temp: 98.6 °F (37 °C)   SpO2: 98%     Physical Exam  Constitutional:       Appearance: Normal appearance. He is well-developed.   Eyes:      General: No scleral icterus.  Cardiovascular:      Rate and Rhythm: Regular rhythm.      Heart sounds: Normal heart sounds. No murmur heard.  Pulmonary:      Effort: Pulmonary effort is normal. No accessory muscle usage.      Breath sounds: Normal breath sounds.   Abdominal:      General: Bowel sounds are normal. There is no distension.      Palpations: Abdomen is soft. There is no mass.      Tenderness: There is no abdominal tenderness. There is no guarding or rebound.   Skin:     General: Skin is warm and dry.      Coloration: Skin is not jaundiced.   Neurological:      Mental Status: He is alert.   Psychiatric:          Behavior: Behavior is cooperative.       Imaging Results (Most Recent)     None        Body mass index is 23.73 kg/m².    Assessment & Plan   Diagnoses and all orders for this visit:    1. History of adenomatous polyp of colon (Primary)  -     Case Request; Standing  -     Case Request    2. Family history of colon cancer  Comments:  father    Other orders  -     polyethylene glycol (MIRALAX) 17 GM/SCOOP powder; Take 238 g by mouth 1 (One) Time for 1 dose. Take as directed  Dispense: 238 g; Refill: 0      COLONOSCOPY WITH ANESTHESIA (N/A)    Felix Bartlett Sr. requests no cranberry flavored preps  Miralax prep     Patient is to continue all blood pressure and cardiac medications prior to procedure and has been advised to take medications morning of procedure   Pt verbalized understanding    Advised pt to stop use of NSAIDs, Fish Oil, and MV 5 days prior to procedure, per Dr Cordero protocol.  Tylenol based products are ok to take.  Pt verbalized understanding.     All risks, benefits, alternatives, and indications of colonoscopy procedure have been discussed with the patient. Risks to include perforation of the colon requiring possible surgery or colostomy, risk of bleeding from biopsies or removal of colon tissue, possibility of missing a colon polyp or cancer, or adverse drug reaction.  Benefits to include the diagnosis and management of disease of the colon and rectum. Alternatives to include barium enema, radiographic evaluation, lab testing or no intervention. He verbalizes understanding and agrees.         Miguel Hastings, APRN  05/23/22        There are no Patient Instructions on file for this visit.

## 2022-05-23 ENCOUNTER — TELEPHONE (OUTPATIENT)
Dept: FAMILY MEDICINE CLINIC | Facility: CLINIC | Age: 72
End: 2022-05-23

## 2022-05-23 ENCOUNTER — OFFICE VISIT (OUTPATIENT)
Dept: GASTROENTEROLOGY | Facility: CLINIC | Age: 72
End: 2022-05-23

## 2022-05-23 VITALS
SYSTOLIC BLOOD PRESSURE: 126 MMHG | TEMPERATURE: 98.6 F | HEART RATE: 68 BPM | OXYGEN SATURATION: 98 % | WEIGHT: 175 LBS | DIASTOLIC BLOOD PRESSURE: 60 MMHG | HEIGHT: 72 IN | BODY MASS INDEX: 23.7 KG/M2

## 2022-05-23 DIAGNOSIS — Z86.010 HISTORY OF ADENOMATOUS POLYP OF COLON: Primary | ICD-10-CM

## 2022-05-23 DIAGNOSIS — Z80.0 FAMILY HISTORY OF COLON CANCER: ICD-10-CM

## 2022-05-23 PROCEDURE — S0285 CNSLT BEFORE SCREEN COLONOSC: HCPCS | Performed by: NURSE PRACTITIONER

## 2022-05-23 RX ORDER — POLYETHYLENE GLYCOL 3350 17 G/17G
238 POWDER, FOR SOLUTION ORAL ONCE
Qty: 238 G | Refills: 0 | Status: SHIPPED | OUTPATIENT
Start: 2022-05-23 | End: 2022-05-23

## 2022-05-23 NOTE — TELEPHONE ENCOUNTER
Yes    ----- Message from Marisela Angela sent at 5/23/2022  1:30 PM CDT -----  Regarding: CT Low dose lung ca screening  This patient is due for his CT low dose lung ca screening. Looks like you had an OV on 4/28/22. Can I order his ct scan and get him scheduled?

## 2022-06-01 ENCOUNTER — DOCUMENTATION (OUTPATIENT)
Dept: CT IMAGING | Facility: HOSPITAL | Age: 72
End: 2022-06-01

## 2022-06-01 ENCOUNTER — HOSPITAL ENCOUNTER (OUTPATIENT)
Dept: CT IMAGING | Facility: HOSPITAL | Age: 72
Discharge: HOME | End: 2022-06-01
Admitting: FAMILY MEDICINE

## 2022-06-01 DIAGNOSIS — Z12.2 SCREENING FOR MALIGNANT NEOPLASM OF RESPIRATORY ORGAN: ICD-10-CM

## 2022-06-01 DIAGNOSIS — Z87.891 PERSONAL HISTORY OF TOBACCO USE, PRESENTING HAZARDS TO HEALTH: ICD-10-CM

## 2022-06-01 DIAGNOSIS — Z72.0 TOBACCO ABUSE: ICD-10-CM

## 2022-06-01 PROCEDURE — 71271 CT THORAX LUNG CANCER SCR C-: CPT

## 2022-06-02 ENCOUNTER — TELEPHONE (OUTPATIENT)
Dept: GASTROENTEROLOGY | Facility: CLINIC | Age: 72
End: 2022-06-02

## 2022-06-03 ENCOUNTER — DOCUMENTATION (OUTPATIENT)
Dept: CT IMAGING | Facility: HOSPITAL | Age: 72
End: 2022-06-03

## 2022-06-14 ENCOUNTER — TELEPHONE (OUTPATIENT)
Dept: PODIATRY | Facility: CLINIC | Age: 72
End: 2022-06-14

## 2022-06-14 NOTE — TELEPHONE ENCOUNTER
PT IS AWARE OF APPT TIME AND DATE THAT HAS BEEN CHANGED DUE TO PT NOT HAVING A WAY TO SEE DR TRENT AND PT NEEDED A MONDAY

## 2022-06-17 ENCOUNTER — TELEPHONE (OUTPATIENT)
Dept: GASTROENTEROLOGY | Facility: CLINIC | Age: 72
End: 2022-06-17

## 2022-06-23 NOTE — PROGRESS NOTES
[facility] - PODIATRY    Today's Date: 06/27/2022     Patient Name: Felix Bartlett Sr.  MRN: [id number]  CSN: [id number]  PCP: Bassam Rosario MD  Referring Provider: Bassam Rosario MD    SUBJECTIVE     Chief Complaint   Patient presents with   • Establish Care     Bassam Rosario, 04/28/2022 NEW PATIENT - PT STATES non diabetic left great toe does cause some pain at times - pt denies pain - pt presents new patient, nondiabetic, and toes presents long thick, and discolored, left hallux does cause pain      HPI: Felix Bartlett Sr., a 72 y.o.male, comes to clinic as a(n) new patient complaining of thickened, dystrophic, irregular toenails of both feet and numbness in both feet. Patient has h/o GERD, arthritis, COPD, asthma, HTN, Renal CA. Patient presents with complaints of thickened, dystrophic toenails of both feet. States that he served in [employer] and has had thickness and discoloration since that time. Admits to numbness and tingling in both feet with history of chronic back issues. States he is unable to reach his feet to provide care for nails. Notes lower extremity edema with some tenderness in legs. Denies pain. Denies previous treatment. Denies any constitutional symptoms. No other pedal complaints at this time.    Past Medical History:   Diagnosis Date   • Acid reflux    • Arthritis    • Asthma    • COPD (chronic obstructive pulmonary disease) (HCC)    • Hyperlipidemia    • Hypertension    • Oxygen dependent     at hs   • Renal cancer (HCC) 08/2014    Lt RALPart'l Nx; t1a Clear Cell with negative margin     Past Surgical History:   Procedure Laterality Date   • APPENDECTOMY     • CHOLECYSTECTOMY     • ENDOSCOPY N/A 5/1/2018    Procedure: ESOPHAGOGASTRODUODENOSCOPY WITH ANESTHESIA;  Surgeon: Donnell Cordero DO;  Location: [facility] ENDOSCOPY;  Service: Gastroenterology   • HERNIA REPAIR     • KIDNEY SURGERY     • LAPAROSCOPIC PARTIAL NEPHRECTOMY Left 08/14/2014    Robot  Assisted     Family History   Problem Relation Age of Onset   • Colon cancer Father    • Heart disease Mother    • Colon polyps Neg Hx    • Esophageal cancer Neg Hx      Social History     Socioeconomic History   • Marital status:      Spouse name: Melanie   • Number of children: 1   • Years of education: 12   Tobacco Use   • Smoking status: Current Every Day Smoker     Packs/day: 0.25     Years: 50.00     Pack years: 12.50     Types: Cigarettes     Start date: 4/30/1966   • Smokeless tobacco: Never Used   • Tobacco comment: 8/27/18 Smoking 5 cigarettes per day.   Vaping Use   • Vaping Use: Never used   Substance and Sexual Activity   • Alcohol use: No   • Drug use: No   • Sexual activity: Defer     No Known Allergies  Current Outpatient Medications   Medication Sig Dispense Refill   • acetaminophen (TYLENOL) 650 MG 8 hr tablet Take 650 mg by mouth 2 (Two) Times a Day.     • calcium-vitamin D (OSCAL-500) 500-200 MG-UNIT per tablet Take 1 tablet by mouth 2 times daily.       • cholecalciferol (VITAMIN D3) 10 MCG (400 UNIT) tablet Take 400 Units by mouth.     • diphenhydrAMINE (BENADRYL) 25 mg capsule Take 25 mg by mouth 2 (Two) Times a Day.     • escitalopram (LEXAPRO) 20 MG tablet Take 1 tablet by mouth Daily. 90 tablet 3   • ferrous sulfate 325 (65 FE) MG tablet Take 325 mg by mouth Daily With Breakfast.     • FLOVENT  MCG/ACT inhaler INHALE 1 PUFF BY MOUTH TWICE DAILY 12 g 5   • fluticasone (FLONASE) 50 MCG/ACT nasal spray      • FLUTICASONE PROPIONATE, NASAL, NA 1 spray into the nostril(s) as directed by provider Daily.     • KETOTIFEN FUMARATE OP Apply 1 drop to eye(s) as directed by provider As Needed.     • losartan (COZAAR) 50 MG tablet Take 1 tablet by mouth Daily. 90 tablet 3   • metoprolol tartrate (LOPRESSOR) 50 MG tablet Take 1 tablet by mouth Every 12 (Twelve) Hours. 180 tablet 3   • Multiple Vitamins-Minerals (CENTRUM SILVER 50+MEN) tablet Take 1 tablet by mouth Daily.     • O2 (OXYGEN)  Inhale 1 (One) Time.     • omeprazole (priLOSEC) 20 MG capsule 20 mg 2 (Two) Times a Day.     • potassium chloride (KLOR-CON) 10 MEQ CR tablet Take 1 tablet by mouth 2 (Two) Times a Day. 180 tablet 3   • primidone (MYSOLINE) 50 MG tablet Take 1 tablet by mouth 2 (Two) Times a Day. 180 tablet 3   • simvastatin (ZOCOR) 20 MG tablet Take 1 tablet by mouth Daily. 90 tablet 3   • sodium-potassium-magnesium sulfates (Suprep Bowel Prep Kit) 17.5-3.13-1.6 GM/177ML solution oral solution Take as directed 177 mL 0   • tamsulosin (FLOMAX) 0.4 MG capsule 24 hr capsule Take 1 capsule by mouth Daily. 90 capsule 3     No current facility-administered medications for this visit.     Review of Systems   Constitutional: Negative for chills and fever.   HENT: Negative for congestion.    Respiratory: Negative for shortness of breath.    Cardiovascular: Positive for leg swelling. Negative for chest pain.   Gastrointestinal: Negative for constipation, diarrhea, nausea and vomiting.   Musculoskeletal: Positive for arthralgias and back pain. Negative for myalgias.   Skin: Negative for wound.   Neurological: Positive for numbness.       OBJECTIVE     Vitals:    22 1111   BP: 118/80   Pulse: 61   SpO2: 98%       PHYSICAL EXAM  GEN:   Accompanied by none.     Foot/Ankle Exam:       General:   Appearance: appears stated age and healthy and obesity    Orientation: AAOx3    Affect: appropriate    Gait: unimpaired    Assistance: independent    Shoe Gear:  Casual shoes    VASCULAR      Right Foot Vascularity   Dorsalis pedis:  1+  Posterior tibial:  1+  Skin Temperature: warm    Edema Gradin+ and pitting  CFT:  3  Pedal Hair Growth:  Present  Varicosities: moderate varicosities       Left Foot Vascularity   Dorsalis pedis:  1+  Posterior tibial:  1+  Skin Temperature: warm    Edema Gradin+ and pitting  CFT:  3  Pedal Hair Growth:  Present  Varicosities: moderate varicosities        NEUROLOGIC     Right Foot Neurologic   Light touch  sensation:  Diminished  Vibratory sensation:  Diminished  Hot/Cold sensation: diminished    Protective Sensation using [place] Monofilament:  1     Left Foot Neurologic   Light touch sensation:  Diminished  Vibratory sensation:  Diminished  Hot/cold sensation: diminished    Protective Sensation using [employer]-[place] Monofilament:  4     MUSCULOSKELETAL      Right Foot Musculoskeletal   Ecchymosis:  None  Tenderness: toenails    Arch:  Normal  Hallux valgus: No       Left Foot Musculoskeletal   Ecchymosis:  None  Tenderness: toenails    Arch:  Normal  Hallux valgus: No       MUSCLE STRENGTH     Right Foot Muscle Strength   Foot dorsiflexion:  4+  Foot plantar flexion:  4+  Foot inversion:  4+  Foot eversion:  4+     Left Foot Muscle Strength   Foot dorsiflexion:  4+  Foot plantar flexion:  4+  Foot inversion:  4+  Foot eversion:  4+     RANGE OF MOTION      Right Foot Range of Motion   Foot and ankle ROM within normal limits       Left Foot Range of Motion   Foot and ankle ROM within normal limits       DERMATOLOGIC     Right Foot Dermatologic   Skin: tinea    Nails: onychomycosis, abnormally thick, subungual debris and dystrophic nails    Nails comment:  Gryphotic 1-5     Left Foot Dermatologic   Skin: tinea    Nails: onychomycosis, abnormally thick, subungual debris and dystrophic nails    Nails comment:  Deangelootic 1-5      RADIOLOGY/NUCLEAR:  CT Chest Low Dose Cancer Screening WO    Result Date: 6/1/2022  Narrative: EXAM/TECHNIQUE: CT the chest without contrast, low-dose protocol  INDICATION: Lung cancer screening; Z87.891-Personal history of nicotine dependence; Z12.2-Encounter for screening for malignant neoplasm of respiratory organs; Z72.0-Tobacco use  COMPARISON: 5/13/2021, 4/15/2019.  DLP: 95 mGy cm. Automated exposure control was also utilized to decrease patient radiation dose.  FINDINGS:  No change in 7 mm RIGHT middle lobe pulmonary nodule, image 97. No change in 3 mm LEFT upper lobe pulmonary  nodule, image 101. No change in adjacent 3 mm LEFT lower lobe pulmonary nodules on image 114. No new or enlarging pulmonary nodule.  Central airways are clear. No consolidation or pleural effusion. Mild centrilobular emphysema.  No enlarged axillary, supraclavicular, or mediastinal lymph nodes. Main pulmonary artery is stable in caliber. Thoracic aorta is nonaneurysmal. No pericardial effusion. Mild coronary calcification.  No large thyroid nodule. No acute chest wall soft tissue abnormality. No change in nodular thickening of the LEFT adrenal gland. No acute finding in the partially imaged upper abdomen. No aggressive osseous lesion.      Impression:  No new or enlarging pulmonary nodule.  Lung-RADS 1: Negative No nodules and definitely benign nodules. Continue annual screening with low-dose CT in 12 months. This report was finalized on 06/01/2022 10:49 by Dr. Fawad Valadez MD.      LABORATORY/CULTURE RESULTS:      PATHOLOGY RESULTS:       ASSESSMENT/PLAN     Diagnoses and all orders for this visit:    1. Onychogryphosis (Primary)    2. Neuropathy    3. Peripheral edema    4. Pain in both feet    5. Tinea pedis of both feet      Comprehensive lower extremity examination and evaluation was performed.  Discussed findings and treatment plan including risks, benefits, and treatment options with patient in detail. Patient agreed with treatment plan.  After verbal consent obtained, nail(s) x10 debrided of length and thickness with nail nipper without incidence  Patient may maintain nails and calluses at home utilizing emery board or pumice stone between visits as needed   Given gryphotic nails and neuropathy we will continue to follow patient for routine care.   Continue use of OTC antifungal for feet.   Elevate lower extremities at rest and continue use of compression stockings.   An After Visit Summary was printed and given to the patient at discharge, including (if requested) any available informative/educational  handouts regarding diagnosis, treatment, or medications. All questions were answered to patient/family satisfaction. Should symptoms fail to improve or worsen they agree to call or return to clinic or to go to the Emergency Department. Discussed the importance of following up with any needed screening tests/labs/specialist appointments and any requested follow-up recommended by me today. Importance of maintaining follow-up discussed and patient accepts that missed appointments can delay diagnosis and potentially lead to worsening of conditions.  Return in about 3 months (around 9/27/2022)., or sooner if acute issues arise.    Lab Frequency Next Occurrence       This document has been electronically signed by RUBIO Hardin on June 27, 2022 12:06 CDT

## 2022-06-27 ENCOUNTER — OFFICE VISIT (OUTPATIENT)
Dept: PODIATRY | Facility: CLINIC | Age: 72
End: 2022-06-27

## 2022-06-27 VITALS
WEIGHT: 176 LBS | OXYGEN SATURATION: 98 % | HEIGHT: 72 IN | SYSTOLIC BLOOD PRESSURE: 118 MMHG | HEART RATE: 61 BPM | DIASTOLIC BLOOD PRESSURE: 80 MMHG | BODY MASS INDEX: 23.84 KG/M2

## 2022-06-27 DIAGNOSIS — R60.9 PERIPHERAL EDEMA: ICD-10-CM

## 2022-06-27 DIAGNOSIS — M79.672 PAIN IN BOTH FEET: ICD-10-CM

## 2022-06-27 DIAGNOSIS — M79.671 PAIN IN BOTH FEET: ICD-10-CM

## 2022-06-27 DIAGNOSIS — L60.2 ONYCHOGRYPHOSIS: Primary | ICD-10-CM

## 2022-06-27 DIAGNOSIS — G62.9 NEUROPATHY: ICD-10-CM

## 2022-06-27 DIAGNOSIS — B35.3 TINEA PEDIS OF BOTH FEET: ICD-10-CM

## 2022-06-27 PROCEDURE — 11721 DEBRIDE NAIL 6 OR MORE: CPT | Performed by: NURSE PRACTITIONER

## 2022-06-27 PROCEDURE — 99214 OFFICE O/P EST MOD 30 MIN: CPT | Performed by: NURSE PRACTITIONER

## 2022-07-18 ENCOUNTER — TELEPHONE (OUTPATIENT)
Dept: GASTROENTEROLOGY | Facility: CLINIC | Age: 72
End: 2022-07-18

## 2022-07-18 NOTE — TELEPHONE ENCOUNTER
Patient called today and stated he forgot about his Colonoscopy that is scheduled for Wednesday with Dr. Cordero.   Rescheduled to 08/10/2022 at 10:15am

## 2022-08-08 ENCOUNTER — TELEPHONE (OUTPATIENT)
Dept: GASTROENTEROLOGY | Facility: CLINIC | Age: 72
End: 2022-08-08

## 2022-08-08 NOTE — TELEPHONE ENCOUNTER
Spoke with patient. Patient is sick and will not be here Wednesday.     Patient is not going to reschedule right now.     Patient has cancel/ rescheduled multiple times.     Sent letter to Bassam Rosario MD

## 2022-10-05 NOTE — PROGRESS NOTES
[facility] - PODIATRY    Today's Date: 10/10/2022     Patient Name: Felix ALMEIDA Sr.  MRN: [id number]  CSN: [id number]  PCP: Bassam Rosario MD  Referring Provider: No ref. provider found    SUBJECTIVE     Chief Complaint   Patient presents with   • Follow-up     Bassam Rosario, 04/28/2022  Return in about 3 months- pt states left great nail feels like starting to ingrow- pt denies pain- pt presents with long thick nails,  dry skin      HPI: Felix ALMEIDA Sr., a 72 y.o.male, comes to clinic as a(n) established patient complaining of thickened, dystrophic, irregular toenails of both feet and numbness in both feet. Patient has h/o GERD, arthritis, COPD, asthma, HTN, Renal CA. Patient presents with complaints of thickened, dystrophic toenails of both feet. States that he served in [employer] and has had thickness and discoloration since that time. Admits to numbness and tingling in both feet with history of chronic back issues. States he is unable to reach his feet to provide care for nails. Notes lower extremity edema with some tenderness in legs. Notes that he has tremor which makes it difficult to provide self care. Denies pain due to numbness. Relates previous treatment(s) including care by podiatry. Denies any constitutional symptoms. No other pedal complaints at this time.    Past Medical History:   Diagnosis Date   • Acid reflux    • Arthritis    • Asthma    • COPD (chronic obstructive pulmonary disease) (HCC)    • Hyperlipidemia    • Hypertension    • Oxygen dependent     at hs   • Renal cancer (HCC) 08/2014    Lt RALPart'l Nx; t1a Clear Cell with negative margin     Past Surgical History:   Procedure Laterality Date   • APPENDECTOMY     • CHOLECYSTECTOMY     • ENDOSCOPY N/A 5/1/2018    Procedure: ESOPHAGOGASTRODUODENOSCOPY WITH ANESTHESIA;  Surgeon: Donnell Cordero DO;  Location: [facility] ENDOSCOPY;  Service: Gastroenterology   • HERNIA REPAIR     • KIDNEY SURGERY     •  LAPAROSCOPIC PARTIAL NEPHRECTOMY Left 08/14/2014    Robot Assisted     Family History   Problem Relation Age of Onset   • Colon cancer Father    • Heart disease Mother    • Colon polyps Neg Hx    • Esophageal cancer Neg Hx      Social History     Socioeconomic History   • Marital status:      Spouse name: Melanie   • Number of children: 1   • Years of education: 12   Tobacco Use   • Smoking status: Every Day     Packs/day: 0.50     Years: 50.00     Pack years: 25.00     Types: Cigarettes     Start date: 4/30/1966   • Smokeless tobacco: Never   • Tobacco comments:     8/27/18 Smoking 5 cigarettes per day.   Vaping Use   • Vaping Use: Never used   Substance and Sexual Activity   • Alcohol use: No   • Drug use: No   • Sexual activity: Defer     No Known Allergies  Current Outpatient Medications   Medication Sig Dispense Refill   • acetaminophen (TYLENOL) 650 MG 8 hr tablet Take 650 mg by mouth 2 (Two) Times a Day.     • calcium-vitamin D (OSCAL-500) 500-200 MG-UNIT per tablet Take 1 tablet by mouth 2 times daily.       • cholecalciferol (VITAMIN D3) 10 MCG (400 UNIT) tablet Take 400 Units by mouth.     • diphenhydrAMINE (BENADRYL) 25 mg capsule Take 25 mg by mouth 2 (Two) Times a Day.     • escitalopram (LEXAPRO) 20 MG tablet Take 1 tablet by mouth Daily. 90 tablet 3   • ferrous sulfate 325 (65 FE) MG tablet Take 325 mg by mouth Daily With Breakfast.     • FLOVENT  MCG/ACT inhaler INHALE 1 PUFF BY MOUTH TWICE DAILY 12 g 5   • fluticasone (FLONASE) 50 MCG/ACT nasal spray      • FLUTICASONE PROPIONATE, NASAL, NA 1 spray into the nostril(s) as directed by provider Daily.     • KETOTIFEN FUMARATE OP Apply 1 drop to eye(s) as directed by provider As Needed.     • losartan (COZAAR) 50 MG tablet Take 1 tablet by mouth Daily. 90 tablet 3   • metoprolol tartrate (LOPRESSOR) 50 MG tablet Take 1 tablet by mouth Every 12 (Twelve) Hours. 180 tablet 3   • Multiple Vitamins-Minerals (CENTRUM SILVER 50+MEN) tablet Take 1  tablet by mouth Daily.     • O2 (OXYGEN) Inhale 1 (One) Time.     • omeprazole (priLOSEC) 20 MG capsule 20 mg 2 (Two) Times a Day.     • potassium chloride (KLOR-CON) 10 MEQ CR tablet Take 1 tablet by mouth 2 (Two) Times a Day. 180 tablet 3   • primidone (MYSOLINE) 50 MG tablet Take 1 tablet by mouth 2 (Two) Times a Day. 180 tablet 3   • simvastatin (ZOCOR) 20 MG tablet Take 1 tablet by mouth Daily. 90 tablet 3   • sodium-potassium-magnesium sulfates (Suprep Bowel Prep Kit) 17.5-3.13-1.6 GM/177ML solution oral solution Take as directed 177 mL 0   • tamsulosin (FLOMAX) 0.4 MG capsule 24 hr capsule Take 1 capsule by mouth Daily. 90 capsule 3     No current facility-administered medications for this visit.     Review of Systems   Constitutional: Negative for chills and fever.   HENT: Negative for congestion.    Respiratory: Negative for shortness of breath.    Cardiovascular: Positive for leg swelling. Negative for chest pain.   Gastrointestinal: Negative for constipation, diarrhea, nausea and vomiting.   Musculoskeletal: Positive for arthralgias and back pain. Negative for myalgias.   Skin: Negative for wound.   Neurological: Positive for tremors and numbness.       OBJECTIVE     Vitals:    10/10/22 1043   BP: 120/78   Pulse: 69   SpO2: 99%       PHYSICAL EXAM  GEN:   Accompanied by none.     Foot/Ankle Exam:       General:   Appearance: appears stated age and healthy and obesity    Orientation: AAOx3    Affect: appropriate    Gait: unimpaired    Assistance: independent    Shoe Gear:  Casual shoes    VASCULAR      Right Foot Vascularity   Dorsalis pedis:  1+  Posterior tibial:  1+  Skin Temperature: warm    Edema Gradin+ and pitting  CFT:  3  Pedal Hair Growth:  Present  Varicosities: moderate varicosities       Left Foot Vascularity   Dorsalis pedis:  1+  Posterior tibial:  1+  Skin Temperature: warm    Edema Gradin+ and pitting  CFT:  3  Pedal Hair Growth:  Present  Varicosities: moderate varicosities         NEUROLOGIC     Right Foot Neurologic   Light touch sensation:  Diminished  Vibratory sensation:  Diminished  Hot/Cold sensation: diminished    Protective Sensation using [place] Monofilament:  1     Left Foot Neurologic   Light touch sensation:  Diminished  Vibratory sensation:  Diminished  Hot/cold sensation: diminished    Protective Sensation using [place] Monofilament:  4     MUSCULOSKELETAL      Right Foot Musculoskeletal   Ecchymosis:  None  Tenderness: toenails    Arch:  Normal  Hallux valgus: No       Left Foot Musculoskeletal   Ecchymosis:  None  Tenderness: toenails    Arch:  Normal  Hallux valgus: No       MUSCLE STRENGTH     Right Foot Muscle Strength   Foot dorsiflexion:  4+  Foot plantar flexion:  4+  Foot inversion:  4+  Foot eversion:  4+     Left Foot Muscle Strength   Foot dorsiflexion:  4+  Foot plantar flexion:  4+  Foot inversion:  4+  Foot eversion:  4+     RANGE OF MOTION      Right Foot Range of Motion   Foot and ankle ROM within normal limits       Left Foot Range of Motion   Foot and ankle ROM within normal limits       DERMATOLOGIC     Right Foot Dermatologic   Skin: dry skin and tinea    Nails: onychomycosis, abnormally thick, subungual debris and dystrophic nails    Nails comment:  Gryphotic 1-5     Left Foot Dermatologic   Skin: dry skin and tinea    Nails: onychomycosis, abnormally thick, subungual debris and dystrophic nails    Nails comment:  Gryphotic 1-5      RADIOLOGY/NUCLEAR:  No results found.    LABORATORY/CULTURE RESULTS:      PATHOLOGY RESULTS:       ASSESSMENT/PLAN     Diagnoses and all orders for this visit:    1. Onychogryphosis (Primary)    2. Neuropathy    3. Peripheral edema    4. Pain in both feet    5. Tremor      Comprehensive lower extremity examination and evaluation was performed.  Discussed findings and treatment plan including risks, benefits, and treatment options with patient in detail. Patient agreed with treatment plan.  After verbal  consent obtained, nail(s) x10 debrided of length and thickness with nail nipper without incidence  Patient may maintain nails and calluses at home utilizing emery board or pumice stone between visits as needed   Given gryphotic nails and neuropathy we will continue to follow patient for routine care.   Continue use of OTC antifungal for feet.   Elevate lower extremities at rest and continue use of compression stockings.   An After Visit Summary was printed and given to the patient at discharge, including (if requested) any available informative/educational handouts regarding diagnosis, treatment, or medications. All questions were answered to patient/family satisfaction. Should symptoms fail to improve or worsen they agree to call or return to clinic or to go to the Emergency Department. Discussed the importance of following up with any needed screening tests/labs/specialist appointments and any requested follow-up recommended by me today. Importance of maintaining follow-up discussed and patient accepts that missed appointments can delay diagnosis and potentially lead to worsening of conditions.  Return in about 3 months (around 1/10/2023)., or sooner if acute issues arise.    Lab Frequency Next Occurrence       This document has been electronically signed by RUBIO Hardin on October 10, 2022 12:15 CDT

## 2022-10-07 ENCOUNTER — TELEPHONE (OUTPATIENT)
Dept: PODIATRY | Facility: CLINIC | Age: 72
End: 2022-10-07

## 2022-10-10 ENCOUNTER — OFFICE VISIT (OUTPATIENT)
Dept: PODIATRY | Facility: CLINIC | Age: 72
End: 2022-10-10

## 2022-10-10 VITALS
BODY MASS INDEX: 35.35 KG/M2 | OXYGEN SATURATION: 99 % | HEIGHT: 72 IN | HEART RATE: 69 BPM | WEIGHT: 261 LBS | SYSTOLIC BLOOD PRESSURE: 120 MMHG | DIASTOLIC BLOOD PRESSURE: 78 MMHG

## 2022-10-10 DIAGNOSIS — R25.1 TREMOR: ICD-10-CM

## 2022-10-10 DIAGNOSIS — R60.9 PERIPHERAL EDEMA: ICD-10-CM

## 2022-10-10 DIAGNOSIS — G62.9 NEUROPATHY: ICD-10-CM

## 2022-10-10 DIAGNOSIS — M79.671 PAIN IN BOTH FEET: ICD-10-CM

## 2022-10-10 DIAGNOSIS — L60.2 ONYCHOGRYPHOSIS: Primary | ICD-10-CM

## 2022-10-10 DIAGNOSIS — M79.672 PAIN IN BOTH FEET: ICD-10-CM

## 2022-10-10 PROCEDURE — 11721 DEBRIDE NAIL 6 OR MORE: CPT | Performed by: NURSE PRACTITIONER

## 2022-10-31 ENCOUNTER — OFFICE VISIT (OUTPATIENT)
Dept: FAMILY MEDICINE CLINIC | Facility: CLINIC | Age: 72
End: 2022-10-31

## 2022-10-31 VITALS
RESPIRATION RATE: 18 BRPM | HEIGHT: 72 IN | SYSTOLIC BLOOD PRESSURE: 136 MMHG | OXYGEN SATURATION: 98 % | WEIGHT: 263.2 LBS | BODY MASS INDEX: 35.65 KG/M2 | DIASTOLIC BLOOD PRESSURE: 84 MMHG | HEART RATE: 64 BPM

## 2022-10-31 DIAGNOSIS — M54.9 CHRONIC BACK PAIN, UNSPECIFIED BACK LOCATION, UNSPECIFIED BACK PAIN LATERALITY: ICD-10-CM

## 2022-10-31 DIAGNOSIS — R73.01 ELEVATED FASTING GLUCOSE: Chronic | ICD-10-CM

## 2022-10-31 DIAGNOSIS — I10 PRIMARY HYPERTENSION: Chronic | ICD-10-CM

## 2022-10-31 DIAGNOSIS — R26.9 GAIT DIFFICULTY: ICD-10-CM

## 2022-10-31 DIAGNOSIS — I50.32 CHRONIC DIASTOLIC CONGESTIVE HEART FAILURE: ICD-10-CM

## 2022-10-31 DIAGNOSIS — E87.6 HYPOPOTASSEMIA: Chronic | ICD-10-CM

## 2022-10-31 DIAGNOSIS — G89.29 CHRONIC BACK PAIN, UNSPECIFIED BACK LOCATION, UNSPECIFIED BACK PAIN LATERALITY: ICD-10-CM

## 2022-10-31 DIAGNOSIS — D64.9 ANEMIA, UNSPECIFIED TYPE: ICD-10-CM

## 2022-10-31 DIAGNOSIS — E55.9 VITAMIN D DEFICIENCY: Primary | ICD-10-CM

## 2022-10-31 DIAGNOSIS — E78.2 MIXED HYPERLIPIDEMIA: Chronic | ICD-10-CM

## 2022-10-31 DIAGNOSIS — Z23 NEED FOR IMMUNIZATION AGAINST INFLUENZA: ICD-10-CM

## 2022-10-31 DIAGNOSIS — J44.9 CHRONIC OBSTRUCTIVE PULMONARY DISEASE, UNSPECIFIED COPD TYPE: Chronic | ICD-10-CM

## 2022-10-31 DIAGNOSIS — R60.0 EDEMA LEG: ICD-10-CM

## 2022-10-31 DIAGNOSIS — E61.1 IRON DEFICIENCY: ICD-10-CM

## 2022-10-31 DIAGNOSIS — R25.1 TREMOR: ICD-10-CM

## 2022-10-31 DIAGNOSIS — K21.9 GASTROESOPHAGEAL REFLUX DISEASE, UNSPECIFIED WHETHER ESOPHAGITIS PRESENT: Chronic | ICD-10-CM

## 2022-10-31 PROCEDURE — 90662 IIV NO PRSV INCREASED AG IM: CPT | Performed by: FAMILY MEDICINE

## 2022-10-31 PROCEDURE — G0008 ADMIN INFLUENZA VIRUS VAC: HCPCS | Performed by: FAMILY MEDICINE

## 2022-10-31 PROCEDURE — 99214 OFFICE O/P EST MOD 30 MIN: CPT | Performed by: FAMILY MEDICINE

## 2022-10-31 NOTE — PROGRESS NOTES
Pt here to be seen and discussed the benefits and risks of high dose flu shot, Fluzone, as pt is due. Copy of VIS given to pt and verbal and written consent given. Given in left deltoid site, tolerated well.

## 2022-10-31 NOTE — PROGRESS NOTES
Subjective   Felix ALMEIDA Tyron is a 72 y.o. male presenting with chief complaint of:   Chief Complaint   Patient presents with   • COPD   • Hypertension   • Hyperlipidemia     Patient states that he is here today for 6 shelli FU    Answers for HPI/ROS submitted by the patient on 10/24/2022  What is the primary reason for your visit?: Physical      History of Present Illness :  Alone.   Here for review of chronic problems that includes HTN and others.      Has multiple chronic problems to consider that might have a bearing on today's issues;  an interval appointment.       Chronic/acute problems reviewed today:   1. Vitamin D deficiency    2. Need for immunization against influenza    3. Mixed hyperlipidemia    4. Primary hypertension    5. Chronic diastolic congestive heart failure (HCC)    6. Elevated fasting glucose    7. Gastroesophageal reflux disease, unspecified whether esophagitis present    8. Hypopotassemia-diuretic    9. Anemia, unspecified type    10. Chronic back pain, unspecified back location, unspecified back pain laterality    11. Tremor    12. Chronic obstructive pulmonary disease, unspecified COPD type (HCC)    13. Edema leg-CHF/d, obesity, copd, cirrhosis    14. Gait difficulty    15. Iron deficiency      Has an/another acute issue today: none.    The following portions of the patient's history were reviewed and updated as appropriate: allergies, current medications, past family history, past medical history, past social history, past surgical history and problem list.      Current Outpatient Medications:   •  acetaminophen (TYLENOL) 650 MG 8 hr tablet, Take 650 mg by mouth 2 (Two) Times a Day., Disp: , Rfl:   •  calcium-vitamin D (OSCAL-500) 500-200 MG-UNIT per tablet, Take 1 tablet by mouth 2 times daily.  , Disp: , Rfl:   •  cholecalciferol (VITAMIN D3) 10 MCG (400 UNIT) tablet, Take 400 Units by mouth., Disp: , Rfl:   •  diphenhydrAMINE (BENADRYL) 25 mg capsule, Take 25 mg by mouth 2 (Two)  Times a Day., Disp: , Rfl:   •  escitalopram (LEXAPRO) 20 MG tablet, Take 1 tablet by mouth Daily., Disp: 90 tablet, Rfl: 3  •  ferrous sulfate 325 (65 FE) MG tablet, Take 325 mg by mouth Daily With Breakfast., Disp: , Rfl:   •  FLOVENT  MCG/ACT inhaler, INHALE 1 PUFF BY MOUTH TWICE DAILY, Disp: 12 g, Rfl: 5  •  fluticasone (FLONASE) 50 MCG/ACT nasal spray, , Disp: , Rfl:   •  FLUTICASONE PROPIONATE, NASAL, NA, 1 spray into the nostril(s) as directed by provider Daily., Disp: , Rfl:   •  KETOTIFEN FUMARATE OP, Apply 1 drop to eye(s) as directed by provider As Needed., Disp: , Rfl:   •  losartan (COZAAR) 50 MG tablet, Take 1 tablet by mouth Daily., Disp: 90 tablet, Rfl: 3  •  metoprolol tartrate (LOPRESSOR) 50 MG tablet, Take 1 tablet by mouth Every 12 (Twelve) Hours., Disp: 180 tablet, Rfl: 3  •  Multiple Vitamins-Minerals (CENTRUM SILVER 50+MEN) tablet, Take 1 tablet by mouth Daily., Disp: , Rfl:   •  O2 (OXYGEN), Inhale 1 (One) Time., Disp: , Rfl:   •  omeprazole (priLOSEC) 20 MG capsule, 20 mg 2 (Two) Times a Day., Disp: , Rfl:   •  potassium chloride (KLOR-CON) 10 MEQ CR tablet, Take 1 tablet by mouth 2 (Two) Times a Day., Disp: 180 tablet, Rfl: 3  •  primidone (MYSOLINE) 50 MG tablet, Take 1 tablet by mouth 2 (Two) Times a Day., Disp: 180 tablet, Rfl: 3  •  simvastatin (ZOCOR) 20 MG tablet, Take 1 tablet by mouth Daily., Disp: 90 tablet, Rfl: 3  •  tamsulosin (FLOMAX) 0.4 MG capsule 24 hr capsule, Take 1 capsule by mouth Daily., Disp: 90 capsule, Rfl: 3  •  sodium-potassium-magnesium sulfates (Suprep Bowel Prep Kit) 17.5-3.13-1.6 GM/177ML solution oral solution, Take as directed, Disp: 177 mL, Rfl: 0    No problems with medications.    No Known Allergies    Review of Systems  GENERAL:  Inactive/slower with limits, speed, stamina for age and fatigue. Sleep is ok with HOB up. No fever now/recent.  ENDO:  No syncope, near or diaphoretic sweaty spells.  HEENT: No change occ headache.   No vision change.    Same significant hearing loss.  Ears without pain/drainage.  No sore throat.  No significant nasal/sinus congestion/drainage. No epistaxis.  CHEST: No chest wall tenderness or mass. No change cough, with occ wheeze.  Stable/occ SOB; no hemoptysis.  CV: No chest pain, palpitations, less daily ankle edema.  GI: No heartburn, dysphagia.  No abdominal pain, diarrhea, constipation.  No rectal bleeding, or melena.    :  Voids without dysuria, or incontinence to completion; sees sony  ORTHO: No painful/swollen joints but various on /off sore.  No change sore neck; more pain lower back.  No acute neck or back pain without recent injury.  NEURO: No dizziness, weakness of extremities.  No change UE/LE numbness/paresthesias.   Continued RUE > LUE tremor.   PSYCH: No memory loss.  Mood good; occ anxious, depressed but/and not suicidal.  Tries to tolerate stress   Screening:  Mammogram: NA  Bone density: 3.2.2017 [facility]/Ts LS -1.8 hip -0.5-offered  Low dose CT chest:  IMPRESSION: low dose CT 6.1.22  No new or enlarging pulmonary nodule.  Lung-RADS 1: Negative   No nodules and definitely benign nodules.  Continue annual screening with low-dose CT in 12 months.  GI:   Colonoscopy-polyp/Ji/3.18.16/5 yr-reminded  EGD-neg/Gil/CARLY/5.1.18  Prostate: sony confirmed 10.31.22  advised sony 4.28.22  sony last 11.5.20  sony confirmed 3.28.19  Usual lab order  6m CBC, CMP, A1c, iron  12m CBC, CMP, A1c LIPID, TSH, T4, Vit D, PSAs, B12, folate, iron, ferritin, % sat iron, retic count      Copy/paste function used for ROS/exam AND each area of these were reviewed, updated, confirmed and supplemented as needed.  Data reviewed:   Recent admit/ER/MD visits: 4.28.22  Discussion:  BP ok  Other vitals ok  All labs due     Controlled substance form  Last cardiac testing:   Echo: Results for orders placed in visit on 08/21/17    Adult Transthoracic Echo Complete    Interpretation Summary  · Left ventricular systolic function is  normal. Estimated EF = 60%.  · Left ventricular wall thickness is consistent with mild concentric hypertrophy.  · Left ventricular diastolic dysfunction (grade I) consistent with impaired relaxation.  · No evidence of pulmonary hypertension is present.    Radiology considered:   No radiology results for the last 90 days.    Lab Results:  Results for orders placed or performed in visit on 04/28/22   Comprehensive Metabolic Panel    Specimen: Blood   Result Value Ref Range    Glucose 92 65 - 99 mg/dL    BUN 9 8 - 23 mg/dL    Creatinine 1.06 0.76 - 1.27 mg/dL    EGFR Result 74.1 >60.0 mL/min/1.73    BUN/Creatinine Ratio 8.5 7.0 - 25.0    Sodium 140 136 - 145 mmol/L    Potassium 3.8 3.5 - 5.2 mmol/L    Chloride 105 98 - 107 mmol/L    Total CO2 26.0 22.0 - 29.0 mmol/L    Calcium 9.4 8.6 - 10.5 mg/dL    Total Protein 6.3 6.0 - 8.5 g/dL    Albumin 3.80 3.50 - 5.20 g/dL    Globulin 2.5 gm/dL    A/G Ratio 1.5 g/dL    Total Bilirubin 0.2 0.0 - 1.2 mg/dL    Alkaline Phosphatase 102 39 - 117 U/L    AST (SGOT) 13 1 - 40 U/L    ALT (SGPT) 16 1 - 41 U/L   Iron Profile    Specimen: Blood   Result Value Ref Range    TIBC 301 mcg/dL    UIBC 208 112 - 346 mcg/dL    Iron 93 59 - 158 mcg/dL    Iron Saturation 31 20 - 50 %   Vitamin B12    Specimen: Blood   Result Value Ref Range    Vitamin B-12 496 211 - 946 pg/mL   Folate    Specimen: Blood   Result Value Ref Range    Folate 7.36 4.78 - 24.20 ng/mL   TSH    Specimen: Blood   Result Value Ref Range    TSH 0.972 0.270 - 4.200 uIU/mL   PSA Screen    Specimen: Blood   Result Value Ref Range    PSA 0.937 0.000 - 4.000 ng/mL   Uric Acid    Specimen: Blood   Result Value Ref Range    Uric Acid 5.0 3.4 - 7.0 mg/dL   CBC & Differential    Specimen: Blood   Result Value Ref Range    WBC 13.65 (H) 3.40 - 10.80 10*3/mm3    RBC 3.07 (L) 4.14 - 5.80 10*6/mm3    Hemoglobin 10.2 (L) 13.0 - 17.7 g/dL    Hematocrit 30.8 (L) 37.5 - 51.0 %    .3 (H) 79.0 - 97.0 fL    MCH 33.2 (H) 26.6 - 33.0 pg     MCHC 33.1 31.5 - 35.7 g/dL    RDW 12.4 12.3 - 15.4 %    Platelets 243 140 - 450 10*3/mm3    Neutrophil Rel % 47.1 42.7 - 76.0 %    Lymphocyte Rel % 44.0 19.6 - 45.3 %    Monocyte Rel % 5.7 5.0 - 12.0 %    Eosinophil Rel % 2.6 0.3 - 6.2 %    Basophil Rel % 0.3 0.0 - 1.5 %    Neutrophils Absolute 6.43 1.70 - 7.00 10*3/mm3    Lymphocytes Absolute 6.00 (H) 0.70 - 3.10 10*3/mm3    Monocytes Absolute 0.78 0.10 - 0.90 10*3/mm3    Eosinophils Absolute 0.36 0.00 - 0.40 10*3/mm3    Basophils Absolute 0.04 0.00 - 0.20 10*3/mm3    Immature Granulocyte Rel % 0.3 0.0 - 0.5 %    Immature Grans Absolute 0.04 0.00 - 0.05 10*3/mm3    nRBC 0.0 0.0 - 0.2 /100 WBC       A1C:No results for input(s): HGBA1C in the last 78996 hours.  GLUCOSE:  Lab Results - Last 18 Months   Lab Units 04/28/22  1108 10/21/21  1350   GLUCOSE mg/dL 92 87     LIPID:No results for input(s): CHLPL, LDL, HDL, TRIG in the last 29710 hours.  PSA:  Lab Results - Last 18 Months   Lab Units 04/28/22  1108   PSA ng/mL 0.937       CBC:  Lab Results - Last 18 Months   Lab Units 04/28/22  1108 10/21/21  1350   WBC 10*3/mm3 13.65* 13.1*   HEMOGLOBIN g/dL 10.2* 10.3*   HEMATOCRIT % 30.8* 30.7*   PLATELETS 10*3/mm3 243 261   IRON mcg/dL 93  --       BMP/CMP:  Lab Results - Last 18 Months   Lab Units 04/28/22  1108 10/21/21  1350   SODIUM mmol/L 140 138   POTASSIUM mmol/L 3.8 4.4   CHLORIDE mmol/L 105 102   TOTAL CO2 mmol/L 26.0 24   GLUCOSE mg/dL 92 87   BUN mg/dL 9 8   CREATININE mg/dL 1.06 1.05   EGFR IF NONAFRICN AM mL/min/1.73  --  70   EGFR IF AFRICN AM mL/min/1.73  --  81   EGFR RESULT mL/min/1.73 74.1  --    CALCIUM mg/dL 9.4 9.1     HEPATIC:  Lab Results - Last 18 Months   Lab Units 04/28/22  1108 10/21/21  1350   ALT (SGPT) U/L 16 20   AST (SGOT) U/L 13 18   ALK PHOS U/L 102 104     Vit D:  Lab Results - Last 18 Months   Lab Units 10/21/21  1350   VIT D 25 HYDROXY ng/mL 25.4*     THYROID:  Lab Results - Last 18 Months   Lab Units 04/28/22  1108 10/21/21  1350   TSH  "uIU/mL 0.972 0.752       Objective   /84 (BP Location: Right arm, Patient Position: Sitting, Cuff Size: Adult)   Pulse 64   Resp 18   Ht 182.9 cm (72\")   Wt 119 kg (263 lb 3.2 oz)   SpO2 98%   BMI 35.70 kg/m²   Body mass index is 35.7 kg/m².    Recent Vitals       6/27/2022 10/10/2022 10/31/2022       BP: 118/80 120/78 136/84     Pulse: 61 69 64     Weight: 79.8 kg (176 lb) 118 kg (261 lb) 119 kg (263 lb 3.2 oz)     BMI (Calculated): 23.9 35.4 35.7         Wt Readings from Last 15 Encounters:   10/31/22 1114 119 kg (263 lb 3.2 oz)   10/10/22 1043 118 kg (261 lb)   06/27/22 1111 79.8 kg (176 lb)   05/23/22 1006 79.4 kg (175 lb)   04/28/22 1143 126 kg (278 lb)   10/21/21 1411 127 kg (279 lb)   05/11/21 1402 127 kg (279 lb)   11/05/20 1431 121 kg (267 lb)   04/09/20 1318 121 kg (267 lb 3.2 oz)   10/03/19 1418 129 kg (285 lb)   04/25/19 1434 132 kg (290 lb)   03/28/19 1452 133 kg (293 lb)   08/27/18 1321 127 kg (281 lb)   05/01/18 0823 127 kg (280 lb)   03/27/18 0934 125 kg (275 lb)       Physical Exam  GENERAL:  Well nourished/developed in no acute distress. Obese  SKIN: Turgor excellent, without wound, rash, lesion.  HEENT: Normal cephalic without trauma.  Pupils equal round reactive to light. Extraocular motions full without nystagmus.   External canals moderate wax but nonobstructive nontender without reddness. Tymphatic membranes seen anders with david structures intact.   Oral cavity without growths, exudates, and moist.  Posterior pharynx without mass, obstruction, redness.  No thyromegaly, mass, tenderness, lymphadenopathy and supple.  CV: Regular rhythm.  No murmur, gallop,  edema. Posterior pulses intact.  No carotid bruits.  CHEST: No chest wall tenderness or mass.   LUNGS: Symmetric motion with clear/reduced to auscultation.  No dullness to percussion  ABD: Soft, nontender without mass.   ORTHO: Symmetric extremities without swelling/point tenderness.  Full gross range of motion.  NEURO: CN 2-12 " grossly intact.  Symmetric facies. 1/4 x bicep equal reflexes.  UE/LE   3/5 strength throughout.  Nonfocal use extremities. Speech clear.  Reduced light touch with monofilament, vibratory sensation with tuning fork; equal toes/distal feet.  Intention tremor.     PSYCH: Oriented x 3.  Pleasant calm, well kept.  Purposeful/directed conservation with intact short/long gross    Assessment & Plan     1. Vitamin D deficiency    2. Need for immunization against influenza    3. Mixed hyperlipidemia    4. Primary hypertension    5. Chronic diastolic congestive heart failure (HCC)    6. Elevated fasting glucose    7. Gastroesophageal reflux disease, unspecified whether esophagitis present    8. Hypopotassemia-diuretic    9. Anemia, unspecified type    10. Chronic back pain, unspecified back location, unspecified back pain laterality    11. Tremor    12. Chronic obstructive pulmonary disease, unspecified COPD type (HCC)    13. Edema leg-CHF/d, obesity, copd, cirrhosis    14. Gait difficulty    15. Iron deficiency        Discussions/medical decisions/reviews:  BP ok  Other vitals ok  DM/BS 92 4.28.22  Lipid due  PSA ok 4.28.22  CBC stable 10.2/normal iron 4.28.22-worrisome behind on colonoscopy  Renal ok 4.28.22  Liver ok 4.28.22  Vit D 25.4 10.21.21  Thyroid TSH ok 4.28.22    Data review above:   Rx: reviewed and decisions:   Rx new/changes: none  No orders of the defined types were placed in this encounter.    Badly needs colonoscopy; anemia could have malignant conquences  Lab update  When on iron; needs periodic lab test    Orders placed:   LAB/Testing/Referrals: reviewed/orders:   Today: future  Orders Placed This Encounter   Procedures   • Fluzone High-Dose 65+yrs (8288-5246)   • TSH   • T4, Free   • Ferritin   • Hemoglobin A1c   • Comprehensive Metabolic Panel   • Vitamin D,25-Hydroxy   • Iron Profile   • Lipid Panel   • CBC & Differential     Chronic/recurrent labs above or change to:   Same     Screening  reviewed/updated     Immunization History   Administered Date(s) Administered   • COVID-19 (MODERNA) 1st, 2nd, 3rd Dose Only 03/06/2021, 04/07/2021   • FLUAD TRI 65YR+ 10/03/2019   • Flu Vaccine Quad PF >36MO 10/31/2017   • Fluad Quad 65+ 10/01/2020   • Fluzone High-Dose 65+yrs 10/31/2022   • Fluzone Quad >6mos (Multi-dose) 01/27/2016, 10/21/2016, 10/31/2017   • Pneumococcal Conjugate 13-Valent (PCV13) 01/27/2016   • Pneumococcal, Unspecified 12/12/2013     Vaccine reviewed: today flu; later we advised/reaffirmed our support/suggestion for staying complete with covid- covid boosters, seasonal flu/yearly and any missing vaccine from list we supplied; we suggest contact with local health department office to review missing/needed vaccines and then bring nursing documentation for these vaccines to this office.     Health maintenance:   Body mass index is 35.7 kg/m².  Class 2 Severe Obesity (BMI >=35 and <=39.9). Obesity-related health conditions include the following: hypertension. Obesity is unchanged. BMI is is above average; BMI management plan is completed. We discussed portion control and increasing exercise.      Tobacco use reviewed:   Felix ALMEIDA Sr.  reports that he has been smoking cigarettes. He started smoking about 56 years ago. He has a 25.00 pack-year smoking history. He has never used smokeless tobacco.. I have educated him on the risk of diseases from using tobacco products such as cancer, COPD and heart disease.     I advised him to quit and he is not willing to quit.    I spent 1 minutes counseling the patient.       Reminded; as discussed before.      There are no Patient Instructions on file for this visit.    Visit today involved chronic significant medical problems or differentials and/or intensive drug monitoring: ie potential to cause serious morbidity or death:     Follow up: Return for fasting lab when able then Dr Abraham hall.  Future Appointments   Date Time Provider Department Center    12/8/2022  2:10 PM Felix Ramos MD [facility]   1/16/2023 10:30 AM Shane Perez APRN [facility]   5/1/2023 11:30 AM Bassam Rosario MD [facility]

## 2023-01-13 ENCOUNTER — TELEPHONE (OUTPATIENT)
Dept: PODIATRY | Facility: CLINIC | Age: 73
End: 2023-01-13
Payer: MEDICARE

## 2023-03-08 ENCOUNTER — TELEPHONE (OUTPATIENT)
Dept: PODIATRY | Facility: CLINIC | Age: 73
End: 2023-03-08
Payer: MEDICARE

## 2023-03-08 NOTE — TELEPHONE ENCOUNTER
Called patient regarding appt on 03/09/2023. Left message for patient to return call if any questions or concerns arise.

## 2023-03-09 ENCOUNTER — OFFICE VISIT (OUTPATIENT)
Dept: PODIATRY | Facility: CLINIC | Age: 73
End: 2023-03-09
Payer: MEDICARE

## 2023-03-09 VITALS
SYSTOLIC BLOOD PRESSURE: 132 MMHG | BODY MASS INDEX: 34.81 KG/M2 | OXYGEN SATURATION: 98 % | HEART RATE: 63 BPM | HEIGHT: 72 IN | DIASTOLIC BLOOD PRESSURE: 70 MMHG | WEIGHT: 257 LBS

## 2023-03-09 DIAGNOSIS — L60.2 ONYCHOGRYPHOSIS: Primary | ICD-10-CM

## 2023-03-09 DIAGNOSIS — R60.9 PERIPHERAL EDEMA: ICD-10-CM

## 2023-03-09 DIAGNOSIS — R25.1 TREMOR: ICD-10-CM

## 2023-03-09 DIAGNOSIS — G62.9 NEUROPATHY: ICD-10-CM

## 2023-03-09 DIAGNOSIS — M79.671 PAIN IN BOTH FEET: ICD-10-CM

## 2023-03-09 DIAGNOSIS — M79.672 PAIN IN BOTH FEET: ICD-10-CM

## 2023-03-09 PROCEDURE — 1160F RVW MEDS BY RX/DR IN RCRD: CPT | Performed by: NURSE PRACTITIONER

## 2023-03-09 PROCEDURE — 3078F DIAST BP <80 MM HG: CPT | Performed by: NURSE PRACTITIONER

## 2023-03-09 PROCEDURE — 1159F MED LIST DOCD IN RCRD: CPT | Performed by: NURSE PRACTITIONER

## 2023-03-09 PROCEDURE — 3075F SYST BP GE 130 - 139MM HG: CPT | Performed by: NURSE PRACTITIONER

## 2023-03-09 PROCEDURE — 11721 DEBRIDE NAIL 6 OR MORE: CPT | Performed by: NURSE PRACTITIONER

## 2023-05-02 ENCOUNTER — OFFICE VISIT (OUTPATIENT)
Dept: FAMILY MEDICINE CLINIC | Facility: CLINIC | Age: 73
End: 2023-05-02
Payer: MEDICARE

## 2023-05-02 VITALS
OXYGEN SATURATION: 98 % | BODY MASS INDEX: 36.68 KG/M2 | WEIGHT: 270.8 LBS | DIASTOLIC BLOOD PRESSURE: 86 MMHG | TEMPERATURE: 96.8 F | HEART RATE: 66 BPM | RESPIRATION RATE: 18 BRPM | HEIGHT: 72 IN | SYSTOLIC BLOOD PRESSURE: 162 MMHG

## 2023-05-02 DIAGNOSIS — Z72.0 TOBACCO ABUSE: ICD-10-CM

## 2023-05-02 DIAGNOSIS — E66.01 CLASS 2 SEVERE OBESITY DUE TO EXCESS CALORIES WITH SERIOUS COMORBIDITY AND BODY MASS INDEX (BMI) OF 36.0 TO 36.9 IN ADULT: ICD-10-CM

## 2023-05-02 DIAGNOSIS — Z87.891 PERSONAL HISTORY OF NICOTINE DEPENDENCE: ICD-10-CM

## 2023-05-02 DIAGNOSIS — I10 ESSENTIAL HYPERTENSION: Primary | ICD-10-CM

## 2023-05-02 DIAGNOSIS — E61.1 IRON DEFICIENCY: ICD-10-CM

## 2023-05-02 DIAGNOSIS — Z23 NEED FOR VACCINATION: ICD-10-CM

## 2023-05-02 DIAGNOSIS — R79.9 ABNORMAL FINDING OF BLOOD CHEMISTRY, UNSPECIFIED: ICD-10-CM

## 2023-05-02 DIAGNOSIS — Z12.5 ENCOUNTER FOR PROSTATE CANCER SCREENING: ICD-10-CM

## 2023-05-02 DIAGNOSIS — E78.2 MIXED HYPERLIPIDEMIA: ICD-10-CM

## 2023-05-02 DIAGNOSIS — E55.9 VITAMIN D DEFICIENCY: ICD-10-CM

## 2023-05-02 DIAGNOSIS — I50.32 CHRONIC DIASTOLIC CONGESTIVE HEART FAILURE: ICD-10-CM

## 2023-05-02 NOTE — PROGRESS NOTES
Subjective   Chief Complaint:   Medication checkup    History of Present Illness:  This 73 y.o. male was seen in the office today.      Hypertension: Patient has not had medication today. Presents with 162/86 mmHg. Repeat blood pressure is 152/62 mmHg. Denies any chest pain or palpitations. He reports he has not been self monitoring at home, but he does have a wrist cuff.    Hyperlipidemia: On statins. Denies any ill effects.    PSA: Due for labs.    Diastolic heart failure: Denies any chest pain or shortness of breath with activity. Denies any increased swelling. Has been managed by Dr. Rosario. Last ejection fraction was 60.    Iron deficiency: Due for labs.    Tobacco abuse: We will be due in a few weeks for low dose CAT scan.    Cancer screening: Declines counseling today. Denies desire to set a stop date or help with quitting.    Preventative care: Needs PCV 20 vaccine.    No Known Allergies   Current Outpatient Medications on File Prior to Visit   Medication Sig   • acetaminophen (TYLENOL) 650 MG 8 hr tablet Take 1 tablet by mouth 2 (Two) Times a Day.   • calcium-vitamin D (OSCAL-500) 500-200 MG-UNIT per tablet Take 1 tablet by mouth 2 times daily.     • cholecalciferol (VITAMIN D3) 10 MCG (400 UNIT) tablet Take 1 tablet by mouth.   • diphenhydrAMINE (BENADRYL) 25 mg capsule Take 1 capsule by mouth 2 (Two) Times a Day.   • escitalopram (LEXAPRO) 20 MG tablet Take 1 tablet by mouth Daily.   • ferrous sulfate 325 (65 FE) MG tablet Take 1 tablet by mouth Daily With Breakfast.   • FLOVENT  MCG/ACT inhaler INHALE 1 PUFF BY MOUTH TWICE DAILY   • fluticasone (FLONASE) 50 MCG/ACT nasal spray    • FLUTICASONE PROPIONATE, NASAL, NA 1 spray into the nostril(s) as directed by provider Daily.   • KETOTIFEN FUMARATE OP Apply 1 drop to eye(s) as directed by provider As Needed.   • losartan (COZAAR) 50 MG tablet Take 1 tablet by mouth Daily.   • metoprolol tartrate (LOPRESSOR) 50 MG tablet Take 1 tablet by mouth Every  12 (Twelve) Hours.   • Multiple Vitamins-Minerals (CENTRUM SILVER 50+MEN) tablet Take 1 tablet by mouth Daily.   • O2 (OXYGEN) Inhale 1 (One) Time.   • omeprazole (priLOSEC) 20 MG capsule 1 capsule 2 (Two) Times a Day.   • potassium chloride (KLOR-CON) 10 MEQ CR tablet Take 1 tablet by mouth 2 (Two) Times a Day.   • primidone (MYSOLINE) 50 MG tablet Take 1 tablet by mouth 2 (Two) Times a Day.   • simvastatin (ZOCOR) 20 MG tablet Take 1 tablet by mouth Daily.   • sodium-potassium-magnesium sulfates (Suprep Bowel Prep Kit) 17.5-3.13-1.6 GM/177ML solution oral solution Take as directed   • tamsulosin (FLOMAX) 0.4 MG capsule 24 hr capsule Take 1 capsule by mouth Daily.     No current facility-administered medications on file prior to visit.      Past Medical, Surgical, Social, and Family History:  Past Medical History:   Diagnosis Date   • Acid reflux    • Arthritis    • Asthma    • COPD (chronic obstructive pulmonary disease)    • Hyperlipidemia    • Hypertension    • Oxygen dependent     at hs   • Renal cancer 08/2014    Lt RALPart'l Nx; t1a Clear Cell with negative margin     Past Surgical History:   Procedure Laterality Date   • APPENDECTOMY     • CHOLECYSTECTOMY     • ENDOSCOPY N/A 5/1/2018    Procedure: ESOPHAGOGASTRODUODENOSCOPY WITH ANESTHESIA;  Surgeon: Donnell Cordero DO;  Location: [facility] ENDOSCOPY;  Service: Gastroenterology   • HERNIA REPAIR     • KIDNEY SURGERY     • LAPAROSCOPIC PARTIAL NEPHRECTOMY Left 08/14/2014    Robot Assisted     Social History     Socioeconomic History   • Marital status:      Spouse name: Melanie   • Number of children: 1   • Years of education: 12   Tobacco Use   • Smoking status: Every Day     Packs/day: 0.50     Years: 50.00     Pack years: 25.00     Types: Cigarettes     Start date: 4/30/1966   • Smokeless tobacco: Never   • Tobacco comments:     8/27/18 Smoking 5 cigarettes per day.   Vaping Use   • Vaping Use: Never used   Substance and Sexual Activity   • Alcohol use:  "No   • Drug use: No   • Sexual activity: Never     Family History   Problem Relation Age of Onset   • Colon cancer Father    • Heart disease Mother    • Colon polyps Neg Hx    • Esophageal cancer Neg Hx        Objective   Physical Exam  Constitutional:       General: He is not in acute distress.     Appearance: He is obese.   Neck:      Vascular: No carotid bruit.   Cardiovascular:      Rate and Rhythm: Normal rate and regular rhythm.      Pulses: Normal pulses.           Dorsalis pedis pulses are 2+ on the right side and 2+ on the left side.        Posterior tibial pulses are 2+ on the right side and 2+ on the left side.      Heart sounds: No murmur heard.    No friction rub. No gallop.   Pulmonary:      Effort: Pulmonary effort is normal. No respiratory distress.      Breath sounds: Normal breath sounds. No wheezing or rhonchi.   Musculoskeletal:      Right lower leg: No edema.      Left lower leg: No edema.   Neurological:      Mental Status: He is alert.     /86 (BP Location: Left arm, Patient Position: Sitting, Cuff Size: Adult)   Pulse 66   Temp 96.8 °F (36 °C) (Infrared)   Resp 18   Ht 182.9 cm (72\")   Wt 123 kg (270 lb 12.8 oz)   SpO2 98%   BMI 36.73 kg/m²     Prior Visit Notes/Records, Lab, Imaging, and Diagnostic Results Reviewed:  CBC:  Lab Results - Last 18 Months   Lab Units 04/28/22  1108   WBC 10*3/mm3 13.65*   HEMOGLOBIN g/dL 10.2*   HEMATOCRIT % 30.8*   PLATELETS 10*3/mm3 243   IRON mcg/dL 93      Chemistry:  Lab Results - Last 18 Months   Lab Units 04/28/22  1108   SODIUM mmol/L 140   POTASSIUM mmol/L 3.8   CHLORIDE mmol/L 105   TOTAL CO2 mmol/L 26.0   GLUCOSE mg/dL 92   BUN mg/dL 9   CREATININE mg/dL 1.06   EGFR RESULT mL/min/1.73 74.1   CALCIUM mg/dL 9.4     BMI Trend:  BMI Readings from Last 10 Encounters:   05/02/23 36.73 kg/m²   03/09/23 34.86 kg/m²   10/31/22 35.70 kg/m²   10/10/22 35.40 kg/m²   06/27/22 23.87 kg/m²   05/23/22 23.73 kg/m²   04/28/22 37.70 kg/m²   10/21/21 37.84 " kg/m²   05/11/21 37.84 kg/m²   11/05/20 36.21 kg/m²     Assessment & Plan   Diagnoses and all orders for this visit:    1. Essential hypertension (Primary)  -     Hemoglobin A1c  -     CBC & Differential  -     Comprehensive Metabolic Panel  -     TSH  -     T4, Free    2. Mixed hyperlipidemia  -     Lipid Panel    3. Encounter for prostate cancer screening  -     PSA Screen    4. Chronic diastolic congestive heart failure    5. Iron deficiency  -     Vitamin B12 & Folate  -     Iron Profile  -     Reticulocytes  -     Ferritin    6. Vitamin D deficiency  -     Vitamin D,25-Hydroxy    7. Abnormal finding of blood chemistry, unspecified  -     Hemoglobin A1c    8. Tobacco abuse  -     CT Chest Low Dose Wo; Future    9. Personal history of nicotine dependence  -     CT Chest Low Dose Wo; Future    10. Need for vaccination  -     Pneumococcal Conjugate Vaccine 20-Valent (PCV20)    Discussion:  Advised and educated plan of care. Advised updated labs today. Keep regular follow-ups and we will proceed with the CT low dose of chest without contrast.    Class 2 Severe Obesity (BMI >=35 and <=39.9). Obesity-related health conditions include the following: hypertension and dyslipidemias. Obesity is unchanged. BMI is is above average; BMI management plan is completed. We discussed portion control and increasing exercise.Follow-up:  Return in about 6 months (around 11/2/2023) for AWV: Make sure at least 366 days from last.    Transcribed from ambient dictation for RUBIO Carmona by Kayleen Castellanos.  05/02/23   11:26 CDT    I have personally performed the services described in this document as transcribed by the above individual, and it is both accurate and complete.    Electronically signed by RUBIO Ruano 05/02/23, 11:26 AM CDT.

## 2023-05-03 LAB
25(OH)D3+25(OH)D2 SERPL-MCNC: 37.6 NG/ML (ref 30–100)
ALBUMIN SERPL-MCNC: 4.1 G/DL (ref 3.5–5.2)
ALBUMIN/GLOB SERPL: 2 G/DL
ALP SERPL-CCNC: 84 U/L (ref 39–117)
ALT SERPL-CCNC: 18 U/L (ref 1–41)
AST SERPL-CCNC: 17 U/L (ref 1–40)
BASOPHILS # BLD AUTO: 0.05 10*3/MM3 (ref 0–0.2)
BASOPHILS NFR BLD AUTO: 0.4 % (ref 0–1.5)
BILIRUB SERPL-MCNC: 0.3 MG/DL (ref 0–1.2)
BUN SERPL-MCNC: 11 MG/DL (ref 8–23)
BUN/CREAT SERPL: 10.4 (ref 7–25)
CALCIUM SERPL-MCNC: 9.5 MG/DL (ref 8.6–10.5)
CHLORIDE SERPL-SCNC: 106 MMOL/L (ref 98–107)
CHOLEST SERPL-MCNC: 226 MG/DL (ref 0–200)
CO2 SERPL-SCNC: 27.9 MMOL/L (ref 22–29)
CREAT SERPL-MCNC: 1.06 MG/DL (ref 0.76–1.27)
EGFRCR SERPLBLD CKD-EPI 2021: 73.2 ML/MIN/1.73
EOSINOPHIL # BLD AUTO: 0.25 10*3/MM3 (ref 0–0.4)
EOSINOPHIL NFR BLD AUTO: 2.2 % (ref 0.3–6.2)
ERYTHROCYTE [DISTWIDTH] IN BLOOD BY AUTOMATED COUNT: 12.5 % (ref 12.3–15.4)
FERRITIN SERPL-MCNC: 348 NG/ML (ref 30–400)
FOLATE SERPL-MCNC: 11.9 NG/ML (ref 4.78–24.2)
GLOBULIN SER CALC-MCNC: 2.1 GM/DL
GLUCOSE SERPL-MCNC: 97 MG/DL (ref 65–99)
HBA1C MFR BLD: 6.1 % (ref 4.8–5.6)
HCT VFR BLD AUTO: 31.2 % (ref 37.5–51)
HDLC SERPL-MCNC: 35 MG/DL (ref 40–60)
HGB BLD-MCNC: 10.7 G/DL (ref 13–17.7)
IMM GRANULOCYTES # BLD AUTO: 0.05 10*3/MM3 (ref 0–0.05)
IMM GRANULOCYTES NFR BLD AUTO: 0.4 % (ref 0–0.5)
IRON SATN MFR SERPL: 39 % (ref 20–50)
IRON SERPL-MCNC: 132 MCG/DL (ref 59–158)
LDLC SERPL CALC-MCNC: 155 MG/DL (ref 0–100)
LYMPHOCYTES # BLD AUTO: 4.39 10*3/MM3 (ref 0.7–3.1)
LYMPHOCYTES NFR BLD AUTO: 38.4 % (ref 19.6–45.3)
MCH RBC QN AUTO: 33.9 PG (ref 26.6–33)
MCHC RBC AUTO-ENTMCNC: 34.3 G/DL (ref 31.5–35.7)
MCV RBC AUTO: 98.7 FL (ref 79–97)
MONOCYTES # BLD AUTO: 0.8 10*3/MM3 (ref 0.1–0.9)
MONOCYTES NFR BLD AUTO: 7 % (ref 5–12)
NEUTROPHILS # BLD AUTO: 5.9 10*3/MM3 (ref 1.7–7)
NEUTROPHILS NFR BLD AUTO: 51.6 % (ref 42.7–76)
NRBC BLD AUTO-RTO: 0 /100 WBC (ref 0–0.2)
PLATELET # BLD AUTO: 242 10*3/MM3 (ref 140–450)
POTASSIUM SERPL-SCNC: 4.3 MMOL/L (ref 3.5–5.2)
PROT SERPL-MCNC: 6.2 G/DL (ref 6–8.5)
PSA SERPL-MCNC: 1.15 NG/ML (ref 0–4)
RBC # BLD AUTO: 3.16 10*6/MM3 (ref 4.14–5.8)
RETICS/RBC NFR AUTO: 2.03 % (ref 0.7–1.9)
SODIUM SERPL-SCNC: 142 MMOL/L (ref 136–145)
T4 FREE SERPL-MCNC: 0.76 NG/DL (ref 0.93–1.7)
TIBC SERPL-MCNC: 341 MCG/DL
TRIGL SERPL-MCNC: 196 MG/DL (ref 0–150)
TSH SERPL DL<=0.005 MIU/L-ACNC: 0.39 UIU/ML (ref 0.27–4.2)
UIBC SERPL-MCNC: 209 MCG/DL (ref 112–346)
VIT B12 SERPL-MCNC: 452 PG/ML (ref 211–946)
VLDLC SERPL CALC-MCNC: 36 MG/DL (ref 5–40)
WBC # BLD AUTO: 11.44 10*3/MM3 (ref 3.4–10.8)

## 2023-05-03 NOTE — PROGRESS NOTES
Please call result - labs over all are stable.  Watch cholesterol intake, if numbers don't move much on the next lab, may need to increase med but we'll see.    Electronically signed by RUBIO Carmona, 05/03/23, 8:16 AM CDT.

## 2023-05-15 ENCOUNTER — TELEPHONE (OUTPATIENT)
Dept: FAMILY MEDICINE CLINIC | Facility: CLINIC | Age: 73
End: 2023-05-15

## 2023-05-15 NOTE — TELEPHONE ENCOUNTER
Caller: Felix ALMEIDA Sr.    Relationship: Self    Best call back number: [phone]    What form or medical record are you requesting: LATEST BLOOD TEST RESULTS    Who is requesting this form or medical record from you: PATIENT    How would you like to receive the form or medical records (pick-up, mail, fax): PATIENT IS STOPPING BY TO     Timeframe paperwork needed: ASAP

## 2023-06-06 ENCOUNTER — HOSPITAL ENCOUNTER (OUTPATIENT)
Dept: CT IMAGING | Facility: HOSPITAL | Age: 73
Discharge: HOME | End: 2023-06-06
Admitting: NURSE PRACTITIONER
Payer: MEDICARE

## 2023-06-06 DIAGNOSIS — Z87.891 PERSONAL HISTORY OF NICOTINE DEPENDENCE: ICD-10-CM

## 2023-06-06 DIAGNOSIS — Z72.0 TOBACCO ABUSE: ICD-10-CM

## 2023-06-06 PROCEDURE — 71271 CT THORAX LUNG CANCER SCR C-: CPT

## 2023-06-07 NOTE — PROGRESS NOTES
Please call result -chest CT is stable.  I would recommend a repeat in 1 year.  There was finding that showed a little bit of dilation of the central pulmonary artery suggesting some pulmonary artery hypertension.  This can be a result from blood pressures being elevated.  During his visit, he had not had his medication that morning and his blood pressure is elevated so the answer there was to take his medication.  Lets remind the patient not to miss any doses as blood pressure control is very important.    Electronically signed by RUBIO Carmona, 06/07/23, 7:59 AM CDT.

## 2023-07-31 ENCOUNTER — TELEPHONE (OUTPATIENT)
Dept: PODIATRY | Facility: CLINIC | Age: 73
End: 2023-07-31
Payer: OTHER GOVERNMENT

## 2023-08-01 ENCOUNTER — OFFICE VISIT (OUTPATIENT)
Dept: PODIATRY | Facility: CLINIC | Age: 73
End: 2023-08-01
Payer: MEDICARE

## 2023-08-01 VITALS
WEIGHT: 272 LBS | OXYGEN SATURATION: 98 % | SYSTOLIC BLOOD PRESSURE: 142 MMHG | BODY MASS INDEX: 36.84 KG/M2 | DIASTOLIC BLOOD PRESSURE: 68 MMHG | HEART RATE: 67 BPM | HEIGHT: 72 IN

## 2023-08-01 DIAGNOSIS — R60.9 PERIPHERAL EDEMA: ICD-10-CM

## 2023-08-01 DIAGNOSIS — G62.89 OTHER POLYNEUROPATHY: ICD-10-CM

## 2023-08-01 DIAGNOSIS — R26.81 UNSTEADY GAIT: ICD-10-CM

## 2023-08-01 DIAGNOSIS — L60.2 ONYCHOGRYPHOSIS: Primary | ICD-10-CM

## 2023-10-24 ENCOUNTER — OFFICE VISIT (OUTPATIENT)
Dept: NEUROLOGY | Facility: CLINIC | Age: 73
End: 2023-10-24
Payer: OTHER GOVERNMENT

## 2023-10-24 VITALS
SYSTOLIC BLOOD PRESSURE: 124 MMHG | DIASTOLIC BLOOD PRESSURE: 62 MMHG | BODY MASS INDEX: 38.01 KG/M2 | WEIGHT: 280.6 LBS | HEART RATE: 71 BPM | OXYGEN SATURATION: 98 % | HEIGHT: 72 IN

## 2023-10-24 DIAGNOSIS — R25.1 TREMOR: ICD-10-CM

## 2023-10-24 PROCEDURE — 99204 OFFICE O/P NEW MOD 45 MIN: CPT | Performed by: PSYCHIATRY & NEUROLOGY

## 2023-10-24 RX ORDER — PRIMIDONE 50 MG/1
TABLET ORAL
Qty: 360 TABLET | Refills: 3 | Status: SHIPPED | OUTPATIENT
Start: 2023-10-24

## 2023-10-24 NOTE — PROGRESS NOTES
Subjective        Felix ALMEIDA  presents to [facility]    History of Present Illness    Patient is a 73-year-old male with history of COPD, hyperlipidemia, hypertension, and a history of renal cancer, referred for evaluation of tremor.    He has a history of exposure to Agent Orange. 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 He developed a tremor right after his time in the service. He had been drinking alcohol heavily until 8 years ago and noticed the tremor was better with alcohol. His tremor has worsened over time. He localizes his tremor to his bilateral hands. He is unable to hold anything in his hands for long. He is taking primidone 1 tablet twice daily with only temporary benefit. He wakes up at 8:00 AM and takes his first dose of primidone at 9:00 AM. His tremor returns at approximately 6:00 PM or 7:00 PM. A home health care worker assists him in his home. Denies any significant balance/walking issues.     He has a family history of tremor in his uncle.      Past Medical History:   Diagnosis Date    Acid reflux     Arthritis     Asthma     COPD (chronic obstructive pulmonary disease)     CTS (carpal tunnel syndrome)     HL (hearing loss)     Hyperlipidemia     Hypertension     Oxygen dependent     at     Renal cancer 08/2014    Lt RALPart'l Nx; t1a Clear Cell with negative margin          Current Outpatient Medications:     acetaminophen (TYLENOL) 650 MG 8 hr tablet, Take 1 tablet by mouth 2 (Two) Times a Day., Disp: , Rfl:     calcium-vitamin D (OSCAL-500) 500-200 MG-UNIT per tablet, Take 1 tablet by mouth 2 times daily.  , Disp: , Rfl:     cholecalciferol (VITAMIN D3) 10 MCG (400 UNIT) tablet, Take 1 tablet by mouth., Disp: , Rfl:     diphenhydrAMINE (BENADRYL) 25 mg capsule, Take 1 capsule by mouth 2 (Two) Times a Day., Disp: , Rfl:     escitalopram (LEXAPRO) 20 MG tablet, Take 1 tablet by mouth Daily., Disp: 90 tablet, Rfl: 3     "ferrous sulfate 325 (65 FE) MG tablet, Take 1 tablet by mouth Daily With Breakfast., Disp: , Rfl:     fluticasone (FLONASE) 50 MCG/ACT nasal spray, , Disp: , Rfl:     KETOTIFEN FUMARATE OP, Apply 1 drop to eye(s) as directed by provider As Needed., Disp: , Rfl:     losartan (COZAAR) 50 MG tablet, Take 1 tablet by mouth Daily., Disp: 90 tablet, Rfl: 3    metoprolol tartrate (LOPRESSOR) 50 MG tablet, Take 1 tablet by mouth Every 12 (Twelve) Hours., Disp: 180 tablet, Rfl: 3    Multiple Vitamins-Minerals (CENTRUM SILVER 50+MEN) tablet, Take 1 tablet by mouth Daily., Disp: , Rfl:     O2 (OXYGEN), Inhale 1 (One) Time., Disp: , Rfl:     omeprazole (priLOSEC) 20 MG capsule, 1 capsule 2 (Two) Times a Day., Disp: , Rfl:     potassium chloride (KLOR-CON) 10 MEQ CR tablet, Take 1 tablet by mouth 2 (Two) Times a Day., Disp: 180 tablet, Rfl: 3    primidone (MYSOLINE) 50 MG tablet, Take 1 tablet by mouth 2 (Two) Times a Day., Disp: 180 tablet, Rfl: 3    simvastatin (ZOCOR) 20 MG tablet, Take 1 tablet by mouth Daily., Disp: 90 tablet, Rfl: 3    tamsulosin (FLOMAX) 0.4 MG capsule 24 hr capsule, Take 1 capsule by mouth Daily., Disp: 90 capsule, Rfl: 3    FLOVENT  MCG/ACT inhaler, INHALE 1 PUFF BY MOUTH TWICE DAILY (Patient not taking: Reported on 10/24/2023), Disp: 12 g, Rfl: 5       Objective   Vital Signs:   /62 (BP Location: Left arm, Patient Position: Sitting, Cuff Size: Large Adult)   Pulse 71   Ht 182.9 cm (72\")   Wt 127 kg (280 lb 9.6 oz)   SpO2 98%   BMI 38.06 kg/m²     Physical Exam  Constitutional:       General: He is awake.   Eyes:      Extraocular Movements: Extraocular movements intact.      Pupils: Pupils are equal, round, and reactive to light.   Neurological:      Mental Status: He is alert.      Motor: Motor strength is normal.     Deep Tendon Reflexes: Reflexes are normal and symmetric.   Psychiatric:         Speech: Speech normal.        Neurological Exam  Mental Status  Awake and alert. Oriented " to person, place and time. Recent and remote memory are intact. Speech is normal. Language is fluent with no aphasia. Attention and concentration are normal. Fund of knowledge is appropriate for level of education.    Cranial Nerves  CN II: Visual fields full to confrontation.  CN III, IV, VI: Extraocular movements intact bilaterally. Pupils equal round and reactive to light bilaterally.  CN V: Facial sensation is normal.  CN VII: Full and symmetric facial movement.  CN IX, X: Palate elevates symmetrically  CN XI: Shoulder shrug strength is normal.  CN XII: Tongue midline without atrophy or fasciculations.    Motor  Normal muscle bulk throughout. Normal muscle tone. Strength is 5/5 throughout all four extremities.  No bradykinesia or rigidity  High amplitude tremor with arms outstretched and FNF, worse on the left   No rest tremor.    Sensory  Light touch is normal in upper and lower extremities.     Reflexes  Deep tendon reflexes are 2+ and symmetric in all four extremities.    Coordination  Right: Finger-to-nose normal.Left: Finger-to-nose normal.    Gait    Walks with cane  Normal arm swing, no shuffling.      Result Review :                     Assessment and Plan     A 73-year-old male with hypertension, hyperlipidemia, COPD, previous renal cancer, and previous alcohol use disorder, referred for tremor. He has no features of Parkinson's disease on exam today. His tremor appears mostly dystonic and is actually present in his head and potentially voice as well. He is on low-dose primidone and he has been on this for a while. We discussed increasing it. He is already on metoprolol. We even discussed the option of something like DBS in the future and he tells me he is not interested in this.     Plan:  1. Increase primidone to 50 mg in the morning and 100 mg at night for 1 week, then can increase to 100 mg twice daily if tolerating. We discussed the side effect of sedation.   2. Follow up 6 months, call sooner with  any issues.      Follow Up   No follow-ups on file.  Patient was given instructions and counseling regarding his condition or for health maintenance advice. Please see specific information pulled into the AVS if appropriate.     Transcribed from ambient dictation for Lorena Cantrell MD by Jennifer Mejia.  10/24/23   12:08 CDT    Patient or patient representative verbalized consent to the visit recording.  I have personally performed the services described in this document as transcribed by the above individual, and it is both accurate and complete.

## 2023-11-06 ENCOUNTER — TELEPHONE (OUTPATIENT)
Dept: PODIATRY | Facility: CLINIC | Age: 73
End: 2023-11-06
Payer: OTHER GOVERNMENT

## 2023-11-06 NOTE — PROGRESS NOTES
[facility] - PODIATRY    Today's Date: 11/07/2023     Patient Name: Felix ALMEIDA Sr.  MRN: [id number]  CSN: [id number]  PCP: Bassam Rosario MD  Referring Provider: No ref. provider found    SUBJECTIVE     Chief Complaint   Patient presents with    Follow-up     Bassam Rosario MD 05/02/2023 3 MTH FU - pt states feet doing ok- pt denies pain      HPI: Felix ALMEIDA Sr., a 73 y.o.male, comes to clinic as a(n) established patient complaining of thickened, dystrophic, irregular toenails of both feet and numbness in both feet . Patient has h/o GERD, arthritis, COPD, asthma, HTN, Renal CA .  Patient presents for routine nail care due to thickened, irregular toenails of both feet. States that nails are tender in shoes due to excessive length and thickness.  Notes tenderness in toes due to nail thickness/length but no pain due to neuropathy . Relates previous treatment(s) including care by podiatry . Denies any constitutional symptoms. No other pedal complaints at this time.    Past Medical History:   Diagnosis Date    Acid reflux     Arthritis     Asthma     COPD (chronic obstructive pulmonary disease)     CTS (carpal tunnel syndrome)     HL (hearing loss)     Hyperlipidemia     Hypertension     Oxygen dependent     at hs    Renal cancer 08/2014    Lt RALPart'l Nx; t1a Clear Cell with negative margin     Past Surgical History:   Procedure Laterality Date    APPENDECTOMY      CHOLECYSTECTOMY      ENDOSCOPY N/A 05/01/2018    Procedure: ESOPHAGOGASTRODUODENOSCOPY WITH ANESTHESIA;  Surgeon: Donnell Cordero DO;  Location: [facility] ENDOSCOPY;  Service: Gastroenterology    HERNIA REPAIR      KIDNEY SURGERY      LAPAROSCOPIC PARTIAL NEPHRECTOMY Left 08/14/2014    Robot Assisted     Family History   Problem Relation Age of Onset    Colon cancer Father     Heart disease Mother     Colon polyps Neg Hx     Esophageal cancer Neg Hx      Social History     Socioeconomic History    Marital status:       Spouse name: Melanie    Number of children: 1    Years of education: 12   Tobacco Use    Smoking status: Every Day     Packs/day: 0.50     Years: 50.00     Additional pack years: 0.00     Total pack years: 25.00     Types: Cigarettes     Start date: 4/30/1966    Smokeless tobacco: Never   Vaping Use    Vaping Use: Never used   Substance and Sexual Activity    Alcohol use: No    Drug use: No    Sexual activity: Never     No Known Allergies  Current Outpatient Medications   Medication Sig Dispense Refill    acetaminophen (TYLENOL) 650 MG 8 hr tablet Take 1 tablet by mouth 2 (Two) Times a Day.      calcium-vitamin D (OSCAL-500) 500-200 MG-UNIT per tablet Take 1 tablet by mouth 2 times daily.        cholecalciferol (VITAMIN D3) 10 MCG (400 UNIT) tablet Take 1 tablet by mouth.      diphenhydrAMINE (BENADRYL) 25 mg capsule Take 1 capsule by mouth 2 (Two) Times a Day.      escitalopram (LEXAPRO) 20 MG tablet Take 1 tablet by mouth Daily. 90 tablet 3    ferrous sulfate 325 (65 FE) MG tablet Take 1 tablet by mouth Daily With Breakfast.      FLOVENT  MCG/ACT inhaler INHALE 1 PUFF BY MOUTH TWICE DAILY 12 g 5    fluticasone (FLONASE) 50 MCG/ACT nasal spray       KETOTIFEN FUMARATE OP Apply 1 drop to eye(s) as directed by provider As Needed.      losartan (COZAAR) 50 MG tablet Take 1 tablet by mouth Daily. 90 tablet 3    metoprolol tartrate (LOPRESSOR) 50 MG tablet Take 1 tablet by mouth Every 12 (Twelve) Hours. 180 tablet 3    Multiple Vitamins-Minerals (CENTRUM SILVER 50+MEN) tablet Take 1 tablet by mouth Daily.      O2 (OXYGEN) Inhale 1 (One) Time.      omeprazole (priLOSEC) 20 MG capsule 1 capsule 2 (Two) Times a Day.      potassium chloride (KLOR-CON) 10 MEQ CR tablet Take 1 tablet by mouth 2 (Two) Times a Day. 180 tablet 3    primidone (MYSOLINE) 50 MG tablet 1 pill qam/2 pills qhs x 1 week then 2 pill BID. 360 tablet 3    simvastatin (ZOCOR) 20 MG tablet Take 1 tablet by mouth Daily. 90 tablet 3    tamsulosin  (FLOMAX) 0.4 MG capsule 24 hr capsule Take 1 capsule by mouth Daily. 90 capsule 3     No current facility-administered medications for this visit.     Review of Systems   Constitutional:  Negative for chills and fever.   HENT:  Negative for congestion.    Respiratory:  Negative for shortness of breath.    Cardiovascular:  Positive for leg swelling. Negative for chest pain.   Gastrointestinal:  Negative for constipation, diarrhea, nausea and vomiting.   Musculoskeletal:  Positive for arthralgias and back pain. Negative for myalgias.   Skin:  Negative for wound.   Neurological:  Positive for tremors and numbness.       OBJECTIVE     Vitals:    23 1014   BP: 132/68   Pulse: 61   SpO2: 94%         PHYSICAL EXAM  GEN:   Accompanied by none.     Foot/Ankle Exam    GENERAL  Appearance:  appears stated age and obese  Orientation:  AAOx3  Affect:  appropriate  Gait:  involuntary movements (unsteady)  Assistance:  cane use  Right shoe gear: casual shoe  Left shoe gear: casual shoe    VASCULAR     Right Foot Vascularity   Dorsalis pedis:  1+  Posterior tibial:  1+  Skin temperature:  warm  Edema gradin+ and pitting  CFT:  3  Pedal hair growth:  Present  Varicosities:  moderate varicosities     Left Foot Vascularity   Dorsalis pedis:  1+  Posterior tibial:  1+  Skin temperature:  warm  Edema gradin+ and pitting  CFT:  3  Pedal hair growth:  Present  Varicosities:  moderate varicosities     NEUROLOGIC     Right Foot Neurologic   Light touch sensation: diminished  Vibratory sensation: diminished  Hot/Cold sensation: diminished  Protective Sensation using [place] Monofilament:   Sites intact: 1  Sites tested: 10     Left Foot Neurologic   Light touch sensation: diminished  Vibratory sensation: diminished  Hot/Cold sensation:  diminished  Protective Sensation using [place] Monofilament:   Sites intact: 4  Sites tested: 10    MUSCULOSKELETAL     Right Foot Musculoskeletal   Ecchymosis:   none  Tenderness:  toenail problem    Arch:  Normal  Hallux valgus: No       Left Foot Musculoskeletal   Ecchymosis:  none  Tenderness:  toenail problem  Arch:  Normal  Hallux valgus: No      MUSCLE STRENGTH     Right Foot Muscle Strength   Foot dorsiflexion:  4+  Foot plantar flexion:  4+  Foot inversion:  4+  Foot eversion:  4+     Left Foot Muscle Strength   Foot dorsiflexion:  4+  Foot plantar flexion:  4+  Foot inversion:  4+  Foot eversion:  4+    RANGE OF MOTION     Right Foot Range of Motion   Foot and ankle ROM within normal limits       Left Foot Range of Motion   Foot and ankle ROM within normal limits      DERMATOLOGIC      Right Foot Dermatologic   Skin  Positive for dryness.   Nails  1.  Positive for elongated, onychomycosis, abnormal thickness, subungual debris and dystrophic nail.  2.  Positive for elongated, onychomycosis, abnormal thickness and subungual debris.  3.  Positive for elongated, onychomycosis, abnormal thickness and subungual debris.  4.  Positive for elongated, onychomycosis, abnormal thickness and subungual debris.  5.  Positive for elongated, onychomycosis, abnormal thickness and subungual debris.  Nails comment:  Gryphotic 1-5     Left Foot Dermatologic   Skin  Positive for dryness.   Nails comment:  Gryphotic 1-5  Nails  1.  Positive for elongated, onychomycosis, abnormal thickness, subungual debris and dystrophic nail.  2.  Positive for elongated, onychomycosis, abnormal thickness and subungual debris.  3.  Positive for elongated, onychomycosis, abnormal thickness and subungual debris.  4.  Positive for elongated, onychomycosis, abnormally thick and subungual debris.  5.  Positive for elongated, onychomycosis, abnormally thick and subungual debris.      RADIOLOGY/NUCLEAR:  No results found.    LABORATORY/CULTURE RESULTS:      PATHOLOGY RESULTS:       ASSESSMENT/PLAN     Diagnoses and all orders for this visit:    1. Onychogryphosis (Primary)    2. Other polyneuropathy    3.  Peripheral edema    4. Unsteady gait    5. Pain in both feet    6. Tremor        Comprehensive lower extremity examination and evaluation was performed.  Discussed findings and treatment plan including risks, benefits, and treatment options with patient in detail. Patient agreed with treatment plan.  After verbal consent obtained, nail(s) x10 debrided of length and thickness with nail nipper without incidence  Patient may maintain nails and calluses at home utilizing emery board or pumice stone between visits as needed   Given gryphotic nails and neuropathy we will continue to follow patient for routine care.   Continue use of cane for stability.  Elevate lower extremities at rest and continue use of compression stockings.   An After Visit Summary was printed and given to the patient at discharge, including (if requested) any available informative/educational handouts regarding diagnosis, treatment, or medications. All questions were answered to patient/family satisfaction. Should symptoms fail to improve or worsen they agree to call or return to clinic or to go to the Emergency Department. Discussed the importance of following up with any needed screening tests/labs/specialist appointments and any requested follow-up recommended by me today. Importance of maintaining follow-up discussed and patient accepts that missed appointments can delay diagnosis and potentially lead to worsening of conditions.  Return in about 3 months (around 2/7/2024) for Follow-up with Podiatry APRN, Schedule [facility]., or sooner if acute issues arise.    Lab Frequency Next Occurrence       This document has been electronically signed by Jae Preston DPM on November 7, 2023 10:24 CST

## 2023-11-07 ENCOUNTER — OFFICE VISIT (OUTPATIENT)
Dept: PODIATRY | Facility: CLINIC | Age: 73
End: 2023-11-07
Payer: MEDICARE

## 2023-11-07 VITALS
WEIGHT: 278 LBS | HEIGHT: 72 IN | DIASTOLIC BLOOD PRESSURE: 68 MMHG | HEART RATE: 61 BPM | BODY MASS INDEX: 37.65 KG/M2 | OXYGEN SATURATION: 94 % | SYSTOLIC BLOOD PRESSURE: 132 MMHG

## 2023-11-07 DIAGNOSIS — G62.89 OTHER POLYNEUROPATHY: ICD-10-CM

## 2023-11-07 DIAGNOSIS — R60.9 PERIPHERAL EDEMA: ICD-10-CM

## 2023-11-07 DIAGNOSIS — L60.2 ONYCHOGRYPHOSIS: Primary | ICD-10-CM

## 2023-11-07 DIAGNOSIS — R25.1 TREMOR: ICD-10-CM

## 2023-11-07 DIAGNOSIS — M79.671 PAIN IN BOTH FEET: ICD-10-CM

## 2023-11-07 DIAGNOSIS — R26.81 UNSTEADY GAIT: ICD-10-CM

## 2023-11-07 DIAGNOSIS — M79.672 PAIN IN BOTH FEET: ICD-10-CM

## 2023-11-13 ENCOUNTER — OFFICE VISIT (OUTPATIENT)
Dept: FAMILY MEDICINE CLINIC | Facility: CLINIC | Age: 73
End: 2023-11-13
Payer: OTHER GOVERNMENT

## 2023-11-13 VITALS
TEMPERATURE: 96.6 F | RESPIRATION RATE: 18 BRPM | BODY MASS INDEX: 38.06 KG/M2 | OXYGEN SATURATION: 98 % | HEART RATE: 75 BPM | DIASTOLIC BLOOD PRESSURE: 88 MMHG | HEIGHT: 72 IN | SYSTOLIC BLOOD PRESSURE: 128 MMHG | WEIGHT: 281 LBS

## 2023-11-13 DIAGNOSIS — D64.9 ANEMIA, UNSPECIFIED TYPE: ICD-10-CM

## 2023-11-13 DIAGNOSIS — Z00.00 WELLNESS EXAMINATION: Primary | ICD-10-CM

## 2023-11-13 DIAGNOSIS — K76.0 FATTY LIVER: Chronic | ICD-10-CM

## 2023-11-13 DIAGNOSIS — R73.01 ELEVATED FASTING GLUCOSE: Chronic | ICD-10-CM

## 2023-11-13 DIAGNOSIS — M81.0 OSTEOPOROSIS, UNSPECIFIED OSTEOPOROSIS TYPE, UNSPECIFIED PATHOLOGICAL FRACTURE PRESENCE: ICD-10-CM

## 2023-11-13 DIAGNOSIS — J44.9 CHRONIC OBSTRUCTIVE PULMONARY DISEASE, UNSPECIFIED COPD TYPE: Chronic | ICD-10-CM

## 2023-11-13 DIAGNOSIS — R26.9 GAIT DIFFICULTY: ICD-10-CM

## 2023-11-13 DIAGNOSIS — R60.0 EDEMA LEG: ICD-10-CM

## 2023-11-13 DIAGNOSIS — E61.1 IRON DEFICIENCY: ICD-10-CM

## 2023-11-13 DIAGNOSIS — E78.2 MIXED HYPERLIPIDEMIA: Chronic | ICD-10-CM

## 2023-11-13 DIAGNOSIS — E87.6 HYPOPOTASSEMIA: Chronic | ICD-10-CM

## 2023-11-13 DIAGNOSIS — K21.9 GASTROESOPHAGEAL REFLUX DISEASE, UNSPECIFIED WHETHER ESOPHAGITIS PRESENT: Chronic | ICD-10-CM

## 2023-11-13 DIAGNOSIS — R25.1 TREMOR: ICD-10-CM

## 2023-11-13 DIAGNOSIS — I10 PRIMARY HYPERTENSION: Chronic | ICD-10-CM

## 2023-11-13 DIAGNOSIS — I50.32 CHRONIC DIASTOLIC CONGESTIVE HEART FAILURE: ICD-10-CM

## 2023-11-13 DIAGNOSIS — J30.89 NON-SEASONAL ALLERGIC RHINITIS, UNSPECIFIED TRIGGER: Chronic | ICD-10-CM

## 2023-11-13 NOTE — PATIENT INSTRUCTIONS
"There are several vaccines used for individuals near/over 65 years old.      1. Tetanus.   Like anyone this needs to given every 10 years; sooner for/with lacerations/wounds.   Likely when getting this booster it needs to be a tetanus called Tdap (tetanus mixed with diptheria and pertussis).   Years ago you had this vaccine.  We now know we can lose our immunity to pertussis (a part of this vaccine) and run a risk of catching this.  Now only would this make us ill; but more importantly we can spread this to very young children (and for them it can be a much more dangerous illness).   We call this the grandparent vaccine for this reason.     2. Pneumonia.   This comes now as a one time vaccine for most persons.  Even if you have had these before; we need to review when and your current health situation/s as you may need a booster     3. Shingles.  You do not want to catch shingles.  Though you will recover from this; the pain associated with shingles can be severe.  Even if you have had the now older zostavax, or have had shingles; it is recommended you still get the Shingrix (the new just available early 2018 shingles vaccine).   Medicare began 1.1.23 waving any co-pays for this; it is therefore free.     4. Flu/influenza: Yearly flu vaccine given from September through April each year.  For those over 65 it should be \"high dose\" flu (to complement the possibility the immune system is alittle weaker)    5. RSV: If you are over 60; beginning 10.1.23 you can now take a RSV vaccination.  This would lower your change for catching this winter/\"cold\" virus that can under some circumstances cause significant respiratory symptoms and even require hospitalization.  Being vaccinated also makes it more difficult for you to be contagious and get this to children; particularly children less than 6 months of where this can be a very dangerous illness.      6. COVID: Since the early onset of COVID illness and a the many COVID " vaccines that were initially developed; we will have a winter 2023 (and maybe beyond)  booster particularly for those at higher risk of complications and over age 65.    7. Travel vaccines:  If you are one to do international travel; be sure and ask us for any particular unusual vaccines you may need.     8.  Miscellaneous:  If you have certain health situations/disease you may need specific/particular vaccines not give to the general public.     Your records we have on file:   Immunization History   Administered Date(s) Administered    COVID-19 (MODERNA) 1st,2nd,3rd Dose Monovalent 03/06/2021, 04/07/2021    FLUAD TRI 65YR+ 10/03/2019    Flu Vaccine Quad PF >36MO 10/31/2017    Fluad Quad 65+ 10/01/2020    Fluzone High-Dose 65+yrs 10/31/2022    Fluzone Quad >6mos (Multi-dose) 01/27/2016, 10/21/2016, 10/31/2017    Pneumococcal Conjugate 13-Valent (PCV13) 01/27/2016    Pneumococcal Conjugate 20-Valent (PCV20) 05/02/2023    Pneumococcal, Unspecified 12/12/2013       You therefore could need the ones if we listed below:   Winter 2023 shots: latest/updated covid vaccine + 2023 flu/high dose flu (can/should be given same day)                                 2-3 weeks after then get the new RSV vaccine    After this; take a break and then work on getting those other vaccines still needed:   Shingles  Tdap      Because of many restrictions on this office always having all the above vaccines; you may be advised to work with your local health department or various pharmacies to keep up with your individual vaccine needs.  If you are forced to get a vaccine outside this office in order to keep your health record up to date; we request you personally notify us after any vaccines of the type and date.

## 2023-11-13 NOTE — PROGRESS NOTES
The ABCs of the Annual Wellness Visit  Subsequent Medicare Wellness Visit    Subjective    Felix ALMEIDA Sr. is a 73 y.o. male who presents for a Subsequent Medicare Wellness Visit.    The following portions of the patient's history were reviewed and   updated as appropriate: allergies, current medications, past family history, past medical history, past social history, past surgical history, and problem list.    Compared to one year ago, the patient feels his physical   health is the same.    Compared to one year ago, the patient feels his mental   health is the same.    Recent Hospitalizations:  He was not admitted to the hospital during the last year.       Current Medical Providers:  Patient Care Team:  Bassam Rosario MD as PCP - General  Bassam Rosario MD as PCP - Family Medicine  RachelFelix dunn MD as Consulting Physician (Urology)  Donnell Cordero DO as Consulting Physician (Gastroenterology)  Edie Vigil APRN as Nurse Practitioner (Pulmonary Disease)    Outpatient Medications Prior to Visit   Medication Sig Dispense Refill    acetaminophen (TYLENOL) 650 MG 8 hr tablet Take 1 tablet by mouth 2 (Two) Times a Day.      calcium-vitamin D (OSCAL-500) 500-200 MG-UNIT per tablet Take 1 tablet by mouth 2 times daily.        cholecalciferol (VITAMIN D3) 10 MCG (400 UNIT) tablet Take 1 tablet by mouth.      diphenhydrAMINE (BENADRYL) 25 mg capsule Take 1 capsule by mouth 2 (Two) Times a Day.      escitalopram (LEXAPRO) 20 MG tablet Take 1 tablet by mouth Daily. 90 tablet 3    ferrous sulfate 325 (65 FE) MG tablet Take 1 tablet by mouth Daily With Breakfast.      FLOVENT  MCG/ACT inhaler INHALE 1 PUFF BY MOUTH TWICE DAILY 12 g 5    fluticasone (FLONASE) 50 MCG/ACT nasal spray       KETOTIFEN FUMARATE OP Apply 1 drop to eye(s) as directed by provider As Needed.      losartan (COZAAR) 50 MG tablet Take 1 tablet by mouth Daily. 90 tablet 3    metoprolol tartrate (LOPRESSOR) 50 MG tablet  Take 1 tablet by mouth Every 12 (Twelve) Hours. 180 tablet 3    Multiple Vitamins-Minerals (CENTRUM SILVER 50+MEN) tablet Take 1 tablet by mouth Daily.      O2 (OXYGEN) Inhale 1 (One) Time.      omeprazole (priLOSEC) 20 MG capsule 1 capsule 2 (Two) Times a Day.      potassium chloride (KLOR-CON) 10 MEQ CR tablet Take 1 tablet by mouth 2 (Two) Times a Day. 180 tablet 3    primidone (MYSOLINE) 50 MG tablet 1 pill qam/2 pills qhs x 1 week then 2 pill BID. 360 tablet 3    simvastatin (ZOCOR) 20 MG tablet Take 1 tablet by mouth Daily. 90 tablet 3    tamsulosin (FLOMAX) 0.4 MG capsule 24 hr capsule Take 1 capsule by mouth Daily. 90 capsule 3     No facility-administered medications prior to visit.       No opioid medication identified on active medication list. I have reviewed chart for other potential  high risk medication/s and harmful drug interactions in the elderly.        Aspirin is not on active medication list.  Aspirin use is not indicated based on review of current medical condition/s. Risk of harm outweighs potential benefits.  .    Patient Active Problem List   Diagnosis    Wellness examination-done    History of renal cell cancer-sony    COPD (chronic obstructive pulmonary disease)    Elevated fasting glucose    History of alcohol abuse    Allergic rhinitis    Anxiety    Vertebral compression fracture    Gastroesophageal reflux disease    Edema leg-CHF/d, obesity, copd, cirrhosis    Fatty liver    Hyperlipidemia-statin    Hypertension    Hypopotassemia-diuretic    Respiratory failure-hypoxic (compliant night)    Macrocytosis    Osteoporosis bd 3.2017 ? 2y    Tremor    Posttraumatic stress disorder    History of TIA (transient ischemic attack)    Chronic back pain    Laboratory test*    Congestive heart failure-diastolic    Tobacco use: 6.2022/12m    Anemia: chronic iron loss#    Heme positive stool    Pain in leg, unspecified    Family history of colon cancer    History of adenomatous polyp of colon    Bile  "salt-induced diarrhea    RUQ pain    S/P laparoscopic cholecystectomy    Gait difficulty    SOB (shortness of breath)    Iron deficiency     Advance Care Planning  Advance Directive is not on file.  ACP discussion was held with the patient during this visit. Patient has an advance directive (not in EMR), copy requested.     Objective    Vitals:    23 0932   BP: 128/88   Pulse: 75   Resp: 18   Temp: 96.6 °F (35.9 °C)   TempSrc: Temporal   SpO2: 98%   Weight: 127 kg (281 lb)   Height: 182.9 cm (72\")   PainSc: 0-No pain     Estimated body mass index is 38.11 kg/m² as calculated from the following:    Height as of this encounter: 182.9 cm (72\").    Weight as of this encounter: 127 kg (281 lb).           Does the patient have evidence of cognitive impairment? No          HEALTH RISK ASSESSMENT    Smoking Status:  Social History     Tobacco Use   Smoking Status Every Day    Packs/day: 0.50    Years: 50.00    Additional pack years: 0.00    Total pack years: 25.00    Types: Cigarettes    Start date: 1966   Smokeless Tobacco Never     Alcohol Consumption:  Social History     Substance and Sexual Activity   Alcohol Use No     Fall Risk Screen:    STEADI Fall Risk Assessment was completed, and patient is at LOW risk for falls.Assessment completed on:2023    Depression Screenin/13/2023     9:33 AM   PHQ-2/PHQ-9 Depression Screening   Little Interest or Pleasure in Doing Things 0-->not at all   Feeling Down, Depressed or Hopeless 0-->not at all   PHQ-9: Brief Depression Severity Measure Score 0       Health Habits and Functional and Cognitive Screenin/13/2023     9:33 AM   Functional & Cognitive Status   Do you have difficulty preparing food and eating? No   Do you have difficulty bathing yourself, getting dressed or grooming yourself? No   Do you have difficulty using the toilet? No   Do you have difficulty moving around from place to place? No   Do you have trouble with steps or getting out " of a bed or a chair? Yes   Current Diet Well Balanced Diet   Dental Exam Up to date   Eye Exam Up to date   Exercise (times per week) 0 times per week   Current Exercises Include No Regular Exercise   Do you need help using the phone?  No   Are you deaf or do you have serious difficulty hearing?  Yes   Do you need help to go to places out of walking distance? Yes   Do you need help preparing meals?  No   Do you need help with housework?  No   Do you need help with laundry? No   Do you need help taking your medications? No   Do you need help managing money? No   Do you ever drive or ride in a car without wearing a seat belt? No   Have you felt unusual stress, anger or loneliness in the last month? No   Who do you live with? Spouse   If you need help, do you have trouble finding someone available to you? Yes   Have you been bothered in the last four weeks by sexual problems? No   Do you have difficulty concentrating, remembering or making decisions? No       Age-appropriate Screening Schedule:  Refer to the list below for future screening recommendations based on patient's age, sex and/or medical conditions. Orders for these recommended tests are listed in the plan section. The patient has been provided with a written plan.    Health Maintenance   Topic Date Due    TDAP/TD VACCINES (1 - Tdap) Never done    ZOSTER VACCINE (1 of 2) Never done    DXA SCAN  03/01/2019    ANNUAL WELLNESS VISIT  04/28/2023    INFLUENZA VACCINE  08/01/2023    COVID-19 Vaccine (3 - 2023-24 season) 09/01/2023    LIPID PANEL  05/02/2024    BMI FOLLOWUP  05/02/2024    LUNG CANCER SCREENING  06/06/2024    COLORECTAL CANCER SCREENING  03/18/2026    HEPATITIS C SCREENING  Completed    Pneumococcal Vaccine 65+  Completed    AAA SCREEN (ONE-TIME)  Completed                CMS Preventative Services Quick Reference  Risk Factors Identified During Encounter  Immunizations Discussed/Encouraged: Tdap, Influenza, Shingrix, COVID19, and RSV (Respiratory  Syncytial Virus)  The above risks/problems have been discussed with the patient.  Pertinent information has been shared with the patient in the After Visit Summary.  An After Visit Summary and PPPS were made available to the patient.    Follow Up:   Next Medicare Wellness visit to be scheduled in 1 year.       Additional E&M Note during same encounter follows:  Patient has multiple medical problems which are significant and separately identifiable that require additional work above and beyond the Medicare Wellness Visit.        E/M encounter  Subjective   Felix ALMEIDA Sr. is a 73 y.o. male presenting with chief complaint of:   Chief Complaint   Patient presents with    Medicare Wellness-subsequent     AWV 4.28.22  Last Completed Annual Wellness Visit       This patient has no relevant Health Maintenance data.             History of Present Illness :  Alone.   Here for review of chronic problems that includes copd and others.  Also yearly medicare wellness exam.    Has multiple chronic problems to consider that might have a bearing on today's issues;  an interval appointment.       Chronic/acute problems reviewed today:   1. Wellness examination-done Chronic ongoing over time screening or advice for general health care/wellness.      2. Non-seasonal allergic rhinitis, unspecified trigger Chronic/variable.   On/off eye, sinus, nasal congestion and drainage.  Rx helps.  Aware of options additional medications, testing and not interested.     3. Mixed hyperlipidemia Chronic/stable.  Tolerated use of Rx with labs showing improved lipid values and tolerant liver labs. No muscle aches unexpected.      4. Primary hypertension Chronic/stable. Stable here past/no recent home blood pressures.  No significant chest pain, SOB, LE edema, orthopnea, near syncope, dizziness/light headness.   Recent Vitals         10/24/2023 11/7/2023 11/13/2023       BP: 124/62 132/68 128/88     Pulse: 71 61 75     Temp: -- -- 96.6 °F (35.9 °C)      Weight: 127 kg (280 lb 9.6 oz) 126 kg (278 lb) 127 kg (281 lb)     BMI (Calculated): 38 37.7 38.1              5. Chronic diastolic congestive heart failure Chronic/stable.  Denies significant sob, orthopnea, leg edema, weight gain.  Aware of influence diet/salt and watching weight at home.       6. Elevated fasting glucose Chronic/stable.  No problem/pattern hypoglycemia/hyperglycemia manifest by poly- dypsia, phagia, uria, or sweats, diaphoretic episodes, syncope/near.     7. Gastroesophageal reflux disease, unspecified whether esophagitis present Chronic/stable.  Controlled heartburn, reflux without dysphagia, melena.  Rx used, periods not used proven currently needed with symptoms -currently doing ok.      8. Fatty liver Chronic/stable.  Aware treatment involves normalizing weight; usually including low fat diet and regular exercise.  Aware condition can go on to cirrhosis and death.  Stable occ liver labs and past imaging.       9. Hypopotassemia-diuretic Chronic/variable high or low K as uses diuretic; has to have lab monitoring.  No significant fatigue or muscle cramps     10. Anemia, unspecified type Chronic or past history/variable and did not get labs when requested: This has been present before.    There has been GI evaulation in the past. There is no current melena, hematochezia. It has been benign to date and stable/treating/watching.  Contributing comorbidities to date: .    Colonoscopy-nl/Gil//7-28-10/5y  EGD/P/Gil/3.4.16  Colonoscopy-polyp/Gil//3.18.16/5 yr  EGD-neg/Gil//5.1.18    SURGERIES:  T&A  Appendix/1959  Scrotum-cyst/1980's  Lap gb+liver bx/Raya/[facility]/7.23.12  L robotic partial nephrectomy (clear cell grade 1 [id number])/Rachel//8.14.14  Dubocf-xjeahxvq-egtuk+mesh+omen/[facility]//5.9.16    Hb 11.0 3.22.19; 10.7 5.2.23; 325/d  MCV always high; 98.7 5.2.23  Iron always ok; 132N 5.2.23; 325/d  Ferritin always ok; 348 5.2.23  B12 always ok; 452 5.2.23; none  Folate always ok;  11.9 5.2.23; none  Retic always high; 2.3 5.2.23  OB 3/3+ 3.19.18       11. Iron deficiency see anemia   12. Osteoporosis, unspecified osteoporosis type, unspecified pathological fracture presence known and we have encouraged him to keep up with this but he does not follow fortunately no fractures   13. Tremor chronic variable; he said he made it finally to neurology and they increase his medicine which she has not Dr Obrien to doing; he says it makes him too drowsy to try to increase the   14. Chronic obstructive pulmonary disease, unspecified COPD type Chronic/stable mild occ cough, sob, wheeze.  Rxhelps.   Not smoking.       15. Edema leg-CHF/d, obesity, copd, cirrhosis chronic/variable.  Recently stable.  Causes include: those noted.      16. Gait difficulty Chronic/stable:.  Ongoing issues with difficulties it results in difficulty with walking or gait.  No recent falls.  No recent injuries.  Uses to help gait: none.       Has an/another acute issue today: none.    The following portions of the patient's history were reviewed and updated as appropriate: allergies, current medications, past family history, past medical history, past social history, past surgical history, and problem list.      Current Outpatient Medications:     acetaminophen (TYLENOL) 650 MG 8 hr tablet, Take 1 tablet by mouth 2 (Two) Times a Day., Disp: , Rfl:     calcium-vitamin D (OSCAL-500) 500-200 MG-UNIT per tablet, Take 1 tablet by mouth 2 times daily.  , Disp: , Rfl:     cholecalciferol (VITAMIN D3) 10 MCG (400 UNIT) tablet, Take 1 tablet by mouth., Disp: , Rfl:     diphenhydrAMINE (BENADRYL) 25 mg capsule, Take 1 capsule by mouth 2 (Two) Times a Day., Disp: , Rfl:     escitalopram (LEXAPRO) 20 MG tablet, Take 1 tablet by mouth Daily., Disp: 90 tablet, Rfl: 3    ferrous sulfate 325 (65 FE) MG tablet, Take 1 tablet by mouth Daily With Breakfast., Disp: , Rfl:     FLOVENT  MCG/ACT inhaler, INHALE 1 PUFF BY MOUTH TWICE DAILY, Disp:  12 g, Rfl: 5    fluticasone (FLONASE) 50 MCG/ACT nasal spray, , Disp: , Rfl:     KETOTIFEN FUMARATE OP, Apply 1 drop to eye(s) as directed by provider As Needed., Disp: , Rfl:     losartan (COZAAR) 50 MG tablet, Take 1 tablet by mouth Daily., Disp: 90 tablet, Rfl: 3    metoprolol tartrate (LOPRESSOR) 50 MG tablet, Take 1 tablet by mouth Every 12 (Twelve) Hours., Disp: 180 tablet, Rfl: 3    Multiple Vitamins-Minerals (CENTRUM SILVER 50+MEN) tablet, Take 1 tablet by mouth Daily., Disp: , Rfl:     O2 (OXYGEN), Inhale 1 (One) Time., Disp: , Rfl:     omeprazole (priLOSEC) 20 MG capsule, 1 capsule 2 (Two) Times a Day., Disp: , Rfl:     potassium chloride (KLOR-CON) 10 MEQ CR tablet, Take 1 tablet by mouth 2 (Two) Times a Day., Disp: 180 tablet, Rfl: 3    primidone (MYSOLINE) 50 MG tablet, 1 pill qam/2 pills qhs x 1 week then 2 pill BID., Disp: 360 tablet, Rfl: 3    simvastatin (ZOCOR) 20 MG tablet, Take 1 tablet by mouth Daily., Disp: 90 tablet, Rfl: 3    tamsulosin (FLOMAX) 0.4 MG capsule 24 hr capsule, Take 1 capsule by mouth Daily., Disp: 90 capsule, Rfl: 3    No problems with medications.    No Known Allergies    Review of Systems  GENERAL:  Inactive/slower with limits, speed, stamina for age and fatigue. Sleep is ok with HOB up. No fever now/recent.  ENDO:  No syncope, near or diaphoretic sweaty spells.  HEENT: No change occ headache.   No vision change.   Same significant hearing loss.  Ears without pain/drainage.  No sore throat.  No significant nasal/sinus congestion/drainage. No epistaxis.  CHEST: No chest wall tenderness or mass. No change cough, with occ wheeze.  Stable/occ SOB; no hemoptysis.  CV: No chest pain, palpitations, less daily ankle edema.  GI: No heartburn, dysphagia.  No abdominal pain, diarrhea, constipation.  No rectal bleeding, or melena.    :  Voids without dysuria, or incontinence to completion; sees sony  ORTHO: No painful/swollen joints but various on /off sore.  No change sore neck;  more pain lower back.  No acute neck or back pain without recent injury.  NEURO: No dizziness, weakness of extremities.  No change UE/LE numbness/paresthesias.   Continued RUE > LUE tremor.   PSYCH: No memory loss.  Mood good; occ anxious, depressed but/and not suicidal.  Tries to tolerate stress   Screening:  Mammogram: NA  Bone density: 3.2.2017 [facility]/Ts LS -1.8 hip -0.5-offered  Low dose CT chest:Tobacco-smoker/age 16/1ppd/advised   /6.6.23  1. Stable low-dose screening chest CT, no evidence of pulmonary  malignancy is identified. Lung RADS category 2, benign. Follow-up  screening low-dose chest CT is recommended in 12 months.  2. Mild dilation of the central pulmonary arteries suggesting pulmonary  arterial hypertension.  3. There is a fat-containing exophytic lesion at the inferior pole of  the left kidney which appears stable, measuring approximately 2.9 cm.  There also appears to be an exophytic cyst at the upper pole of the  right kidney measuring 2.6 cm.     GI:   Colonoscopy-polyp/Gil//3.18.16/5 yr-reminded  EGD-neg/Gil//5.1.18  Prostate: sony confirmed 10.31.22  advised sony 4.28.22  sony last 11.5.20  sony confirmed 3.28.19  Usual lab order  6m CBC, CMP, A1c, iron  12m CBC, CMP, A1c LIPID, TSH, T4, Vit D, PSAs, B12, folate, iron, ferritin, % sat iron, retic count    Copy/paste function used for ROS/exam AND each area of these were reviewed, updated, confirmed and supplemented as needed.  Data reviewed:   Recent admit/ER/MD visits: 10.31.23    1 Vitamin D deficiency    2. Need for immunization against influenza    3. Mixed hyperlipidemia    4. Primary hypertension    5. Chronic diastolic congestive heart failure (HCC)    6. Elevated fasting glucose    7. Gastroesophageal reflux disease, unspecified whether esophagitis present    8. Hypopotassemia-diuretic    9. Anemia, unspecified type    10. Chronic back pain, unspecified back location, unspecified back pain laterality    11. Tremor     12. Chronic obstructive pulmonary disease, unspecified COPD type (HCC)    13. Edema leg-CHF/d, obesity, copd, cirrhosis    14. Gait difficulty    15. Iron deficiency          Discussions/medical decisions/reviews:  BP ok  Other vitals ok  DM/BS 92 4.28.22  Lipid due  PSA ok 4.28.22  CBC stable 10.2/normal iron 4.28.22-worrisome behind on colonoscopy  Renal ok 4.28.22  Liver ok 4.28.22  Vit D 25.4 10.21.21  Thyroid TSH ok 4.28.22     Data review above:   Rx: reviewed and decisions:   Rx new/changes: none  No orders of the defined types were placed in this encounter.     Badly needs colonoscopy; anemia could have malignant conquences  Lab update  When on iron; needs periodic lab test    Last cardiac testing:   Echo: Results for orders placed in visit on 08/21/17    Adult Transthoracic Echo Complete    Interpretation Summary  · Left ventricular systolic function is normal. Estimated EF = 60%.  · Left ventricular wall thickness is consistent with mild concentric hypertrophy.  · Left ventricular diastolic dysfunction (grade I) consistent with impaired relaxation.  · No evidence of pulmonary hypertension is present.      Radiology considered:   No radiology results for the last 90 days.      Lab Results:  Results for orders placed or performed in visit on 05/02/23   Hemoglobin A1c    Specimen: Blood   Result Value Ref Range    Hemoglobin A1C 6.10 (H) 4.80 - 5.60 %   Comprehensive Metabolic Panel    Specimen: Blood   Result Value Ref Range    Glucose 97 65 - 99 mg/dL    BUN 11 8 - 23 mg/dL    Creatinine 1.06 0.76 - 1.27 mg/dL    EGFR Result 73.2 >60.0 mL/min/1.73    BUN/Creatinine Ratio 10.4 7.0 - 25.0    Sodium 142 136 - 145 mmol/L    Potassium 4.3 3.5 - 5.2 mmol/L    Chloride 106 98 - 107 mmol/L    Total CO2 27.9 22.0 - 29.0 mmol/L    Calcium 9.5 8.6 - 10.5 mg/dL    Total Protein 6.2 6.0 - 8.5 g/dL    Albumin 4.1 3.5 - 5.2 g/dL    Globulin 2.1 gm/dL    A/G Ratio 2.0 g/dL    Total Bilirubin 0.3 0.0 - 1.2 mg/dL     Alkaline Phosphatase 84 39 - 117 U/L    AST (SGOT) 17 1 - 40 U/L    ALT (SGPT) 18 1 - 41 U/L   Lipid Panel    Specimen: Blood   Result Value Ref Range    Total Cholesterol 226 (H) 0 - 200 mg/dL    Triglycerides 196 (H) 0 - 150 mg/dL    HDL Cholesterol 35 (L) 40 - 60 mg/dL    VLDL Cholesterol Chava 36 5 - 40 mg/dL    LDL Chol Calc (NIH) 155 (H) 0 - 100 mg/dL   TSH    Specimen: Blood   Result Value Ref Range    TSH 0.388 0.270 - 4.200 uIU/mL   T4, Free    Specimen: Blood   Result Value Ref Range    Free T4 0.76 (L) 0.93 - 1.70 ng/dL   PSA Screen    Specimen: Blood   Result Value Ref Range    PSA 1.150 0.000 - 4.000 ng/mL   Vitamin B12 & Folate    Specimen: Blood   Result Value Ref Range    Vitamin B-12 452 211 - 946 pg/mL    Folate 11.90 4.78 - 24.20 ng/mL   Iron Profile    Specimen: Blood   Result Value Ref Range    TIBC 341 mcg/dL    UIBC 209 112 - 346 mcg/dL    Iron 132 59 - 158 mcg/dL    Iron Saturation 39 20 - 50 %   Reticulocytes    Specimen: Blood   Result Value Ref Range    Reticulocyte Absolute 2.03 (H) 0.70 - 1.90 %   Ferritin    Specimen: Blood   Result Value Ref Range    Ferritin 348.00 30.00 - 400.00 ng/mL   Vitamin D,25-Hydroxy    Specimen: Blood   Result Value Ref Range    25 Hydroxy, Vitamin D 37.6 30.0 - 100.0 ng/ml   CBC & Differential    Specimen: Blood   Result Value Ref Range    WBC 11.44 (H) 3.40 - 10.80 10*3/mm3    RBC 3.16 (L) 4.14 - 5.80 10*6/mm3    Hemoglobin 10.7 (L) 13.0 - 17.7 g/dL    Hematocrit 31.2 (L) 37.5 - 51.0 %    MCV 98.7 (H) 79.0 - 97.0 fL    MCH 33.9 (H) 26.6 - 33.0 pg    MCHC 34.3 31.5 - 35.7 g/dL    RDW 12.5 12.3 - 15.4 %    Platelets 242 140 - 450 10*3/mm3    Neutrophil Rel % 51.6 42.7 - 76.0 %    Lymphocyte Rel % 38.4 19.6 - 45.3 %    Monocyte Rel % 7.0 5.0 - 12.0 %    Eosinophil Rel % 2.2 0.3 - 6.2 %    Basophil Rel % 0.4 0.0 - 1.5 %    Neutrophils Absolute 5.90 1.70 - 7.00 10*3/mm3    Lymphocytes Absolute 4.39 (H) 0.70 - 3.10 10*3/mm3    Monocytes Absolute 0.80 0.10 - 0.90  "10*3/mm3    Eosinophils Absolute 0.25 0.00 - 0.40 10*3/mm3    Basophils Absolute 0.05 0.00 - 0.20 10*3/mm3    Immature Granulocyte Rel % 0.4 0.0 - 0.5 %    Immature Grans Absolute 0.05 0.00 - 0.05 10*3/mm3    nRBC 0.0 0.0 - 0.2 /100 WBC       A1C:  Lab Results - Last 18 Months   Lab Units 05/02/23  0959   HEMOGLOBIN A1C % 6.10*     GLUCOSE:  Lab Results - Last 18 Months   Lab Units 05/02/23  0959   GLUCOSE mg/dL 97     LIPID:  Lab Results - Last 18 Months   Lab Units 05/02/23  0959   CHOLESTEROL mg/dL 226*   LDL CHOL mg/dL 155*   HDL CHOL mg/dL 35*   TRIGLYCERIDES mg/dL 196*     PSA:  Lab Results   Component Value Date/Time    PSA 1.150 05/02/2023 09:59 AM    PSA 0.937 04/28/2022 11:08 AM    PSA 0.791 04/06/2020 10:25 AM    PSA 0.767 03/22/2019 09:48 AM    PSA 0.838 02/21/2018 07:19 AM       CBC:  Lab Results - Last 18 Months   Lab Units 05/02/23  0959   WBC 10*3/mm3 11.44*   HEMOGLOBIN g/dL 10.7*   HEMATOCRIT % 31.2*   PLATELETS 10*3/mm3 242   IRON mcg/dL 132   VITAMIN B 12 pg/mL 452   FOLATE ng/mL 11.90     BMP/CMP:  Lab Results - Last 18 Months   Lab Units 05/02/23  0959   SODIUM mmol/L 142   POTASSIUM mmol/L 4.3   CHLORIDE mmol/L 106   CO2 mmol/L 27.9   GLUCOSE mg/dL 97   BUN mg/dL 11   CREATININE mg/dL 1.06   EGFR RESULT mL/min/1.73 73.2   CALCIUM mg/dL 9.5       HEPATIC:  Lab Results - Last 18 Months   Lab Units 05/02/23  0959   ALT (SGPT) U/L 18   AST (SGOT) U/L 17   ALK PHOS U/L 84     HEPATITIS C ANTIBODY:   Lab Results   Component Value Date/Time    HEPCVIRUSABY <0.1 02/21/2018 07:19 AM     Vit D:  Lab Results - Last 18 Months   Lab Units 05/02/23  0959   VIT D 25 HYDROXY ng/ml 37.6     THYROID:  Lab Results - Last 18 Months   Lab Units 05/02/23  0959   TSH uIU/mL 0.388   FREE T4 ng/dL 0.76*       Objective   /88   Pulse 75   Temp 96.6 °F (35.9 °C) (Temporal)   Resp 18   Ht 182.9 cm (72\")   Wt 127 kg (281 lb)   SpO2 98%   BMI 38.11 kg/m²       Recent Vitals         10/24/2023 11/7/2023 " 11/13/2023       BP: 124/62 132/68 128/88     Pulse: 71 61 75     Temp: -- -- 96.6 °F (35.9 °C)     Weight: 127 kg (280 lb 9.6 oz) 126 kg (278 lb) 127 kg (281 lb)     BMI (Calculated): 38 37.7 38.1           Wt Readings from Last 15 Encounters:   11/13/23 0932 127 kg (281 lb)   11/07/23 1014 126 kg (278 lb)   10/24/23 0942 127 kg (280 lb 9.6 oz)   08/01/23 0821 123 kg (272 lb)   05/02/23 1021 123 kg (270 lb 12.8 oz)   03/09/23 1057 117 kg (257 lb)   10/31/22 1114 119 kg (263 lb 3.2 oz)   10/10/22 1043 118 kg (261 lb)   06/27/22 1111 79.8 kg (176 lb)   05/23/22 1006 79.4 kg (175 lb)   04/28/22 1143 126 kg (278 lb)   10/21/21 1411 127 kg (279 lb)   05/11/21 1402 127 kg (279 lb)   11/05/20 1431 121 kg (267 lb)   04/09/20 1318 121 kg (267 lb 3.2 oz)       Physical Exam  GENERAL:  Well nourished/developed in no acute distress. Obese  SKIN: Turgor excellent, without wound, rash, lesion.  HEENT: Normal cephalic without trauma.  Pupils equal round reactive to light. Extraocular motions full without nystagmus.   External canals moderate wax but nonobstructive nontender without reddness. Tymphatic membranes seen anders with david structures intact.   Oral cavity without growths, exudates, and moist.  Posterior pharynx without mass, obstruction, redness.  No thyromegaly, mass, tenderness, lymphadenopathy and supple.  CV: Regular rhythm.  No murmur, gallop,  edema. Posterior pulses intact.  No carotid bruits.  CHEST: No chest wall tenderness or mass.   LUNGS: Symmetric motion with clear/reduced to auscultation.  No dullness to percussion  ABD: Soft, nontender without mass.   ORTHO: Symmetric extremities without swelling/point tenderness.  Full gross range of motion.  NEURO: CN 2-12 grossly intact.  Symmetric facies. 1/4 x bicep equal reflexes.  UE/LE   3/5 strength throughout.  Nonfocal use extremities. Speech clear.  Reduced light touch with monofilament, vibratory sensation with tuning fork; equal toes/distal feet.  Intention  tremor.     PSYCH: Oriented x 3.  Pleasant calm, well kept.  Purposeful/directed conservation with intact short/long gross    Assessment & Plan     1. Wellness examination-done    2. Non-seasonal allergic rhinitis, unspecified trigger    3. Mixed hyperlipidemia    4. Primary hypertension    5. Chronic diastolic congestive heart failure    6. Elevated fasting glucose    7. Gastroesophageal reflux disease, unspecified whether esophagitis present    8. Fatty liver    9. Hypopotassemia-diuretic    10. Anemia, unspecified type    11. Iron deficiency    12. Osteoporosis, unspecified osteoporosis type, unspecified pathological fracture presence    13. Tremor    14. Chronic obstructive pulmonary disease, unspecified COPD type    15. Edema leg-CHF/d, obesity, copd, cirrhosis    16. Gait difficulty        Issues that are new, uncontrolled, or required review HPI/ROS/exam and decisions beyond wellness today: (ie: requiring the service of a physician and/or not likely to resolve independently without clinical intervention.)   Poor complicance; ie osteoporosis, iron-anemia f/u, vaccines    Discussions/medical decisions/reviews:  BP ok  Other vitals ok  Recent Vitals         10/24/2023 11/7/2023 11/13/2023       BP: 124/62 132/68 128/88     Pulse: 71 61 75     Temp: -- -- 96.6 °F (35.9 °C)     Weight: 127 kg (280 lb 9.6 oz) 126 kg (278 lb) 127 kg (281 lb)     BMI (Calculated): 38 37.7 38.1           DM/A1c 6.1 5.2.23  DM/BS 97 5.2.23  Lipid  5.2.23; zocor 20  PSA ok 5.2.23  CBC 10.7 5.2.23; 325qd  Renal ok 5.2.23  Liver ok 5.2.23  Vit D 37 5.2.23  Thyroid TSH ok 5.2.23, fT4 0.76L 5.2.23    Wellness/or annual done   Screening reviewed/updated as he would allow  Vaccines discussed (see below)   Again; cannot go more 3m on iron without monitoring  Since no colonoscopy; stool OB x 2 again  (Realize transportation a big issue for him BUT he has some issues that require at least minimum attention; ie seeing  "sony/etc)-discussed      Follow up: Return for stool occlut blood x 2; lab today; lab/Dr Rosario .  Future Appointments   Date Time Provider Department Center   2/8/2024  9:30 AM Whit Li APRN [facility]   3/11/2024  9:45 AM Bassam Rosario MD [facility]   4/23/2024  9:45 AM Lorena Cantrell MD [facility]       Data review above:   Rx: reviewed and decisions:   Rx new/changes: none  No orders of the defined types were placed in this encounter.      Orders placed:   LAB/Testing/Referrals: reviewed/orders:   Today:   Orders Placed This Encounter   Procedures    Comprehensive Metabolic Panel    Ferritin    Iron Profile    Hemoglobin A1c    Microalbumin / Creatinine Urine Ratio - Urine, Clean Catch    CBC & Differential     Chronic/recurrent labs above or change to:   Same     Immunization History   Administered Date(s) Administered    COVID-19 (MODERNA) 1st,2nd,3rd Dose Monovalent 03/06/2021, 04/07/2021    FLUAD TRI 65YR+ 10/03/2019    Flu Vaccine Quad PF >36MO 10/31/2017    Fluad Quad 65+ 10/01/2020    Fluzone High-Dose 65+yrs 10/31/2022    Fluzone Quad >6mos (Multi-dose) 01/27/2016, 10/21/2016, 10/31/2017    Pneumococcal Conjugate 13-Valent (PCV13) 01/27/2016    Pneumococcal Conjugate 20-Valent (PCV20) 05/02/2023    Pneumococcal, Unspecified 12/12/2013     We advised/reaffirmed our support/suggestion for staying complete with covid- covid boosters, seasonal flu/yearly and any missing vaccine from list we supplied; when cannot be given here we suggest contact with local health department office or pharmacy to review missing/needed vaccines and then bring nursing documentation for these vaccines to this office or call this information in. Shingles became \"free\" 1.1.23 for medicare insurance.  Health maintenance:     Tobacco use reviewed:   Felix ALMEIDA Sr.  reports that he has been smoking cigarettes. He started smoking about 57 years ago. He has a 25.00 pack-year smoking history. He has " never used smokeless tobacco.. I have educated him on the risk of diseases from using tobacco products such as cancer, COPD, and heart disease.     I advised him to quit and he is not willing to quit.    I spent 3  minutes counseling the patient.       Reminded; as discussed before.     Annual/wellness also done today.  Issues as appropriate discussed as counseling, anticipatory guidance:   Nutrition, physical activity, healthy weight, injury prevention, misuse of tobacco, alcohol and drugs, sexual behavior and STDs, contraception, dental health, mental health, immunizations, screenings as appropriate. As appropriate see AVS.         Assessment and Plan   Diagnoses and all orders for this visit:    1. Wellness examination-done (Primary)    2. Non-seasonal allergic rhinitis, unspecified trigger    3. Mixed hyperlipidemia    4. Primary hypertension    5. Chronic diastolic congestive heart failure    6. Elevated fasting glucose  -     CBC & Differential  -     Comprehensive Metabolic Panel  -     Ferritin  -     Iron Profile  -     Hemoglobin A1c  -     Microalbumin / Creatinine Urine Ratio - Urine, Clean Catch    7. Gastroesophageal reflux disease, unspecified whether esophagitis present    8. Fatty liver  -     CBC & Differential  -     Comprehensive Metabolic Panel  -     Ferritin  -     Iron Profile  -     Hemoglobin A1c  -     Microalbumin / Creatinine Urine Ratio - Urine, Clean Catch    9. Hypopotassemia-diuretic    10. Anemia, unspecified type  -     CBC & Differential  -     Comprehensive Metabolic Panel  -     Ferritin  -     Iron Profile  -     Hemoglobin A1c  -     Microalbumin / Creatinine Urine Ratio - Urine, Clean Catch    11. Iron deficiency  -     CBC & Differential  -     Comprehensive Metabolic Panel  -     Ferritin  -     Iron Profile  -     Hemoglobin A1c  -     Microalbumin / Creatinine Urine Ratio - Urine, Clean Catch    12. Osteoporosis, unspecified osteoporosis type, unspecified pathological  "fracture presence    13. Tremor    14. Chronic obstructive pulmonary disease, unspecified COPD type    15. Edema leg-CHF/d, obesity, copd, cirrhosis    16. Gait difficulty           Patient Instructions   There are several vaccines used for individuals near/over 65 years old.      1. Tetanus.   Like anyone this needs to given every 10 years; sooner for/with lacerations/wounds.   Likely when getting this booster it needs to be a tetanus called Tdap (tetanus mixed with diptheria and pertussis).   Years ago you had this vaccine.  We now know we can lose our immunity to pertussis (a part of this vaccine) and run a risk of catching this.  Now only would this make us ill; but more importantly we can spread this to very young children (and for them it can be a much more dangerous illness).   We call this the grandparent vaccine for this reason.     2. Pneumonia.   This comes now as a one time vaccine for most persons.  Even if you have had these before; we need to review when and your current health situation/s as you may need a booster     3. Shingles.  You do not want to catch shingles.  Though you will recover from this; the pain associated with shingles can be severe.  Even if you have had the now older zostavax, or have had shingles; it is recommended you still get the Shingrix (the new just available early 2018 shingles vaccine).   Medicare began 1.1.23 waving any co-pays for this; it is therefore free.     4. Flu/influenza: Yearly flu vaccine given from September through April each year.  For those over 65 it should be \"high dose\" flu (to complement the possibility the immune system is alittle weaker)    5. RSV: If you are over 60; beginning 10.1.23 you can now take a RSV vaccination.  This would lower your change for catching this winter/\"cold\" virus that can under some circumstances cause significant respiratory symptoms and even require hospitalization.  Being vaccinated also makes it more difficult for you to be " contagious and get this to children; particularly children less than 6 months of where this can be a very dangerous illness.      6. COVID: Since the early onset of COVID illness and a the many COVID vaccines that were initially developed; we will have a winter 2023 (and maybe beyond)  booster particularly for those at higher risk of complications and over age 65.    7. Travel vaccines:  If you are one to do international travel; be sure and ask us for any particular unusual vaccines you may need.     8.  Miscellaneous:  If you have certain health situations/disease you may need specific/particular vaccines not give to the general public.     Your records we have on file:   Immunization History   Administered Date(s) Administered    COVID-19 (MODERNA) 1st,2nd,3rd Dose Monovalent 03/06/2021, 04/07/2021    FLUAD TRI 65YR+ 10/03/2019    Flu Vaccine Quad PF >36MO 10/31/2017    Fluad Quad 65+ 10/01/2020    Fluzone High-Dose 65+yrs 10/31/2022    Fluzone Quad >6mos (Multi-dose) 01/27/2016, 10/21/2016, 10/31/2017    Pneumococcal Conjugate 13-Valent (PCV13) 01/27/2016    Pneumococcal Conjugate 20-Valent (PCV20) 05/02/2023    Pneumococcal, Unspecified 12/12/2013       You therefore could need the ones if we listed below:   Winter 2023 shots: latest/updated covid vaccine + 2023 flu/high dose flu (can/should be given same day)                                 2-3 weeks after then get the new RSV vaccine    After this; take a break and then work on getting those other vaccines still needed:   Shingles  Tdap      Because of many restrictions on this office always having all the above vaccines; you may be advised to work with your local health department or various pharmacies to keep up with your individual vaccine needs.  If you are forced to get a vaccine outside this office in order to keep your health record up to date; we request you personally notify us after any vaccines of the type and date.

## 2024-02-07 ENCOUNTER — TELEPHONE (OUTPATIENT)
Dept: PODIATRY | Facility: CLINIC | Age: 74
End: 2024-02-07
Payer: OTHER GOVERNMENT

## 2024-02-07 DIAGNOSIS — I10 ESSENTIAL HYPERTENSION: ICD-10-CM

## 2024-02-07 DIAGNOSIS — E78.2 MIXED HYPERLIPIDEMIA: ICD-10-CM

## 2024-02-07 RX ORDER — METOPROLOL TARTRATE 50 MG/1
50 TABLET, FILM COATED ORAL EVERY 12 HOURS
Qty: 180 TABLET | Refills: 3 | Status: SHIPPED | OUTPATIENT
Start: 2024-02-07

## 2024-02-07 RX ORDER — SIMVASTATIN 20 MG
20 TABLET ORAL DAILY
Qty: 90 TABLET | Refills: 3 | Status: SHIPPED | OUTPATIENT
Start: 2024-02-07

## 2024-02-07 RX ORDER — TAMSULOSIN HYDROCHLORIDE 0.4 MG/1
1 CAPSULE ORAL DAILY
Qty: 90 CAPSULE | Refills: 3 | Status: SHIPPED | OUTPATIENT
Start: 2024-02-07

## 2024-02-07 RX ORDER — LOSARTAN POTASSIUM 50 MG/1
50 TABLET ORAL DAILY
Qty: 90 TABLET | Refills: 3 | Status: SHIPPED | OUTPATIENT
Start: 2024-02-07

## 2024-02-08 ENCOUNTER — OFFICE VISIT (OUTPATIENT)
Dept: PODIATRY | Facility: CLINIC | Age: 74
End: 2024-02-08
Payer: MEDICARE

## 2024-02-08 VITALS
BODY MASS INDEX: 37.25 KG/M2 | OXYGEN SATURATION: 98 % | WEIGHT: 275 LBS | HEART RATE: 72 BPM | SYSTOLIC BLOOD PRESSURE: 140 MMHG | DIASTOLIC BLOOD PRESSURE: 74 MMHG | HEIGHT: 72 IN

## 2024-02-08 DIAGNOSIS — R26.81 UNSTEADY GAIT: ICD-10-CM

## 2024-02-08 DIAGNOSIS — L60.2 ONYCHOGRYPHOSIS: Primary | ICD-10-CM

## 2024-02-08 DIAGNOSIS — R60.9 PERIPHERAL EDEMA: ICD-10-CM

## 2024-02-08 DIAGNOSIS — G62.89 OTHER POLYNEUROPATHY: ICD-10-CM

## 2024-02-12 ENCOUNTER — TELEPHONE (OUTPATIENT)
Dept: VASCULAR SURGERY | Facility: CLINIC | Age: 74
End: 2024-02-12

## 2024-02-12 NOTE — TELEPHONE ENCOUNTER
Caller: Felix ALMEIDA Sr.    Relationship to patient: Self    Best call back number: [phone]    Type of visit: 3 MO F/U    Requested date: AFTER  05/09/24, TUES OR THURS IN THE MORNING    If rescheduling, when is the original appointment: 05/09/24     Additional notes:PATIENT STATES HE HAS ANOTHER APPT THAT DAY THAT HE FORGOT ABOUT. HUB UNABLE TO PULL UP CANDACE'S SCHEDULE.

## 2024-02-13 DIAGNOSIS — I10 ESSENTIAL HYPERTENSION: ICD-10-CM

## 2024-02-13 RX ORDER — ESCITALOPRAM OXALATE 20 MG/1
20 TABLET ORAL DAILY
Qty: 90 TABLET | Refills: 3 | Status: SHIPPED | OUTPATIENT
Start: 2024-02-13

## 2024-03-11 ENCOUNTER — OFFICE VISIT (OUTPATIENT)
Dept: FAMILY MEDICINE CLINIC | Facility: CLINIC | Age: 74
End: 2024-03-11
Payer: OTHER GOVERNMENT

## 2024-03-11 VITALS
BODY MASS INDEX: 37.52 KG/M2 | RESPIRATION RATE: 18 BRPM | WEIGHT: 277 LBS | HEIGHT: 72 IN | TEMPERATURE: 97.1 F | DIASTOLIC BLOOD PRESSURE: 90 MMHG | OXYGEN SATURATION: 98 % | HEART RATE: 81 BPM | SYSTOLIC BLOOD PRESSURE: 160 MMHG

## 2024-03-11 DIAGNOSIS — K76.0 FATTY LIVER: Chronic | ICD-10-CM

## 2024-03-11 DIAGNOSIS — E87.6 HYPOPOTASSEMIA: Chronic | ICD-10-CM

## 2024-03-11 DIAGNOSIS — R60.0 EDEMA LEG: ICD-10-CM

## 2024-03-11 DIAGNOSIS — E61.1 IRON DEFICIENCY: ICD-10-CM

## 2024-03-11 DIAGNOSIS — K63.5 POLYP OF COLON, UNSPECIFIED PART OF COLON, UNSPECIFIED TYPE: ICD-10-CM

## 2024-03-11 DIAGNOSIS — J44.9 CHRONIC OBSTRUCTIVE PULMONARY DISEASE, UNSPECIFIED COPD TYPE: Chronic | ICD-10-CM

## 2024-03-11 DIAGNOSIS — M54.9 CHRONIC BACK PAIN, UNSPECIFIED BACK LOCATION, UNSPECIFIED BACK PAIN LATERALITY: ICD-10-CM

## 2024-03-11 DIAGNOSIS — F41.9 ANXIETY: Chronic | ICD-10-CM

## 2024-03-11 DIAGNOSIS — R73.01 ELEVATED FASTING GLUCOSE: Chronic | ICD-10-CM

## 2024-03-11 DIAGNOSIS — Z87.891 PERSONAL HISTORY OF NICOTINE DEPENDENCE: ICD-10-CM

## 2024-03-11 DIAGNOSIS — R26.9 GAIT DIFFICULTY: ICD-10-CM

## 2024-03-11 DIAGNOSIS — R25.1 TREMOR: ICD-10-CM

## 2024-03-11 DIAGNOSIS — I10 PRIMARY HYPERTENSION: Chronic | ICD-10-CM

## 2024-03-11 DIAGNOSIS — I50.32 CHRONIC DIASTOLIC CONGESTIVE HEART FAILURE: ICD-10-CM

## 2024-03-11 DIAGNOSIS — D64.9 ANEMIA, UNSPECIFIED TYPE: ICD-10-CM

## 2024-03-11 DIAGNOSIS — E55.9 VITAMIN D DEFICIENCY: ICD-10-CM

## 2024-03-11 DIAGNOSIS — Z12.2 ENCOUNTER FOR SCREENING FOR LUNG CANCER: ICD-10-CM

## 2024-03-11 DIAGNOSIS — K21.9 GASTROESOPHAGEAL REFLUX DISEASE, UNSPECIFIED WHETHER ESOPHAGITIS PRESENT: Chronic | ICD-10-CM

## 2024-03-11 DIAGNOSIS — G89.29 CHRONIC BACK PAIN, UNSPECIFIED BACK LOCATION, UNSPECIFIED BACK PAIN LATERALITY: ICD-10-CM

## 2024-03-11 NOTE — PROGRESS NOTES
TIFF”.   Subjective   Felix Bartlett Sr. is a 73 y.o. male presenting with chief complaint of:   Chief Complaint   Patient presents with    Follow-up     AWV 11.13.23  Last Completed Annual Wellness Visit            ANNUAL WELLNESS VISIT (Yearly) Next due on 11/13/2024 11/13/2023  Level of Service: PPPS, SUBSEQ VISIT    04/28/2022  Level of Service: [place] PPPS, SUBSEQ VISIT                     History of Present Illness :  Alone.   Here for review of chronic problems that includes HTN and others.    Has multiple chronic problems to consider that might have a bearing on today's issues; an interval appointment.       Chronic/acute problems reviewed today:   1. Elevated fasting glucose Chronic/stable. No problem/pattern hypoglycemia/hyperglycemia manifest by poly- dypsia, phagia, uria, or sweats, diaphoretic episodes, syncope/near.     2. Primary hypertension  Chronic/unstable. Above targets today/without home blood pressures.  No significant chest pain, SOB, LE edema, orthopnea, near syncope, dizziness/light headness.  Agrees to follow next few days and let us know if stays elevated.   Recent Vitals         11/13/2023 2/8/2024 3/11/2024       BP: 128/88 140/74 160/90     Pulse: 75 72 81     Temp: 96.6 °F (35.9 °C) -- 97.1 °F (36.2 °C)     Weight: 127 kg (281 lb) 125 kg (275 lb) 126 kg (277 lb)     BMI (Calculated): 38.1 37.3 37.6                  3. Chronic diastolic congestive heart failure Chronic/stable.  Denies significant sob, orthopnea, leg edema, weight gain.  Aware of influence diet/salt and watching weight at home.       4. Fatty liver Chronic/stable.  Aware treatment involves normalizing weight; usually including low fat diet and regular exercise.  Aware condition can go on to cirrhosis and death.  Stable occ liver labs and past imaging.       5. Gastroesophageal reflux disease, unspecified whether esophagitis present Chronic/stable.  Controlled heartburn, reflux without dysphagia, melena.  Rx used,  periods not used proven currently needed with symptoms -currently doing ok.      6. Hypopotassemia-diuretic Chronic/variable high or low K as uses diuretic; has to have lab monitoring.  No significant fatigue or muscle cramps     7. Anemia, unspecified type Chronic or past history/stable: This has been present before.    There has been GI evaulation in the past but behind and warned. There is no current melena, hematochezia. It has been benign to date and stable/treated/watching.  Contributing comorbidities to date:.unknown GI?      8. Iron deficiency see anemia   9. Anxiety Chronic/stable:  On/off anxiety tolerated well.  Rx helps and not interested in Rx change. Stress ongoing day to day; including caring for invalid wife.      10. Chronic back pain, unspecified back location, unspecified back pain laterality : chronic/variable spinal (upper/mid/lower) 0-2/10 pain with infrequent/occ radiation to UE/LE.  No change LE numbness.  Has bladder/bowel control. No desire to change approach of care.      11. Tremor chronic stable resting and intention tremor; worse with intention.  Improved with medications; but no desire to increase medication   12. Chronic obstructive pulmonary disease, unspecified COPD type Chronic/stable mild occ cough, sob, wheeze.  Rx helps.   Still but less smoking.       13. Gait difficulty Chronic/stable:.  Ongoing issues with difficulties it results in difficulty with walking or gait.  No recent falls.  No recent injuries.  Uses to help gait: slower/intentional walk.     14. Edema leg-CHF/d, obesity, copd, cirrhosis chronic/variable.  Recently doing wewll.  Causes include: venous insufficiency, liver disease (past).      15. Vitamin D deficiency chronic/variable up/down with past labs and/or risk to run low especially in winter. Lab monitored.        Has an/another acute issue today: none.    The following portions of the patient's history were reviewed and updated as appropriate: allergies,  current medications, past family history, past medical history, past social history, past surgical history, and problem list.      Current Outpatient Medications:     acetaminophen (TYLENOL) 650 MG 8 hr tablet, Take 1 tablet by mouth 2 (Two) Times a Day., Disp: , Rfl:     calcium-vitamin D (OSCAL-500) 500-200 MG-UNIT per tablet, Take 1 tablet by mouth 2 times daily.  , Disp: , Rfl:     cholecalciferol (VITAMIN D3) 10 MCG (400 UNIT) tablet, Take 1 tablet by mouth., Disp: , Rfl:     diphenhydrAMINE (BENADRYL) 25 mg capsule, Take 1 capsule by mouth 2 (Two) Times a Day., Disp: , Rfl:     escitalopram (LEXAPRO) 20 MG tablet, Take 1 tablet by mouth Daily., Disp: 90 tablet, Rfl: 3    ferrous sulfate 325 (65 FE) MG tablet, Take 1 tablet by mouth Daily With Breakfast., Disp: , Rfl:     FLOVENT  MCG/ACT inhaler, INHALE 1 PUFF BY MOUTH TWICE DAILY, Disp: 12 g, Rfl: 5    fluticasone (FLONASE) 50 MCG/ACT nasal spray, , Disp: , Rfl:     KETOTIFEN FUMARATE OP, Apply 1 drop to eye(s) as directed by provider As Needed., Disp: , Rfl:     losartan (COZAAR) 50 MG tablet, Take 1 tablet by mouth Daily., Disp: 90 tablet, Rfl: 3    metoprolol tartrate (LOPRESSOR) 50 MG tablet, Take 1 tablet by mouth Every 12 (Twelve) Hours., Disp: 180 tablet, Rfl: 3    Multiple Vitamins-Minerals (CENTRUM SILVER 50+MEN) tablet, Take 1 tablet by mouth Daily., Disp: , Rfl:     O2 (OXYGEN), Inhale 1 (One) Time., Disp: , Rfl:     omeprazole (priLOSEC) 20 MG capsule, 1 capsule 2 (Two) Times a Day., Disp: , Rfl:     potassium chloride (KLOR-CON) 10 MEQ CR tablet, Take 1 tablet by mouth 2 (Two) Times a Day., Disp: 180 tablet, Rfl: 3    primidone (MYSOLINE) 50 MG tablet, 1 pill qam/2 pills qhs x 1 week then 2 pill BID., Disp: 360 tablet, Rfl: 3    simvastatin (ZOCOR) 20 MG tablet, Take 1 tablet by mouth Daily., Disp: 90 tablet, Rfl: 3    tamsulosin (FLOMAX) 0.4 MG capsule 24 hr capsule, Take 1 capsule by mouth Daily., Disp: 90 capsule, Rfl: 3    No problems  with medications.    No Known Allergies    Review of Systems  GENERAL:  Inactive/slower with limits, speed, stamina for age and fatigue. Sleep is ok; flat. No fever now/recent.  ENDO:  No syncope, near or diaphoretic sweaty spells.  HEENT: No change occ headache.   No vision change.   Same significant hearing loss.  Ears without pain/drainage.  No sore throat.  No significant nasal/sinus congestion/drainage. No epistaxis.  CHEST: No chest wall tenderness or mass. No change cough, with occ wheeze.  Stable/occ SOB; no hemoptysis.  CV: No chest pain, palpitations, less daily ankle edema.  GI: No heartburn, dysphagia.  No abdominal pain, diarrhea, constipation.  No rectal bleeding, or melena.    :  Voids without dysuria, or incontinence to completion; sees sony  ORTHO: No painful/swollen joints but various on /off sore.  No change sore neck; more pain lower back.  No acute neck or back pain without recent injury.  NEURO: No dizziness, weakness of extremities.  No change UE/LE numbness/paresthesias.   Continued RUE > LUE tremor.   PSYCH: No memory loss.  Mood good; occ anxious, depressed but/and not suicidal.  Tries to tolerate stress   Screening:  Mammogram: NA  Bone density: 3.2.2017 [facility]/Ts LS -1.8 hip -0.5-offered  Low dose CT chest:Tobacco-smoker/age 16/1ppd/advised   /6.6.23  1. Stable low-dose screening chest CT, no evidence of pulmonary  malignancy is identified. Lung RADS category 2, benign. Follow-up  screening low-dose chest CT is recommended in 12 months.  2. Mild dilation of the central pulmonary arteries suggesting pulmonary  arterial hypertension.  3. There is a fat-containing exophytic lesion at the inferior pole of  the left kidney which appears stable, measuring approximately 2.9 cm.  There also appears to be an exophytic cyst at the upper pole of the  right kidney measuring 2.6 cm.     GI:   Colonoscopy-polyp/Gil//3.18.16/5 yr-reminded  EGD-neg/Gil//5.1.18  Prostate:   [place] confirmed  3.11.24  sony confirmed 10.31.22  advised sony 4.28.22  sony last 11.5.20  sony confirmed 3.28.19  Usual lab order  6m CBC, CMP, A1c, iron  12m CBC, CMP, A1c LIPID, TSH, T4, Vit D, PSAs, B12, folate, iron, ferritin, % sat iron, retic count       Copy/paste function used for ROS/exam AND each area of these were reviewed, updated, confirmed and supplemented as needed.  Data reviewed:   Recent admit/ER/MD visits: 11.13.23    1. Wellness examination-done    2. Non-seasonal allergic rhinitis, unspecified trigger    3. Mixed hyperlipidemia    4. Primary hypertension    5. Chronic diastolic congestive heart failure    6. Elevated fasting glucose    7. Gastroesophageal reflux disease, unspecified whether esophagitis present    8. Fatty liver    9. Hypopotassemia-diuretic    10. Anemia, unspecified type    11. Iron deficiency    12. Osteoporosis, unspecified osteoporosis type, unspecified pathological fracture presence    13. Tremor    14. Chronic obstructive pulmonary disease, unspecified COPD type    15. Edema leg-CHF/d, obesity, copd, cirrhosis    16. Gait difficulty          Issues that are new, uncontrolled, or required review HPI/ROS/exam and decisions beyond wellness today: (ie: requiring the service of a physician and/or not likely to resolve independently without clinical intervention.)   Poor complicance; ie osteoporosis, iron-anemia f/u, vaccines     Discussions/medical decisions/reviews:  BP ok  Other vitals ok  Recent Vitals           10/24/2023 11/7/2023 11/13/2023          BP: 124/62 132/68 128/88       Pulse: 71 61 75       Temp: -- -- 96.6 °F (35.9 °C)       Weight: 127 kg (280 lb 9.6 oz) 126 kg (278 lb) 127 kg (281 lb)       BMI (Calculated): 38 37.7 38.1               DM/A1c 6.1 5.2.23  DM/BS 97 5.2.23  Lipid  5.2.23; zocor 20  PSA ok 5.2.23  CBC 10.7 5.2.23; 325qd  Renal ok 5.2.23  Liver ok 5.2.23  Vit D 37 5.2.23  Thyroid TSH ok 5.2.23, fT4 0.76L 5.2.23     Wellness/or annual done    Screening reviewed/updated as he would allow  Vaccines discussed (see below)   Again; cannot go more 3m on iron without monitoring  Since no colonoscopy; stool OB x 2 again  (Realize transportation a big issue for him BUT he has some issues that require at least minimum attention; ie seeing sony/etc)-discussed    Last cardiac testing:   Echo:   Results for orders placed in visit on 08/21/17    Adult Transthoracic Echo Complete    Interpretation Summary  · Left ventricular systolic function is normal. Estimated EF = 60%.  · Left ventricular wall thickness is consistent with mild concentric hypertrophy.  · Left ventricular diastolic dysfunction (grade I) consistent with impaired relaxation.  · No evidence of pulmonary hypertension is present.    Radiology considered:   No radiology results for the last 90 days.    Lab Results:  Results for orders placed or performed in visit on 05/02/23   Hemoglobin A1c    Specimen: Blood   Result Value Ref Range    Hemoglobin A1C 6.10 (H) 4.80 - 5.60 %   Comprehensive Metabolic Panel    Specimen: Blood   Result Value Ref Range    Glucose 97 65 - 99 mg/dL    BUN 11 8 - 23 mg/dL    Creatinine 1.06 0.76 - 1.27 mg/dL    EGFR Result 73.2 >60.0 mL/min/1.73    BUN/Creatinine Ratio 10.4 7.0 - 25.0    Sodium 142 136 - 145 mmol/L    Potassium 4.3 3.5 - 5.2 mmol/L    Chloride 106 98 - 107 mmol/L    Total CO2 27.9 22.0 - 29.0 mmol/L    Calcium 9.5 8.6 - 10.5 mg/dL    Total Protein 6.2 6.0 - 8.5 g/dL    Albumin 4.1 3.5 - 5.2 g/dL    Globulin 2.1 gm/dL    A/G Ratio 2.0 g/dL    Total Bilirubin 0.3 0.0 - 1.2 mg/dL    Alkaline Phosphatase 84 39 - 117 U/L    AST (SGOT) 17 1 - 40 U/L    ALT (SGPT) 18 1 - 41 U/L   Lipid Panel    Specimen: Blood   Result Value Ref Range    Total Cholesterol 226 (H) 0 - 200 mg/dL    Triglycerides 196 (H) 0 - 150 mg/dL    HDL Cholesterol 35 (L) 40 - 60 mg/dL    VLDL Cholesterol Chava 36 5 - 40 mg/dL    LDL Chol Calc (NIH) 155 (H) 0 - 100 mg/dL   TSH    Specimen: Blood    Result Value Ref Range    TSH 0.388 0.270 - 4.200 uIU/mL   T4, Free    Specimen: Blood   Result Value Ref Range    Free T4 0.76 (L) 0.93 - 1.70 ng/dL   PSA Screen    Specimen: Blood   Result Value Ref Range    PSA 1.150 0.000 - 4.000 ng/mL   Vitamin B12 & Folate    Specimen: Blood   Result Value Ref Range    Vitamin B-12 452 211 - 946 pg/mL    Folate 11.90 4.78 - 24.20 ng/mL   Iron Profile    Specimen: Blood   Result Value Ref Range    TIBC 341 mcg/dL    UIBC 209 112 - 346 mcg/dL    Iron 132 59 - 158 mcg/dL    Iron Saturation 39 20 - 50 %   Reticulocytes    Specimen: Blood   Result Value Ref Range    Reticulocyte Absolute 2.03 (H) 0.70 - 1.90 %   Ferritin    Specimen: Blood   Result Value Ref Range    Ferritin 348.00 30.00 - 400.00 ng/mL   Vitamin D,25-Hydroxy    Specimen: Blood   Result Value Ref Range    25 Hydroxy, Vitamin D 37.6 30.0 - 100.0 ng/ml   CBC & Differential    Specimen: Blood   Result Value Ref Range    WBC 11.44 (H) 3.40 - 10.80 10*3/mm3    RBC 3.16 (L) 4.14 - 5.80 10*6/mm3    Hemoglobin 10.7 (L) 13.0 - 17.7 g/dL    Hematocrit 31.2 (L) 37.5 - 51.0 %    MCV 98.7 (H) 79.0 - 97.0 fL    MCH 33.9 (H) 26.6 - 33.0 pg    MCHC 34.3 31.5 - 35.7 g/dL    RDW 12.5 12.3 - 15.4 %    Platelets 242 140 - 450 10*3/mm3    Neutrophil Rel % 51.6 42.7 - 76.0 %    Lymphocyte Rel % 38.4 19.6 - 45.3 %    Monocyte Rel % 7.0 5.0 - 12.0 %    Eosinophil Rel % 2.2 0.3 - 6.2 %    Basophil Rel % 0.4 0.0 - 1.5 %    Neutrophils Absolute 5.90 1.70 - 7.00 10*3/mm3    Lymphocytes Absolute 4.39 (H) 0.70 - 3.10 10*3/mm3    Monocytes Absolute 0.80 0.10 - 0.90 10*3/mm3    Eosinophils Absolute 0.25 0.00 - 0.40 10*3/mm3    Basophils Absolute 0.05 0.00 - 0.20 10*3/mm3    Immature Granulocyte Rel % 0.4 0.0 - 0.5 %    Immature Grans Absolute 0.05 0.00 - 0.05 10*3/mm3    nRBC 0.0 0.0 - 0.2 /100 WBC       A1C:  Lab Results - Last 18 Months   Lab Units 05/02/23  0959   HEMOGLOBIN A1C % 6.10*     GLUCOSE:  Lab Results - Last 18 Months   Lab Units  "05/02/23  0959   GLUCOSE mg/dL 97     LIPID:  Lab Results - Last 18 Months   Lab Units 05/02/23  0959   CHOLESTEROL mg/dL 226*   LDL CHOL mg/dL 155*   HDL CHOL mg/dL 35*   TRIGLYCERIDES mg/dL 196*     PSA:  Lab Results   Component Value Date/Time    PSA 1.150 05/02/2023 09:59 AM    PSA 0.937 04/28/2022 11:08 AM    PSA 0.791 04/06/2020 10:25 AM    PSA 0.767 03/22/2019 09:48 AM    PSA 0.838 02/21/2018 07:19 AM       CBC:  Lab Results - Last 18 Months   Lab Units 05/02/23  0959   WBC 10*3/mm3 11.44*   HEMOGLOBIN g/dL 10.7*   HEMATOCRIT % 31.2*   PLATELETS 10*3/mm3 242   IRON mcg/dL 132   VITAMIN B 12 pg/mL 452   FOLATE ng/mL 11.90     BMP/CMP:  Lab Results - Last 18 Months   Lab Units 05/02/23  0959   SODIUM mmol/L 142   POTASSIUM mmol/L 4.3   CHLORIDE mmol/L 106   CO2 mmol/L 27.9   GLUCOSE mg/dL 97   BUN mg/dL 11   CREATININE mg/dL 1.06   EGFR RESULT mL/min/1.73 73.2   CALCIUM mg/dL 9.5       HEPATIC:  Lab Results - Last 18 Months   Lab Units 05/02/23  0959   ALT (SGPT) U/L 18   AST (SGOT) U/L 17   ALK PHOS U/L 84     HEPATITIS C ANTIBODY:   Lab Results   Component Value Date/Time    HEPCVIRUSABY <0.1 02/21/2018 07:19 AM     Vit D:  Lab Results - Last 18 Months   Lab Units 05/02/23  0959   VIT D 25 HYDROXY ng/ml 37.6     THYROID:  Lab Results - Last 18 Months   Lab Units 05/02/23  0959   TSH uIU/mL 0.388   FREE T4 ng/dL 0.76*       Objective   /90   Pulse 81   Temp 97.1 °F (36.2 °C) (Temporal)   Resp 18   Ht 182.9 cm (72\")   Wt 126 kg (277 lb)   SpO2 98%   BMI 37.57 kg/m²   Body mass index is 37.57 kg/m².    Recent Vitals         11/13/2023 2/8/2024 3/11/2024       BP: 128/88 140/74 160/90     Pulse: 75 72 81     Temp: 96.6 °F (35.9 °C) -- 97.1 °F (36.2 °C)     Weight: 127 kg (281 lb) 125 kg (275 lb) 126 kg (277 lb)     BMI (Calculated): 38.1 37.3 37.6           Wt Readings from Last 15 Encounters:   03/11/24 0943 126 kg (277 lb)   02/08/24 0925 125 kg (275 lb)   11/13/23 0932 127 kg (281 lb)   11/07/23 " 1014 126 kg (278 lb)   10/24/23 0942 127 kg (280 lb 9.6 oz)   08/01/23 0821 123 kg (272 lb)   05/02/23 1021 123 kg (270 lb 12.8 oz)   03/09/23 1057 117 kg (257 lb)   10/31/22 1114 119 kg (263 lb 3.2 oz)   10/10/22 1043 118 kg (261 lb)   06/27/22 1111 79.8 kg (176 lb)   05/23/22 1006 79.4 kg (175 lb)   04/28/22 1143 126 kg (278 lb)   10/21/21 1411 127 kg (279 lb)   05/11/21 1402 127 kg (279 lb)       Physical Exam  GENERAL:  Well nourished/developed in no acute distress. Obese  SKIN: Turgor excellent, without wound, rash, lesion.  HEENT: Normal cephalic without trauma.  Pupils equal round reactive to light. Extraocular motions full without nystagmus.   External canals moderate wax but nonobstructive nontender without reddness. Tymphatic membranes seen anders with david structures intact.   Oral cavity without growths, exudates, and moist.  Posterior pharynx without mass, obstruction, redness.  No thyromegaly, mass, tenderness, lymphadenopathy and supple.  CV: Regular rhythm.  No murmur, gallop,  edema. Posterior pulses intact.  No carotid bruits.  CHEST: No chest wall tenderness or mass.   LUNGS: Symmetric motion with clear/reduced to auscultation.  No dullness to percussion  ABD: Soft, nontender without mass.   ORTHO: Symmetric extremities without swelling/point tenderness.  Full gross range of motion.  NEURO: CN 2-12 grossly intact.  Symmetric facies. 1/4 x bicep equal reflexes.  UE/LE   3/5 strength throughout.  Nonfocal use extremities. Speech clear.  Reduced light touch with monofilament, vibratory sensation with tuning fork; equal toes/distal feet.  Intention tremor.     PSYCH: Oriented x 3.  Pleasant calm, well kept.  Purposeful/directed conservation with intact short/long gross    Assessment & Plan     1. Elevated fasting glucose    2. Primary hypertension    3. Chronic diastolic congestive heart failure    4. Fatty liver    5. Gastroesophageal reflux disease, unspecified whether esophagitis present    6.  Hypopotassemia-diuretic    7. Anemia, unspecified type    8. Iron deficiency    9. Anxiety    10. Chronic back pain, unspecified back location, unspecified back pain laterality    11. Tremor    12. Chronic obstructive pulmonary disease, unspecified COPD type    13. Gait difficulty    14. Edema leg-CHF/d, obesity, copd, cirrhosis    15. Vitamin D deficiency    16. Encounter for screening for lung cancer    17. Personal history of nicotine dependence    18. Polyp of colon, unspecified part of colon, unspecified type        Data review above:   Discussions/medical decisions/reviews:  BP too high; fortunately asymptomatic  Other vitals ok  Recent Vitals         11/13/2023 2/8/2024 3/11/2024       BP: 128/88 140/74 160/90     Pulse: 75 72 81     Temp: 96.6 °F (35.9 °C) -- 97.1 °F (36.2 °C)     Weight: 127 kg (281 lb) 125 kg (275 lb) 126 kg (277 lb)     BMI (Calculated): 38.1 37.3 37.6           DM/A1c 6.1 5.2.23  DM/BS 97 5.2.23  Lipid  5.2.23; zocor 20  PSA ok 5.2.23  CBC 10.7 5.2.23; 325qd  Renal ok 5.2.23  Liver ok 5.2.23  Vit D 37 5.2.23  Thyroid TSH ok 5.2.23, fT4 0.76L 5.2.23    Screening reviewed/updated appears to agree to gi this year; need CT lung 6.2024  Vaccines discussed (see below)   Labs today-decision from     Follow up: Return for lab today and lab 3m and lab/return 6m+.  Future Appointments   Date Time Provider Department Center   4/23/2024  9:45 AM Lorena Cantrell MD [facility]   5/16/2024  9:00 AM Milagro Anderson APRN [facility]   6/11/2024  9:00 AM [facility]       Data review above:   Rx: reviewed and decisions:   Rx new/changes: none  No orders of the defined types were placed in this encounter.      Orders placed:   LAB/Testing/Referrals: reviewed/orders:   Today:   Orders Placed This Encounter   Procedures     CT Chest Low Dose Cancer Screening WO    Comprehensive Metabolic Panel    Lipid Panel    Hemoglobin A1c    TSH Rfx On Abnormal To Free T4     "Microalbumin / Creatinine Urine Ratio - Urine, Clean Catch    Vitamin B12    Folate    Ferritin    Iron Profile    Vitamin D,25-Hydroxy    Ambulatory Referral to Gastroenterology    CBC & Differential     Chronic/recurrent labs above or change to:   Same     Immunization History   Administered Date(s) Administered    COVID-19 (MODERNA) 1st,2nd,3rd Dose Monovalent 03/06/2021, 04/07/2021    FLUAD TRI 65YR+ 10/03/2019    Flu Vaccine Quad PF >36MO 10/31/2017    Fluad Quad 65+ 10/01/2020    Fluzone High-Dose 65+yrs 10/31/2022    Fluzone Quad >6mos (Multi-dose) 01/27/2016, 10/21/2016, 10/31/2017    Pneumococcal Conjugate 13-Valent (PCV13) 01/27/2016    Pneumococcal Conjugate 20-Valent (PCV20) 05/02/2023    Pneumococcal, Unspecified 12/12/2013     We advised/reaffirmed our support/suggestion for staying complete with covid- covid boosters, seasonal flu/yearly and any missing vaccine from list we supplied; when cannot be given here we suggest contact with local health department office or pharmacy to review missing/needed vaccines and then bring nursing documentation for these vaccines to this office or call this information in. Shingles became \"free\" 1.1.23 for medicare insurance.    Health maintenance:   Body mass index is 37.57 kg/m².         Tobacco use reviewed:   Felix Bartlett Sr.  reports that he has been smoking cigarettes. He started smoking about 57 years ago. He has a 28.9 pack-year smoking history. He has never used smokeless tobacco.. I have educated him on the risk of diseases from using tobacco products such as cancer, COPD, and heart disease.     I advised him to quit and he is not willing to quit.    I spent  1  minutes counseling the patient.       Reminded; as discussed before.      There are no Patient Instructions on file for this visit.          "

## 2024-03-12 DIAGNOSIS — D64.9 ANEMIA, UNSPECIFIED TYPE: Primary | ICD-10-CM

## 2024-03-12 DIAGNOSIS — Z12.11 ENCOUNTER FOR SCREENING FOR MALIGNANT NEOPLASM OF COLON: ICD-10-CM

## 2024-03-12 LAB
25(OH)D3+25(OH)D2 SERPL-MCNC: 54 NG/ML (ref 30–100)
ALBUMIN SERPL-MCNC: 4.2 G/DL (ref 3.5–5.2)
ALBUMIN/CREAT UR: 5 MG/G CREAT (ref 0–29)
ALBUMIN/GLOB SERPL: 2 G/DL
ALP SERPL-CCNC: 98 U/L (ref 39–117)
ALT SERPL-CCNC: 15 U/L (ref 1–41)
AST SERPL-CCNC: 17 U/L (ref 1–40)
BASOPHILS # BLD AUTO: 0.05 10*3/MM3 (ref 0–0.2)
BASOPHILS NFR BLD AUTO: 0.5 % (ref 0–1.5)
BILIRUB SERPL-MCNC: <0.2 MG/DL (ref 0–1.2)
BUN SERPL-MCNC: 13 MG/DL (ref 8–23)
BUN/CREAT SERPL: 11.7 (ref 7–25)
CALCIUM SERPL-MCNC: 8.9 MG/DL (ref 8.6–10.5)
CHLORIDE SERPL-SCNC: 103 MMOL/L (ref 98–107)
CHOLEST SERPL-MCNC: 222 MG/DL (ref 0–200)
CO2 SERPL-SCNC: 25.4 MMOL/L (ref 22–29)
CREAT SERPL-MCNC: 1.11 MG/DL (ref 0.76–1.27)
CREAT UR-MCNC: 70.5 MG/DL
EGFRCR SERPLBLD CKD-EPI 2021: 69.2 ML/MIN/1.73
EOSINOPHIL # BLD AUTO: 0.18 10*3/MM3 (ref 0–0.4)
EOSINOPHIL NFR BLD AUTO: 1.6 % (ref 0.3–6.2)
ERYTHROCYTE [DISTWIDTH] IN BLOOD BY AUTOMATED COUNT: 12.4 % (ref 12.3–15.4)
FERRITIN SERPL-MCNC: 422 NG/ML (ref 30–400)
FOLATE SERPL-MCNC: 10 NG/ML (ref 4.78–24.2)
GLOBULIN SER CALC-MCNC: 2.1 GM/DL
GLUCOSE SERPL-MCNC: 99 MG/DL (ref 65–99)
HBA1C MFR BLD: 6 % (ref 4.8–5.6)
HCT VFR BLD AUTO: 30.7 % (ref 37.5–51)
HDLC SERPL-MCNC: 29 MG/DL (ref 40–60)
HGB BLD-MCNC: 10.2 G/DL (ref 13–17.7)
IMM GRANULOCYTES # BLD AUTO: 0.03 10*3/MM3 (ref 0–0.05)
IMM GRANULOCYTES NFR BLD AUTO: 0.3 % (ref 0–0.5)
IRON SATN MFR SERPL: 21 % (ref 20–50)
IRON SERPL-MCNC: 74 MCG/DL (ref 59–158)
LDLC SERPL CALC-MCNC: 139 MG/DL (ref 0–100)
LYMPHOCYTES # BLD AUTO: 3.79 10*3/MM3 (ref 0.7–3.1)
LYMPHOCYTES NFR BLD AUTO: 34.2 % (ref 19.6–45.3)
MCH RBC QN AUTO: 33.7 PG (ref 26.6–33)
MCHC RBC AUTO-ENTMCNC: 33.2 G/DL (ref 31.5–35.7)
MCV RBC AUTO: 101.3 FL (ref 79–97)
MICROALBUMIN UR-MCNC: 3.2 UG/ML
MONOCYTES # BLD AUTO: 0.67 10*3/MM3 (ref 0.1–0.9)
MONOCYTES NFR BLD AUTO: 6 % (ref 5–12)
NEUTROPHILS # BLD AUTO: 6.36 10*3/MM3 (ref 1.7–7)
NEUTROPHILS NFR BLD AUTO: 57.4 % (ref 42.7–76)
NRBC BLD AUTO-RTO: 0 /100 WBC (ref 0–0.2)
PLATELET # BLD AUTO: 261 10*3/MM3 (ref 140–450)
POTASSIUM SERPL-SCNC: 4.4 MMOL/L (ref 3.5–5.2)
PROT SERPL-MCNC: 6.3 G/DL (ref 6–8.5)
RBC # BLD AUTO: 3.03 10*6/MM3 (ref 4.14–5.8)
SODIUM SERPL-SCNC: 140 MMOL/L (ref 136–145)
TIBC SERPL-MCNC: 349 MCG/DL
TRIGL SERPL-MCNC: 297 MG/DL (ref 0–150)
TSH SERPL DL<=0.005 MIU/L-ACNC: 0.6 UIU/ML (ref 0.27–4.2)
UIBC SERPL-MCNC: 275 MCG/DL (ref 112–346)
VIT B12 SERPL-MCNC: 461 PG/ML (ref 211–946)
VLDLC SERPL CALC-MCNC: 54 MG/DL (ref 5–40)
WBC # BLD AUTO: 11.08 10*3/MM3 (ref 3.4–10.8)

## 2024-03-18 ENCOUNTER — TELEPHONE (OUTPATIENT)
Dept: FAMILY MEDICINE CLINIC | Facility: CLINIC | Age: 74
End: 2024-03-18
Payer: OTHER GOVERNMENT

## 2024-03-18 NOTE — TELEPHONE ENCOUNTER
Caller: Felix Bartlett Sr.    Relationship: Self    Best call back number: [phone]    Who are you requesting to speak with         Do you know the name of the person who called:     FELIX    What was the call regarding:     NEEDS NURSE VISIT ON 03.26.24 CHANGED TO ANOTHER DATE. WIFE HAS AN APPOINTMENT AROUND THE SAME TIME ON THAT DAY    Is it okay if the provider responds through MyChart:     NO    HUB STAFF ATTEMPTED TO FOLLOW WARM TRANSFER PROCESS AND WAS NOT SUCCESSFUL

## 2024-03-28 ENCOUNTER — CLINICAL SUPPORT (OUTPATIENT)
Dept: FAMILY MEDICINE CLINIC | Facility: CLINIC | Age: 74
End: 2024-03-28
Payer: MEDICARE

## 2024-03-28 VITALS
DIASTOLIC BLOOD PRESSURE: 82 MMHG | HEIGHT: 72 IN | OXYGEN SATURATION: 96 % | TEMPERATURE: 96.9 F | HEART RATE: 84 BPM | BODY MASS INDEX: 37.52 KG/M2 | WEIGHT: 277 LBS | SYSTOLIC BLOOD PRESSURE: 132 MMHG | RESPIRATION RATE: 18 BRPM

## 2024-03-28 DIAGNOSIS — I10 PRIMARY HYPERTENSION: Chronic | ICD-10-CM

## 2024-03-28 DIAGNOSIS — R03.0 ELEVATED BLOOD PRESSURE READING: ICD-10-CM

## 2024-03-28 NOTE — PATIENT INSTRUCTIONS
Your blood pressure was better than your last visit   Goals for your blood pressure are 130-140 range top and 80-89 range bottom and you feeling ok.     We really advise rechecking your blood pressure at home (or with a friend) daily to every other day for the next several days and let us know if your pattern is not the goals above.  If you will do this make sure you control variables that can make your blood pressure show higher than it really is:   A. Use a machine that measures your blood pressure at the upper arm level; not wrist or lower  B. Take off any shirts/garmets and apply the cuff to your bare arm  C. Be sure and rest your arm being used on a table/like so it is totally supported  D. Do not cross your legs while taking your blood pressure  E. Be calm, rested and not upset/anxious in any way while taking your blood pressure  F. Avoid talking while taking the blood pressure  G. Be sure your back is supported and not in a strain while taking your blood pressure.     If you cannot do this at home/with a friend OR want it done here we are happy to make appointments here for that (we only charge/bill for a nurse visit for doing this).      Please CALL US if your blood pressure stays up as that probably means you have a problem; we likely will adjust things even over the phone.  We want your blood pressure to be normal.     [employer] is a reliable/common home blood automatic blood pressure device that can range around 50-75$ and most of this brand is certified by the government.  A good model would be [employer] model 7/platinum.  Many chain brands ([facility], etc) if you look will say they are made by [employer].     ########################

## 2024-03-28 NOTE — PROGRESS NOTES
TIFF”.   Subjective   Felix Bartlett Sr. is a 73 y.o. male presenting with chief complaint of:   Chief Complaint   Patient presents with    Hypertension     AWV   Last Completed Annual Wellness Visit            ANNUAL WELLNESS VISIT (Yearly) Next due on 11/13/2024 11/13/2023  Level of Service: HI PPPS, SUBSEQ VISIT    04/28/2022  Level of Service: HI PPPS, SUBSEQ VISIT                     History of Present Illness :  Alone.  Here for primarily nurse nir of bp from last visit (which by policy turned into MD review)    Has multiple chronic problems to consider that might have a bearing on today's issues; not an interval appointment.       Chronic/acute problems reviewed today:   1. Elevated blood pressure reading    2. Primary hypertension      Has an/another acute issue today: none.    The following portions of the patient's history were reviewed and updated as appropriate: allergies, current medications, past family history, past medical history, past social history, past surgical history, and problem list.      Current Outpatient Medications:     acetaminophen (TYLENOL) 650 MG 8 hr tablet, Take 1 tablet by mouth 2 (Two) Times a Day., Disp: , Rfl:     calcium-vitamin D (OSCAL-500) 500-200 MG-UNIT per tablet, Take 1 tablet by mouth 2 times daily.  , Disp: , Rfl:     cholecalciferol (VITAMIN D3) 10 MCG (400 UNIT) tablet, Take 1 tablet by mouth., Disp: , Rfl:     diphenhydrAMINE (BENADRYL) 25 mg capsule, Take 1 capsule by mouth 2 (Two) Times a Day., Disp: , Rfl:     escitalopram (LEXAPRO) 20 MG tablet, Take 1 tablet by mouth Daily., Disp: 90 tablet, Rfl: 3    ferrous sulfate 325 (65 FE) MG tablet, Take 1 tablet by mouth Daily With Breakfast., Disp: , Rfl:     FLOVENT  MCG/ACT inhaler, INHALE 1 PUFF BY MOUTH TWICE DAILY, Disp: 12 g, Rfl: 5    fluticasone (FLONASE) 50 MCG/ACT nasal spray, , Disp: , Rfl:     KETOTIFEN FUMARATE OP, Apply 1 drop to eye(s) as directed by provider As Needed., Disp: , Rfl:      losartan (COZAAR) 50 MG tablet, Take 1 tablet by mouth Daily., Disp: 90 tablet, Rfl: 3    metoprolol tartrate (LOPRESSOR) 50 MG tablet, Take 1 tablet by mouth Every 12 (Twelve) Hours., Disp: 180 tablet, Rfl: 3    Multiple Vitamins-Minerals (CENTRUM SILVER 50+MEN) tablet, Take 1 tablet by mouth Daily., Disp: , Rfl:     O2 (OXYGEN), Inhale 1 (One) Time., Disp: , Rfl:     omeprazole (priLOSEC) 20 MG capsule, 1 capsule 2 (Two) Times a Day., Disp: , Rfl:     potassium chloride (KLOR-CON) 10 MEQ CR tablet, Take 1 tablet by mouth 2 (Two) Times a Day., Disp: 180 tablet, Rfl: 3    primidone (MYSOLINE) 50 MG tablet, 1 pill qam/2 pills qhs x 1 week then 2 pill BID., Disp: 360 tablet, Rfl: 3    simvastatin (ZOCOR) 20 MG tablet, Take 1 tablet by mouth Daily., Disp: 90 tablet, Rfl: 3    tamsulosin (FLOMAX) 0.4 MG capsule 24 hr capsule, Take 1 capsule by mouth Daily., Disp: 90 capsule, Rfl: 3    No problems with medications.    No Known Allergies    Review of Systems   Constitutional:  Negative for activity change and appetite change.   Respiratory:  Negative for cough, shortness of breath and wheezing.    Cardiovascular:  Positive for leg swelling (usual). Negative for chest pain and palpitations.     Copy/paste function used for ROS/exam AND each area of these were reviewed, updated, confirmed and supplemented as needed.  Data reviewed:   Recent admit/ER/MD visits: none  Last cardiac testing:   Echo:   Results for orders placed in visit on 08/21/17    Adult Transthoracic Echo Complete    Interpretation Summary  · Left ventricular systolic function is normal. Estimated EF = 60%.  · Left ventricular wall thickness is consistent with mild concentric hypertrophy.  · Left ventricular diastolic dysfunction (grade I) consistent with impaired relaxation.  · No evidence of pulmonary hypertension is present.    Radiology considered:   No radiology results for the last 90 days.    Lab Results:  Results for orders placed or performed in  visit on 03/11/24   Comprehensive Metabolic Panel    Specimen: Blood   Result Value Ref Range    Glucose 99 65 - 99 mg/dL    BUN 13 8 - 23 mg/dL    Creatinine 1.11 0.76 - 1.27 mg/dL    EGFR Result 69.2 >60.0 mL/min/1.73    BUN/Creatinine Ratio 11.7 7.0 - 25.0    Sodium 140 136 - 145 mmol/L    Potassium 4.4 3.5 - 5.2 mmol/L    Chloride 103 98 - 107 mmol/L    Total CO2 25.4 22.0 - 29.0 mmol/L    Calcium 8.9 8.6 - 10.5 mg/dL    Total Protein 6.3 6.0 - 8.5 g/dL    Albumin 4.2 3.5 - 5.2 g/dL    Globulin 2.1 gm/dL    A/G Ratio 2.0 g/dL    Total Bilirubin <0.2 0.0 - 1.2 mg/dL    Alkaline Phosphatase 98 39 - 117 U/L    AST (SGOT) 17 1 - 40 U/L    ALT (SGPT) 15 1 - 41 U/L   Lipid Panel    Specimen: Blood   Result Value Ref Range    Total Cholesterol 222 (H) 0 - 200 mg/dL    Triglycerides 297 (H) 0 - 150 mg/dL    HDL Cholesterol 29 (L) 40 - 60 mg/dL    VLDL Cholesterol Chava 54 (H) 5 - 40 mg/dL    LDL Chol Calc (NIH) 139 (H) 0 - 100 mg/dL   Hemoglobin A1c    Specimen: Blood   Result Value Ref Range    Hemoglobin A1C 6.00 (H) 4.80 - 5.60 %   TSH Rfx On Abnormal To Free T4    Specimen: Blood   Result Value Ref Range    TSH 0.603 0.270 - 4.200 uIU/mL   Microalbumin / Creatinine Urine Ratio - Urine, Clean Catch    Specimen: Urine, Clean Catch   Result Value Ref Range    Creatinine, Urine 70.5 Not Estab. mg/dL    Microalbumin, Urine 3.2 Not Estab. ug/mL    Microalbumin/Creatinine Ratio 5 0 - 29 mg/g creat   Vitamin B12    Specimen: Blood   Result Value Ref Range    Vitamin B-12 461 211 - 946 pg/mL   Folate    Specimen: Blood   Result Value Ref Range    Folate 10.00 4.78 - 24.20 ng/mL   Ferritin    Specimen: Blood   Result Value Ref Range    Ferritin 422.00 (H) 30.00 - 400.00 ng/mL   Iron Profile    Specimen: Blood   Result Value Ref Range    TIBC 349 mcg/dL    UIBC 275 112 - 346 mcg/dL    Iron 74 59 - 158 mcg/dL    Iron Saturation 21 20 - 50 %   Vitamin D,25-Hydroxy    Specimen: Blood   Result Value Ref Range    25 Hydroxy, Vitamin  D 54.0 30.0 - 100.0 ng/ml   CBC & Differential    Specimen: Blood   Result Value Ref Range    WBC 11.08 (H) 3.40 - 10.80 10*3/mm3    RBC 3.03 (L) 4.14 - 5.80 10*6/mm3    Hemoglobin 10.2 (L) 13.0 - 17.7 g/dL    Hematocrit 30.7 (L) 37.5 - 51.0 %    .3 (H) 79.0 - 97.0 fL    MCH 33.7 (H) 26.6 - 33.0 pg    MCHC 33.2 31.5 - 35.7 g/dL    RDW 12.4 12.3 - 15.4 %    Platelets 261 140 - 450 10*3/mm3    Neutrophil Rel % 57.4 42.7 - 76.0 %    Lymphocyte Rel % 34.2 19.6 - 45.3 %    Monocyte Rel % 6.0 5.0 - 12.0 %    Eosinophil Rel % 1.6 0.3 - 6.2 %    Basophil Rel % 0.5 0.0 - 1.5 %    Neutrophils Absolute 6.36 1.70 - 7.00 10*3/mm3    Lymphocytes Absolute 3.79 (H) 0.70 - 3.10 10*3/mm3    Monocytes Absolute 0.67 0.10 - 0.90 10*3/mm3    Eosinophils Absolute 0.18 0.00 - 0.40 10*3/mm3    Basophils Absolute 0.05 0.00 - 0.20 10*3/mm3    Immature Granulocyte Rel % 0.3 0.0 - 0.5 %    Immature Grans Absolute 0.03 0.00 - 0.05 10*3/mm3    nRBC 0.0 0.0 - 0.2 /100 WBC       A1C:  Lab Results - Last 18 Months   Lab Units 03/11/24  0907 05/02/23  0959   HEMOGLOBIN A1C % 6.00* 6.10*     GLUCOSE:  Lab Results - Last 18 Months   Lab Units 03/11/24  0907 05/02/23  0959   GLUCOSE mg/dL 99 97     LIPID:  Lab Results - Last 18 Months   Lab Units 03/11/24  0907 05/02/23  0959   CHOLESTEROL mg/dL 222* 226*   LDL CHOL mg/dL 139* 155*   HDL CHOL mg/dL 29* 35*   TRIGLYCERIDES mg/dL 297* 196*     PSA:  Lab Results   Component Value Date/Time    PSA 1.150 05/02/2023 09:59 AM    PSA 0.937 04/28/2022 11:08 AM    PSA 0.791 04/06/2020 10:25 AM    PSA 0.767 03/22/2019 09:48 AM    PSA 0.838 02/21/2018 07:19 AM       CBC:  Lab Results - Last 18 Months   Lab Units 03/11/24  0907 05/02/23  0959   WBC 10*3/mm3 11.08* 11.44*   HEMOGLOBIN g/dL 10.2* 10.7*   HEMATOCRIT % 30.7* 31.2*   PLATELETS 10*3/mm3 261 242   IRON mcg/dL 74 132   VITAMIN B 12 pg/mL 461 452   FOLATE ng/mL 10.00 11.90     BMP/CMP:  Lab Results - Last 18 Months   Lab Units 03/11/24  0907  "05/02/23  0959   SODIUM mmol/L 140 142   POTASSIUM mmol/L 4.4 4.3   CHLORIDE mmol/L 103 106   CO2 mmol/L 25.4 27.9   GLUCOSE mg/dL 99 97   BUN mg/dL 13 11   CREATININE mg/dL 1.11 1.06   EGFR RESULT mL/min/1.73 69.2 73.2   CALCIUM mg/dL 8.9 9.5       HEPATIC:  Lab Results - Last 18 Months   Lab Units 03/11/24  0907 05/02/23  0959   ALT (SGPT) U/L 15 18   AST (SGOT) U/L 17 17   ALK PHOS U/L 98 84     HEPATITIS C ANTIBODY:   Lab Results   Component Value Date/Time    HEPCVIRUSABY <0.1 02/21/2018 07:19 AM     Vit D:  Lab Results - Last 18 Months   Lab Units 03/11/24  0907 05/02/23  0959   VIT D 25 HYDROXY ng/ml 54.0 37.6     THYROID:  Lab Results - Last 18 Months   Lab Units 03/11/24  0907 05/02/23  0959   TSH uIU/mL 0.603 0.388   FREE T4 ng/dL  --  0.76*       Objective   /82   Pulse 84   Temp 96.9 °F (36.1 °C) (Temporal)   Resp 18   Ht 182.9 cm (72\")   Wt 126 kg (277 lb)   SpO2 96%   BMI 37.57 kg/m²   Body mass index is 37.57 kg/m².    Recent Vitals         2/8/2024 3/11/2024 3/28/2024       BP: 140/74 160/90 132/82     Pulse: 72 81 84     Temp: -- 97.1 °F (36.2 °C) 96.9 °F (36.1 °C)     Weight: 125 kg (275 lb) 126 kg (277 lb) 126 kg (277 lb)     BMI (Calculated): 37.3 37.6 37.6           Wt Readings from Last 15 Encounters:   03/28/24 1018 126 kg (277 lb)   03/11/24 0943 126 kg (277 lb)   02/08/24 0925 125 kg (275 lb)   11/13/23 0932 127 kg (281 lb)   11/07/23 1014 126 kg (278 lb)   10/24/23 0942 127 kg (280 lb 9.6 oz)   08/01/23 0821 123 kg (272 lb)   05/02/23 1021 123 kg (270 lb 12.8 oz)   03/09/23 1057 117 kg (257 lb)   10/31/22 1114 119 kg (263 lb 3.2 oz)   10/10/22 1043 118 kg (261 lb)   06/27/22 1111 79.8 kg (176 lb)   05/23/22 1006 79.4 kg (175 lb)   04/28/22 1143 126 kg (278 lb)   10/21/21 1411 127 kg (279 lb)       Physical Exam  Vitals and nursing note reviewed.   Constitutional:       General: He is not in acute distress.     Appearance: He is not toxic-appearing.   HENT:      Mouth/Throat:    "   Mouth: Mucous membranes are moist.   Cardiovascular:      Rate and Rhythm: Normal rate and regular rhythm.      Heart sounds: Normal heart sounds.   Pulmonary:      Effort: Pulmonary effort is normal.      Breath sounds: Normal breath sounds.   Musculoskeletal:      Right lower leg: Edema present.      Left lower leg: Edema present.   Skin:     Coloration: Skin is not jaundiced.      Findings: No rash.   Neurological:      Mental Status: He is alert and oriented to person, place, and time. Mental status is at baseline.       Assessment & Plan     1. Elevated blood pressure reading    2. Primary hypertension      Data review above:   Discussions/medical decisions/reviews:  BP better today than last  Other vitals ok  Recent Vitals         2/8/2024 3/11/2024 3/28/2024       BP: 140/74 160/90 132/82     Pulse: 72 81 84     Temp: -- 97.1 °F (36.2 °C) 96.9 °F (36.1 °C)     Weight: 125 kg (275 lb) 126 kg (277 lb) 126 kg (277 lb)     BMI (Calculated): 37.3 37.6 37.6             Home bps recommended; see goals    Follow up: Return for lab/return 3m.  Future Appointments   Date Time Provider Department Center   4/2/2024 12:45 PM Donnell Cordero DO [facility] GE PAD PAD   4/23/2024  9:45 AM Lorena Cantrell MD [facility] N PAD PAD   5/16/2024  9:00 AM Milagro Anderson APRN [facility] POD PAD PAD   6/11/2024  9:00 AM [facility] [place] [facility] MET[facility]AD   6/20/2024  1:30 PM PAD BIC CT 1 BH PAD CT BI PAD   6/24/2024 10:30 AM Bassam Rosario MD [facility] PC METR PAD       Data review above:   Rx: reviewed and decisions:   Rx new/changes: none  No orders of the defined types were placed in this encounter.      Orders placed:   LAB/Testing/Referrals: reviewed/orders:   Today:   No orders of the defined types were placed in this encounter.    Chronic/recurrent labs above or change to:   Same     Immunization History   Administered Date(s) Administered    COVID-19 (MODERNA) 1st,2nd,3rd Dose Monovalent 03/06/2021, 04/07/2021    FLUAD TRI 65YR+  "10/03/2019    Flu Vaccine Quad PF >36MO 10/31/2017    Fluad Quad 65+ 10/01/2020    Fluzone High-Dose 65+yrs 10/31/2022    Fluzone Quad >6mos (Multi-dose) 01/27/2016, 10/21/2016, 10/31/2017    Pneumococcal Conjugate 13-Valent (PCV13) 01/27/2016    Pneumococcal Conjugate 20-Valent (PCV20) 05/02/2023    Pneumococcal, Unspecified 12/12/2013     We advised/reaffirmed our support/suggestion for staying complete with covid- covid boosters, seasonal flu/yearly and any missing vaccine from list we supplied; when cannot be given here we suggest contact with local health department office or pharmacy to review missing/needed vaccines and then bring nursing documentation for these vaccines to this office or call this information in. Shingles became \"free\" 1.1.23 for medicare insurance.    Health maintenance:   Body mass index is 37.57 kg/m².         Tobacco use reviewed:   Felix Bartlett Sr.  reports that he has been smoking cigarettes. He started smoking about 57 years ago. He has a 29 pack-year smoking history. He has never used smokeless tobacco.     Patient Instructions   Your blood pressure was better than your last visit   Goals for your blood pressure are 130-140 range top and 80-89 range bottom and you feeling ok.     We really advise rechecking your blood pressure at home (or with a friend) daily to every other day for the next several days and let us know if your pattern is not the goals above.  If you will do this make sure you control variables that can make your blood pressure show higher than it really is:   A. Use a machine that measures your blood pressure at the upper arm level; not wrist or lower  B. Take off any shirts/garmets and apply the cuff to your bare arm  C. Be sure and rest your arm being used on a table/like so it is totally supported  D. Do not cross your legs while taking your blood pressure  E. Be calm, rested and not upset/anxious in any way while taking your blood pressure  F. Avoid talking " while taking the blood pressure  G. Be sure your back is supported and not in a strain while taking your blood pressure.     If you cannot do this at home/with a friend OR want it done here we are happy to make appointments here for that (we only charge/bill for a nurse visit for doing this).      Please CALL US if your blood pressure stays up as that probably means you have a problem; we likely will adjust things even over the phone.  We want your blood pressure to be normal.     [employer] is a reliable/common home blood automatic blood pressure device that can range around 50-75$ and most of this brand is certified by the government.  A good model would be Omron model 7/platinum.  Many chain brands ([employer], etc) if you look will say they are made by [employer].     ########################

## 2024-04-02 ENCOUNTER — OFFICE VISIT (OUTPATIENT)
Dept: GASTROENTEROLOGY | Facility: CLINIC | Age: 74
End: 2024-04-02
Payer: MEDICARE

## 2024-04-02 VITALS
BODY MASS INDEX: 36.57 KG/M2 | SYSTOLIC BLOOD PRESSURE: 140 MMHG | WEIGHT: 270 LBS | HEART RATE: 70 BPM | OXYGEN SATURATION: 96 % | DIASTOLIC BLOOD PRESSURE: 78 MMHG | HEIGHT: 72 IN | TEMPERATURE: 97 F

## 2024-04-02 DIAGNOSIS — D50.9 IRON DEFICIENCY ANEMIA, UNSPECIFIED IRON DEFICIENCY ANEMIA TYPE: Primary | ICD-10-CM

## 2024-04-02 PROCEDURE — 3077F SYST BP >= 140 MM HG: CPT | Performed by: INTERNAL MEDICINE

## 2024-04-02 PROCEDURE — 99214 OFFICE O/P EST MOD 30 MIN: CPT | Performed by: INTERNAL MEDICINE

## 2024-04-02 PROCEDURE — 1159F MED LIST DOCD IN RCRD: CPT | Performed by: INTERNAL MEDICINE

## 2024-04-02 PROCEDURE — 1160F RVW MEDS BY RX/DR IN RCRD: CPT | Performed by: INTERNAL MEDICINE

## 2024-04-02 PROCEDURE — 3078F DIAST BP <80 MM HG: CPT | Performed by: INTERNAL MEDICINE

## 2024-04-02 NOTE — PROGRESS NOTES
Chief Complaint   Patient presents with    Anemia     Anemia per dr. rosario       PCP: Bassam Rosario MD  REFER: Bassam Rosario MD    Subjective     HPI             Recently found to be more anemic  Denies active gi bleeding  Last c-scope in 2016 which showed polyps  Denies abdominal pain       Past Medical History:   Diagnosis Date    Acid reflux     Arthritis     Asthma     COPD (chronic obstructive pulmonary disease)     CTS (carpal tunnel syndrome)     HL (hearing loss)     Hyperlipidemia     Hypertension     Oxygen dependent     at hs    Renal cancer 08/2014    Lt RALPart'l Nx; t1a Clear Cell with negative margin       Past Surgical History:   Procedure Laterality Date    APPENDECTOMY      CHOLECYSTECTOMY      ENDOSCOPY N/A 05/01/2018    Procedure: ESOPHAGOGASTRODUODENOSCOPY WITH ANESTHESIA;  Surgeon: Donnell Cordero DO;  Location: [facility] ENDOSCOPY;  Service: Gastroenterology    HERNIA REPAIR      KIDNEY SURGERY      LAPAROSCOPIC PARTIAL NEPHRECTOMY Left 08/14/2014    Robot Assisted       Outpatient Medications Marked as Taking for the 4/2/24 encounter (Office Visit) with Donnell Cordero DO   Medication Sig Dispense Refill    acetaminophen (TYLENOL) 650 MG 8 hr tablet Take 1 tablet by mouth 2 (Two) Times a Day.      calcium-vitamin D (OSCAL-500) 500-200 MG-UNIT per tablet Take 1 tablet by mouth 2 times daily.        cholecalciferol (VITAMIN D3) 10 MCG (400 UNIT) tablet Take 1 tablet by mouth.      diphenhydrAMINE (BENADRYL) 25 mg capsule Take 1 capsule by mouth 2 (Two) Times a Day.      escitalopram (LEXAPRO) 20 MG tablet Take 1 tablet by mouth Daily. 90 tablet 3    ferrous sulfate 325 (65 FE) MG tablet Take 1 tablet by mouth Daily With Breakfast.      FLOVENT  MCG/ACT inhaler INHALE 1 PUFF BY MOUTH TWICE DAILY 12 g 5    fluticasone (FLONASE) 50 MCG/ACT nasal spray       KETOTIFEN FUMARATE OP Apply 1 drop to eye(s) as directed by provider As Needed.      losartan (COZAAR) 50 MG tablet  "Take 1 tablet by mouth Daily. 90 tablet 3    metoprolol tartrate (LOPRESSOR) 50 MG tablet Take 1 tablet by mouth Every 12 (Twelve) Hours. 180 tablet 3    Multiple Vitamins-Minerals (CENTRUM SILVER 50+MEN) tablet Take 1 tablet by mouth Daily.      omeprazole (priLOSEC) 20 MG capsule 1 capsule 2 (Two) Times a Day.      potassium chloride (KLOR-CON) 10 MEQ CR tablet Take 1 tablet by mouth 2 (Two) Times a Day. 180 tablet 3    primidone (MYSOLINE) 50 MG tablet 1 pill qam/2 pills qhs x 1 week then 2 pill BID. 360 tablet 3    simvastatin (ZOCOR) 20 MG tablet Take 1 tablet by mouth Daily. 90 tablet 3    tamsulosin (FLOMAX) 0.4 MG capsule 24 hr capsule Take 1 capsule by mouth Daily. 90 capsule 3       No Known Allergies    Social History     Socioeconomic History    Marital status:      Spouse name: Melanie    Number of children: 1    Years of education: 12   Tobacco Use    Smoking status: Every Day     Current packs/day: 0.50     Average packs/day: 0.5 packs/day for 57.9 years (29.0 ttl pk-yrs)     Types: Cigarettes     Start date: 4/30/1966    Smokeless tobacco: Never   Vaping Use    Vaping status: Never Used   Substance and Sexual Activity    Alcohol use: No    Drug use: No    Sexual activity: Never       Review of Systems   Constitutional:  Negative for fever and unexpected weight change.   HENT:  Negative for trouble swallowing.    Respiratory:  Negative for shortness of breath.    Cardiovascular:  Negative for chest pain.   Gastrointestinal:  Negative for abdominal pain and anal bleeding.       Objective     Vitals:    04/02/24 1237   BP: 140/78   Pulse: 70   Temp: 97 °F (36.1 °C)   SpO2: 96%   Weight: 122 kg (270 lb)   Height: 182.9 cm (72\")     Body mass index is 36.62 kg/m².    Physical Exam  Constitutional:       Appearance: Normal appearance. He is well-developed.   Eyes:      General: No scleral icterus.  Cardiovascular:      Heart sounds: Normal heart sounds. No murmur heard.  Pulmonary:      Effort: " Pulmonary effort is normal.   Abdominal:      General: Bowel sounds are normal. There is no distension.      Palpations: Abdomen is soft.      Tenderness: There is no abdominal tenderness. There is no guarding.   Skin:     General: Skin is warm and dry.      Coloration: Skin is not jaundiced.   Neurological:      Mental Status: He is alert.   Psychiatric:         Behavior: Behavior is cooperative.         Imaging Results (Most Recent)       None            Body mass index is 36.62 kg/m².    Assessment & Plan     Diagnoses and all orders for this visit:    1. Iron deficiency anemia, unspecified iron deficiency anemia type (Primary)  -     Case Request; Standing  -     Implement Anesthesia Orders Day of Procedure; Standing  -     Obtain Informed Consent; Standing  -     Case Request        COLONOSCOPY WITH ANESTHESIA (N/A)       Miralax/Gatorade prep     Advised pt to stop use of NSAIDs, Fish Oil, and MV 5 days prior to procedure, per Dr Cordero protocol.  Tylenol based products are ok to take.  Pt verbalized understanding.        Donnell Cordero, DO  04/02/24          There are no Patient Instructions on file for this visit.

## 2024-04-23 ENCOUNTER — OFFICE VISIT (OUTPATIENT)
Dept: NEUROLOGY | Facility: CLINIC | Age: 74
End: 2024-04-23
Payer: MEDICARE

## 2024-04-23 VITALS
SYSTOLIC BLOOD PRESSURE: 120 MMHG | HEIGHT: 72 IN | DIASTOLIC BLOOD PRESSURE: 52 MMHG | OXYGEN SATURATION: 96 % | WEIGHT: 274 LBS | BODY MASS INDEX: 37.11 KG/M2 | HEART RATE: 65 BPM

## 2024-04-23 DIAGNOSIS — R25.1 TREMOR: Primary | ICD-10-CM

## 2024-04-23 PROCEDURE — 3074F SYST BP LT 130 MM HG: CPT | Performed by: PSYCHIATRY & NEUROLOGY

## 2024-04-23 PROCEDURE — 99214 OFFICE O/P EST MOD 30 MIN: CPT | Performed by: PSYCHIATRY & NEUROLOGY

## 2024-04-23 PROCEDURE — 3078F DIAST BP <80 MM HG: CPT | Performed by: PSYCHIATRY & NEUROLOGY

## 2024-04-23 RX ORDER — GABAPENTIN 300 MG/1
300 CAPSULE ORAL 2 TIMES DAILY
Qty: 60 CAPSULE | Refills: 3 | Status: SHIPPED | OUTPATIENT
Start: 2024-04-23

## 2024-04-23 NOTE — PROGRESS NOTES
Subjective        Felix SILVESTRE Bartlett Sr. presents to [facility]    History of Present Illness  74 yo here for follow up. Increased primidone after last visit. Caused too much sleepiness. Now taking 50 mg 2-3 times a day as tolerated.         Past Medical History:   Diagnosis Date    Acid reflux     Arthritis     Asthma     COPD (chronic obstructive pulmonary disease)     CTS (carpal tunnel syndrome)     HL (hearing loss)     Hyperlipidemia     Hypertension     Oxygen dependent     at hs    Peripheral neuropathy     Renal cancer 08/2014    Lt RALPart'l Nx; t1a Clear Cell with negative margin          Current Outpatient Medications:     acetaminophen (TYLENOL) 650 MG 8 hr tablet, Take 1 tablet by mouth 2 (Two) Times a Day., Disp: , Rfl:     calcium-vitamin D (OSCAL-500) 500-200 MG-UNIT per tablet, Take 1 tablet by mouth 2 times daily.  , Disp: , Rfl:     cholecalciferol (VITAMIN D3) 10 MCG (400 UNIT) tablet, Take 1 tablet by mouth., Disp: , Rfl:     diphenhydrAMINE (BENADRYL) 25 mg capsule, Take 1 capsule by mouth 2 (Two) Times a Day., Disp: , Rfl:     escitalopram (LEXAPRO) 20 MG tablet, Take 1 tablet by mouth Daily., Disp: 90 tablet, Rfl: 3    ferrous sulfate 325 (65 FE) MG tablet, Take 1 tablet by mouth Daily With Breakfast., Disp: , Rfl:     FLOVENT  MCG/ACT inhaler, INHALE 1 PUFF BY MOUTH TWICE DAILY, Disp: 12 g, Rfl: 5    fluticasone (FLONASE) 50 MCG/ACT nasal spray, , Disp: , Rfl:     KETOTIFEN FUMARATE OP, Apply 1 drop to eye(s) as directed by provider As Needed., Disp: , Rfl:     losartan (COZAAR) 50 MG tablet, Take 1 tablet by mouth Daily., Disp: 90 tablet, Rfl: 3    metoprolol tartrate (LOPRESSOR) 50 MG tablet, Take 1 tablet by mouth Every 12 (Twelve) Hours., Disp: 180 tablet, Rfl: 3    Multiple Vitamins-Minerals (CENTRUM SILVER 50+MEN) tablet, Take 1 tablet by mouth Daily., Disp: , Rfl:     O2 (OXYGEN), Inhale 1 (One) Time., Disp: , Rfl:     omeprazole (priLOSEC) 20 MG  "capsule, 1 capsule 2 (Two) Times a Day., Disp: , Rfl:     potassium chloride (KLOR-CON) 10 MEQ CR tablet, Take 1 tablet by mouth 2 (Two) Times a Day., Disp: 180 tablet, Rfl: 3    primidone (MYSOLINE) 50 MG tablet, 1 pill qam/2 pills qhs x 1 week then 2 pill BID. (Patient taking differently: Take 1 tablet by mouth 3 (Three) Times a Day. .), Disp: 360 tablet, Rfl: 3    simvastatin (ZOCOR) 20 MG tablet, Take 1 tablet by mouth Daily., Disp: 90 tablet, Rfl: 3    tamsulosin (FLOMAX) 0.4 MG capsule 24 hr capsule, Take 1 capsule by mouth Daily., Disp: 90 capsule, Rfl: 3       Objective   Vital Signs:   /52 (BP Location: Left arm, Patient Position: Sitting, Cuff Size: Large Adult)   Pulse 65   Ht 182.9 cm (72\")   Wt 124 kg (274 lb)   SpO2 96%   BMI 37.16 kg/m²     Physical Exam  Constitutional:       General: He is awake.   Eyes:      Extraocular Movements: Extraocular movements intact.      Pupils: Pupils are equal, round, and reactive to light.   Neurological:      Mental Status: He is alert.      Motor: Motor strength is normal.     Deep Tendon Reflexes: Reflexes are normal and symmetric.   Psychiatric:         Speech: Speech normal.      Neurological Exam  Mental Status  Awake and alert. Oriented to person, place and time. Recent and remote memory are intact. Speech is normal. Language is fluent with no aphasia. Attention and concentration are normal. Fund of knowledge is appropriate for level of education.    Cranial Nerves  CN II: Visual fields full to confrontation.  CN III, IV, VI: Extraocular movements intact bilaterally. Pupils equal round and reactive to light bilaterally.  CN V: Facial sensation is normal.  CN VII: Full and symmetric facial movement.  CN IX, X: Palate elevates symmetrically  CN XI: Shoulder shrug strength is normal.  CN XII: Tongue midline without atrophy or fasciculations.    Motor  Normal muscle bulk throughout. Normal muscle tone. Strength is 5/5 throughout all four extremities.  No " bradykinesia or rigidity  High amplitude tremor with arms outstretched and FNF, worse on the left   No rest tremor.    Sensory  Light touch is normal in upper and lower extremities.     Reflexes  Deep tendon reflexes are 2+ and symmetric in all four extremities.    Coordination  Right: Finger-to-nose normal.Left: Finger-to-nose normal.    Gait    Walks with cane  Normal arm swing, no shuffling.      Result Review :                     Assessment and Plan     A 73-year-old male with hypertension, hyperlipidemia, COPD, previous renal cancer, and previous alcohol use disorder, referred for tremor. He has no features of Parkinson's disease on exam today. His tremor appears mostly dystonic and is actually present in his head and potentially voice as well. He is on low-dose primidone and he has been on this for a while. We discussed increasing it. He is already on metoprolol. We even discussed the option of something like DBS in the future and he tells me he is not interested in this.  He was unable to tolerate higher dose of primidone.  Currently taking 50 twice daily with an additional dose in the middle of the day as tolerated.  Discussed other options and will add low-dose gabapentin.    Plan:  1.  Continue primidone 50 mg twice daily, with an additional 50 mg in the middle the day as tolerated.  2.  Start gabapentin 300 mg twice daily.  Can start with 300 mg nightly and add in twice daily as tolerated.  3.  Follow-up 3 months.      Follow Up   No follow-ups on file.  Patient was given instructions and counseling regarding his condition or for health maintenance advice. Please see specific information pulled into the AVS if appropriate.

## 2024-04-25 ENCOUNTER — TELEPHONE (OUTPATIENT)
Dept: GASTROENTEROLOGY | Facility: HOSPITAL | Age: 74
End: 2024-04-25

## 2024-04-25 NOTE — TELEPHONE ENCOUNTER
Called to reschedule procedure     No answer, message left with return number        Sent letter to Bassam Rosario MD

## 2024-05-08 NOTE — PROGRESS NOTES
[facility] - PODIATRY    Today's Date: 05/16/2024     Patient Name: Felix Bartlett Sr.  MRN: [id number]  CSN: [id number]  PCP: Bassam Rosario MD  Referring Provider: No ref. provider found    SUBJECTIVE     Chief Complaint   Patient presents with    Follow-up     Bassam Rosario MD 04/01/2024 3 MTH FU- pt states  feet doing ok, left great toe hurts at time. Did break it in [place] so that might be why- pt denies pain      HPI: Felix Bartlett Sr., a 74 y.o.male, comes to clinic as a(n) established patient complaining of thickened, dystrophic, irregular toenails of both feet and numbness in both feet . Patient has h/o GERD, arthritis, COPD, asthma, HTN, Renal CA .  Patient here today for routine nail care due to thickened, irregular toenails of both feet. Reports tenderness in shoes when nails are at an excessive length and thickness.  Notes tenderness in toes due to nail thickness/length but no pain due to neuropathy . Continues to use a cane for ambulation assistance.  Relates previous treatment(s) including care by podiatry . No other pedal complaints at this time.    Past Medical History:   Diagnosis Date    Acid reflux     Arthritis     Asthma     COPD (chronic obstructive pulmonary disease)     CTS (carpal tunnel syndrome)     HL (hearing loss)     Hyperlipidemia     Hypertension     Oxygen dependent     at     Peripheral neuropathy     Renal cancer 08/2014    Lt RALPart'l Nx; t1a Clear Cell with negative margin     Past Surgical History:   Procedure Laterality Date    APPENDECTOMY      CHOLECYSTECTOMY      ENDOSCOPY N/A 05/01/2018    Procedure: ESOPHAGOGASTRODUODENOSCOPY WITH ANESTHESIA;  Surgeon: Donnell Cordero DO;  Location: [facility] ENDOSCOPY;  Service: Gastroenterology    HERNIA REPAIR      KIDNEY SURGERY      LAPAROSCOPIC PARTIAL NEPHRECTOMY Left 08/14/2014    Robot Assisted     Family History   Problem Relation Age of Onset    Colon cancer Father     Heart disease Mother      Colon polyps Neg Hx     Esophageal cancer Neg Hx      Social History     Socioeconomic History    Marital status:      Spouse name: Melanie    Number of children: 1    Years of education: 12   Tobacco Use    Smoking status: Every Day     Current packs/day: 0.50     Average packs/day: 0.5 packs/day for 58.0 years (29.0 ttl pk-yrs)     Types: Cigarettes     Start date: 4/30/1966     Passive exposure: Never    Smokeless tobacco: Never   Vaping Use    Vaping status: Never Used   Substance and Sexual Activity    Alcohol use: No    Drug use: No    Sexual activity: Never     No Known Allergies  Current Outpatient Medications   Medication Sig Dispense Refill    acetaminophen (TYLENOL) 650 MG 8 hr tablet Take 1 tablet by mouth 2 (Two) Times a Day.      calcium-vitamin D (OSCAL-500) 500-200 MG-UNIT per tablet Take 1 tablet by mouth 2 times daily.        cholecalciferol (VITAMIN D3) 10 MCG (400 UNIT) tablet Take 1 tablet by mouth.      diphenhydrAMINE (BENADRYL) 25 mg capsule Take 1 capsule by mouth 2 (Two) Times a Day.      escitalopram (LEXAPRO) 20 MG tablet Take 1 tablet by mouth Daily. 90 tablet 3    ferrous sulfate 325 (65 FE) MG tablet Take 1 tablet by mouth Daily With Breakfast.      FLOVENT  MCG/ACT inhaler INHALE 1 PUFF BY MOUTH TWICE DAILY 12 g 5    fluticasone (FLONASE) 50 MCG/ACT nasal spray       gabapentin (NEURONTIN) 300 MG capsule Take 1 capsule by mouth 2 (Two) Times a Day. 60 capsule 3    KETOTIFEN FUMARATE OP Apply 1 drop to eye(s) as directed by provider As Needed.      losartan (COZAAR) 50 MG tablet Take 1 tablet by mouth Daily. 90 tablet 3    metoprolol tartrate (LOPRESSOR) 50 MG tablet Take 1 tablet by mouth Every 12 (Twelve) Hours. 180 tablet 3    Multiple Vitamins-Minerals (CENTRUM SILVER 50+MEN) tablet Take 1 tablet by mouth Daily.      O2 (OXYGEN) Inhale 1 (One) Time.      omeprazole (priLOSEC) 20 MG capsule 1 capsule 2 (Two) Times a Day.      potassium chloride (Klor-Con M10) 10 MEQ CR  tablet Take 1 tablet by mouth 2 (Two) Times a Day. 180 tablet 3    primidone (MYSOLINE) 50 MG tablet 1 pill qam/2 pills qhs x 1 week then 2 pill BID. (Patient taking differently: Take 1 tablet by mouth 3 (Three) Times a Day. .) 360 tablet 3    simvastatin (ZOCOR) 20 MG tablet Take 1 tablet by mouth Daily. 90 tablet 3    tamsulosin (FLOMAX) 0.4 MG capsule 24 hr capsule Take 1 capsule by mouth Daily. 90 capsule 3     No current facility-administered medications for this visit.     Review of Systems   Constitutional:  Negative for chills and fever.   HENT:  Negative for congestion.    Respiratory:  Negative for shortness of breath.    Cardiovascular:  Positive for leg swelling. Negative for chest pain.   Gastrointestinal:  Negative for constipation, diarrhea, nausea and vomiting.   Musculoskeletal:  Positive for arthralgias and back pain. Negative for myalgias.   Skin:  Negative for wound.   Neurological:  Positive for tremors and numbness.   Psychiatric/Behavioral:  Negative for agitation and behavioral problems.        OBJECTIVE     Vitals:    24 0900   BP: 126/74   Pulse: 71   SpO2: 97%             PHYSICAL EXAM  GEN:   Accompanied by none.     Foot/Ankle Exam    GENERAL  Appearance:  appears stated age and obese  Orientation:  AAOx3  Affect:  appropriate  Gait:  involuntary movements (unsteady)  Assistance:  cane use  Right shoe gear: casual shoe  Left shoe gear: casual shoe    VASCULAR     Right Foot Vascularity   Dorsalis pedis:  1+  Posterior tibial:  1+  Skin temperature:  warm  Edema gradin+ and pitting  CFT:  3  Pedal hair growth:  Present  Varicosities:  moderate varicosities     Left Foot Vascularity   Dorsalis pedis:  1+  Posterior tibial:  1+  Skin temperature:  warm  Edema gradin+ and pitting  CFT:  3  Pedal hair growth:  Present  Varicosities:  moderate varicosities     NEUROLOGIC     Right Foot Neurologic   Light touch sensation: diminished  Vibratory sensation: diminished  Hot/Cold  sensation: diminished  Protective Sensation using [place] Monofilament:   Sites intact: 1  Sites tested: 10     Left Foot Neurologic   Light touch sensation: diminished  Vibratory sensation: diminished  Hot/Cold sensation:  diminished  Protective Sensation using [place] Monofilament:   Sites intact: 4  Sites tested: 10    MUSCULOSKELETAL     Right Foot Musculoskeletal   Ecchymosis:  none  Tenderness:  toenail problem    Arch:  Normal  Hallux valgus: No       Left Foot Musculoskeletal   Ecchymosis:  none  Tenderness:  toenail problem  Arch:  Normal  Hallux valgus: No      MUSCLE STRENGTH     Right Foot Muscle Strength   Foot dorsiflexion:  4+  Foot plantar flexion:  4+  Foot inversion:  4+  Foot eversion:  4+     Left Foot Muscle Strength   Foot dorsiflexion:  4+  Foot plantar flexion:  4+  Foot inversion:  4+  Foot eversion:  4+    RANGE OF MOTION     Right Foot Range of Motion   Foot and ankle ROM within normal limits       Left Foot Range of Motion   Foot and ankle ROM within normal limits      DERMATOLOGIC      Right Foot Dermatologic   Skin  Positive for dryness.   Nails  1.  Positive for elongated, onychomycosis, abnormal thickness, subungual debris and dystrophic nail.  2.  Positive for elongated, onychomycosis, abnormal thickness and subungual debris.  3.  Positive for elongated, onychomycosis, abnormal thickness and subungual debris.  4.  Positive for elongated, onychomycosis, abnormal thickness and subungual debris.  5.  Positive for elongated, onychomycosis, abnormal thickness and subungual debris.  Nails comment:  Gryphotic 1-5     Left Foot Dermatologic   Skin  Positive for dryness.   Nails comment:  Gryphotic 1-5  Nails  1.  Positive for elongated, onychomycosis, abnormal thickness, subungual debris and dystrophic nail.  2.  Positive for elongated, onychomycosis, abnormal thickness and subungual debris.  3.  Positive for elongated, onychomycosis, abnormal thickness and subungual  debris.  4.  Positive for elongated, onychomycosis, abnormally thick and subungual debris.  5.  Positive for elongated, onychomycosis, abnormally thick and subungual debris.      RADIOLOGY/NUCLEAR:  No results found.    LABORATORY/CULTURE RESULTS:      PATHOLOGY RESULTS:       ASSESSMENT/PLAN     Diagnoses and all orders for this visit:    1. Onychogryphosis (Primary)    2. Other polyneuropathy    3. Peripheral edema    4. Unsteady gait    5. Pain in both feet    6. Tremor            Comprehensive lower extremity examination and evaluation was performed.  Discussed findings and treatment plan including risks, benefits, and treatment options with patient in detail. Patient agreed with treatment plan.  After verbal consent obtained, nail(s) x10 debrided of length and thickness with nail nipper without incidence  Patient may maintain nails and calluses at home utilizing emery board or pumice stone between visits as needed   Continue use of cane for ambulation assistance.   Elevate lower extremities at rest and continue use of compression stockings.       An After Visit Summary was printed and given to the patient at discharge, including (if requested) any available informative/educational handouts regarding diagnosis, treatment, or medications. All questions were answered to patient/family satisfaction. Should symptoms fail to improve or worsen they agree to call or return to clinic or to go to the Emergency Department. Discussed the importance of following up with any needed screening tests/labs/specialist appointments and any requested follow-up recommended by me today. Importance of maintaining follow-up discussed and patient accepts that missed appointments can delay diagnosis and potentially lead to worsening of conditions.  No follow-ups on file., or sooner if acute issues arise.      This document has been electronically signed by RUBIO Merrill on May 16, 2024 09:22 CDT

## 2024-05-13 DIAGNOSIS — R60.0 EDEMA LEG: ICD-10-CM

## 2024-05-13 RX ORDER — POTASSIUM CHLORIDE 750 MG/1
10 TABLET, EXTENDED RELEASE ORAL 2 TIMES DAILY
Qty: 180 TABLET | Refills: 3 | Status: SHIPPED | OUTPATIENT
Start: 2024-05-13

## 2024-05-13 NOTE — TELEPHONE ENCOUNTER
Caller: Felix Bartlett Sr.    Relationship: Self    Best call back number:     [phone] (Home)       Requested Prescriptions:   Requested Prescriptions     Pending Prescriptions Disp Refills    potassium chloride (Klor-Con M10) 10 MEQ CR tablet 180 tablet 3     Sig: Take 1 tablet by mouth 2 (Two) Times a Day.        Pharmacy where request should be sent: [facility] - [address] [facility] - [address] [facility] AT [address]AL TO [facility] - [phone]  - [phone] FX     Last office visit with prescribing clinician: 3/11/2024   Last telemedicine visit with prescribing clinician: Visit date not found   Next office visit with prescribing clinician: 6/24/2024         Does the patient have less than a 3 day supply:  [] Yes  [x] No    Jamari Garrido Rep   05/13/24 09:58 CDT

## 2024-05-13 NOTE — TELEPHONE ENCOUNTER
Rx Refill Note  Requested Prescriptions     Signed Prescriptions Disp Refills    potassium chloride (Klor-Con M10) 10 MEQ CR tablet 180 tablet 3     Sig: Take 1 tablet by mouth 2 (Two) Times a Day.     Authorizing Provider: BUNNY PHELPS     Ordering User: KAMLESH LUTZ      Last office visit with prescribing clinician: 3/11/2024   Last telemedicine visit with prescribing clinician: Visit date not found   Next office visit with prescribing clinician: 6/24/2024     Kamlesh Gonzalez MA  05/13/24, 11:41 CDT

## 2024-05-15 ENCOUNTER — TELEPHONE (OUTPATIENT)
Dept: PODIATRY | Facility: CLINIC | Age: 74
End: 2024-05-15
Payer: MEDICARE

## 2024-05-15 NOTE — TELEPHONE ENCOUNTER
Hub to relay     Spoke with patient regarding appt on 05/16/2024. Patient confirmed date and time off appt.

## 2024-05-16 ENCOUNTER — OFFICE VISIT (OUTPATIENT)
Dept: PODIATRY | Facility: CLINIC | Age: 74
End: 2024-05-16
Payer: MEDICARE

## 2024-05-16 VITALS
SYSTOLIC BLOOD PRESSURE: 126 MMHG | HEART RATE: 71 BPM | OXYGEN SATURATION: 97 % | BODY MASS INDEX: 36.57 KG/M2 | WEIGHT: 270 LBS | DIASTOLIC BLOOD PRESSURE: 74 MMHG | HEIGHT: 72 IN

## 2024-05-16 DIAGNOSIS — G62.89 OTHER POLYNEUROPATHY: ICD-10-CM

## 2024-05-16 DIAGNOSIS — R26.81 UNSTEADY GAIT: ICD-10-CM

## 2024-05-16 DIAGNOSIS — R60.0 PERIPHERAL EDEMA: ICD-10-CM

## 2024-05-16 DIAGNOSIS — L60.2 ONYCHOGRYPHOSIS: Primary | ICD-10-CM

## 2024-05-16 DIAGNOSIS — M79.672 PAIN IN BOTH FEET: ICD-10-CM

## 2024-05-16 DIAGNOSIS — R25.1 TREMOR: ICD-10-CM

## 2024-05-16 DIAGNOSIS — M79.671 PAIN IN BOTH FEET: ICD-10-CM

## 2024-06-11 DIAGNOSIS — D64.9 ANEMIA, UNSPECIFIED TYPE: ICD-10-CM

## 2024-06-11 DIAGNOSIS — E61.1 IRON DEFICIENCY: ICD-10-CM

## 2024-06-11 DIAGNOSIS — E78.5 HYPERLIPIDEMIA, UNSPECIFIED HYPERLIPIDEMIA TYPE: Chronic | ICD-10-CM

## 2024-06-11 DIAGNOSIS — K76.0 FATTY LIVER: Chronic | ICD-10-CM

## 2024-06-11 DIAGNOSIS — R73.01 ELEVATED FASTING GLUCOSE: Chronic | ICD-10-CM

## 2024-06-12 LAB
ALBUMIN SERPL-MCNC: 3.9 G/DL (ref 3.5–5.2)
ALBUMIN/GLOB SERPL: 1.9 G/DL
ALP SERPL-CCNC: 84 U/L (ref 39–117)
ALT SERPL-CCNC: 12 U/L (ref 1–41)
AST SERPL-CCNC: 14 U/L (ref 1–40)
BASOPHILS # BLD AUTO: 0.03 10*3/MM3 (ref 0–0.2)
BASOPHILS NFR BLD AUTO: 0.3 % (ref 0–1.5)
BILIRUB SERPL-MCNC: <0.2 MG/DL (ref 0–1.2)
BUN SERPL-MCNC: 13 MG/DL (ref 8–23)
BUN/CREAT SERPL: 11.1 (ref 7–25)
CALCIUM SERPL-MCNC: 9.2 MG/DL (ref 8.6–10.5)
CHLORIDE SERPL-SCNC: 104 MMOL/L (ref 98–107)
CO2 SERPL-SCNC: 25.7 MMOL/L (ref 22–29)
CREAT SERPL-MCNC: 1.17 MG/DL (ref 0.76–1.27)
EGFRCR SERPLBLD CKD-EPI 2021: 64.6 ML/MIN/1.73
EOSINOPHIL # BLD AUTO: 0.29 10*3/MM3 (ref 0–0.4)
EOSINOPHIL NFR BLD AUTO: 2.8 % (ref 0.3–6.2)
ERYTHROCYTE [DISTWIDTH] IN BLOOD BY AUTOMATED COUNT: 12 % (ref 12.3–15.4)
FERRITIN SERPL-MCNC: 472 NG/ML (ref 30–400)
GLOBULIN SER CALC-MCNC: 2.1 GM/DL
GLUCOSE SERPL-MCNC: 113 MG/DL (ref 65–99)
HBA1C MFR BLD: 6.1 % (ref 4.8–5.6)
HCT VFR BLD AUTO: 28.8 % (ref 37.5–51)
HGB BLD-MCNC: 9.7 G/DL (ref 13–17.7)
IMM GRANULOCYTES # BLD AUTO: 0.02 10*3/MM3 (ref 0–0.05)
IMM GRANULOCYTES NFR BLD AUTO: 0.2 % (ref 0–0.5)
IRON SERPL-MCNC: 92 MCG/DL (ref 59–158)
LYMPHOCYTES # BLD AUTO: 3.82 10*3/MM3 (ref 0.7–3.1)
LYMPHOCYTES NFR BLD AUTO: 36.8 % (ref 19.6–45.3)
MCH RBC QN AUTO: 34.2 PG (ref 26.6–33)
MCHC RBC AUTO-ENTMCNC: 33.7 G/DL (ref 31.5–35.7)
MCV RBC AUTO: 101.4 FL (ref 79–97)
MONOCYTES # BLD AUTO: 0.66 10*3/MM3 (ref 0.1–0.9)
MONOCYTES NFR BLD AUTO: 6.4 % (ref 5–12)
NEUTROPHILS # BLD AUTO: 5.56 10*3/MM3 (ref 1.7–7)
NEUTROPHILS NFR BLD AUTO: 53.5 % (ref 42.7–76)
NRBC BLD AUTO-RTO: 0 /100 WBC (ref 0–0.2)
PLATELET # BLD AUTO: 232 10*3/MM3 (ref 140–450)
POTASSIUM SERPL-SCNC: 4.1 MMOL/L (ref 3.5–5.2)
PROT SERPL-MCNC: 6 G/DL (ref 6–8.5)
RBC # BLD AUTO: 2.84 10*6/MM3 (ref 4.14–5.8)
SODIUM SERPL-SCNC: 139 MMOL/L (ref 136–145)
WBC # BLD AUTO: 10.38 10*3/MM3 (ref 3.4–10.8)

## 2024-06-13 LAB
ALBUMIN/CREAT UR: <5 MG/G CREAT (ref 0–29)
CREAT UR-MCNC: 64.6 MG/DL
MICROALBUMIN UR-MCNC: <3 UG/ML

## 2024-06-20 ENCOUNTER — HOSPITAL ENCOUNTER (OUTPATIENT)
Dept: CT IMAGING | Facility: HOSPITAL | Age: 74
Discharge: HOME | End: 2024-06-20
Admitting: FAMILY MEDICINE
Payer: MEDICARE

## 2024-06-20 DIAGNOSIS — Z12.2 ENCOUNTER FOR SCREENING FOR LUNG CANCER: ICD-10-CM

## 2024-06-20 DIAGNOSIS — Z87.891 PERSONAL HISTORY OF NICOTINE DEPENDENCE: ICD-10-CM

## 2024-06-20 PROCEDURE — 71271 CT THORAX LUNG CANCER SCR C-: CPT

## 2024-06-24 ENCOUNTER — OFFICE VISIT (OUTPATIENT)
Dept: FAMILY MEDICINE CLINIC | Facility: CLINIC | Age: 74
End: 2024-06-24
Payer: MEDICARE

## 2024-06-24 VITALS
DIASTOLIC BLOOD PRESSURE: 62 MMHG | HEIGHT: 72 IN | OXYGEN SATURATION: 97 % | SYSTOLIC BLOOD PRESSURE: 124 MMHG | WEIGHT: 274.8 LBS | BODY MASS INDEX: 37.22 KG/M2 | HEART RATE: 59 BPM

## 2024-06-24 DIAGNOSIS — E87.6 HYPOPOTASSEMIA: Chronic | ICD-10-CM

## 2024-06-24 DIAGNOSIS — E78.2 MIXED HYPERLIPIDEMIA: Chronic | ICD-10-CM

## 2024-06-24 DIAGNOSIS — K76.0 FATTY LIVER: Chronic | ICD-10-CM

## 2024-06-24 DIAGNOSIS — R25.1 TREMOR: ICD-10-CM

## 2024-06-24 DIAGNOSIS — D64.9 ANEMIA, UNSPECIFIED TYPE: ICD-10-CM

## 2024-06-24 DIAGNOSIS — J44.9 CHRONIC OBSTRUCTIVE PULMONARY DISEASE, UNSPECIFIED COPD TYPE: Chronic | ICD-10-CM

## 2024-06-24 DIAGNOSIS — I10 PRIMARY HYPERTENSION: Chronic | ICD-10-CM

## 2024-06-24 DIAGNOSIS — R69 MULTIPLE CHRONIC DISEASES: ICD-10-CM

## 2024-06-24 DIAGNOSIS — M54.9 CHRONIC BACK PAIN, UNSPECIFIED BACK LOCATION, UNSPECIFIED BACK PAIN LATERALITY: ICD-10-CM

## 2024-06-24 DIAGNOSIS — G89.29 CHRONIC BACK PAIN, UNSPECIFIED BACK LOCATION, UNSPECIFIED BACK PAIN LATERALITY: ICD-10-CM

## 2024-06-24 DIAGNOSIS — I50.32 CHRONIC DIASTOLIC CONGESTIVE HEART FAILURE: ICD-10-CM

## 2024-06-24 DIAGNOSIS — K21.9 GASTROESOPHAGEAL REFLUX DISEASE, UNSPECIFIED WHETHER ESOPHAGITIS PRESENT: Chronic | ICD-10-CM

## 2024-06-24 PROCEDURE — 1126F AMNT PAIN NOTED NONE PRSNT: CPT | Performed by: FAMILY MEDICINE

## 2024-06-24 PROCEDURE — 3074F SYST BP LT 130 MM HG: CPT | Performed by: FAMILY MEDICINE

## 2024-06-24 PROCEDURE — 99214 OFFICE O/P EST MOD 30 MIN: CPT | Performed by: FAMILY MEDICINE

## 2024-06-24 PROCEDURE — 3078F DIAST BP <80 MM HG: CPT | Performed by: FAMILY MEDICINE

## 2024-06-24 RX ORDER — PRIMIDONE 50 MG/1
50 TABLET ORAL 3 TIMES DAILY
Start: 2024-06-24

## 2024-06-24 NOTE — PROGRESS NOTES
TIFF”.   Subjective   Felix Bartlett . is a 74 y.o. male presenting with chief complaint of:   Chief Complaint   Patient presents with    Diabetes    Hypertension     AWV   Last Completed Annual Wellness Visit            ANNUAL WELLNESS VISIT (Yearly) Next due on 11/13/2024 11/13/2023  Level of Service: MI PPPS, SUBSEQ VISIT    04/28/2022  Level of Service: MI PPPS, SUBSEQ VISIT                     History of Present Illness :  Alone.  Here for primarily f/u last visit with BP too high.  Did not get a new BP machine.       Has multiple chronic problems to consider that might have a bearing on today's issues;  an interval appointment.       Chronic/acute problems reviewed today:   1. Tremor chronic variable tremor; various medications have been used and he is satisfied with his current status   2. Primary hypertension Chronic/stable. Stable here past/no recent home blood pressures.  No significant chest pain, SOB, LE edema, orthopnea, near syncope, dizziness/light headness.   Recent Vitals         4/23/2024 5/16/2024 6/24/2024       BP: 120/52 126/74 124/62     Pulse: 65 71 59     Weight: 124 kg (274 lb) 122 kg (270 lb) 125 kg (274 lb 12.8 oz)     BMI (Calculated): 37.2 36.6 37.3              3. Anemia, unspecified type Chronic or past history/variable: This has been present before.    There has been GI evaulation in the past-did not f/u on recent suggestion with gi.  There is no current melena, hematochezia. It has been benign to date and stable/treated/watching.  Contributing comorbidities to date: iron def.     4. Chronic diastolic congestive heart failure Chronic/stable.  Denies significant sob, orthopnea, leg edema, weight gain.  Aware of influence diet/salt and watching weight at home.       5. Mixed hyperlipidemia Chronic/stable.  Tolerated use of Rx with labs showing improved lipid values and tolerant liver labs. No muscle aches unexpected.      6. Fatty liver Chronic/stable.  Aware treatment  involves normalizing weight; usually including low fat diet and regular exercise.  Aware condition can go on to cirrhosis and death.  Stable occ liver labs and past imaging.       7. Gastroesophageal reflux disease, unspecified whether esophagitis present Chronic/stable.  Controlled heartburn, reflux without dysphagia, melena.  Rx used, periods not used proven currently needed with symptoms -currently doing ok.      8. Hypopotassemia-diuretic Chronic/variable high or low K as uses diuretic; has to have lab monitoring.  No significant fatigue or muscle cramps     9. Chronic back pain, unspecified back location, unspecified back pain laterality : chronic/variable spinal (upper/mid/lower) 0-1/10 pain with infrequent/occ radiation to LE.  No change LE numbness.  Has bladder/bowel control. No desire to change approach of care.      10. Chronic obstructive pulmonary disease, unspecified COPD type Chronic/stable mild occ cough, sob, wheeze.  Rx helps.   No smoking.       11. Multiple chronic diseases Has multiple chronic problems with increased risks for management (involves polypharmacy, multiple providers, many required labs/imaging/ to manage)       Has an/another acute issue today: none.    The following portions of the patient's history were reviewed and updated as appropriate: allergies, current medications, past family history, past medical history, past social history, past surgical history, and problem list.      Current Outpatient Medications:     acetaminophen (TYLENOL) 650 MG 8 hr tablet, Take 1 tablet by mouth 2 (Two) Times a Day., Disp: , Rfl:     calcium-vitamin D (OSCAL-500) 500-200 MG-UNIT per tablet, Take 1 tablet by mouth 2 times daily.  , Disp: , Rfl:     cholecalciferol (VITAMIN D3) 10 MCG (400 UNIT) tablet, Take 1 tablet by mouth., Disp: , Rfl:     diphenhydrAMINE (BENADRYL) 25 mg capsule, Take 1 capsule by mouth 2 (Two) Times a Day., Disp: , Rfl:     escitalopram (LEXAPRO) 20 MG tablet, Take 1 tablet  by mouth Daily., Disp: 90 tablet, Rfl: 3    ferrous sulfate 325 (65 FE) MG tablet, Take 1 tablet by mouth Daily With Breakfast., Disp: , Rfl:     fluticasone (FLONASE) 50 MCG/ACT nasal spray, , Disp: , Rfl:     gabapentin (NEURONTIN) 300 MG capsule, Take 1 capsule by mouth 2 (Two) Times a Day., Disp: 60 capsule, Rfl: 3    KETOTIFEN FUMARATE OP, Apply 1 drop to eye(s) as directed by provider As Needed., Disp: , Rfl:     losartan (COZAAR) 50 MG tablet, Take 1 tablet by mouth Daily., Disp: 90 tablet, Rfl: 3    metoprolol tartrate (LOPRESSOR) 50 MG tablet, Take 1 tablet by mouth Every 12 (Twelve) Hours., Disp: 180 tablet, Rfl: 3    Multiple Vitamins-Minerals (CENTRUM SILVER 50+MEN) tablet, Take 1 tablet by mouth Daily., Disp: , Rfl:     O2 (OXYGEN), Inhale 1 (One) Time., Disp: , Rfl:     omeprazole (priLOSEC) 20 MG capsule, 1 capsule 2 (Two) Times a Day., Disp: , Rfl:     potassium chloride (Klor-Con M10) 10 MEQ CR tablet, Take 1 tablet by mouth 2 (Two) Times a Day., Disp: 180 tablet, Rfl: 3    primidone (MYSOLINE) 50 MG tablet, Take 1 tablet by mouth 3 (Three) Times a Day. ., Disp: , Rfl:     simvastatin (ZOCOR) 20 MG tablet, Take 1 tablet by mouth Daily., Disp: 90 tablet, Rfl: 3    tamsulosin (FLOMAX) 0.4 MG capsule 24 hr capsule, Take 1 capsule by mouth Daily., Disp: 90 capsule, Rfl: 3    No problems with medications.    No Known Allergies    Review of Systems  GENERAL:  Inactive/slower with limits, speed, stamina for age and fatigue. Sleep is ok with HOB up. No fever now/recent.  ENDO:  No syncope, near or diaphoretic sweaty spells.  HEENT: No change occ headache.   No vision change.   Same significant hearing loss.  Ears without pain/drainage.  No sore throat.  No significant nasal/sinus congestion/drainage. No epistaxis.  CHEST: No chest wall tenderness or mass. No change cough, with occ wheeze.  Stable/occ SOB; no hemoptysis.  CV: No chest pain, palpitations, less daily ankle edema.  GI: No heartburn, dysphagia.   No abdominal pain, diarrhea, constipation.  No rectal bleeding, or melena.    :  Voids without dysuria, or incontinence to completion; sees sony  ORTHO: No painful/swollen joints but various on /off sore.  No change sore neck; more pain lower back.  No acute neck or back pain without recent injury.  NEURO: No dizziness, weakness of extremities.  No change UE/LE numbness/paresthesias.   Continued RUE > LUE tremor.   PSYCH: No memory loss.  Mood good; occ anxious, depressed but/and not suicidal.  Tries to tolerate stress   Screening:  Mammogram: NA  Bone density: 3.2.2017 [facility]/Ts LS -1.8 hip -0.5-offered    Low dose CT chest:Tobacco-smoker/age 16/1ppd/advised    low dost CT/6.20.24-IMPRESSION:  No new or enlarging pulmonary nodule.  Lung-RADS 2: Benign  Nodules with a very low likelihood of becoming a clinically active  cancer due to size or lack of growth.  Continue annual screening with low dose CT in 12 months.     GI:   Colonoscopy-polyp/Gil//3.18.16/5 yr-reminded  EGD-neg/Gil//5.1.18  Prostate:   Spice confirmed 6.24.24  sony confirmed 10.31.22  advised sony 4.28.22  sony last 11.5.20  sony confirmed 3.28.19  Usual lab order  6m CBC, CMP, A1c, iron  12m CBC, CMP, A1c LIPID, TSH, T4, Vit D, PSAs, B12, folate, iron, ferritin, % sat iron, retic count     Copy/paste function used for ROS/exam AND each area of these were reviewed, updated, confirmed and supplemented as needed.  Data reviewed:   Recent admit/ER/MD visits: 3.28.24    1. Elevated blood pressure reading    2. Primary hypertension       Data review above:   Discussions/medical decisions/reviews:  BP better today than last  Other vitals ok  Recent Vitals           2/8/2024 3/11/2024 3/28/2024          BP: 140/74 160/90 132/82       Pulse: 72 81 84       Temp: -- 97.1 °F (36.2 °C) 96.9 °F (36.1 °C)       Weight: 125 kg (275 lb) 126 kg (277 lb) 126 kg (277 lb)       BMI (Calculated): 37.3 37.6 37.6                 Home bps recommended;  see goals     Follow up: Return for lab/return 3m.    Last cardiac testing:   Echo:   Results for orders placed in visit on 08/21/17    Adult Transthoracic Echo Complete    Interpretation Summary  · Left ventricular systolic function is normal. Estimated EF = 60%.  · Left ventricular wall thickness is consistent with mild concentric hypertrophy.  · Left ventricular diastolic dysfunction (grade I) consistent with impaired relaxation.  · No evidence of pulmonary hypertension is present.    Radiology considered:   CT Chest Low Dose Cancer Screening WO    Result Date: 6/20/2024   No new or enlarging pulmonary nodule.  Lung-RADS 2: Benign Nodules with a very low likelihood of becoming a clinically active cancer due to size or lack of growth. Continue annual screening with low dose CT in 12 months.   This report was signed and finalized on 6/20/2024 2:00 PM by Dr. Fawad Valadez MD.       Lab Results:  Results for orders placed or performed in visit on 06/12/24   Microalbumin / Creatinine Urine Ratio -   Result Value Ref Range    Creatinine, Urine 64.6 Not Estab. mg/dL    Microalbumin, Urine <3.0 Not Estab. ug/mL    Microalbumin/Creatinine Ratio <5 0 - 29 mg/g creat       A1C:  Lab Results - Last 18 Months   Lab Units 06/11/24  0820 03/11/24  0907 05/02/23  0959   HEMOGLOBIN A1C % 6.10* 6.00* 6.10*     GLUCOSE:  Lab Results - Last 18 Months   Lab Units 06/11/24  0820 03/11/24  0907 05/02/23  0959   GLUCOSE mg/dL 113* 99 97     LIPID:  Lab Results - Last 18 Months   Lab Units 03/11/24  0907 05/02/23  0959   CHOLESTEROL mg/dL 222* 226*   LDL CHOL mg/dL 139* 155*   HDL CHOL mg/dL 29* 35*   TRIGLYCERIDES mg/dL 297* 196*     PSA:  Lab Results   Component Value Date/Time    PSA 1.150 05/02/2023 09:59 AM    PSA 0.937 04/28/2022 11:08 AM    PSA 0.791 04/06/2020 10:25 AM    PSA 0.767 03/22/2019 09:48 AM    PSA 0.838 02/21/2018 07:19 AM       CBC:  Lab Results - Last 18 Months   Lab Units 06/11/24  0820 03/11/24  0907  "05/02/23  0959   WBC 10*3/mm3 10.38 11.08* 11.44*   HEMOGLOBIN g/dL 9.7* 10.2* 10.7*   HEMATOCRIT % 28.8* 30.7* 31.2*   PLATELETS 10*3/mm3 232 261 242   IRON mcg/dL 92 74 132   VITAMIN B 12 pg/mL  --  461 452   FOLATE ng/mL  --  10.00 11.90     BMP/CMP:  Lab Results - Last 18 Months   Lab Units 06/11/24  0820 03/11/24  0907 05/02/23  0959   SODIUM mmol/L 139 140 142   POTASSIUM mmol/L 4.1 4.4 4.3   CHLORIDE mmol/L 104 103 106   CO2 mmol/L 25.7 25.4 27.9   GLUCOSE mg/dL 113* 99 97   BUN mg/dL 13 13 11   CREATININE mg/dL 1.17 1.11 1.06   EGFR RESULT mL/min/1.73 64.6 69.2 73.2   CALCIUM mg/dL 9.2 8.9 9.5       HEPATIC:  Lab Results - Last 18 Months   Lab Units 06/11/24 0820 03/11/24  0907 05/02/23  0959   ALT (SGPT) U/L 12 15 18   AST (SGOT) U/L 14 17 17   ALK PHOS U/L 84 98 84     HEPATITIS C ANTIBODY:   Lab Results   Component Value Date/Time    HEPCVIRUSABY <0.1 02/21/2018 07:19 AM     Vit D:  Lab Results - Last 18 Months   Lab Units 03/11/24  0907 05/02/23  0959   VIT D 25 HYDROXY ng/ml 54.0 37.6     THYROID:  Lab Results - Last 18 Months   Lab Units 03/11/24  0907 05/02/23  0959   TSH uIU/mL 0.603 0.388   FREE T4 ng/dL  --  0.76*       Objective   /62   Pulse 59   Ht 182.9 cm (72\")   Wt 125 kg (274 lb 12.8 oz)   SpO2 97%   BMI 37.27 kg/m²   Body mass index is 37.27 kg/m².    Recent Vitals         4/23/2024 5/16/2024 6/24/2024       BP: 120/52 126/74 124/62     Pulse: 65 71 59     Weight: 124 kg (274 lb) 122 kg (270 lb) 125 kg (274 lb 12.8 oz)     BMI (Calculated): 37.2 36.6 37.3           Wt Readings from Last 15 Encounters:   06/24/24 1049 125 kg (274 lb 12.8 oz)   05/16/24 0900 122 kg (270 lb)   04/23/24 0927 124 kg (274 lb)   04/02/24 1237 122 kg (270 lb)   03/28/24 1018 126 kg (277 lb)   03/11/24 0943 126 kg (277 lb)   02/08/24 0925 125 kg (275 lb)   11/13/23 0932 127 kg (281 lb)   11/07/23 1014 126 kg (278 lb)   10/24/23 0942 127 kg (280 lb 9.6 oz)   08/01/23 0821 123 kg (272 lb)   05/02/23 1021 " 123 kg (270 lb 12.8 oz)   03/09/23 1057 117 kg (257 lb)   10/31/22 1114 119 kg (263 lb 3.2 oz)   10/10/22 1043 118 kg (261 lb)       Physical Exam  GENERAL:  Well nourished/developed in no acute distress. Obese  SKIN: Turgor excellent, without wound, rash, lesion.  HEENT: Normal cephalic without trauma.  Pupils equal round reactive to light. Extraocular motions full without nystagmus.   External canals moderate wax but nonobstructive nontender without reddness. Tymphatic membranes seen anders with david structures intact.   Oral cavity without growths, exudates, and moist.  Posterior pharynx without mass, obstruction, redness.  No thyromegaly, mass, tenderness, lymphadenopathy and supple.  CV: Regular rhythm.  No murmur, gallop,  edema. Posterior pulses intact.  No carotid bruits.  CHEST: No chest wall tenderness or mass.   LUNGS: Symmetric motion with clear/reduced to auscultation.  No dullness to percussion  ABD: Soft, nontender without mass.   ORTHO: Symmetric extremities without swelling/point tenderness.  Full gross range of motion.  NEURO: CN 2-12 grossly intact.  Symmetric facies. 1/4 x bicep equal reflexes.  UE/LE   3/5 strength throughout.  Nonfocal use extremities. Speech clear.  Reduced light touch with monofilament, vibratory sensation with tuning fork; equal toes/distal feet.  Intention tremor.     PSYCH: Oriented x 3.  Pleasant calm, well kept.  Purposeful/directed conservation with intact short/long gross    Assessment & Plan     1. Tremor    2. Primary hypertension    3. Anemia, unspecified type    4. Chronic diastolic congestive heart failure    5. Mixed hyperlipidemia    6. Fatty liver    7. Gastroesophageal reflux disease, unspecified whether esophagitis present    8. Hypopotassemia-diuretic    9. Chronic back pain, unspecified back location, unspecified back pain laterality    10. Chronic obstructive pulmonary disease, unspecified COPD type    11. Multiple chronic diseases        Data review  above:   Discussions/medical decisions/reviews:  BP ok  Other vitals ok  Recent Vitals         4/23/2024 5/16/2024 6/24/2024       BP: 120/52 126/74 124/62     Pulse: 65 71 59     Weight: 124 kg (274 lb) 122 kg (270 lb) 125 kg (274 lb 12.8 oz)     BMI (Calculated): 37.2 36.6 37.3           DM/A1c 6.1 6.11.24  DM/ 6.11.24  Lipid   3.11.24; zocor 20  PSA ok 5.2.23  CBC Hb 9.7 6.11.24; down  Iron 92 6.11.24; now on 325/d  B12 461 3.11.24  Folate 10 3.11.24  Retic 2.03H 5.2.23  Renal ok 6.11.24  Liver ok 6.11.24  Vit D 54 3.11.24  Thyroid TSH ok 3.11.24    Screening reviewed/updated needs colon  Vaccines discussed (see below) those missing  Anemia; lab today and needs to be doing Gil tests he wants  Lab today    Discussed upcoming correction of me/Dr Rosaroi.  Discussed new physician, Dr Johan Dockery coming as replacement. Decision to: stay at /[facility].    Follow up: Return for lab today then lab 1m, then lab/Dockery 4-5m .  Future Appointments   Date Time Provider Department Center   7/25/2024  9:30 AM Andreina Hernández APRN [facility] PAD PAD   7/30/2024  9:30 AM [facility]R [facility]AD   8/22/2024  9:00 AM Milagro Anderson APRN [facility] POD PAD None   10/1/2024 10:15 AM Keyon Dockery MD [facility]       Data review above:   Rx: reviewed and decisions:   Rx new/changes: clarify what he is doing  New Medications Ordered This Visit   Medications    primidone (MYSOLINE) 50 MG tablet     Sig: Take 1 tablet by mouth 3 (Three) Times a Day. .       Orders placed:   LAB/Testing/Referrals: reviewed/orders:   Today:   Orders Placed This Encounter   Procedures    Reticulocytes    Comprehensive Metabolic Panel    Ferritin    Iron Profile    CBC & Differential     Chronic/recurrent labs above or change to:   Same     Immunization History   Administered Date(s) Administered    COVID-19 (MODERNA) 1st,2nd,3rd Dose Monovalent 03/06/2021, 04/07/2021    FLUAD TRI 65YR+ 10/03/2019    Flu Vaccine Quad PF >36MO  "10/31/2017    Fluad Quad 65+ 10/01/2020    Fluzone High-Dose 65+yrs 10/31/2022    Fluzone Quad >6mos (Multi-dose) 01/27/2016, 10/21/2016, 10/31/2017    Pneumococcal Conjugate 13-Valent (PCV13) 01/27/2016    Pneumococcal Conjugate 20-Valent (PCV20) 05/02/2023    Pneumococcal, Unspecified 12/12/2013     We advised/reaffirmed our support/suggestion for staying complete with covid- covid boosters, seasonal flu/yearly and any missing vaccine from list we supplied; when cannot be given here we suggest contact with local health department office or pharmacy to review missing/needed vaccines and then bring nursing documentation for these vaccines to this office or call this information in. Shingles became \"free\" 1.1.23 for medicare insurance.    Health maintenance:   Body mass index is 37.27 kg/m².  Class 2 Severe Obesity (BMI >=35 and <=39.9). Obesity-related health conditions include the following: hypertension. Obesity is unchanged. BMI is is above average; BMI management plan is completed. We discussed portion control and increasing exercise.      Tobacco use reviewed:   Felix Bartlett Sr.  reports that he has been smoking cigarettes. He started smoking about 58 years ago. He has a 29.1 pack-year smoking history. He has never been exposed to tobacco smoke. He has never used smokeless tobacco. I have educated him on the risk of diseases from using tobacco products such as cancer, COPD, and heart disease.     I advised him to quit and he is not willing to quit.    I spent 3  minutes counseling the patient.       Reminded; as discussed before.    There are no Patient Instructions on file for this visit.          "

## 2024-06-25 ENCOUNTER — TELEPHONE (OUTPATIENT)
Dept: FAMILY MEDICINE CLINIC | Facility: CLINIC | Age: 74
End: 2024-06-25
Payer: MEDICARE

## 2024-06-25 DIAGNOSIS — Z85.528 HISTORY OF RENAL CELL CANCER: Primary | ICD-10-CM

## 2024-06-25 DIAGNOSIS — D64.9 ANEMIA, UNSPECIFIED TYPE: Primary | ICD-10-CM

## 2024-06-25 DIAGNOSIS — R79.89 ELEVATED FERRITIN: ICD-10-CM

## 2024-06-25 LAB
ALBUMIN SERPL-MCNC: 3.9 G/DL (ref 3.5–5.2)
ALBUMIN/GLOB SERPL: 2 G/DL
ALP SERPL-CCNC: 82 U/L (ref 39–117)
ALT SERPL-CCNC: 11 U/L (ref 1–41)
AST SERPL-CCNC: 13 U/L (ref 1–40)
BASOPHILS # BLD AUTO: 0.05 10*3/MM3 (ref 0–0.2)
BASOPHILS NFR BLD AUTO: 0.4 % (ref 0–1.5)
BILIRUB SERPL-MCNC: <0.2 MG/DL (ref 0–1.2)
BUN SERPL-MCNC: 14 MG/DL (ref 8–23)
BUN/CREAT SERPL: 12.2 (ref 7–25)
CALCIUM SERPL-MCNC: 9 MG/DL (ref 8.6–10.5)
CHLORIDE SERPL-SCNC: 104 MMOL/L (ref 98–107)
CO2 SERPL-SCNC: 26.7 MMOL/L (ref 22–29)
CREAT SERPL-MCNC: 1.15 MG/DL (ref 0.76–1.27)
EGFRCR SERPLBLD CKD-EPI 2021: 66 ML/MIN/1.73
EOSINOPHIL # BLD AUTO: 0.26 10*3/MM3 (ref 0–0.4)
EOSINOPHIL NFR BLD AUTO: 2.1 % (ref 0.3–6.2)
ERYTHROCYTE [DISTWIDTH] IN BLOOD BY AUTOMATED COUNT: 12.2 % (ref 12.3–15.4)
FERRITIN SERPL-MCNC: 427 NG/ML (ref 30–400)
GLOBULIN SER CALC-MCNC: 2 GM/DL
GLUCOSE SERPL-MCNC: 75 MG/DL (ref 65–99)
HCT VFR BLD AUTO: 28.8 % (ref 37.5–51)
HGB BLD-MCNC: 9.3 G/DL (ref 13–17.7)
IMM GRANULOCYTES # BLD AUTO: 0.02 10*3/MM3 (ref 0–0.05)
IMM GRANULOCYTES NFR BLD AUTO: 0.2 % (ref 0–0.5)
IRON SATN MFR SERPL: 34 % (ref 20–50)
IRON SERPL-MCNC: 110 MCG/DL (ref 59–158)
LYMPHOCYTES # BLD AUTO: 5.18 10*3/MM3 (ref 0.7–3.1)
LYMPHOCYTES NFR BLD AUTO: 42 % (ref 19.6–45.3)
MCH RBC QN AUTO: 33.3 PG (ref 26.6–33)
MCHC RBC AUTO-ENTMCNC: 32.3 G/DL (ref 31.5–35.7)
MCV RBC AUTO: 103.2 FL (ref 79–97)
MONOCYTES # BLD AUTO: 0.75 10*3/MM3 (ref 0.1–0.9)
MONOCYTES NFR BLD AUTO: 6.1 % (ref 5–12)
NEUTROPHILS # BLD AUTO: 6.06 10*3/MM3 (ref 1.7–7)
NEUTROPHILS NFR BLD AUTO: 49.2 % (ref 42.7–76)
NRBC BLD AUTO-RTO: 0 /100 WBC (ref 0–0.2)
PLATELET # BLD AUTO: 223 10*3/MM3 (ref 140–450)
POTASSIUM SERPL-SCNC: 4.2 MMOL/L (ref 3.5–5.2)
PROT SERPL-MCNC: 5.9 G/DL (ref 6–8.5)
RBC # BLD AUTO: 2.79 10*6/MM3 (ref 4.14–5.8)
RETICS/RBC NFR AUTO: 2.07 % (ref 0.7–1.9)
SODIUM SERPL-SCNC: 140 MMOL/L (ref 136–145)
TIBC SERPL-MCNC: 328 MCG/DL
UIBC SERPL-MCNC: 218 MCG/DL (ref 112–346)
WBC # BLD AUTO: 12.32 10*3/MM3 (ref 3.4–10.8)

## 2024-06-25 NOTE — TELEPHONE ENCOUNTER
Pt would like a referral put in to Dr Ramos.  Pt states he tried to schedule with him, due to being a pt of his, but was told he needed a referral.

## 2024-07-19 ENCOUNTER — OFFICE VISIT (OUTPATIENT)
Dept: ENT CLINIC | Age: 74
End: 2024-07-19
Payer: MEDICARE

## 2024-07-19 VITALS
HEIGHT: 72 IN | WEIGHT: 270 LBS | BODY MASS INDEX: 36.57 KG/M2 | DIASTOLIC BLOOD PRESSURE: 72 MMHG | SYSTOLIC BLOOD PRESSURE: 134 MMHG

## 2024-07-19 DIAGNOSIS — H61.23 BILATERAL IMPACTED CERUMEN: Primary | ICD-10-CM

## 2024-07-19 PROCEDURE — 69210 REMOVE IMPACTED EAR WAX UNI: CPT | Performed by: PHYSICIAN ASSISTANT

## 2024-07-19 PROCEDURE — 1123F ACP DISCUSS/DSCN MKR DOCD: CPT | Performed by: PHYSICIAN ASSISTANT

## 2024-07-19 PROCEDURE — 99202 OFFICE O/P NEW SF 15 MIN: CPT | Performed by: PHYSICIAN ASSISTANT

## 2024-07-19 RX ORDER — ACETAMINOPHEN 325 MG/1
650 TABLET ORAL
COMMUNITY
Start: 2016-11-21

## 2024-07-19 RX ORDER — FERROUS SULFATE 325(65) MG
325 TABLET ORAL
COMMUNITY
Start: 2023-05-15

## 2024-07-19 RX ORDER — TAMSULOSIN HYDROCHLORIDE 0.4 MG/1
0.4 CAPSULE ORAL
COMMUNITY
Start: 2016-11-21

## 2024-07-19 RX ORDER — ESCITALOPRAM OXALATE 20 MG/1
20 TABLET ORAL
COMMUNITY
Start: 2017-11-22

## 2024-07-19 RX ORDER — TERBINAFINE HYDROCHLORIDE 250 MG/1
250 TABLET ORAL DAILY
COMMUNITY

## 2024-07-19 RX ORDER — POTASSIUM CHLORIDE 20 MEQ/1
10 TABLET, EXTENDED RELEASE ORAL
COMMUNITY
Start: 2020-04-22

## 2024-07-19 RX ORDER — PRIMIDONE 50 MG/1
50 TABLET ORAL
COMMUNITY
Start: 2024-05-09

## 2024-07-19 RX ORDER — GABAPENTIN 300 MG/1
300 CAPSULE ORAL
COMMUNITY
Start: 2024-05-09

## 2024-07-19 RX ORDER — FLUTICASONE PROPIONATE 44 UG/1
1 AEROSOL, METERED RESPIRATORY (INHALATION) 2 TIMES DAILY
COMMUNITY

## 2024-07-19 RX ORDER — LOSARTAN POTASSIUM 100 MG/1
50 TABLET ORAL
COMMUNITY
Start: 2017-11-22

## 2024-07-19 NOTE — PROGRESS NOTES
Mr. Gagnon is a pleasant 74-year-old male that was referred by Dr. Inocencio Jimenez due to problems with bilateral cerumen impactions.  Patient reports that he has noticed gradual diminished hearing and is having no drainage from the ear canals.      Physical examination with the microscope confirmed bilateral cerumen impactions to be present.  The left side was successfully disimpacted with alligator graspers and suction with no complications.  The right side was only partially disimpacted due to very firm cerumen being plastered to the TM.-Please refer to separate dictated procedure note  After disimpaction, the TM appeared to be normal to the left side with the right side never visualized.  Neck exam demonstrated no lymphadenopathy or thyromegaly.      Impression: Successful cerumen disimpaction of the left ear with partial in the right with microscopy and instrumentation    Plan: I will prescribe Debrox for the remaining part of the cerumen to the right ear.  He is to follow-up in 2 weeks for reattempt for completion of the disimpaction.  He was reminded call if he has any questions or concerns.      Electronically signed by CAROLIN SHAFFER PA-C on 7/23/24 at 1:07 PM CDT

## 2024-07-25 ENCOUNTER — OFFICE VISIT (OUTPATIENT)
Dept: NEUROLOGY | Facility: CLINIC | Age: 74
End: 2024-07-25
Payer: MEDICARE

## 2024-07-25 VITALS
HEART RATE: 68 BPM | WEIGHT: 276 LBS | BODY MASS INDEX: 37.38 KG/M2 | SYSTOLIC BLOOD PRESSURE: 138 MMHG | DIASTOLIC BLOOD PRESSURE: 82 MMHG | HEIGHT: 72 IN | RESPIRATION RATE: 18 BRPM

## 2024-07-25 DIAGNOSIS — R25.1 TREMOR: Primary | ICD-10-CM

## 2024-07-25 RX ORDER — PRIMIDONE 50 MG/1
50 TABLET ORAL 3 TIMES DAILY
Qty: 90 TABLET | Refills: 3
Start: 2024-07-25 | End: 2024-11-22

## 2024-07-25 RX ORDER — GABAPENTIN 300 MG/1
300 CAPSULE ORAL 2 TIMES DAILY
Qty: 60 CAPSULE | Refills: 3 | Status: SHIPPED | OUTPATIENT
Start: 2024-07-25

## 2024-07-25 NOTE — PROGRESS NOTES
Neurology Consult Note    [facility]  [address], [address]  [place] [address]  Phone: [phone]  Fax: [phone]    Referring Provider:   No ref. provider found  Primary Care Provider:  Bassam Rosario MD    Reason for Consult:  Tremor  Subjective      Felix Bartlett . presents to [facility]    History of Present Illness  74-year-old male seen for follow-up of tremor.  He was last seen in clinic by Dr. Cantrell on 4/23/2024.  Review of that record shows patient had his primidone dose increased last visit however this increased his level sleepiness.  He is now taking 50 mg 2-3 times a day as tolerated.  Patient's tremor was felt to be mostly dystonic and head tremor involving his head and voice.  Patient had no features of Parkinson's disease on that exam.  Patient was also started on gabapentin 300 mg twice daily for tremor.  He was already on metoprolol.  DBS was discussed however patient declined wanting this.    Today patient presents by himself.  He is ambulating with assistance of a cane.  Reports continued tremor.  Reports some improvement with use of gabapentin.  Elected continue current regimen without increasing.  I feel this is appropriate.  He did have tremor on exam today of both hands as well as his head and voice.  Does not appear his exam has changed since last being seen.  Patient Active Problem List   Diagnosis    Wellness examination-done    History of renal cell cancer-sony    COPD (chronic obstructive pulmonary disease)    Elevated fasting glucose    History of alcohol abuse    Allergic rhinitis    Anxiety    Vertebral compression fracture    Gastroesophageal reflux disease    Edema leg-CHF/d, obesity, copd, cirrhosis    Fatty liver    Hyperlipidemia-statin    Hypertension    Hypopotassemia-diuretic    Respiratory failure-hypoxic (compliant night)    Macrocytosis    Osteoporosis bd 3.2017 ? 2y    Tremor    Posttraumatic stress  disorder    History of TIA (transient ischemic attack)    Chronic back pain    Laboratory test*    Congestive heart failure-diastolic    Tobacco use: 6.2024/12m    Anemia: chronic iron loss#    Heme positive stool    Pain in leg, unspecified    Family history of colon cancer    History of adenomatous polyp of colon    Bile salt-induced diarrhea    RUQ pain    S/P laparoscopic cholecystectomy    Gait difficulty    SOB (shortness of breath)    Iron deficiency        Past Medical History:   Diagnosis Date    Acid reflux     Arthritis     Asthma     COPD (chronic obstructive pulmonary disease)     CTS (carpal tunnel syndrome)     HL (hearing loss)     Hyperlipidemia     Hypertension     Oxygen dependent     at hs    Peripheral neuropathy     Renal cancer 08/2014    Lt RALPart'l Nx; t1a Clear Cell with negative margin        Social History     Socioeconomic History    Marital status:      Spouse name: Melanie    Number of children: 1    Years of education: 12   Tobacco Use    Smoking status: Every Day     Current packs/day: 0.50     Average packs/day: 0.5 packs/day for 58.2 years (29.1 ttl pk-yrs)     Types: Cigarettes     Start date: 4/30/1966     Passive exposure: Never    Smokeless tobacco: Never   Vaping Use    Vaping status: Never Used   Substance and Sexual Activity    Alcohol use: No    Drug use: No    Sexual activity: Never        No Known Allergies       Current Outpatient Medications:     acetaminophen (TYLENOL) 650 MG 8 hr tablet, Take 1 tablet by mouth 2 (Two) Times a Day., Disp: , Rfl:     calcium-vitamin D (OSCAL-500) 500-200 MG-UNIT per tablet, Take 1 tablet by mouth 2 times daily.  , Disp: , Rfl:     cholecalciferol (VITAMIN D3) 10 MCG (400 UNIT) tablet, Take 1 tablet by mouth., Disp: , Rfl:     diphenhydrAMINE (BENADRYL) 25 mg capsule, Take 1 capsule by mouth 2 (Two) Times a Day., Disp: , Rfl:     escitalopram (LEXAPRO) 20 MG tablet, Take 1 tablet by mouth Daily., Disp: 90 tablet, Rfl: 3    ferrous  "sulfate 325 (65 FE) MG tablet, Take 1 tablet by mouth Daily With Breakfast., Disp: , Rfl:     fluticasone (FLONASE) 50 MCG/ACT nasal spray, , Disp: , Rfl:     gabapentin (NEURONTIN) 300 MG capsule, Take 1 capsule by mouth 2 (Two) Times a Day., Disp: 60 capsule, Rfl: 3    KETOTIFEN FUMARATE OP, Apply 1 drop to eye(s) as directed by provider As Needed., Disp: , Rfl:     losartan (COZAAR) 50 MG tablet, Take 1 tablet by mouth Daily., Disp: 90 tablet, Rfl: 3    metoprolol tartrate (LOPRESSOR) 50 MG tablet, Take 1 tablet by mouth Every 12 (Twelve) Hours., Disp: 180 tablet, Rfl: 3    Multiple Vitamins-Minerals (CENTRUM SILVER 50+MEN) tablet, Take 1 tablet by mouth Daily., Disp: , Rfl:     O2 (OXYGEN), Inhale 1 (One) Time., Disp: , Rfl:     omeprazole (priLOSEC) 20 MG capsule, 1 capsule 2 (Two) Times a Day., Disp: , Rfl:     potassium chloride (Klor-Con M10) 10 MEQ CR tablet, Take 1 tablet by mouth 2 (Two) Times a Day., Disp: 180 tablet, Rfl: 3    primidone (MYSOLINE) 50 MG tablet, Take 1 tablet by mouth 3 (Three) Times a Day for 120 days. ., Disp: 90 tablet, Rfl: 3    simvastatin (ZOCOR) 20 MG tablet, Take 1 tablet by mouth Daily., Disp: 90 tablet, Rfl: 3    tamsulosin (FLOMAX) 0.4 MG capsule 24 hr capsule, Take 1 capsule by mouth Daily., Disp: 90 capsule, Rfl: 3       Objective   Vital Signs:   /82 (BP Location: Left arm, Patient Position: Sitting)   Pulse 68   Resp 18   Ht 182.9 cm (72\")   Wt 125 kg (276 lb)   BMI 37.43 kg/m²       Physical Exam  Vitals and nursing note reviewed.   Constitutional:       Appearance: Normal appearance.   HENT:      Head: Normocephalic.   Eyes:      General: Lids are normal.      Extraocular Movements: Extraocular movements intact.      Pupils: Pupils are equal, round, and reactive to light.   Pulmonary:      Effort: Pulmonary effort is normal. No respiratory distress.   Skin:     General: Skin is warm and dry.   Neurological:      Mental Status: He is alert.      Motor: Motor " strength is normal.  Psychiatric:         Speech: Speech normal.        Neurological Exam  Mental Status  Alert. Oriented to person, place, time and situation. Speech is normal. Language is fluent with no aphasia.    Cranial Nerves  CN II: Visual fields full to confrontation.  CN III, IV, VI: Extraocular movements intact bilaterally. Normal lids and orbits bilaterally. Pupils equal round and reactive to light bilaterally.  CN V: Facial sensation is normal.  CN VII: Full and symmetric facial movement.  CN IX, X: Palate elevates symmetrically. Normal gag reflex.  CN XI: Shoulder shrug strength is normal.  CN XII: Tongue midline without atrophy or fasciculations.    Motor  Normal muscle bulk throughout. No fasciculations present. Normal muscle tone. The following abnormal movements were seen: Coarse tremor with outstretched arms, bird wing position as well as finger-to-nose.  No resting tremor.  No bradykinesia.  No rigidity..   Strength is 5/5 throughout all four extremities.    Sensory  Light touch is normal in upper and lower extremities.     Gait  Casual gait is normal including stance, stride, and arm swing.  Uses single-point cane.      Result Review :   The following data was reviewed by: RUBIO Lang on 07/25/2024:       Progress Notes by Lorena Cantrell MD (04/23/2024 09:45)                Impression:  Felix Bartlett  is a 74 y.o. male who presents for follow-up of tremor.  Will continue patient's current regimen of primidone as well as gabapentin for his tremor.  It is noted he is on metoprolol as well.  I be hesitant to increase doses of either medication at this time.  Patient like to maintain his current dosing which I feel is appropriate.    Diagnoses and all orders for this visit:    1. Tremor (Primary)  Orders:  -     primidone (MYSOLINE) 50 MG tablet; Take 1 tablet by mouth 3 (Three) Times a Day for 120 days. .  Dispense: 90 tablet; Refill: 3  -     gabapentin (NEURONTIN) 300 MG capsule;  Take 1 capsule by mouth 2 (Two) Times a Day.  Dispense: 60 capsule; Refill: 3        Plan:  Continue primidone 50 mg tablet, take 1 tablet 3 times daily  Gabapentin 300 mg capsule, take 1 tablet twice daily  Contact our office for any worsening symptoms  Follow-up with PCP as scheduled  Follow-up in my clinic in 6 months or sooner if needed    The patient and I have discussed the plan of care and he is in full agreement at this time.     Follow Up   Return in about 6 months (around 1/25/2025) for Tremor.            RUBIO Lang  07/25/24  11:10 CDT

## 2024-08-02 ENCOUNTER — OFFICE VISIT (OUTPATIENT)
Dept: ENT CLINIC | Age: 74
End: 2024-08-02
Payer: OTHER GOVERNMENT

## 2024-08-02 VITALS
WEIGHT: 280 LBS | HEIGHT: 72 IN | BODY MASS INDEX: 37.93 KG/M2 | SYSTOLIC BLOOD PRESSURE: 124 MMHG | DIASTOLIC BLOOD PRESSURE: 80 MMHG

## 2024-08-02 DIAGNOSIS — H61.21 IMPACTED CERUMEN OF RIGHT EAR: Primary | ICD-10-CM

## 2024-08-02 PROCEDURE — 1123F ACP DISCUSS/DSCN MKR DOCD: CPT | Performed by: PHYSICIAN ASSISTANT

## 2024-08-02 PROCEDURE — 99212 OFFICE O/P EST SF 10 MIN: CPT | Performed by: PHYSICIAN ASSISTANT

## 2024-08-02 RX ORDER — CIPROFLOXACIN AND DEXAMETHASONE 3; 1 MG/ML; MG/ML
4 SUSPENSION/ DROPS AURICULAR (OTIC) 2 TIMES DAILY
Qty: 7 ML | Refills: 0 | Status: SHIPPED | OUTPATIENT
Start: 2024-08-02 | End: 2024-08-12

## 2024-08-02 NOTE — PROGRESS NOTES
Mr. Gagnon is a pleasant 74-year-old  male that presents for a 2-week follow-up due to a persistent cerumen impaction to the right ear.  He reports that [facility].  He continues to complain of muffled hearing as well as a pressure sensation to the right ear.      Physical examination with microscope confirmed a cerumen impaction to the right ear.  This was reattempted for disimpaction utilizing alligator graspers and suction.  The anterior portion of the cerumen is noted to be plastered to the TM and was felt to be unsafe for removal.      Impression: Persistent cerumen impaction to the right ear    Plan: Due to the patient having difficulty getting Debrox, I will place him on Ciprodex twice a day for 14 days and reattempt in 2 weeks.  He was reminded to call if he has any questions or concerns.      Electronically signed by CAROLIN SHAFFER PA-C on 8/2/24 at 11:51 AM HANY

## 2024-08-06 ENCOUNTER — TELEPHONE (OUTPATIENT)
Dept: FAMILY MEDICINE CLINIC | Facility: CLINIC | Age: 74
End: 2024-08-06

## 2024-08-06 PROBLEM — R79.89 ELEVATED FERRITIN: Status: ACTIVE | Noted: 2024-08-06

## 2024-08-06 NOTE — TELEPHONE ENCOUNTER
Caller: Felix Bartlett Sr.    Relationship: Self    Best call back number: [phone]     What is the best time to reach you: ANY    Who are you requesting to speak with (clinical staff, provider,  specific staff member): NONE SPECIFIED     What was the call regarding:     PATIENT WOULD LIKE TO CHECK ON THE STATUS OF HIS LAB RESULTS.     Is it okay if the provider responds through MyChart: PLEASE CALL

## 2024-08-06 NOTE — TELEPHONE ENCOUNTER
Mali checked in lab usha and all labs are not resulted yet. Patient notified and stated understanding

## 2024-08-16 ENCOUNTER — OFFICE VISIT (OUTPATIENT)
Dept: ENT CLINIC | Age: 74
End: 2024-08-16

## 2024-08-16 VITALS
HEIGHT: 72 IN | WEIGHT: 282 LBS | SYSTOLIC BLOOD PRESSURE: 130 MMHG | BODY MASS INDEX: 38.19 KG/M2 | DIASTOLIC BLOOD PRESSURE: 82 MMHG

## 2024-08-16 DIAGNOSIS — H61.21 IMPACTED CERUMEN OF RIGHT EAR: Primary | ICD-10-CM

## 2024-08-16 NOTE — PROGRESS NOTES
Mr. Gagnon is a pleasant 74-year-old  male that presents for a 2-week follow-up due to a persistent cerumen impaction to the right ear.  Patient unfortunately did not  his Debrox and has not been using any type of irrigation.  He continues to complain of muffled hearing to the right ear.      Physical examination with the microscope confirmed a cerumen impaction to the right ear that was somewhat firm to palpation.  I initially tried to remove this with alligator graspers and suction without any success.  I then irrigated the ear canal with peroxide and allow this to soak for a few minutes.  The peroxide was suctioned out and I was able to get the cerumen off the TM safely and extracted with alligator graspers and suction.  After disimpaction, the TM appeared to be normal.  I reexamined the left ear which demonstrated normal TM and canal.  Neck exam demonstrated no lymphadenopathy or thyromegaly.      Impression: Successful cerumen disimpaction with microscopy and instrumentation-right ear    Plan: Patient is follow-up in 6 months for cerumen check due to his history.  He was reminded to call if he has any questions or concerns.      Electronically signed by CAROLIN SHAFFER PA-C on 8/16/24 at 10:35 AM JANETHT

## 2024-08-29 ENCOUNTER — OFFICE VISIT (OUTPATIENT)
Age: 74
End: 2024-08-29
Payer: MEDICARE

## 2024-08-29 VITALS
HEIGHT: 72 IN | WEIGHT: 283 LBS | OXYGEN SATURATION: 96 % | SYSTOLIC BLOOD PRESSURE: 134 MMHG | BODY MASS INDEX: 38.33 KG/M2 | HEART RATE: 74 BPM | DIASTOLIC BLOOD PRESSURE: 80 MMHG

## 2024-08-29 DIAGNOSIS — R60.0 PERIPHERAL EDEMA: ICD-10-CM

## 2024-08-29 DIAGNOSIS — R26.81 UNSTEADY GAIT: ICD-10-CM

## 2024-08-29 DIAGNOSIS — B35.1 ONYCHOMYCOSIS: Primary | ICD-10-CM

## 2024-08-29 DIAGNOSIS — R25.1 TREMOR: ICD-10-CM

## 2024-08-29 DIAGNOSIS — M79.672 PAIN IN BOTH FEET: ICD-10-CM

## 2024-08-29 DIAGNOSIS — G62.89 OTHER POLYNEUROPATHY: ICD-10-CM

## 2024-08-29 DIAGNOSIS — M79.671 PAIN IN BOTH FEET: ICD-10-CM

## 2024-09-26 ENCOUNTER — OFFICE VISIT (OUTPATIENT)
Dept: UROLOGY | Facility: CLINIC | Age: 74
End: 2024-09-26
Payer: MEDICARE

## 2024-09-26 DIAGNOSIS — Z85.528 HISTORY OF RENAL CELL CANCER: Primary | ICD-10-CM

## 2024-09-26 LAB
BILIRUB BLD-MCNC: NEGATIVE MG/DL
CLARITY, POC: CLEAR
COLOR UR: YELLOW
GLUCOSE UR STRIP-MCNC: NEGATIVE MG/DL
KETONES UR QL: NEGATIVE
LEUKOCYTE EST, POC: NEGATIVE
NITRITE UR-MCNC: NEGATIVE MG/ML
PH UR: 6 [PH] (ref 5–8)
PROT UR STRIP-MCNC: NEGATIVE MG/DL
RBC # UR STRIP: NEGATIVE /UL
SP GR UR: 1.03 (ref 1–1.03)
UROBILINOGEN UR QL: NORMAL

## 2024-10-01 ENCOUNTER — OFFICE VISIT (OUTPATIENT)
Dept: FAMILY MEDICINE CLINIC | Facility: CLINIC | Age: 74
End: 2024-10-01
Payer: MEDICARE

## 2024-10-01 VITALS
DIASTOLIC BLOOD PRESSURE: 62 MMHG | HEIGHT: 72 IN | OXYGEN SATURATION: 98 % | WEIGHT: 284.6 LBS | HEART RATE: 67 BPM | SYSTOLIC BLOOD PRESSURE: 136 MMHG | BODY MASS INDEX: 38.55 KG/M2

## 2024-10-01 DIAGNOSIS — M25.562 PAIN IN BOTH KNEES, UNSPECIFIED CHRONICITY: ICD-10-CM

## 2024-10-01 DIAGNOSIS — E78.2 MIXED HYPERLIPIDEMIA: Primary | Chronic | ICD-10-CM

## 2024-10-01 DIAGNOSIS — D75.89 MACROCYTOSIS: ICD-10-CM

## 2024-10-01 DIAGNOSIS — E61.1 IRON DEFICIENCY: ICD-10-CM

## 2024-10-01 DIAGNOSIS — D64.9 ANEMIA, UNSPECIFIED TYPE: ICD-10-CM

## 2024-10-01 DIAGNOSIS — R73.03 PREDIABETES: ICD-10-CM

## 2024-10-01 DIAGNOSIS — I10 PRIMARY HYPERTENSION: Chronic | ICD-10-CM

## 2024-10-01 DIAGNOSIS — M25.561 PAIN IN BOTH KNEES, UNSPECIFIED CHRONICITY: ICD-10-CM

## 2024-10-01 PROBLEM — Z85.528 HISTORY OF RENAL CELL CANCER: Status: RESOLVED | Noted: 2017-02-16 | Resolved: 2024-10-01

## 2024-10-01 PROBLEM — R06.02 SOB (SHORTNESS OF BREATH): Status: RESOLVED | Noted: 2022-01-28 | Resolved: 2024-10-01

## 2024-10-01 PROBLEM — Z01.89 LABORATORY TEST: Status: RESOLVED | Noted: 2017-08-21 | Resolved: 2024-10-01

## 2024-10-01 PROBLEM — Z86.0101 HISTORY OF ADENOMATOUS POLYP OF COLON: Status: RESOLVED | Noted: 2021-04-09 | Resolved: 2024-10-01

## 2024-10-01 PROBLEM — E87.6 HYPOPOTASSEMIA: Chronic | Status: RESOLVED | Noted: 2017-02-16 | Resolved: 2024-10-01

## 2024-10-01 PROBLEM — E83.110 HEREDITARY HEMOCHROMATOSIS: Status: ACTIVE | Noted: 2024-10-01

## 2024-10-01 PROBLEM — G47.34 NOCTURNAL HYPOXIA: Status: ACTIVE | Noted: 2017-02-16

## 2024-10-01 PROBLEM — M79.606 PAIN IN LEG, UNSPECIFIED: Status: RESOLVED | Noted: 2018-08-27 | Resolved: 2024-10-01

## 2024-10-01 PROBLEM — J30.9 ALLERGIC RHINITIS: Chronic | Status: RESOLVED | Noted: 2017-02-16 | Resolved: 2024-10-01

## 2024-10-01 PROBLEM — Z80.0 FAMILY HISTORY OF COLON CANCER: Status: RESOLVED | Noted: 2021-04-09 | Resolved: 2024-10-01

## 2024-10-01 PROBLEM — F10.11 HISTORY OF ALCOHOL ABUSE: Status: RESOLVED | Noted: 2017-02-16 | Resolved: 2024-10-01

## 2024-10-01 PROBLEM — Z86.73 HISTORY OF TIA (TRANSIENT ISCHEMIC ATTACK): Status: RESOLVED | Noted: 2017-02-24 | Resolved: 2024-10-01

## 2024-10-01 PROBLEM — Z00.00 WELLNESS EXAMINATION: Status: RESOLVED | Noted: 2017-02-16 | Resolved: 2024-10-01

## 2024-10-01 PROCEDURE — 99214 OFFICE O/P EST MOD 30 MIN: CPT

## 2024-10-01 PROCEDURE — 3075F SYST BP GE 130 - 139MM HG: CPT

## 2024-10-01 PROCEDURE — 3078F DIAST BP <80 MM HG: CPT

## 2024-10-01 PROCEDURE — 1126F AMNT PAIN NOTED NONE PRSNT: CPT

## 2024-10-01 RX ORDER — SIMVASTATIN 40 MG
40 TABLET ORAL DAILY
Qty: 90 TABLET | Refills: 1 | Status: SHIPPED | OUTPATIENT
Start: 2024-10-01

## 2024-10-01 NOTE — PROGRESS NOTES
"Chief Complaint  Establish Care, Hypertension, and Knee Pain    Subjective      Felix Bartlett  presents to [facility]  HPI: Patient is a 74 y.o. male who is here today for Knee pain that has been going on for about a month. Reports that it has been bothering him when he gets up to start walking. No pain at rest. Reports that it gets a bit better once he actually starts moving.Reports that both knees affected but L>R. Reports that pain is on the medial aspect of his knees.     Objective   Vital Signs:  /62   Pulse 67   Ht 182.9 cm (72\")   Wt 129 kg (284 lb 9.6 oz)   SpO2 98%   BMI 38.60 kg/m²   Estimated body mass index is 38.6 kg/m² as calculated from the following:    Height as of this encounter: 182.9 cm (72\").    Weight as of this encounter: 129 kg (284 lb 9.6 oz).            Physical Exam   Result Review :  The following labs/imaging/notes were reviewed and discussed with the patient by Keyon Dockery MD on 10/01/2024:   Latest Reference Range & Units 06/24/24 10:18   Sodium 136 - 145 mmol/L 140   Potassium 3.5 - 5.2 mmol/L 4.2   Chloride 98 - 107 mmol/L 104   CO2 22.0 - 29.0 mmol/L 26.7   BUN 8 - 23 mg/dL 14   Creatinine 0.76 - 1.27 mg/dL 1.15   BUN/Creatinine Ratio 7.0 - 25.0  12.2   EGFR Result >60.0 mL/min/1.73 66.0   Glucose 65 - 99 mg/dL 75   Calcium 8.6 - 10.5 mg/dL 9.0   Alkaline Phosphatase 39 - 117 U/L 82   Total Protein 6.0 - 8.5 g/dL 5.9 (L)   Albumin 3.5 - 5.2 g/dL 3.9   A/G Ratio g/dL 2.0   AST (SGOT) 1 - 40 U/L 13   ALT (SGPT) 1 - 41 U/L 11   Total Bilirubin 0.0 - 1.2 mg/dL <0.2   (L): Data is abnormally low         Latest Reference Range & Units 06/24/24 10:18 07/30/24 08:32   Iron 38 - 169 ug/dL 110 70   Ferritin 30 - 400 ng/mL 427.00 (H) 359   Iron Saturation 15 - 55 % 34 30   TIBC 250 - 450 ug/dL 328 233 (L)   UIBC 111 - 343 ug/dL 218 163   (H): Data is abnormally high  (L): Data is abnormally low     Latest Reference Range & Units 03/11/24 09:07 " 06/11/24 08:20   Hemoglobin A1C 4.80 - 5.60 % 6.00 (H) 6.10 (H)   (H): Data is abnormally high     Latest Reference Range & Units 03/11/24 09:07   Total Cholesterol 0 - 200 mg/dL 222 (H)   HDL Cholesterol 40 - 60 mg/dL 29 (L)   LDL Cholesterol  0 - 100 mg/dL 139 (H)   Triglycerides 0 - 150 mg/dL 297 (H)   VLDL Cholesterol Chava 5 - 40 mg/dL 54 (H)   (H): Data is abnormally high  (L): Data is abnormally low     Latest Reference Range & Units 06/24/24 10:18 07/30/24 08:32   WBC 3.4 - 10.8 x10E3/uL 12.32 (H) 11.1 (H)   RBC 4.14 - 5.80 x10E6/uL 2.79 (L) 2.75 (L)   Hemoglobin 13.0 - 17.7 g/dL 9.3 (L) 9.4 (L)   Hematocrit 37.5 - 51.0 % 28.8 (L) 27.7 (L)   Platelets 150 - 450 x10E3/uL 223 241   RDW 11.6 - 15.4 % 12.2 (L) 12.5   MCV 79 - 97 fL 103.2 (H) 101 (H)   MCH 26.6 - 33.0 pg 33.3 (H) 34.2 (H)   MCHC 31.5 - 35.7 g/dL 32.3 33.9   (H): Data is abnormally high  (L): Data is abnormally low   Latest Reference Range & Units 06/24/24 10:18 07/30/24 08:32   Neutrophil Rel % Not Estab. % 49.2 56   Lymphocyte Rel % Not Estab. % 42.0 34   Monocyte Rel % Not Estab. % 6.1 7   Eosinophil Rel % Not Estab. % 2.1 2   Basophil Rel % Not Estab. % 0.4 1   Immature Granulocyte Rel % Not Estab. % 0.2 0   Reticulocyte Absolute 0.70 - 1.90 % 2.07 (H)    Neutrophils Absolute 1.4 - 7.0 x10E3/uL 6.06 6.2   Lymphocytes Absolute 0.7 - 3.1 x10E3/uL 5.18 (H) 3.8 (H)   Monocytes Absolute 0.1 - 0.9 x10E3/uL 0.75 0.8   Eosinophils Absolute 0.0 - 0.4 x10E3/uL 0.26 0.3   Basophils Absolute 0.0 - 0.2 x10E3/uL 0.05 0.1   Immature Grans, Absolute 0.0 - 0.1 x10E3/uL 0.02 0.0   (H): Data is abnormally high  Assessment         Mixed hyperlipidemia   Lipid abnormalities are worsening    Plan:  Take simvastatin 40 mg daily.      Discussed medication dosage, use, side effects, and goals of treatment in detail.    Counseled patient on lifestyle modifications to help control hyperlipidemia.     Patient Treatment Goals:   LDL goal is under 100    Followup in 6  months.  Primary hypertension  Hypertension is stable and controlled  Continue current treatment regimen.  Blood pressure will be reassessed in 6 months.  Macrocytosis  CBC B12 folate ordered.  Anemia, unspecified type  CBC ordered for further evaluation.  Iron deficiency  Continue iron supplementation.  Prediabetes  A1c in prediabetic range.  Lifestyle modification discussed with patient.  Pain in both knees, unspecified chronicity  Will obtain x-rays of both knees.  Physical therapy recommended and agreed to palpation.    Orders Placed This Encounter   Procedures    XR Knee 3 View Left     Standing Status:   Future     Standing Expiration Date:   10/1/2025     Order Specific Question:   Reason for Exam:     Answer:   knee pain     Order Specific Question:   Release to patient     Answer:   Routine Release [[id number]]    XR Knee 3 View Right     Standing Status:   Future     Standing Expiration Date:   10/1/2025     Order Specific Question:   Reason for Exam:     Answer:   Knee pain     Order Specific Question:   Release to patient     Answer:   Routine Release [[id number]]    TSH Rfx On Abnormal To Free T4     Order Specific Question:   Release to patient     Answer:   Routine Release [[id number]]    Vitamin B12 & Folate     Order Specific Question:   Release to patient     Answer:   Routine Release [[id number]]    Ambulatory Referral to Physical Therapy for Evaluation & Treatment     Referral Priority:   Routine     Referral Type:   Physical Therapy     Referral Reason:   Specialty Services Required     Requested Specialty:   Physical Therapy     Number of Visits Requested:   1    CBC & Differential     Order Specific Question:   Manual Differential     Answer:   No     Order Specific Question:   Release to patient     Answer:   Routine Release [[id number]]     New Medications Ordered This Visit   Medications    simvastatin (ZOCOR) 40 MG tablet     Sig: Take 1 tablet by mouth Daily.     Dispense:  90 tablet      Refill:  1          Follow Up   Return in about 2 months (around 12/1/2024) for Knee pain w/ PT, HLD w/ med increase, Hemachromatosis/anemia/leukocytosis with lab review. .  Patient was given instructions and counseling regarding his condition or for health maintenance advice. Please see specific information pulled into the AVS if appropriate.

## 2024-10-01 NOTE — ASSESSMENT & PLAN NOTE
Lipid abnormalities are worsening    Plan:  Take simvastatin 40 mg daily.      Discussed medication dosage, use, side effects, and goals of treatment in detail.    Counseled patient on lifestyle modifications to help control hyperlipidemia.     Patient Treatment Goals:   LDL goal is under 100    Followup in 6 months.

## 2024-11-22 ENCOUNTER — HOSPITAL ENCOUNTER (OUTPATIENT)
Dept: CT IMAGING | Facility: HOSPITAL | Age: 74
Discharge: HOME | End: 2024-11-22
Payer: MEDICARE

## 2024-11-22 DIAGNOSIS — Z85.528 HISTORY OF RENAL CELL CANCER: ICD-10-CM

## 2024-11-22 PROCEDURE — 74160 CT ABDOMEN W/CONTRAST: CPT

## 2024-11-22 PROCEDURE — 25510000001 IOPAMIDOL 61 % SOLUTION: Performed by: UROLOGY

## 2024-11-22 RX ORDER — IOPAMIDOL 612 MG/ML
100 INJECTION, SOLUTION INTRAVASCULAR
Status: COMPLETED | OUTPATIENT
Start: 2024-11-22 | End: 2024-11-22

## 2024-11-22 RX ADMIN — IOPAMIDOL 100 ML: 612 INJECTION, SOLUTION INTRAVENOUS at 10:47

## 2024-11-27 NOTE — PROGRESS NOTES
[facility] - PODIATRY    Today's Date: 12/04/2024     Patient Name: Felix Bartlett Sr.  MRN: [id number]  CSN: [id number]  PCP: Keyon Dockery MD  Referring Provider: No ref. provider found    SUBJECTIVE     Chief Complaint   Patient presents with    Follow-up     Bassam Rosario MD 04/01/2024 Return in about 3 months- pt states feet doing ok, occ pain in left great nail area- pt denies pain at present      HPI: Felix Bartlett Sr., a 74 y.o.male, comes to clinic as a(n) established patient complaining of thickened, dystrophic, irregular toenails of both feet and numbness in both feet . Patient has h/o GERD, arthritis, COPD, asthma, HTN, Renal CA .  Reports thickened, irregular toenails of both feet. Relays left greater toenail is occasionally painful especially when wearing socks. Denies drainage.  Complains of occasional tenderness in rest of toes due to nail thickness/length but no pain currently due to neuropathy Utilizing cane for ambulation assistance.  Has difficulty and no longer feels comfortable providing care to himself due to tremors.  Reports mild edema but does not wear compression stockings.  Denies open wounds or sores today.  Relates previous treatment(s) including care by podiatry . No other pedal complaints at this time.    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  6.30.14/7.1.14 CBC With Differential/Platelet; Sedimentation Rate-Westergren; C-Reactive Protein, Quant==WBC 12.0; RBC 3.60; Hemoglobin 11.7; Hematocrit 34.4; Neutrophils (Absolute) 7.1; Lymphs (Absolute) 3.6  nothing major here..his problems are alcohol, smoking now o    HL (hearing loss)     Hyperlipidemia     Hypertension     Hypopotassemia-diuretic 02/16/2017    Oxygen dependent     at hs    Peripheral neuropathy     Renal cancer 08/2014    Lt RALPart'l Nx; t1a Clear Cell with negative margin    RUQ pain 07/23/2013    S/P laparoscopic cholecystectomy 09/04/2012    Wellness examination-done 02/16/2017    PROCEDURES:  Prostate exam/Rachel/confirmed/1.27.16     Colonoscopy-nl/Gil//7-28-10/5y  EGD/P/Gil/3.4.16  Colonoscopy-polyp/Gil//3.18.16/5 yr  EGD-neg/Gil//5.1.18     SURGERIES:  T&A  Appendix/1959  Scrotum-cyst/1980's  Lap gb+liver bx/Raya/[facility]/7.23.12  L robotic partial nephrectomy (clear cell grade 1 [id number])/Rachel//8.14.14  Slrscs-kdmvzmvs-gicph+mesh+omen/[facility]//5.9.     Past Surgical History:   Procedure Laterality Date    APPENDECTOMY      CHOLECYSTECTOMY      ENDOSCOPY N/A 05/01/2018    Procedure: ESOPHAGOGASTRODUODENOSCOPY WITH ANESTHESIA;  Surgeon: Donnell Cordero DO;  Location: [facility] ENDOSCOPY;  Service: Gastroenterology    HERNIA REPAIR      KIDNEY SURGERY      LAPAROSCOPIC PARTIAL NEPHRECTOMY Left 08/14/2014    Robot Assisted     Family History   Problem Relation Age of Onset    Colon cancer Father     Heart disease Mother     Colon polyps Neg Hx     Esophageal cancer Neg Hx      Social History     Socioeconomic History    Marital status:      Spouse name: Melanie     Number of children: 1    Years of education: 12   Tobacco Use    Smoking status: Every Day     Current packs/day: 0.50     Average packs/day: 0.5 packs/day for 58.6 years (29.3 ttl pk-yrs)     Types: Cigarettes     Start date: 4/30/1966     Passive exposure: Never    Smokeless tobacco: Never   Vaping Use    Vaping status: Never Used   Substance and Sexual Activity    Alcohol use: No    Drug use: No    Sexual activity: Never     No Known Allergies  Current Outpatient Medications   Medication Sig Dispense Refill    acetaminophen (TYLENOL) 650 MG 8 hr tablet Take 1 tablet by mouth 2 (Two) Times a Day.      calcium-vitamin D (OSCAL-500) 500-200 MG-UNIT per tablet Take 1 tablet by mouth 2 times daily.        cholecalciferol (VITAMIN D3) 10 MCG (400 UNIT) tablet Take 1 tablet by mouth.      diphenhydrAMINE (BENADRYL) 25 mg capsule Take 1 capsule by mouth 2 (Two) Times a Day.      escitalopram (LEXAPRO) 20 MG tablet Take 1 tablet by mouth Daily. 90 tablet 3    ferrous sulfate 325 (65 FE) MG tablet Take 1 tablet by mouth Daily With Breakfast.      fluticasone (FLONASE) 50 MCG/ACT nasal spray Administer 2 sprays into the nostril(s) as directed by provider Daily.      gabapentin (NEURONTIN) 300 MG capsule Take 1 capsule by mouth 2 (Two) Times a Day. 60 capsule 3    KETOTIFEN FUMARATE OP Apply 1 drop to eye(s) as directed by provider As Needed.      losartan (COZAAR) 50 MG tablet Take 1 tablet by mouth Daily. 90 tablet 3    metoprolol tartrate (LOPRESSOR) 50 MG tablet Take 1 tablet by mouth Every 12 (Twelve) Hours. 180 tablet 3    Multiple Vitamins-Minerals (CENTRUM SILVER 50+MEN) tablet Take 1 tablet by mouth Daily.      O2 (OXYGEN) Inhale 1 (One) Time.      omeprazole (priLOSEC) 20 MG capsule 1 capsule 2 (Two) Times a Day.      potassium chloride (Klor-Con M10) 10 MEQ CR tablet Take 1 tablet by mouth 2 (Two) Times a Day. 180 tablet 3    simvastatin (ZOCOR) 40 MG tablet Take 1 tablet by mouth Daily. 90 tablet 1    tamsulosin  (FLOMAX) 0.4 MG capsule 24 hr capsule Take 1 capsule by mouth Daily. 90 capsule 3    clotrimazole-betamethasone (LOTRISONE) 1-0.05 % cream Apply 1 Application topically to the appropriate area as directed 2 (Two) Times a Day. Apply to affected area twice daily 45 g 1    primidone (MYSOLINE) 50 MG tablet Take 1 tablet by mouth 3 (Three) Times a Day for 120 days. . 90 tablet 3     No current facility-administered medications for this visit.     Review of Systems   Constitutional:  Negative for chills and fever.   HENT:  Negative for congestion.    Respiratory:  Negative for shortness of breath.    Cardiovascular:  Positive for leg swelling. Negative for chest pain.   Gastrointestinal:  Negative for constipation, diarrhea, nausea and vomiting.   Musculoskeletal:  Positive for arthralgias and back pain. Negative for myalgias.   Skin:  Negative for wound.        thickened, elongated, irregular toenails   Neurological:  Positive for tremors and numbness.   Psychiatric/Behavioral:  Negative for agitation and behavioral problems.        OBJECTIVE     Vitals:    24 1048   BP: 142/66   Pulse: 51   SpO2: 95%                 PHYSICAL EXAM  GEN:   Accompanied by none.     Foot/Ankle Exam    GENERAL  Appearance:  appears stated age and obese  Orientation:  AAOx3  Affect:  appropriate  Gait:  involuntary movements (unsteady)  Assistance:  cane use  Right shoe gear: casual shoe  Left shoe gear: casual shoe    VASCULAR     Right Foot Vascularity   Dorsalis pedis:  1+  Posterior tibial:  1+  Skin temperature:  warm  Edema gradin+ and pitting  CFT:  3  Pedal hair growth:  Present  Varicosities:  moderate varicosities     Left Foot Vascularity   Dorsalis pedis:  1+  Posterior tibial:  1+  Skin temperature:  warm  Edema gradin+ and pitting  CFT:  3  Pedal hair growth:  Present  Varicosities:  moderate varicosities     NEUROLOGIC     Right Foot Neurologic   Light touch sensation: diminished  Vibratory sensation:  diminished  Hot/Cold sensation: diminished  Protective Sensation using [place] Monofilament:   Sites intact: 1  Sites tested: 10     Left Foot Neurologic   Light touch sensation: diminished  Vibratory sensation: diminished  Hot/Cold sensation:  diminished  Protective Sensation using [place] Monofilament:   Sites intact: 4  Sites tested: 10    MUSCULOSKELETAL     Right Foot Musculoskeletal   Ecchymosis:  none  Tenderness:  toenail problem    Arch:  Normal  Hallux valgus: No       Left Foot Musculoskeletal   Ecchymosis:  none  Tenderness:  toenail problem  Arch:  Normal  Hallux valgus: No      MUSCLE STRENGTH     Right Foot Muscle Strength   Foot dorsiflexion:  4+  Foot plantar flexion:  4+  Foot inversion:  4+  Foot eversion:  4+     Left Foot Muscle Strength   Foot dorsiflexion:  4+  Foot plantar flexion:  4+  Foot inversion:  4+  Foot eversion:  4+    RANGE OF MOTION     Right Foot Range of Motion   Foot and ankle ROM within normal limits       Left Foot Range of Motion   Foot and ankle ROM within normal limits      DERMATOLOGIC      Right Foot Dermatologic   Skin  Positive for dryness and tinea.   Nails  1.  Positive for elongated, onychomycosis, abnormal thickness, subungual debris and dystrophic nail.  2.  Positive for elongated, onychomycosis, abnormal thickness and subungual debris.  3.  Positive for elongated, onychomycosis, abnormal thickness and subungual debris.  4.  Positive for elongated, onychomycosis, abnormal thickness and subungual debris.  5.  Positive for elongated, onychomycosis, abnormal thickness and subungual debris.  Nails comment:  Gryphotic 1-5     Left Foot Dermatologic   Skin  Positive for dryness and tinea.   Nails comment:  Gryphotic 1-5  Nails  1.  Positive for elongated, onychomycosis, abnormal thickness, subungual debris and dystrophic nail.  2.  Positive for elongated, onychomycosis, abnormal thickness and subungual debris.  3.  Positive for elongated,  onychomycosis, abnormal thickness and subungual debris.  4.  Positive for elongated, onychomycosis, abnormally thick and subungual debris.  5.  Positive for elongated, onychomycosis, abnormally thick and subungual debris.     Right foot additional comments: Tinea pedis to plantar foot     Left foot additional comments: Tinea pedis to plantar foot      RADIOLOGY/NUCLEAR:  CT Abdomen With Contrast    Result Date: 11/22/2024  Narrative: CT ABDOMEN W CONTRAST- 11/22/2024 9:32 AM  HISTORY: hx renal cell carcinoma   COMPARISON: 4/15/2019.  DLP: 2114.95 mGy.cm. All CT scans are performed using dose optimization techniques as appropriate to the performed exam and including at least one of the following: Automated exposure control, adjustment of the mA and/or kV according to size, and the use of the iterative reconstruction technique.  TECHNIQUE: Following the intravenous administration of contrast, helical CT tomographic images of the abdomen were acquired. Coronal reformatted images were also provided for review. Arterial and venous phase imaging is obtained through both kidneys.  FINDINGS: The lung bases are clear. The base of the heart is unremarkable. There is a small hiatal hernia present..  LIVER: Multiple hepatic granulomas are present. No discrete hepatic mass. No free fluid in the perihepatic space..  BILIARY SYSTEM: The gallbladder is surgically absent. There is no biliary dilatation..  PANCREAS: In the region of the tail of the pancreas there is a solid-appearing nodule measuring 2.3 cm in size. This is unchanged in size or appearance from the previous exam of 4/15/2019 suggesting benignity. This could represent a splenule and is isodense with the adjacent spleen. No pancreatic ductal dilatation..  SPLEEN: Multiple splenic granulomas are present. No splenic enlargement..  KIDNEYS AND ADRENALS: The right adrenal is unremarkable. The left adrenal is enlarged with an associated nodule measuring 2.1 x 1.9 cm in size  stable from the previous exam suggesting benignity. I would favor an adenoma.  There is a focus of fatty change involving the lateral lower pole of the left kidney which is felt to be related to previous partial nephrectomy. There is adjacent mild fat necrosis. The finding is stable from the prior exam. No additional left-sided renal lesion identified.  Examination of the right kidney demonstrates an exophytic 2.9 cm lesion involving the lateral interpolar aspect of the kidney. This measures negative Hounsfield units postcontrast consistent with a benign cortical cyst. No additional right renal lesion present. No perinephric fluid collection or nephrolithiasis demonstrated. The visualized ureters are decompressed and normal in appearance..  RETROPERITONEUM: There is atheromatous calcification associated with the visualized abdominal aorta. No aneurysm. No retroperitoneal hematoma or adenopathy..  GI TRACT: The visualized GI tract is normal in appearance. No gastric wall thickening or gastric outlet obstruction.. The appendix is not imaged on today's exam..  OTHER: There is no mesenteric mass, lymphadenopathy or fluid collection. The abdominopelvic vasculature is patent. The osseous structures and soft tissues demonstrate no worrisome lesions. Previous periumbilical hernia repair.       Impression: 1. Benign cortical cyst of the right kidney. Previous partial nephrectomy on the left with fatty density at the nephrectomy site and mild fat necrosis. Overall stable appearance from the previous exam. No suspicious renal lesions or nephrolithiasis. 2. Stable left adrenal nodule likely represents an adenoma. 3. Atheromatous calcification of the abdominal aorta. No evidence of aneurysm or dissection. 4. Solid-appearing nodule at the level of the tail of the pancreas stable from the previous exam of 5 years earlier. I suspect this may represent a splenule. Splenic and hepatic granulomas are present. 5. Small hiatal hernia..     This report was signed and finalized on 11/22/2024 3:07 PM by Dr. Sandor Carranza MD.       LABORATORY/CULTURE RESULTS:      PATHOLOGY RESULTS:       ASSESSMENT/PLAN     Diagnoses and all orders for this visit:    1. Onychomycosis (Primary)    2. Other polyneuropathy    3. Peripheral edema    4. Tinea pedis of both feet  -     clotrimazole-betamethasone (LOTRISONE) 1-0.05 % cream; Apply 1 Application topically to the appropriate area as directed 2 (Two) Times a Day. Apply to affected area twice daily  Dispense: 45 g; Refill: 1    5. Pain in both feet    6. Tremor    7. Gait abnormality        Comprehensive lower extremity examination and evaluation was performed.  Discussed findings and treatment plan including risks, benefits, and treatment options with patient in detail. Patient agreed with treatment plan.  After verbal consent obtained, nail(s) x10 debrided of length and thickness with nail nipper without incidence  Patient may maintain nails and calluses at home utilizing emery board or pumice stone between visits as needed   For tinea pedis-ask for Lotrisone placed today.  Instructed to apply twice daily for next 3 to 4 weeks.  Instructed patient to keep feet as dry as possible, wear moisture wicking socks, and to allow shoes to dry between wear become damp or wet.  Also recommend antiperspirant spray.  Continue use of cane for ambulation assistance.   Continue to recommend use of compression stockings and keeping feet elevated while he is at rest.  Patient to return in 3 months for routine foot nail care services.  Encourage to call sooner with questions or concerns.      An After Visit Summary was printed and given to the patient at discharge, including (if requested) any available informative/educational handouts regarding diagnosis, treatment, or medications. All questions were answered to patient/family satisfaction. Should symptoms fail to improve or worsen they agree to call or return to clinic or to  go to the Emergency Department. Discussed the importance of following up with any needed screening tests/labs/specialist appointments and any requested follow-up recommended by me today. Importance of maintaining follow-up discussed and patient accepts that missed appointments can delay diagnosis and potentially lead to worsening of conditions.  Return in about 3 months (around 3/4/2025) for Follow-up with APRN, Follow-up in [facility] or sooner if acute issues arise.  Advance Care Planning   ACP discussion was declined by the patient. Patient does not have an advance directive, declines further assistance.         This document has been electronically signed by RUBIO Merrill on December 4, 2024 12:13 CST

## 2024-12-03 ENCOUNTER — TELEPHONE (OUTPATIENT)
Dept: FAMILY MEDICINE CLINIC | Facility: CLINIC | Age: 74
End: 2024-12-03

## 2024-12-03 NOTE — TELEPHONE ENCOUNTER
Caller: KAYLAH    Relationship to patient:     Best call back number: [phone]       Patient is needing: KAYLAH NEEDS A VERBAL ORDER FOR PATIENT'S OXYGEN ORDER. KAYLAH ALREADY FAXED A FORM, IT NEEDS SIGNED AND SENT BACK FAX [phone]

## 2024-12-04 ENCOUNTER — OFFICE VISIT (OUTPATIENT)
Age: 74
End: 2024-12-04
Payer: MEDICARE

## 2024-12-04 ENCOUNTER — TELEPHONE (OUTPATIENT)
Dept: UROLOGY | Facility: CLINIC | Age: 74
End: 2024-12-04
Payer: MEDICARE

## 2024-12-04 VITALS
WEIGHT: 284 LBS | HEART RATE: 51 BPM | BODY MASS INDEX: 38.47 KG/M2 | DIASTOLIC BLOOD PRESSURE: 66 MMHG | OXYGEN SATURATION: 95 % | HEIGHT: 72 IN | SYSTOLIC BLOOD PRESSURE: 142 MMHG

## 2024-12-04 DIAGNOSIS — B35.1 ONYCHOMYCOSIS: Primary | ICD-10-CM

## 2024-12-04 DIAGNOSIS — B35.3 TINEA PEDIS OF BOTH FEET: ICD-10-CM

## 2024-12-04 DIAGNOSIS — R25.1 TREMOR: ICD-10-CM

## 2024-12-04 DIAGNOSIS — G62.89 OTHER POLYNEUROPATHY: ICD-10-CM

## 2024-12-04 DIAGNOSIS — M79.672 PAIN IN BOTH FEET: ICD-10-CM

## 2024-12-04 DIAGNOSIS — R60.0 PERIPHERAL EDEMA: ICD-10-CM

## 2024-12-04 DIAGNOSIS — R26.9 GAIT ABNORMALITY: ICD-10-CM

## 2024-12-04 DIAGNOSIS — M79.671 PAIN IN BOTH FEET: ICD-10-CM

## 2024-12-04 RX ORDER — CLOTRIMAZOLE AND BETAMETHASONE DIPROPIONATE 10; .64 MG/G; MG/G
1 CREAM TOPICAL 2 TIMES DAILY
Qty: 45 G | Refills: 1 | Status: SHIPPED | OUTPATIENT
Start: 2024-12-04

## 2024-12-04 NOTE — TELEPHONE ENCOUNTER
Attempted to contact pt. Regarding CT results. No answer. Left voicemail for pt. To call us back.

## 2024-12-04 NOTE — TELEPHONE ENCOUNTER
Patient stopped in to check on the results of his CT scan that Dr. Ramos ordered. I told him I would get a message back and we would let him know.

## 2024-12-05 NOTE — TELEPHONE ENCOUNTER
Contacted pt. Again, spoke to pt. Results of CT given to pt. , he v/u. Pt. Advised per last office note if his scan showed no evidence of a mass he would not need further f/u. Pt. V/u.

## 2024-12-09 DIAGNOSIS — R25.1 TREMOR: ICD-10-CM

## 2024-12-10 RX ORDER — GABAPENTIN 300 MG/1
300 CAPSULE ORAL 2 TIMES DAILY
Qty: 60 CAPSULE | Refills: 3 | Status: SHIPPED | OUTPATIENT
Start: 2024-12-10

## 2025-01-01 ENCOUNTER — APPOINTMENT (OUTPATIENT)
Dept: CT IMAGING | Facility: HOSPITAL | Age: 75
End: 2025-01-01
Payer: OTHER GOVERNMENT

## 2025-01-01 ENCOUNTER — APPOINTMENT (OUTPATIENT)
Dept: CARDIOLOGY | Facility: HOSPITAL | Age: 75
End: 2025-01-01
Payer: OTHER GOVERNMENT

## 2025-01-01 ENCOUNTER — APPOINTMENT (OUTPATIENT)
Dept: GENERAL RADIOLOGY | Facility: HOSPITAL | Age: 75
End: 2025-01-01
Payer: OTHER GOVERNMENT

## 2025-01-01 ENCOUNTER — APPOINTMENT (OUTPATIENT)
Dept: ULTRASOUND IMAGING | Facility: HOSPITAL | Age: 75
End: 2025-01-01
Payer: OTHER GOVERNMENT

## 2025-01-01 ENCOUNTER — HOSPITAL ENCOUNTER (INPATIENT)
Facility: HOSPITAL | Age: 75
LOS: 7 days | End: 2025-06-24
Attending: EMERGENCY MEDICINE | Admitting: INTERNAL MEDICINE
Payer: OTHER GOVERNMENT

## 2025-01-01 VITALS
HEIGHT: 72 IN | TEMPERATURE: 99 F | SYSTOLIC BLOOD PRESSURE: 133 MMHG | DIASTOLIC BLOOD PRESSURE: 46 MMHG | WEIGHT: 288.14 LBS | OXYGEN SATURATION: 93 % | RESPIRATION RATE: 30 BRPM | BODY MASS INDEX: 39.03 KG/M2

## 2025-01-01 DIAGNOSIS — R19.7 DIARRHEA, UNSPECIFIED TYPE: ICD-10-CM

## 2025-01-01 DIAGNOSIS — R50.9 FUO (FEVER OF UNKNOWN ORIGIN): ICD-10-CM

## 2025-01-01 DIAGNOSIS — D69.6 THROMBOCYTOPENIA: ICD-10-CM

## 2025-01-01 DIAGNOSIS — R53.1 GENERALIZED WEAKNESS: ICD-10-CM

## 2025-01-01 DIAGNOSIS — N17.9 AKI (ACUTE KIDNEY INJURY): Primary | ICD-10-CM

## 2025-01-01 DIAGNOSIS — D64.9 ANEMIA, UNSPECIFIED TYPE: ICD-10-CM

## 2025-01-01 DIAGNOSIS — Z74.09 IMPAIRED MOBILITY: ICD-10-CM

## 2025-01-01 DIAGNOSIS — I46.9 CARDIOPULMONARY ARREST: ICD-10-CM

## 2025-01-01 DIAGNOSIS — R10.0 ACUTE ABDOMEN: ICD-10-CM

## 2025-01-01 DIAGNOSIS — E86.0 DEHYDRATION: ICD-10-CM

## 2025-01-01 DIAGNOSIS — R13.10 DYSPHAGIA, UNSPECIFIED TYPE: ICD-10-CM

## 2025-01-01 LAB
ABO GROUP BLD: NORMAL
ADV 40+41 DNA STL QL NAA+NON-PROBE: NOT DETECTED
ALBUMIN SERPL-MCNC: 2.4 G/DL (ref 3.5–5.2)
ALBUMIN SERPL-MCNC: 2.4 G/DL (ref 3.5–5.2)
ALBUMIN SERPL-MCNC: 2.5 G/DL (ref 3.5–5.2)
ALBUMIN SERPL-MCNC: 2.5 G/DL (ref 3.5–5.2)
ALBUMIN SERPL-MCNC: 2.6 G/DL (ref 3.5–5.2)
ALBUMIN SERPL-MCNC: 2.6 G/DL (ref 3.5–5.2)
ALBUMIN SERPL-MCNC: 3.2 G/DL (ref 3.5–5.2)
ALBUMIN SERPL-MCNC: 3.3 G/DL (ref 3.5–5.2)
ALBUMIN SERPL-MCNC: 3.4 G/DL (ref 3.5–5.2)
ALBUMIN SERPL-MCNC: 3.8 G/DL (ref 3.5–5.2)
ALBUMIN/GLOB SERPL: 1.1 G/DL
ALBUMIN/GLOB SERPL: 1.1 G/DL
ALBUMIN/GLOB SERPL: 1.2 G/DL
ALBUMIN/GLOB SERPL: 1.3 G/DL
ALBUMIN/GLOB SERPL: 1.4 G/DL
ALP SERPL-CCNC: 113 U/L (ref 39–117)
ALP SERPL-CCNC: 46 U/L (ref 39–117)
ALP SERPL-CCNC: 46 U/L (ref 39–117)
ALP SERPL-CCNC: 54 U/L (ref 39–117)
ALP SERPL-CCNC: 67 U/L (ref 39–117)
ALP SERPL-CCNC: 68 U/L (ref 39–117)
ALP SERPL-CCNC: 79 U/L (ref 39–117)
ALP SERPL-CCNC: 80 U/L (ref 39–117)
ALP SERPL-CCNC: 81 U/L (ref 39–117)
ALP SERPL-CCNC: 86 U/L (ref 39–117)
ALP SERPL-CCNC: 90 U/L (ref 39–117)
ALP SERPL-CCNC: 95 U/L (ref 39–117)
ALT SERPL W P-5'-P-CCNC: 105 U/L (ref 1–41)
ALT SERPL W P-5'-P-CCNC: 233 U/L (ref 1–41)
ALT SERPL W P-5'-P-CCNC: 379 U/L (ref 1–41)
ALT SERPL W P-5'-P-CCNC: 521 U/L (ref 1–41)
ALT SERPL W P-5'-P-CCNC: 539 U/L (ref 1–41)
ALT SERPL W P-5'-P-CCNC: 549 U/L (ref 1–41)
ALT SERPL W P-5'-P-CCNC: 81 U/L (ref 1–41)
ALT SERPL W P-5'-P-CCNC: 82 U/L (ref 1–41)
ALT SERPL W P-5'-P-CCNC: 84 U/L (ref 1–41)
ALT SERPL W P-5'-P-CCNC: 85 U/L (ref 1–41)
ALT SERPL W P-5'-P-CCNC: 90 U/L (ref 1–41)
ALT SERPL W P-5'-P-CCNC: 96 U/L (ref 1–41)
AMORPH URATE CRY URNS QL MICRO: ABNORMAL /HPF
ANION GAP SERPL CALCULATED.3IONS-SCNC: 13 MMOL/L (ref 5–15)
ANION GAP SERPL CALCULATED.3IONS-SCNC: 14 MMOL/L (ref 5–15)
ANION GAP SERPL CALCULATED.3IONS-SCNC: 15 MMOL/L (ref 5–15)
ANION GAP SERPL CALCULATED.3IONS-SCNC: 15 MMOL/L (ref 5–15)
ANION GAP SERPL CALCULATED.3IONS-SCNC: 16 MMOL/L (ref 5–15)
ANION GAP SERPL CALCULATED.3IONS-SCNC: 16 MMOL/L (ref 5–15)
ANION GAP SERPL CALCULATED.3IONS-SCNC: 18 MMOL/L (ref 5–15)
ANION GAP SERPL CALCULATED.3IONS-SCNC: 20 MMOL/L (ref 5–15)
ANION GAP SERPL CALCULATED.3IONS-SCNC: 20 MMOL/L (ref 5–15)
ANION GAP SERPL CALCULATED.3IONS-SCNC: 21 MMOL/L (ref 5–15)
ANION GAP SERPL CALCULATED.3IONS-SCNC: 23 MMOL/L (ref 5–15)
ANION GAP SERPL CALCULATED.3IONS-SCNC: 24 MMOL/L (ref 5–15)
ANISOCYTOSIS BLD QL: ABNORMAL
AORTIC DIMENSIONLESS INDEX: 0.7 (DI)
APTT PPP: 35.8 SECONDS (ref 24.5–36)
APTT PPP: 36.3 SECONDS (ref 24.5–36)
ARTERIAL PATENCY WRIST A: ABNORMAL
AST SERPL-CCNC: 109 U/L (ref 1–40)
AST SERPL-CCNC: 1099 U/L (ref 1–40)
AST SERPL-CCNC: 1121 U/L (ref 1–40)
AST SERPL-CCNC: 118 U/L (ref 1–40)
AST SERPL-CCNC: 140 U/L (ref 1–40)
AST SERPL-CCNC: 467 U/L (ref 1–40)
AST SERPL-CCNC: 58 U/L (ref 1–40)
AST SERPL-CCNC: 73 U/L (ref 1–40)
AST SERPL-CCNC: 792 U/L (ref 1–40)
AST SERPL-CCNC: 819 U/L (ref 1–40)
AST SERPL-CCNC: 87 U/L (ref 1–40)
AST SERPL-CCNC: 87 U/L (ref 1–40)
ASTRO TYP 1-8 RNA STL QL NAA+NON-PROBE: NOT DETECTED
ATMOSPHERIC PRESS: 754 MMHG
ATMOSPHERIC PRESS: 755 MMHG
ATMOSPHERIC PRESS: 756 MMHG
ATMOSPHERIC PRESS: 757 MMHG
ATMOSPHERIC PRESS: 757 MMHG
ATMOSPHERIC PRESS: 758 MMHG
AV MEAN PRESS GRAD SYS DOP V1V2: 6.5 MMHG
AV VMAX SYS DOP: 178 CM/SEC
B PARAPERT DNA SPEC QL NAA+PROBE: NOT DETECTED
B PERT DNA SPEC QL NAA+PROBE: NOT DETECTED
BACTERIA UR QL AUTO: ABNORMAL /HPF
BACTERIA UR QL AUTO: ABNORMAL /HPF
BASE EXCESS BLDA CALC-SCNC: -11.5 MMOL/L (ref 0–2)
BASE EXCESS BLDA CALC-SCNC: -14.6 MMOL/L (ref 0–2)
BASE EXCESS BLDA CALC-SCNC: -2.6 MMOL/L (ref 0–2)
BASE EXCESS BLDA CALC-SCNC: -5 MMOL/L (ref 0–2)
BASE EXCESS BLDA CALC-SCNC: -5 MMOL/L (ref 0–2)
BASE EXCESS BLDA CALC-SCNC: -6.6 MMOL/L (ref 0–2)
BASE EXCESS BLDA CALC-SCNC: -6.9 MMOL/L (ref 0–2)
BASE EXCESS BLDA CALC-SCNC: -7.2 MMOL/L (ref 0–2)
BASE EXCESS BLDA CALC-SCNC: -7.8 MMOL/L (ref 0–2)
BASOPHILS # BLD MANUAL: 0 10*3/MM3 (ref 0–0.2)
BASOPHILS NFR BLD MANUAL: 0 % (ref 0–1.5)
BDY SITE: ABNORMAL
BH CV ECHO MEAS - AO MAX PG: 12.7 MMHG
BH CV ECHO MEAS - AO V2 VTI: 21.9 CM
BH CV ECHO MEAS - AVA(I,D): 2.9 CM2
BH CV ECHO MEAS - EDV(CUBED): 153.1 ML
BH CV ECHO MEAS - EDV(MOD-SP2): 93.6 ML
BH CV ECHO MEAS - EDV(MOD-SP4): 126 ML
BH CV ECHO MEAS - EF(MOD-SP2): 51.2 %
BH CV ECHO MEAS - EF(MOD-SP4): 56.5 %
BH CV ECHO MEAS - ESV(CUBED): 91.7 ML
BH CV ECHO MEAS - ESV(MOD-SP2): 45.7 ML
BH CV ECHO MEAS - ESV(MOD-SP4): 54.8 ML
BH CV ECHO MEAS - FS: 15.7 %
BH CV ECHO MEAS - IVS/LVPW: 1.07 CM
BH CV ECHO MEAS - IVSD: 1.27 CM
BH CV ECHO MEAS - LAT PEAK E' VEL: 6.4 CM/SEC
BH CV ECHO MEAS - LV DIASTOLIC VOL/BSA (35-75): 50.7 CM2
BH CV ECHO MEAS - LV MASS(C)D: 269.5 GRAMS
BH CV ECHO MEAS - LV MAX PG: 6.2 MMHG
BH CV ECHO MEAS - LV MEAN PG: 3 MMHG
BH CV ECHO MEAS - LV SYSTOLIC VOL/BSA (12-30): 22 CM2
BH CV ECHO MEAS - LV V1 MAX: 124 CM/SEC
BH CV ECHO MEAS - LV V1 VTI: 15.4 CM
BH CV ECHO MEAS - LVIDD: 5.4 CM
BH CV ECHO MEAS - LVIDS: 4.5 CM
BH CV ECHO MEAS - LVOT AREA: 4.2 CM2
BH CV ECHO MEAS - LVOT DIAM: 2.3 CM
BH CV ECHO MEAS - LVPWD: 1.19 CM
BH CV ECHO MEAS - MED PEAK E' VEL: 7.4 CM/SEC
BH CV ECHO MEAS - MV A MAX VEL: 76.5 CM/SEC
BH CV ECHO MEAS - MV DEC SLOPE: 344 CM/SEC2
BH CV ECHO MEAS - MV DEC TIME: 0.18 SEC
BH CV ECHO MEAS - MV E MAX VEL: 67.1 CM/SEC
BH CV ECHO MEAS - MV E/A: 0.88
BH CV ECHO MEAS - MV MAX PG: 3.5 MMHG
BH CV ECHO MEAS - MV MEAN PG: 2 MMHG
BH CV ECHO MEAS - MV P1/2T: 63.1 MSEC
BH CV ECHO MEAS - MV V2 VTI: 18.5 CM
BH CV ECHO MEAS - MVA(P1/2T): 3.5 CM2
BH CV ECHO MEAS - MVA(VTI): 3.5 CM2
BH CV ECHO MEAS - PA V2 MAX: 134.5 CM/SEC
BH CV ECHO MEAS - RAP SYSTOLE: 8 MMHG
BH CV ECHO MEAS - RVSP: 16.8 MMHG
BH CV ECHO MEAS - SV(LVOT): 64 ML
BH CV ECHO MEAS - SV(MOD-SP2): 47.9 ML
BH CV ECHO MEAS - SV(MOD-SP4): 71.2 ML
BH CV ECHO MEAS - SVI(LVOT): 25.7 ML/M2
BH CV ECHO MEAS - SVI(MOD-SP2): 19.3 ML/M2
BH CV ECHO MEAS - SVI(MOD-SP4): 28.6 ML/M2
BH CV ECHO MEAS - TAPSE (>1.6): 2.1 CM
BH CV ECHO MEAS - TR MAX PG: 8.8 MMHG
BH CV ECHO MEAS - TR MAX VEL: 148 CM/SEC
BH CV ECHO MEASUREMENTS AVERAGE E/E' RATIO: 9.72
BH CV XLRA - TDI S': 15.1 CM/SEC
BILIRUB SERPL-MCNC: 0.4 MG/DL (ref 0–1.2)
BILIRUB SERPL-MCNC: 0.5 MG/DL (ref 0–1.2)
BILIRUB SERPL-MCNC: 0.6 MG/DL (ref 0–1.2)
BILIRUB SERPL-MCNC: 0.6 MG/DL (ref 0–1.2)
BILIRUB SERPL-MCNC: 0.7 MG/DL (ref 0–1.2)
BILIRUB SERPL-MCNC: 0.8 MG/DL (ref 0–1.2)
BILIRUB SERPL-MCNC: 1.3 MG/DL (ref 0–1.2)
BILIRUB SERPL-MCNC: 1.9 MG/DL (ref 0–1.2)
BILIRUB SERPL-MCNC: 2.2 MG/DL (ref 0–1.2)
BILIRUB SERPL-MCNC: 2.2 MG/DL (ref 0–1.2)
BILIRUB UR QL STRIP: ABNORMAL
BILIRUB UR QL STRIP: NEGATIVE
BLD GP AB SCN SERPL QL: NEGATIVE
BODY TEMPERATURE: 37
BUN SERPL-MCNC: 30 MG/DL (ref 8–23)
BUN SERPL-MCNC: 30.1 MG/DL (ref 8–23)
BUN SERPL-MCNC: 35.9 MG/DL (ref 8–23)
BUN SERPL-MCNC: 36.6 MG/DL (ref 8–23)
BUN SERPL-MCNC: 39.7 MG/DL (ref 8–23)
BUN SERPL-MCNC: 40.3 MG/DL (ref 8–23)
BUN SERPL-MCNC: 54.2 MG/DL (ref 8–23)
BUN SERPL-MCNC: 58.1 MG/DL (ref 8–23)
BUN SERPL-MCNC: 62.3 MG/DL (ref 8–23)
BUN SERPL-MCNC: 62.6 MG/DL (ref 8–23)
BUN SERPL-MCNC: 64.7 MG/DL (ref 8–23)
BUN SERPL-MCNC: 71.6 MG/DL (ref 8–23)
BUN/CREAT SERPL: 10 (ref 7–25)
BUN/CREAT SERPL: 14.4 (ref 7–25)
BUN/CREAT SERPL: 16.5 (ref 7–25)
BUN/CREAT SERPL: 16.6 (ref 7–25)
BUN/CREAT SERPL: 16.9 (ref 7–25)
BUN/CREAT SERPL: 17.5 (ref 7–25)
BUN/CREAT SERPL: 17.5 (ref 7–25)
BUN/CREAT SERPL: 18.7 (ref 7–25)
BUN/CREAT SERPL: 19.7 (ref 7–25)
BUN/CREAT SERPL: 19.8 (ref 7–25)
BUN/CREAT SERPL: 21.3 (ref 7–25)
BUN/CREAT SERPL: 21.8 (ref 7–25)
BURR CELLS BLD QL SMEAR: ABNORMAL
BURR CELLS BLD QL SMEAR: ABNORMAL
C CAYETANENSIS DNA STL QL NAA+NON-PROBE: NOT DETECTED
C COLI+JEJ+UPSA DNA STL QL NAA+NON-PROBE: NOT DETECTED
C DIFF TOX GENS STL QL NAA+PROBE: NEGATIVE
C PNEUM DNA NPH QL NAA+NON-PROBE: NOT DETECTED
CALCIUM SPEC-SCNC: 6.2 MG/DL (ref 8.6–10.5)
CALCIUM SPEC-SCNC: 6.4 MG/DL (ref 8.6–10.5)
CALCIUM SPEC-SCNC: 6.5 MG/DL (ref 8.6–10.5)
CALCIUM SPEC-SCNC: 6.9 MG/DL (ref 8.6–10.5)
CALCIUM SPEC-SCNC: 7.2 MG/DL (ref 8.6–10.5)
CALCIUM SPEC-SCNC: 7.2 MG/DL (ref 8.6–10.5)
CALCIUM SPEC-SCNC: 7.3 MG/DL (ref 8.6–10.5)
CALCIUM SPEC-SCNC: 7.5 MG/DL (ref 8.6–10.5)
CALCIUM SPEC-SCNC: 7.6 MG/DL (ref 8.6–10.5)
CALCIUM SPEC-SCNC: 7.8 MG/DL (ref 8.6–10.5)
CALCIUM SPEC-SCNC: 8.1 MG/DL (ref 8.6–10.5)
CALCIUM SPEC-SCNC: 8.9 MG/DL (ref 8.6–10.5)
CHLORIDE SERPL-SCNC: 104 MMOL/L (ref 98–107)
CHLORIDE SERPL-SCNC: 105 MMOL/L (ref 98–107)
CHLORIDE SERPL-SCNC: 107 MMOL/L (ref 98–107)
CHLORIDE SERPL-SCNC: 108 MMOL/L (ref 98–107)
CHLORIDE SERPL-SCNC: 111 MMOL/L (ref 98–107)
CHLORIDE SERPL-SCNC: 112 MMOL/L (ref 98–107)
CHLORIDE SERPL-SCNC: 113 MMOL/L (ref 98–107)
CHLORIDE SERPL-SCNC: 114 MMOL/L (ref 98–107)
CHLORIDE SERPL-SCNC: 99 MMOL/L (ref 98–107)
CHOLEST SERPL-MCNC: 126 MG/DL (ref 0–200)
CK SERPL-CCNC: 142 U/L (ref 20–200)
CK SERPL-CCNC: 775 U/L (ref 20–200)
CLARITY UR: ABNORMAL
CLARITY UR: CLEAR
CLUMPED PLATELETS: PRESENT
CO2 SERPL-SCNC: 15 MMOL/L (ref 22–29)
CO2 SERPL-SCNC: 15 MMOL/L (ref 22–29)
CO2 SERPL-SCNC: 16 MMOL/L (ref 22–29)
CO2 SERPL-SCNC: 17 MMOL/L (ref 22–29)
CO2 SERPL-SCNC: 17 MMOL/L (ref 22–29)
CO2 SERPL-SCNC: 19 MMOL/L (ref 22–29)
CO2 SERPL-SCNC: 20 MMOL/L (ref 22–29)
CO2 SERPL-SCNC: 20 MMOL/L (ref 22–29)
CO2 SERPL-SCNC: 21 MMOL/L (ref 22–29)
CO2 SERPL-SCNC: 22 MMOL/L (ref 22–29)
COHGB MFR BLD: 0.9 % (ref 0–5)
COHGB MFR BLD: 1 % (ref 0–5)
COLOR UR: ABNORMAL
COLOR UR: YELLOW
CREAT SERPL-MCNC: 1.41 MG/DL (ref 0.76–1.27)
CREAT SERPL-MCNC: 1.81 MG/DL (ref 0.76–1.27)
CREAT SERPL-MCNC: 1.82 MG/DL (ref 0.76–1.27)
CREAT SERPL-MCNC: 2.05 MG/DL (ref 0.76–1.27)
CREAT SERPL-MCNC: 2.54 MG/DL (ref 0.76–1.27)
CREAT SERPL-MCNC: 3.01 MG/DL (ref 0.76–1.27)
CREAT SERPL-MCNC: 3.09 MG/DL (ref 0.76–1.27)
CREAT SERPL-MCNC: 3.1 MG/DL (ref 0.76–1.27)
CREAT SERPL-MCNC: 3.7 MG/DL (ref 0.76–1.27)
CREAT SERPL-MCNC: 3.77 MG/DL (ref 0.76–1.27)
CREAT SERPL-MCNC: 3.89 MG/DL (ref 0.76–1.27)
CREAT SERPL-MCNC: 4.09 MG/DL (ref 0.76–1.27)
CRYPTOSP DNA STL QL NAA+NON-PROBE: NOT DETECTED
CYTOLOGIST CVX/VAG CYTO: NORMAL
CYTOLOGIST CVX/VAG CYTO: NORMAL
D-LACTATE SERPL-SCNC: 2 MMOL/L (ref 0.5–2)
D-LACTATE SERPL-SCNC: 2.2 MMOL/L (ref 0.5–2)
D-LACTATE SERPL-SCNC: 2.9 MMOL/L (ref 0.5–2)
D-LACTATE SERPL-SCNC: 3.3 MMOL/L (ref 0.5–2)
D-LACTATE SERPL-SCNC: 3.7 MMOL/L (ref 0.5–2)
D-LACTATE SERPL-SCNC: 4.2 MMOL/L (ref 0.5–2)
D-LACTATE SERPL-SCNC: 5.6 MMOL/L (ref 0.5–2)
D-LACTATE SERPL-SCNC: 7 MMOL/L (ref 0.5–2)
D-LACTATE SERPL-SCNC: 7.3 MMOL/L (ref 0.5–2)
D-LACTATE SERPL-SCNC: 7.3 MMOL/L (ref 0.5–2)
D-LACTATE SERPL-SCNC: 7.6 MMOL/L (ref 0.5–2)
D-LACTATE SERPL-SCNC: 7.8 MMOL/L (ref 0.5–2)
D-LACTATE SERPL-SCNC: 7.8 MMOL/L (ref 0.5–2)
D-LACTATE SERPL-SCNC: 8.5 MMOL/L (ref 0.5–2)
DAT POLY-SP REAG RBC QL: NEGATIVE
DEPRECATED RDW RBC AUTO: 47.3 FL (ref 37–54)
DEPRECATED RDW RBC AUTO: 51 FL (ref 37–54)
DEPRECATED RDW RBC AUTO: 51.4 FL (ref 37–54)
DEPRECATED RDW RBC AUTO: 51.4 FL (ref 37–54)
DEPRECATED RDW RBC AUTO: 51.6 FL (ref 37–54)
DEPRECATED RDW RBC AUTO: 52.5 FL (ref 37–54)
DEPRECATED RDW RBC AUTO: 53 FL (ref 37–54)
DEPRECATED RDW RBC AUTO: 54.8 FL (ref 37–54)
DEPRECATED RDW RBC AUTO: 54.8 FL (ref 37–54)
DEPRECATED RDW RBC AUTO: 55.1 FL (ref 37–54)
DEPRECATED RDW RBC AUTO: 56.6 FL (ref 37–54)
E HISTOLYT DNA STL QL NAA+NON-PROBE: NOT DETECTED
EAEC PAA PLAS AGGR+AATA ST NAA+NON-PRB: NOT DETECTED
EC STX1+STX2 GENES STL QL NAA+NON-PROBE: NOT DETECTED
EGFRCR SERPLBLD CKD-EPI 2021: 14.3 ML/MIN/1.73
EGFRCR SERPLBLD CKD-EPI 2021: 15.2 ML/MIN/1.73
EGFRCR SERPLBLD CKD-EPI 2021: 15.8 ML/MIN/1.73
EGFRCR SERPLBLD CKD-EPI 2021: 16.1 ML/MIN/1.73
EGFRCR SERPLBLD CKD-EPI 2021: 19.9 ML/MIN/1.73
EGFRCR SERPLBLD CKD-EPI 2021: 20 ML/MIN/1.73
EGFRCR SERPLBLD CKD-EPI 2021: 20.7 ML/MIN/1.73
EGFRCR SERPLBLD CKD-EPI 2021: 25.3 ML/MIN/1.73
EGFRCR SERPLBLD CKD-EPI 2021: 32.8 ML/MIN/1.73
EGFRCR SERPLBLD CKD-EPI 2021: 37.8 ML/MIN/1.73
EGFRCR SERPLBLD CKD-EPI 2021: 38 ML/MIN/1.73
EGFRCR SERPLBLD CKD-EPI 2021: 51.3 ML/MIN/1.73
EOSINOPHIL # BLD MANUAL: 0 10*3/MM3 (ref 0–0.4)
EOSINOPHIL NFR BLD MANUAL: 0 % (ref 0.3–6.2)
EPEC EAE GENE STL QL NAA+NON-PROBE: NOT DETECTED
ERYTHROCYTE [DISTWIDTH] IN BLOOD BY AUTOMATED COUNT: 13.1 % (ref 12.3–15.4)
ERYTHROCYTE [DISTWIDTH] IN BLOOD BY AUTOMATED COUNT: 13.8 % (ref 12.3–15.4)
ERYTHROCYTE [DISTWIDTH] IN BLOOD BY AUTOMATED COUNT: 13.9 % (ref 12.3–15.4)
ERYTHROCYTE [DISTWIDTH] IN BLOOD BY AUTOMATED COUNT: 14.1 % (ref 12.3–15.4)
ERYTHROCYTE [DISTWIDTH] IN BLOOD BY AUTOMATED COUNT: 14.2 % (ref 12.3–15.4)
ERYTHROCYTE [DISTWIDTH] IN BLOOD BY AUTOMATED COUNT: 14.3 % (ref 12.3–15.4)
ERYTHROCYTE [DISTWIDTH] IN BLOOD BY AUTOMATED COUNT: 14.6 % (ref 12.3–15.4)
ERYTHROCYTE [DISTWIDTH] IN BLOOD BY AUTOMATED COUNT: 14.7 % (ref 12.3–15.4)
ERYTHROCYTE [DISTWIDTH] IN BLOOD BY AUTOMATED COUNT: 14.9 % (ref 12.3–15.4)
ERYTHROCYTE [DISTWIDTH] IN BLOOD BY AUTOMATED COUNT: 15.2 % (ref 12.3–15.4)
ERYTHROCYTE [DISTWIDTH] IN BLOOD BY AUTOMATED COUNT: 15.3 % (ref 12.3–15.4)
ETEC LTA+ST1A+ST1B TOX ST NAA+NON-PROBE: NOT DETECTED
FERRITIN SERPL-MCNC: 1599 NG/ML (ref 30–400)
FIBRINOGEN PPP-MCNC: 255 MG/DL (ref 240–460)
FIBRINOGEN PPP-MCNC: 468 MG/DL (ref 240–460)
FLUAV SUBTYP SPEC NAA+PROBE: NOT DETECTED
FLUBV RNA ISLT QL NAA+PROBE: NOT DETECTED
FOLATE SERPL-MCNC: >20 NG/ML (ref 4.78–24.2)
FSP PPP LA-ACNC: ABNORMAL
G LAMBLIA DNA STL QL NAA+NON-PROBE: NOT DETECTED
GAS FLOW AIRWAY: 6 LPM
GIANT PLATELETS: ABNORMAL
GLOBULIN UR ELPH-MCNC: 1.8 GM/DL
GLOBULIN UR ELPH-MCNC: 1.9 GM/DL
GLOBULIN UR ELPH-MCNC: 2 GM/DL
GLOBULIN UR ELPH-MCNC: 2.1 GM/DL
GLOBULIN UR ELPH-MCNC: 2.1 GM/DL
GLOBULIN UR ELPH-MCNC: 2.3 GM/DL
GLOBULIN UR ELPH-MCNC: 2.4 GM/DL
GLOBULIN UR ELPH-MCNC: 2.5 GM/DL
GLOBULIN UR ELPH-MCNC: 2.5 GM/DL
GLOBULIN UR ELPH-MCNC: 2.8 GM/DL
GLOBULIN UR ELPH-MCNC: 2.9 GM/DL
GLOBULIN UR ELPH-MCNC: 3.1 GM/DL
GLUCOSE BLDC GLUCOMTR-MCNC: 190 MG/DL (ref 70–130)
GLUCOSE BLDC GLUCOMTR-MCNC: 193 MG/DL (ref 70–130)
GLUCOSE BLDC GLUCOMTR-MCNC: 202 MG/DL (ref 70–130)
GLUCOSE BLDC GLUCOMTR-MCNC: 206 MG/DL (ref 70–130)
GLUCOSE BLDC GLUCOMTR-MCNC: 218 MG/DL (ref 70–130)
GLUCOSE BLDC GLUCOMTR-MCNC: 228 MG/DL (ref 70–130)
GLUCOSE BLDC GLUCOMTR-MCNC: 230 MG/DL (ref 70–130)
GLUCOSE BLDC GLUCOMTR-MCNC: 232 MG/DL (ref 70–130)
GLUCOSE BLDC GLUCOMTR-MCNC: 272 MG/DL (ref 70–130)
GLUCOSE SERPL-MCNC: 138 MG/DL (ref 65–99)
GLUCOSE SERPL-MCNC: 163 MG/DL (ref 65–99)
GLUCOSE SERPL-MCNC: 167 MG/DL (ref 65–99)
GLUCOSE SERPL-MCNC: 180 MG/DL (ref 65–99)
GLUCOSE SERPL-MCNC: 192 MG/DL (ref 65–99)
GLUCOSE SERPL-MCNC: 195 MG/DL (ref 65–99)
GLUCOSE SERPL-MCNC: 200 MG/DL (ref 65–99)
GLUCOSE SERPL-MCNC: 211 MG/DL (ref 65–99)
GLUCOSE SERPL-MCNC: 212 MG/DL (ref 65–99)
GLUCOSE SERPL-MCNC: 219 MG/DL (ref 65–99)
GLUCOSE SERPL-MCNC: 222 MG/DL (ref 65–99)
GLUCOSE SERPL-MCNC: 250 MG/DL (ref 65–99)
GLUCOSE UR STRIP-MCNC: NEGATIVE MG/DL
GLUCOSE UR STRIP-MCNC: NEGATIVE MG/DL
GRAN CASTS URNS QL MICRO: ABNORMAL /LPF
HADV DNA SPEC NAA+PROBE: NOT DETECTED
HAPTOGLOB SERPL-MCNC: 146 MG/DL (ref 30–200)
HBA1C MFR BLD: 6.2 % (ref 4.8–5.6)
HCO3 BLDA-SCNC: 15.3 MMOL/L (ref 20–26)
HCO3 BLDA-SCNC: 16.3 MMOL/L (ref 20–26)
HCO3 BLDA-SCNC: 17.6 MMOL/L (ref 20–26)
HCO3 BLDA-SCNC: 20.4 MMOL/L (ref 20–26)
HCO3 BLDA-SCNC: 21.5 MMOL/L (ref 20–26)
HCO3 BLDA-SCNC: 21.7 MMOL/L (ref 20–26)
HCO3 BLDA-SCNC: 22 MMOL/L (ref 20–26)
HCO3 BLDA-SCNC: 22.2 MMOL/L (ref 20–26)
HCO3 BLDA-SCNC: 23.2 MMOL/L (ref 20–26)
HCOV 229E RNA SPEC QL NAA+PROBE: NOT DETECTED
HCOV HKU1 RNA SPEC QL NAA+PROBE: NOT DETECTED
HCOV NL63 RNA SPEC QL NAA+PROBE: NOT DETECTED
HCOV OC43 RNA SPEC QL NAA+PROBE: NOT DETECTED
HCT VFR BLD AUTO: 21 % (ref 37.5–51)
HCT VFR BLD AUTO: 22.9 % (ref 37.5–51)
HCT VFR BLD AUTO: 22.9 % (ref 37.5–51)
HCT VFR BLD AUTO: 23.4 % (ref 37.5–51)
HCT VFR BLD AUTO: 24.1 % (ref 37.5–51)
HCT VFR BLD AUTO: 24.4 % (ref 37.5–51)
HCT VFR BLD AUTO: 24.8 % (ref 37.5–51)
HCT VFR BLD AUTO: 25.5 % (ref 37.5–51)
HCT VFR BLD AUTO: 25.6 % (ref 37.5–51)
HCT VFR BLD AUTO: 27.5 % (ref 37.5–51)
HCT VFR BLD AUTO: 29.2 % (ref 37.5–51)
HCT VFR BLD CALC: 24.5 % (ref 38–51)
HCT VFR BLD CALC: 27.9 % (ref 38–51)
HDLC SERPL-MCNC: 11 MG/DL (ref 40–60)
HGB BLD-MCNC: 7.3 G/DL (ref 13–17.7)
HGB BLD-MCNC: 7.4 G/DL (ref 13–17.7)
HGB BLD-MCNC: 7.5 G/DL (ref 13–17.7)
HGB BLD-MCNC: 7.9 G/DL (ref 13–17.7)
HGB BLD-MCNC: 8 G/DL (ref 13–17.7)
HGB BLD-MCNC: 8 G/DL (ref 13–17.7)
HGB BLD-MCNC: 8.2 G/DL (ref 13–17.7)
HGB BLD-MCNC: 8.3 G/DL (ref 13–17.7)
HGB BLD-MCNC: 8.5 G/DL (ref 13–17.7)
HGB BLD-MCNC: 9.4 G/DL (ref 13–17.7)
HGB BLD-MCNC: 9.9 G/DL (ref 13–17.7)
HGB BLDA-MCNC: 8 G/DL (ref 14–18)
HGB BLDA-MCNC: 9.1 G/DL (ref 14–18)
HGB UR QL STRIP.AUTO: ABNORMAL
HGB UR QL STRIP.AUTO: ABNORMAL
HMPV RNA NPH QL NAA+NON-PROBE: NOT DETECTED
HPIV1 RNA ISLT QL NAA+PROBE: NOT DETECTED
HPIV2 RNA SPEC QL NAA+PROBE: NOT DETECTED
HPIV3 RNA NPH QL NAA+PROBE: NOT DETECTED
HPIV4 P GENE NPH QL NAA+PROBE: NOT DETECTED
HYALINE CASTS UR QL AUTO: ABNORMAL /LPF
HYALINE CASTS UR QL AUTO: ABNORMAL /LPF
INHALED O2 CONCENTRATION: 100 %
INHALED O2 CONCENTRATION: 90 %
INHALED O2 CONCENTRATION: 90 %
INR PPP: 1.74 (ref 0.91–1.09)
INR PPP: 1.86 (ref 0.91–1.09)
IRON 24H UR-MRATE: 57 MCG/DL (ref 59–158)
IRON SATN MFR SERPL: 23 % (ref 20–50)
KETONES UR QL STRIP: ABNORMAL
KETONES UR QL STRIP: NEGATIVE
LDH SERPL-CCNC: 450 U/L (ref 135–225)
LDLC SERPL CALC-MCNC: 60 MG/DL (ref 0–100)
LDLC/HDLC SERPL: 4.07 {RATIO}
LEFT ATRIUM VOLUME INDEX: 15.6 ML/M2
LEUKOCYTE ESTERASE UR QL STRIP.AUTO: ABNORMAL
LEUKOCYTE ESTERASE UR QL STRIP.AUTO: NEGATIVE
LIPASE SERPL-CCNC: 13 U/L (ref 13–60)
LV EF BIPLANE MOD: 55 %
LYMPHOCYTES # BLD MANUAL: 13.97 10*3/MM3 (ref 0.7–3.1)
LYMPHOCYTES # BLD MANUAL: 2.49 10*3/MM3 (ref 0.7–3.1)
LYMPHOCYTES # BLD MANUAL: 3.42 10*3/MM3 (ref 0.7–3.1)
LYMPHOCYTES # BLD MANUAL: 3.59 10*3/MM3 (ref 0.7–3.1)
LYMPHOCYTES # BLD MANUAL: 3.99 10*3/MM3 (ref 0.7–3.1)
LYMPHOCYTES # BLD MANUAL: 4.32 10*3/MM3 (ref 0.7–3.1)
LYMPHOCYTES # BLD MANUAL: 5.01 10*3/MM3 (ref 0.7–3.1)
LYMPHOCYTES # BLD MANUAL: 5.16 10*3/MM3 (ref 0.7–3.1)
LYMPHOCYTES # BLD MANUAL: 5.3 10*3/MM3 (ref 0.7–3.1)
LYMPHOCYTES NFR BLD MANUAL: 0 % (ref 5–12)
LYMPHOCYTES NFR BLD MANUAL: 0 % (ref 5–12)
LYMPHOCYTES NFR BLD MANUAL: 1 % (ref 5–12)
LYMPHOCYTES NFR BLD MANUAL: 2 % (ref 5–12)
LYMPHOCYTES NFR BLD MANUAL: 2.2 % (ref 5–12)
LYMPHOCYTES NFR BLD MANUAL: 4.2 % (ref 5–12)
LYMPHOCYTES NFR BLD MANUAL: 6.1 % (ref 5–12)
LYMPHOCYTES NFR BLD MANUAL: 6.3 % (ref 5–12)
LYMPHOCYTES NFR BLD MANUAL: 8.1 % (ref 5–12)
Lab: ABNORMAL
M PNEUMO IGG SER IA-ACNC: NOT DETECTED
MAGNESIUM SERPL-MCNC: 1.6 MG/DL (ref 1.6–2.4)
MAGNESIUM SERPL-MCNC: 1.7 MG/DL (ref 1.6–2.4)
MAGNESIUM SERPL-MCNC: 1.7 MG/DL (ref 1.6–2.4)
MAGNESIUM SERPL-MCNC: 2 MG/DL (ref 1.6–2.4)
MAGNESIUM SERPL-MCNC: 2.1 MG/DL (ref 1.6–2.4)
MAGNESIUM SERPL-MCNC: 2.2 MG/DL (ref 1.6–2.4)
MAGNESIUM SERPL-MCNC: 2.2 MG/DL (ref 1.6–2.4)
MCH RBC QN AUTO: 32.5 PG (ref 26.6–33)
MCH RBC QN AUTO: 32.8 PG (ref 26.6–33)
MCH RBC QN AUTO: 33 PG (ref 26.6–33)
MCH RBC QN AUTO: 33 PG (ref 26.6–33)
MCH RBC QN AUTO: 33.1 PG (ref 26.6–33)
MCH RBC QN AUTO: 33.1 PG (ref 26.6–33)
MCH RBC QN AUTO: 33.2 PG (ref 26.6–33)
MCH RBC QN AUTO: 33.5 PG (ref 26.6–33)
MCH RBC QN AUTO: 33.6 PG (ref 26.6–33)
MCH RBC QN AUTO: 33.7 PG (ref 26.6–33)
MCH RBC QN AUTO: 34.8 PG (ref 26.6–33)
MCHC RBC AUTO-ENTMCNC: 32.2 G/DL (ref 31.5–35.7)
MCHC RBC AUTO-ENTMCNC: 32.3 G/DL (ref 31.5–35.7)
MCHC RBC AUTO-ENTMCNC: 32.4 G/DL (ref 31.5–35.7)
MCHC RBC AUTO-ENTMCNC: 32.8 G/DL (ref 31.5–35.7)
MCHC RBC AUTO-ENTMCNC: 33.2 G/DL (ref 31.5–35.7)
MCHC RBC AUTO-ENTMCNC: 33.2 G/DL (ref 31.5–35.7)
MCHC RBC AUTO-ENTMCNC: 33.5 G/DL (ref 31.5–35.7)
MCHC RBC AUTO-ENTMCNC: 33.9 G/DL (ref 31.5–35.7)
MCHC RBC AUTO-ENTMCNC: 34.2 G/DL (ref 31.5–35.7)
MCHC RBC AUTO-ENTMCNC: 34.2 G/DL (ref 31.5–35.7)
MCHC RBC AUTO-ENTMCNC: 34.8 G/DL (ref 31.5–35.7)
MCV RBC AUTO: 100 FL (ref 79–97)
MCV RBC AUTO: 100.4 FL (ref 79–97)
MCV RBC AUTO: 100.9 FL (ref 79–97)
MCV RBC AUTO: 101.6 FL (ref 79–97)
MCV RBC AUTO: 102 FL (ref 79–97)
MCV RBC AUTO: 102.2 FL (ref 79–97)
MCV RBC AUTO: 97.7 FL (ref 79–97)
MCV RBC AUTO: 97.9 FL (ref 79–97)
MCV RBC AUTO: 98.2 FL (ref 79–97)
MCV RBC AUTO: 99.2 FL (ref 79–97)
MCV RBC AUTO: 99.6 FL (ref 79–97)
METAMYELOCYTES NFR BLD MANUAL: 1 % (ref 0–0)
METAMYELOCYTES NFR BLD MANUAL: 26.3 % (ref 0–0)
METAMYELOCYTES NFR BLD MANUAL: 9.7 % (ref 0–0)
METHGB BLD QL: 0.5 % (ref 0–3)
METHGB BLD QL: 1.2 % (ref 0–3)
MODALITY: ABNORMAL
MONOCYTES # BLD: 0 10*3/MM3 (ref 0.1–0.9)
MONOCYTES # BLD: 0 10*3/MM3 (ref 0.1–0.9)
MONOCYTES # BLD: 0.17 10*3/MM3 (ref 0.1–0.9)
MONOCYTES # BLD: 0.19 10*3/MM3 (ref 0.1–0.9)
MONOCYTES # BLD: 0.21 10*3/MM3 (ref 0.1–0.9)
MONOCYTES # BLD: 0.26 10*3/MM3 (ref 0.1–0.9)
MONOCYTES # BLD: 0.47 10*3/MM3 (ref 0.1–0.9)
MONOCYTES # BLD: 0.53 10*3/MM3 (ref 0.1–0.9)
MONOCYTES # BLD: 0.98 10*3/MM3 (ref 0.1–0.9)
MRSA DNA SPEC QL NAA+PROBE: NORMAL
MYELOCYTES NFR BLD MANUAL: 1 % (ref 0–0)
MYELOCYTES NFR BLD MANUAL: 1 % (ref 0–0)
MYELOCYTES NFR BLD MANUAL: 10.5 % (ref 0–0)
MYELOCYTES NFR BLD MANUAL: 6.5 % (ref 0–0)
NEUTROPHILS # BLD AUTO: 1.67 10*3/MM3 (ref 1.7–7)
NEUTROPHILS # BLD AUTO: 1.93 10*3/MM3 (ref 1.7–7)
NEUTROPHILS # BLD AUTO: 2.62 10*3/MM3 (ref 1.7–7)
NEUTROPHILS # BLD AUTO: 2.66 10*3/MM3 (ref 1.7–7)
NEUTROPHILS # BLD AUTO: 3.04 10*3/MM3 (ref 1.7–7)
NEUTROPHILS # BLD AUTO: 4.39 10*3/MM3 (ref 1.7–7)
NEUTROPHILS # BLD AUTO: 5.74 10*3/MM3 (ref 1.7–7)
NEUTROPHILS # BLD AUTO: 6.67 10*3/MM3 (ref 1.7–7)
NEUTROPHILS # BLD AUTO: 7.68 10*3/MM3 (ref 1.7–7)
NEUTROPHILS NFR BLD MANUAL: 2.2 % (ref 42.7–76)
NEUTROPHILS NFR BLD MANUAL: 21.4 % (ref 42.7–76)
NEUTROPHILS NFR BLD MANUAL: 26 % (ref 42.7–76)
NEUTROPHILS NFR BLD MANUAL: 26.5 % (ref 42.7–76)
NEUTROPHILS NFR BLD MANUAL: 28.1 % (ref 42.7–76)
NEUTROPHILS NFR BLD MANUAL: 28.9 % (ref 42.7–76)
NEUTROPHILS NFR BLD MANUAL: 52.5 % (ref 42.7–76)
NEUTROPHILS NFR BLD MANUAL: 64 % (ref 42.7–76)
NEUTROPHILS NFR BLD MANUAL: 7.4 % (ref 42.7–76)
NEUTS BAND NFR BLD MANUAL: 11.1 % (ref 0–5)
NEUTS BAND NFR BLD MANUAL: 11.7 % (ref 0–5)
NEUTS BAND NFR BLD MANUAL: 17.2 % (ref 0–5)
NEUTS BAND NFR BLD MANUAL: 27.4 % (ref 0–5)
NEUTS BAND NFR BLD MANUAL: 3 % (ref 0–5)
NEUTS BAND NFR BLD MANUAL: 5.1 % (ref 0–5)
NEUTS BAND NFR BLD MANUAL: 5.2 % (ref 0–5)
NEUTS BAND NFR BLD MANUAL: 6.1 % (ref 0–5)
NEUTS BAND NFR BLD MANUAL: 7.3 % (ref 0–5)
NEUTS VAC BLD QL SMEAR: ABNORMAL
NITRITE UR QL STRIP: NEGATIVE
NITRITE UR QL STRIP: NEGATIVE
NOROVIRUS GI+II RNA STL QL NAA+NON-PROBE: NOT DETECTED
NOTIFIED BY: ABNORMAL
NOTIFIED WHO: ABNORMAL
NRBC SPEC MANUAL: 0.9 /100 WBC (ref 0–0.2)
NRBC SPEC MANUAL: 1 /100 WBC (ref 0–0.2)
NRBC SPEC MANUAL: 3.1 /100 WBC (ref 0–0.2)
NRBC SPEC MANUAL: 6.5 /100 WBC (ref 0–0.2)
NRBC SPEC MANUAL: 6.8 /100 WBC (ref 0–0.2)
OVALOCYTES BLD QL SMEAR: ABNORMAL
OXYHGB MFR BLDV: 73.4 % (ref 94–99)
OXYHGB MFR BLDV: 90.4 % (ref 94–99)
P SHIGELLOIDES DNA STL QL NAA+NON-PROBE: NOT DETECTED
PATH INTERP BLD-IMP: NORMAL
PATH INTERP BLD-IMP: NORMAL
PCO2 BLDA: 25.3 MM HG (ref 35–45)
PCO2 BLDA: 44.3 MM HG (ref 35–45)
PCO2 BLDA: 45.6 MM HG (ref 35–45)
PCO2 BLDA: 47.3 MM HG (ref 35–45)
PCO2 BLDA: 55 MM HG (ref 35–45)
PCO2 BLDA: 55.5 MM HG (ref 35–45)
PCO2 BLDA: 56.6 MM HG (ref 35–45)
PCO2 BLDA: 60.4 MM HG (ref 35–45)
PCO2 BLDA: 68.7 MM HG (ref 35–45)
PCO2 TEMP ADJ BLD: 25.3 MM HG (ref 35–45)
PCO2 TEMP ADJ BLD: 44.3 MM HG (ref 35–45)
PCO2 TEMP ADJ BLD: 45.6 MM HG (ref 35–45)
PCO2 TEMP ADJ BLD: 47.3 MM HG (ref 35–45)
PCO2 TEMP ADJ BLD: 55 MM HG (ref 35–45)
PCO2 TEMP ADJ BLD: 55.5 MM HG (ref 35–45)
PCO2 TEMP ADJ BLD: 56.6 MM HG (ref 35–45)
PCO2 TEMP ADJ BLD: 60.4 MM HG (ref 35–45)
PCO2 TEMP ADJ BLD: 68.7 MM HG (ref 35–45)
PEEP RESPIRATORY: 10 CM[H2O]
PEEP RESPIRATORY: 12 CM[H2O]
PEEP RESPIRATORY: 12 CM[H2O]
PEEP RESPIRATORY: 5 CM[H2O]
PH BLDA: 7.04 PH UNITS (ref 7.35–7.45)
PH BLDA: 7.11 PH UNITS (ref 7.35–7.45)
PH BLDA: 7.12 PH UNITS (ref 7.35–7.45)
PH BLDA: 7.17 PH UNITS (ref 7.35–7.45)
PH BLDA: 7.18 PH UNITS (ref 7.35–7.45)
PH BLDA: 7.27 PH UNITS (ref 7.35–7.45)
PH BLDA: 7.28 PH UNITS (ref 7.35–7.45)
PH BLDA: 7.33 PH UNITS (ref 7.35–7.45)
PH BLDA: 7.42 PH UNITS (ref 7.35–7.45)
PH UR STRIP.AUTO: 5.5 [PH] (ref 5–8)
PH UR STRIP.AUTO: <=5 [PH] (ref 5–8)
PH, TEMP CORRECTED: 7.04 PH UNITS (ref 7.35–7.45)
PH, TEMP CORRECTED: 7.11 PH UNITS (ref 7.35–7.45)
PH, TEMP CORRECTED: 7.12 PH UNITS (ref 7.35–7.45)
PH, TEMP CORRECTED: 7.17 PH UNITS (ref 7.35–7.45)
PH, TEMP CORRECTED: 7.18 PH UNITS (ref 7.35–7.45)
PH, TEMP CORRECTED: 7.27 PH UNITS (ref 7.35–7.45)
PH, TEMP CORRECTED: 7.28 PH UNITS (ref 7.35–7.45)
PH, TEMP CORRECTED: 7.33 PH UNITS (ref 7.35–7.45)
PH, TEMP CORRECTED: 7.42 PH UNITS (ref 7.35–7.45)
PHOSPHATE SERPL-MCNC: 2.2 MG/DL (ref 2.5–4.5)
PHOSPHATE SERPL-MCNC: 6.8 MG/DL (ref 2.5–4.5)
PHOSPHATE SERPL-MCNC: 7.5 MG/DL (ref 2.5–4.5)
PLASMA CELL PREC NFR BLD MANUAL: 0.9 % (ref 0–0)
PLASMA CELL PREC NFR BLD MANUAL: 1 % (ref 0–0)
PLASMA CELL PREC NFR BLD MANUAL: 1.1 % (ref 0–0)
PLASMA CELL PREC NFR BLD MANUAL: 2 % (ref 0–0)
PLATELET # BLD AUTO: 120 10*3/MM3 (ref 140–450)
PLATELET # BLD AUTO: 19 10*3/MM3 (ref 140–450)
PLATELET # BLD AUTO: 42 10*3/MM3 (ref 140–450)
PLATELET # BLD AUTO: 42 10*3/MM3 (ref 140–450)
PLATELET # BLD AUTO: 48 10*3/MM3 (ref 140–450)
PLATELET # BLD AUTO: 53 10*3/MM3 (ref 140–450)
PLATELET # BLD AUTO: 54 10*3/MM3 (ref 140–450)
PLATELET # BLD AUTO: 68 10*3/MM3 (ref 140–450)
PLATELET # BLD AUTO: 72 10*3/MM3 (ref 140–450)
PLATELET # BLD AUTO: 74 10*3/MM3 (ref 140–450)
PLATELET # BLD AUTO: 87 10*3/MM3 (ref 140–450)
PMV BLD AUTO: 13.5 FL (ref 6–12)
PMV BLD AUTO: 13.5 FL (ref 6–12)
PMV BLD AUTO: 13.8 FL (ref 6–12)
PMV BLD AUTO: 14.1 FL (ref 6–12)
PMV BLD AUTO: 14.3 FL (ref 6–12)
PMV BLD AUTO: 14.3 FL (ref 6–12)
PMV BLD AUTO: 14.6 FL (ref 6–12)
PMV BLD AUTO: 14.8 FL (ref 6–12)
PMV BLD AUTO: 15.2 FL (ref 6–12)
PMV BLD AUTO: 15.7 FL (ref 6–12)
PO2 BLD: 114 MM[HG] (ref 0–500)
PO2 BLD: 133 MM[HG] (ref 0–500)
PO2 BLD: 57 MM[HG] (ref 0–500)
PO2 BLD: 62 MM[HG] (ref 0–500)
PO2 BLD: 63 MM[HG] (ref 0–500)
PO2 BLD: 72 MM[HG] (ref 0–500)
PO2 BLDA: 103 MM HG (ref 83–108)
PO2 BLDA: 133 MM HG (ref 83–108)
PO2 BLDA: 55.3 MM HG (ref 83–108)
PO2 BLDA: 56.5 MM HG (ref 83–108)
PO2 BLDA: 62.1 MM HG (ref 83–108)
PO2 BLDA: 62.9 MM HG (ref 83–108)
PO2 BLDA: 65.2 MM HG (ref 83–108)
PO2 BLDA: 67.8 MM HG (ref 83–108)
PO2 BLDA: 76.2 MM HG (ref 83–108)
PO2 TEMP ADJ BLD: 103 MM HG (ref 83–108)
PO2 TEMP ADJ BLD: 133 MM HG (ref 83–108)
PO2 TEMP ADJ BLD: 55.3 MM HG (ref 83–108)
PO2 TEMP ADJ BLD: 56.5 MM HG (ref 83–108)
PO2 TEMP ADJ BLD: 62.1 MM HG (ref 83–108)
PO2 TEMP ADJ BLD: 62.9 MM HG (ref 83–108)
PO2 TEMP ADJ BLD: 65.2 MM HG (ref 83–108)
PO2 TEMP ADJ BLD: 67.8 MM HG (ref 83–108)
PO2 TEMP ADJ BLD: 76.2 MM HG (ref 83–108)
POIKILOCYTOSIS BLD QL SMEAR: ABNORMAL
POLYCHROMASIA BLD QL SMEAR: ABNORMAL
POTASSIUM BLDA-SCNC: 3.6 MMOL/L (ref 3.5–5.2)
POTASSIUM BLDA-SCNC: 4.5 MMOL/L (ref 3.5–5.2)
POTASSIUM SERPL-SCNC: 2.8 MMOL/L (ref 3.5–5.2)
POTASSIUM SERPL-SCNC: 2.8 MMOL/L (ref 3.5–5.2)
POTASSIUM SERPL-SCNC: 2.9 MMOL/L (ref 3.5–5.2)
POTASSIUM SERPL-SCNC: 3.1 MMOL/L (ref 3.5–5.2)
POTASSIUM SERPL-SCNC: 3.2 MMOL/L (ref 3.5–5.2)
POTASSIUM SERPL-SCNC: 3.4 MMOL/L (ref 3.5–5.2)
POTASSIUM SERPL-SCNC: 3.7 MMOL/L (ref 3.5–5.2)
POTASSIUM SERPL-SCNC: 4 MMOL/L (ref 3.5–5.2)
POTASSIUM SERPL-SCNC: 4 MMOL/L (ref 3.5–5.2)
POTASSIUM SERPL-SCNC: 4.2 MMOL/L (ref 3.5–5.2)
POTASSIUM SERPL-SCNC: 4.4 MMOL/L (ref 3.5–5.2)
POTASSIUM SERPL-SCNC: 4.4 MMOL/L (ref 3.5–5.2)
PROCALCITONIN SERPL-MCNC: 1.24 NG/ML (ref 0–0.25)
PROT SERPL-MCNC: 4.3 G/DL (ref 6–8.5)
PROT SERPL-MCNC: 4.4 G/DL (ref 6–8.5)
PROT SERPL-MCNC: 4.4 G/DL (ref 6–8.5)
PROT SERPL-MCNC: 4.5 G/DL (ref 6–8.5)
PROT SERPL-MCNC: 4.7 G/DL (ref 6–8.5)
PROT SERPL-MCNC: 4.9 G/DL (ref 6–8.5)
PROT SERPL-MCNC: 5.6 G/DL (ref 6–8.5)
PROT SERPL-MCNC: 5.7 G/DL (ref 6–8.5)
PROT SERPL-MCNC: 5.7 G/DL (ref 6–8.5)
PROT SERPL-MCNC: 6.1 G/DL (ref 6–8.5)
PROT SERPL-MCNC: 6.3 G/DL (ref 6–8.5)
PROT SERPL-MCNC: 6.9 G/DL (ref 6–8.5)
PROT UR QL STRIP: ABNORMAL
PROT UR QL STRIP: ABNORMAL
PROTHROMBIN TIME: 21.3 SECONDS (ref 11.8–14.8)
PROTHROMBIN TIME: 22.5 SECONDS (ref 11.8–14.8)
QT INTERVAL: 290 MS
QT INTERVAL: 304 MS
QT INTERVAL: 328 MS
QT INTERVAL: 334 MS
QTC INTERVAL: 447 MS
QTC INTERVAL: 465 MS
QTC INTERVAL: 472 MS
QTC INTERVAL: 476 MS
RBC # BLD AUTO: 2.1 10*6/MM3 (ref 4.14–5.8)
RBC # BLD AUTO: 2.24 10*6/MM3 (ref 4.14–5.8)
RBC # BLD AUTO: 2.27 10*6/MM3 (ref 4.14–5.8)
RBC # BLD AUTO: 2.39 10*6/MM3 (ref 4.14–5.8)
RBC # BLD AUTO: 2.42 10*6/MM3 (ref 4.14–5.8)
RBC # BLD AUTO: 2.43 10*6/MM3 (ref 4.14–5.8)
RBC # BLD AUTO: 2.5 10*6/MM3 (ref 4.14–5.8)
RBC # BLD AUTO: 2.5 10*6/MM3 (ref 4.14–5.8)
RBC # BLD AUTO: 2.52 10*6/MM3 (ref 4.14–5.8)
RBC # BLD AUTO: 2.8 10*6/MM3 (ref 4.14–5.8)
RBC # BLD AUTO: 2.99 10*6/MM3 (ref 4.14–5.8)
RBC # UR STRIP: ABNORMAL /HPF
RBC # UR STRIP: ABNORMAL /HPF
RBC MORPH BLD: NORMAL
REF LAB TEST METHOD: ABNORMAL
REF LAB TEST METHOD: ABNORMAL
RH BLD: POSITIVE
RHINOVIRUS RNA SPEC NAA+PROBE: NOT DETECTED
ROULEAUX BLD QL SMEAR: ABNORMAL
RSV RNA NPH QL NAA+NON-PROBE: NOT DETECTED
RVA RNA STL QL NAA+NON-PROBE: NOT DETECTED
S ENT+BONG DNA STL QL NAA+NON-PROBE: NOT DETECTED
SAO2 % BLDCOA: 74.6 % (ref 94–99)
SAO2 % BLDCOA: 81.1 % (ref 94–99)
SAO2 % BLDCOA: 83.5 % (ref 94–99)
SAO2 % BLDCOA: 85.4 % (ref 94–99)
SAO2 % BLDCOA: 89.9 % (ref 94–99)
SAO2 % BLDCOA: 92.3 % (ref 94–99)
SAO2 % BLDCOA: 96.1 % (ref 94–99)
SAO2 % BLDCOA: 97.3 % (ref 94–99)
SAO2 % BLDCOA: 97.8 % (ref 94–99)
SAPO I+II+IV+V RNA STL QL NAA+NON-PROBE: NOT DETECTED
SARS-COV-2 RNA RESP QL NAA+PROBE: NOT DETECTED
SET MECH RESP RATE: 20
SET MECH RESP RATE: 28
SET MECH RESP RATE: 30
SHIGELLA SP+EIEC IPAH ST NAA+NON-PROBE: NOT DETECTED
SMALL PLATELETS BLD QL SMEAR: ABNORMAL
SMUDGE CELLS BLD QL SMEAR: ABNORMAL
SMUDGE CELLS BLD QL SMEAR: ABNORMAL
SODIUM BLDA-SCNC: 145 MMOL/L (ref 136–145)
SODIUM BLDA-SCNC: 148 MMOL/L (ref 136–145)
SODIUM SERPL-SCNC: 140 MMOL/L (ref 136–145)
SODIUM SERPL-SCNC: 142 MMOL/L (ref 136–145)
SODIUM SERPL-SCNC: 143 MMOL/L (ref 136–145)
SODIUM SERPL-SCNC: 145 MMOL/L (ref 136–145)
SODIUM SERPL-SCNC: 146 MMOL/L (ref 136–145)
SODIUM SERPL-SCNC: 147 MMOL/L (ref 136–145)
SP GR UR STRIP: 1.01 (ref 1–1.03)
SP GR UR STRIP: 1.02 (ref 1–1.03)
SQUAMOUS #/AREA URNS HPF: ABNORMAL /HPF
SQUAMOUS #/AREA URNS HPF: ABNORMAL /HPF
STARCH GRANULES URNS QL MICRO: ABNORMAL /HPF
T&S EXPIRATION DATE: NORMAL
TARGETS BLD QL SMEAR: ABNORMAL
TIBC SERPL-MCNC: 247 MCG/DL (ref 298–536)
TRANSFERRIN SERPL-MCNC: 166 MG/DL (ref 200–360)
TRIGL SERPL-MCNC: 351 MG/DL (ref 0–150)
UROBILINOGEN UR QL STRIP: ABNORMAL
UROBILINOGEN UR QL STRIP: ABNORMAL
V CHOL+PARA+VUL DNA STL QL NAA+NON-PROBE: NOT DETECTED
V CHOLERAE DNA STL QL NAA+NON-PROBE: NOT DETECTED
VARIANT LYMPHS NFR BLD MANUAL: 1 % (ref 0–5)
VARIANT LYMPHS NFR BLD MANUAL: 23.2 % (ref 19.6–45.3)
VARIANT LYMPHS NFR BLD MANUAL: 25.4 % (ref 0–5)
VARIANT LYMPHS NFR BLD MANUAL: 28.4 % (ref 19.6–45.3)
VARIANT LYMPHS NFR BLD MANUAL: 29 % (ref 19.6–45.3)
VARIANT LYMPHS NFR BLD MANUAL: 3.1 % (ref 0–5)
VARIANT LYMPHS NFR BLD MANUAL: 32.5 % (ref 19.6–45.3)
VARIANT LYMPHS NFR BLD MANUAL: 5.1 % (ref 0–5)
VARIANT LYMPHS NFR BLD MANUAL: 51 % (ref 19.6–45.3)
VARIANT LYMPHS NFR BLD MANUAL: 6.1 % (ref 0–5)
VARIANT LYMPHS NFR BLD MANUAL: 60.2 % (ref 19.6–45.3)
VARIANT LYMPHS NFR BLD MANUAL: 61.3 % (ref 19.6–45.3)
VARIANT LYMPHS NFR BLD MANUAL: 61.5 % (ref 19.6–45.3)
VARIANT LYMPHS NFR BLD MANUAL: 63.1 % (ref 19.6–45.3)
VARIANT LYMPHS NFR BLD MANUAL: 7.3 % (ref 0–5)
VENTILATOR MODE: ABNORMAL
VENTILATOR MODE: ABNORMAL
VENTILATOR MODE: AC
VIT B12 BLD-MCNC: 519 PG/ML (ref 211–946)
VLDLC SERPL-MCNC: 55 MG/DL (ref 5–40)
VT ON VENT VENT: 500 ML
VT ON VENT VENT: 550 ML
WBC # UR STRIP: ABNORMAL /HPF
WBC # UR STRIP: ABNORMAL /HPF
WBC MORPH BLD: NORMAL
WBC NRBC COR # BLD AUTO: 12.07 10*3/MM3 (ref 3.4–10.8)
WBC NRBC COR # BLD AUTO: 12.63 10*3/MM3 (ref 3.4–10.8)
WBC NRBC COR # BLD AUTO: 21.07 10*3/MM3 (ref 3.4–10.8)
WBC NRBC COR # BLD AUTO: 6.17 10*3/MM3 (ref 3.4–10.8)
WBC NRBC COR # BLD AUTO: 6.32 10*3/MM3 (ref 3.4–10.8)
WBC NRBC COR # BLD AUTO: 7.24 10*3/MM3 (ref 3.4–10.8)
WBC NRBC COR # BLD AUTO: 7.41 10*3/MM3 (ref 3.4–10.8)
WBC NRBC COR # BLD AUTO: 8.05 10*3/MM3 (ref 3.4–10.8)
WBC NRBC COR # BLD AUTO: 8.57 10*3/MM3 (ref 3.4–10.8)
WBC NRBC COR # BLD AUTO: 8.64 10*3/MM3 (ref 3.4–10.8)
WBC NRBC COR # BLD AUTO: 8.65 10*3/MM3 (ref 3.4–10.8)
Y ENTEROCOL DNA STL QL NAA+NON-PROBE: NOT DETECTED

## 2025-01-01 PROCEDURE — 82948 REAGENT STRIP/BLOOD GLUCOSE: CPT

## 2025-01-01 PROCEDURE — 93010 ELECTROCARDIOGRAM REPORT: CPT | Performed by: INTERNAL MEDICINE

## 2025-01-01 PROCEDURE — 92526 ORAL FUNCTION THERAPY: CPT

## 2025-01-01 PROCEDURE — 25010000002 EPINEPHRINE 1 MG/10ML SOLUTION PREFILLED SYRINGE: Performed by: NURSE PRACTITIONER

## 2025-01-01 PROCEDURE — 25010000002 PROPOFOL 10 MG/ML EMULSION: Performed by: NURSE PRACTITIONER

## 2025-01-01 PROCEDURE — 25010000002 METRONIDAZOLE 500 MG/100ML SOLUTION: Performed by: NURSE PRACTITIONER

## 2025-01-01 PROCEDURE — 80053 COMPREHEN METABOLIC PANEL: CPT | Performed by: INTERNAL MEDICINE

## 2025-01-01 PROCEDURE — 25010000002 HYDROCORTISONE SOD SUC (PF) 100 MG RECONSTITUTED SOLUTION: Performed by: NURSE PRACTITIONER

## 2025-01-01 PROCEDURE — 83605 ASSAY OF LACTIC ACID: CPT | Performed by: PHYSICIAN ASSISTANT

## 2025-01-01 PROCEDURE — 82805 BLOOD GASES W/O2 SATURATION: CPT

## 2025-01-01 PROCEDURE — 76705 ECHO EXAM OF ABDOMEN: CPT

## 2025-01-01 PROCEDURE — 86900 BLOOD TYPING SEROLOGIC ABO: CPT

## 2025-01-01 PROCEDURE — 94799 UNLISTED PULMONARY SVC/PX: CPT

## 2025-01-01 PROCEDURE — 71045 X-RAY EXAM CHEST 1 VIEW: CPT

## 2025-01-01 PROCEDURE — 25810000003 LACTATED RINGERS SOLUTION: Performed by: INTERNAL MEDICINE

## 2025-01-01 PROCEDURE — 82550 ASSAY OF CK (CPK): CPT | Performed by: INTERNAL MEDICINE

## 2025-01-01 PROCEDURE — 25010000002 PHENYLEPHRINE 10 MG/ML SOLUTION: Performed by: PHYSICIAN ASSISTANT

## 2025-01-01 PROCEDURE — 83735 ASSAY OF MAGNESIUM: CPT | Performed by: INTERNAL MEDICINE

## 2025-01-01 PROCEDURE — 25810000003 SODIUM CHLORIDE 0.9 % SOLUTION: Performed by: NURSE PRACTITIONER

## 2025-01-01 PROCEDURE — 31500 INSERT EMERGENCY AIRWAY: CPT | Performed by: NURSE PRACTITIONER

## 2025-01-01 PROCEDURE — 93005 ELECTROCARDIOGRAM TRACING: CPT | Performed by: NURSE PRACTITIONER

## 2025-01-01 PROCEDURE — 85025 COMPLETE CBC W/AUTO DIFF WBC: CPT | Performed by: NURSE PRACTITIONER

## 2025-01-01 PROCEDURE — 80053 COMPREHEN METABOLIC PANEL: CPT | Performed by: NURSE PRACTITIONER

## 2025-01-01 PROCEDURE — 5A1945Z RESPIRATORY VENTILATION, 24-96 CONSECUTIVE HOURS: ICD-10-PCS | Performed by: INTERNAL MEDICINE

## 2025-01-01 PROCEDURE — 99497 ADVNCD CARE PLAN 30 MIN: CPT | Performed by: CLINICAL NURSE SPECIALIST

## 2025-01-01 PROCEDURE — 97162 PT EVAL MOD COMPLEX 30 MIN: CPT

## 2025-01-01 PROCEDURE — 82728 ASSAY OF FERRITIN: CPT | Performed by: NURSE PRACTITIONER

## 2025-01-01 PROCEDURE — 25010000002 FUROSEMIDE PER 20 MG: Performed by: FAMILY MEDICINE

## 2025-01-01 PROCEDURE — 93005 ELECTROCARDIOGRAM TRACING: CPT | Performed by: FAMILY MEDICINE

## 2025-01-01 PROCEDURE — 25010000002 NA FERRIC GLUC CPLX PER 12.5 MG: Performed by: INTERNAL MEDICINE

## 2025-01-01 PROCEDURE — 85007 BL SMEAR W/DIFF WBC COUNT: CPT | Performed by: INTERNAL MEDICINE

## 2025-01-01 PROCEDURE — P9047 ALBUMIN (HUMAN), 25%, 50ML: HCPCS | Performed by: PHYSICIAN ASSISTANT

## 2025-01-01 PROCEDURE — 85007 BL SMEAR W/DIFF WBC COUNT: CPT | Performed by: PHYSICIAN ASSISTANT

## 2025-01-01 PROCEDURE — 83010 ASSAY OF HAPTOGLOBIN QUANT: CPT | Performed by: INTERNAL MEDICINE

## 2025-01-01 PROCEDURE — 87040 BLOOD CULTURE FOR BACTERIA: CPT | Performed by: INTERNAL MEDICINE

## 2025-01-01 PROCEDURE — 83050 HGB METHEMOGLOBIN QUAN: CPT

## 2025-01-01 PROCEDURE — 94003 VENT MGMT INPAT SUBQ DAY: CPT

## 2025-01-01 PROCEDURE — 93005 ELECTROCARDIOGRAM TRACING: CPT | Performed by: EMERGENCY MEDICINE

## 2025-01-01 PROCEDURE — 83036 HEMOGLOBIN GLYCOSYLATED A1C: CPT | Performed by: NURSE PRACTITIONER

## 2025-01-01 PROCEDURE — 25010000002 HYDROCORTISONE SOD SUC (PF) 100 MG RECONSTITUTED SOLUTION: Performed by: PHYSICIAN ASSISTANT

## 2025-01-01 PROCEDURE — 99223 1ST HOSP IP/OBS HIGH 75: CPT | Performed by: INTERNAL MEDICINE

## 2025-01-01 PROCEDURE — 25010000003 VASOPRESSIN 0.2 UNITS/ML SOLUTION: Performed by: NURSE PRACTITIONER

## 2025-01-01 PROCEDURE — 83605 ASSAY OF LACTIC ACID: CPT | Performed by: NURSE PRACTITIONER

## 2025-01-01 PROCEDURE — 36415 COLL VENOUS BLD VENIPUNCTURE: CPT | Performed by: NURSE PRACTITIONER

## 2025-01-01 PROCEDURE — 25810000003 LACTATED RINGERS SOLUTION: Performed by: NURSE PRACTITIONER

## 2025-01-01 PROCEDURE — 86880 COOMBS TEST DIRECT: CPT | Performed by: INTERNAL MEDICINE

## 2025-01-01 PROCEDURE — 94761 N-INVAS EAR/PLS OXIMETRY MLT: CPT

## 2025-01-01 PROCEDURE — 99233 SBSQ HOSP IP/OBS HIGH 50: CPT | Performed by: INTERNAL MEDICINE

## 2025-01-01 PROCEDURE — 93306 TTE W/DOPPLER COMPLETE: CPT | Performed by: INTERNAL MEDICINE

## 2025-01-01 PROCEDURE — 87205 SMEAR GRAM STAIN: CPT | Performed by: NURSE PRACTITIONER

## 2025-01-01 PROCEDURE — 36600 WITHDRAWAL OF ARTERIAL BLOOD: CPT

## 2025-01-01 PROCEDURE — 84145 PROCALCITONIN (PCT): CPT | Performed by: SURGERY

## 2025-01-01 PROCEDURE — 25810000003 SODIUM CHLORIDE 0.9 % SOLUTION: Performed by: PHYSICIAN ASSISTANT

## 2025-01-01 PROCEDURE — 85025 COMPLETE CBC W/AUTO DIFF WBC: CPT | Performed by: INTERNAL MEDICINE

## 2025-01-01 PROCEDURE — 85730 THROMBOPLASTIN TIME PARTIAL: CPT | Performed by: NURSE PRACTITIONER

## 2025-01-01 PROCEDURE — 85007 BL SMEAR W/DIFF WBC COUNT: CPT | Performed by: NURSE PRACTITIONER

## 2025-01-01 PROCEDURE — 85730 THROMBOPLASTIN TIME PARTIAL: CPT | Performed by: INTERNAL MEDICINE

## 2025-01-01 PROCEDURE — 25010000002 ALBUMIN HUMAN 25% PER 50 ML: Performed by: PHYSICIAN ASSISTANT

## 2025-01-01 PROCEDURE — 74174 CTA ABD&PLVS W/CONTRAST: CPT

## 2025-01-01 PROCEDURE — 74176 CT ABD & PELVIS W/O CONTRAST: CPT

## 2025-01-01 PROCEDURE — 63710000001 INSULIN REGULAR HUMAN PER 5 UNITS: Performed by: INTERNAL MEDICINE

## 2025-01-01 PROCEDURE — 83605 ASSAY OF LACTIC ACID: CPT | Performed by: FAMILY MEDICINE

## 2025-01-01 PROCEDURE — 25810000003 SODIUM CHLORIDE 0.9 % SOLUTION: Performed by: INTERNAL MEDICINE

## 2025-01-01 PROCEDURE — 85027 COMPLETE CBC AUTOMATED: CPT | Performed by: INTERNAL MEDICINE

## 2025-01-01 PROCEDURE — 81001 URINALYSIS AUTO W/SCOPE: CPT | Performed by: INTERNAL MEDICINE

## 2025-01-01 PROCEDURE — 97166 OT EVAL MOD COMPLEX 45 MIN: CPT | Performed by: OCCUPATIONAL THERAPIST

## 2025-01-01 PROCEDURE — 97530 THERAPEUTIC ACTIVITIES: CPT

## 2025-01-01 PROCEDURE — 83605 ASSAY OF LACTIC ACID: CPT | Performed by: EMERGENCY MEDICINE

## 2025-01-01 PROCEDURE — 02HV33Z INSERTION OF INFUSION DEVICE INTO SUPERIOR VENA CAVA, PERCUTANEOUS APPROACH: ICD-10-PCS | Performed by: NURSE PRACTITIONER

## 2025-01-01 PROCEDURE — 87493 C DIFF AMPLIFIED PROBE: CPT | Performed by: EMERGENCY MEDICINE

## 2025-01-01 PROCEDURE — 85384 FIBRINOGEN ACTIVITY: CPT | Performed by: INTERNAL MEDICINE

## 2025-01-01 PROCEDURE — 25010000003 DEXTROSE 5 % SOLUTION 1,000 ML FLEX CONT: Performed by: NURSE PRACTITIONER

## 2025-01-01 PROCEDURE — 92950 HEART/LUNG RESUSCITATION CPR: CPT

## 2025-01-01 PROCEDURE — 0202U NFCT DS 22 TRGT SARS-COV-2: CPT | Performed by: INTERNAL MEDICINE

## 2025-01-01 PROCEDURE — 03HY32Z INSERTION OF MONITORING DEVICE INTO UPPER ARTERY, PERCUTANEOUS APPROACH: ICD-10-PCS | Performed by: NURSE PRACTITIONER

## 2025-01-01 PROCEDURE — 85060 BLOOD SMEAR INTERPRETATION: CPT | Performed by: INTERNAL MEDICINE

## 2025-01-01 PROCEDURE — 86920 COMPATIBILITY TEST SPIN: CPT

## 2025-01-01 PROCEDURE — 85025 COMPLETE CBC W/AUTO DIFF WBC: CPT | Performed by: PHYSICIAN ASSISTANT

## 2025-01-01 PROCEDURE — 92610 EVALUATE SWALLOWING FUNCTION: CPT

## 2025-01-01 PROCEDURE — P9035 PLATELET PHERES LEUKOREDUCED: HCPCS

## 2025-01-01 PROCEDURE — 87641 MR-STAPH DNA AMP PROBE: CPT | Performed by: NURSE PRACTITIONER

## 2025-01-01 PROCEDURE — 25010000002 ONDANSETRON PER 1 MG: Performed by: NURSE PRACTITIONER

## 2025-01-01 PROCEDURE — 86901 BLOOD TYPING SEROLOGIC RH(D): CPT | Performed by: SURGERY

## 2025-01-01 PROCEDURE — 83735 ASSAY OF MAGNESIUM: CPT | Performed by: NURSE PRACTITIONER

## 2025-01-01 PROCEDURE — 93005 ELECTROCARDIOGRAM TRACING: CPT | Performed by: INTERNAL MEDICINE

## 2025-01-01 PROCEDURE — 87186 SC STD MICRODIL/AGAR DIL: CPT | Performed by: NURSE PRACTITIONER

## 2025-01-01 PROCEDURE — 25010000002 MIDAZOLAM PER 1MG: Performed by: CLINICAL NURSE SPECIALIST

## 2025-01-01 PROCEDURE — 36430 TRANSFUSION BLD/BLD COMPNT: CPT

## 2025-01-01 PROCEDURE — 82803 BLOOD GASES ANY COMBINATION: CPT

## 2025-01-01 PROCEDURE — 25010000002 CEFTRIAXONE PER 250 MG: Performed by: NURSE PRACTITIONER

## 2025-01-01 PROCEDURE — P9016 RBC LEUKOCYTES REDUCED: HCPCS

## 2025-01-01 PROCEDURE — 5A12012 PERFORMANCE OF CARDIAC OUTPUT, SINGLE, MANUAL: ICD-10-PCS | Performed by: INTERNAL MEDICINE

## 2025-01-01 PROCEDURE — 86850 RBC ANTIBODY SCREEN: CPT | Performed by: SURGERY

## 2025-01-01 PROCEDURE — 99285 EMERGENCY DEPT VISIT HI MDM: CPT | Performed by: EMERGENCY MEDICINE

## 2025-01-01 PROCEDURE — 86901 BLOOD TYPING SEROLOGIC RH(D): CPT

## 2025-01-01 PROCEDURE — 86900 BLOOD TYPING SEROLOGIC ABO: CPT | Performed by: SURGERY

## 2025-01-01 PROCEDURE — 82375 ASSAY CARBOXYHB QUANT: CPT

## 2025-01-01 PROCEDURE — 80061 LIPID PANEL: CPT | Performed by: NURSE PRACTITIONER

## 2025-01-01 PROCEDURE — 85362 FIBRIN DEGRADATION PRODUCTS: CPT | Performed by: INTERNAL MEDICINE

## 2025-01-01 PROCEDURE — 93005 ELECTROCARDIOGRAM TRACING: CPT

## 2025-01-01 PROCEDURE — 84100 ASSAY OF PHOSPHORUS: CPT | Performed by: NURSE PRACTITIONER

## 2025-01-01 PROCEDURE — 25510000001 PERFLUTREN 6.52 MG/ML SUSPENSION: Performed by: INTERNAL MEDICINE

## 2025-01-01 PROCEDURE — 0BH17EZ INSERTION OF ENDOTRACHEAL AIRWAY INTO TRACHEA, VIA NATURAL OR ARTIFICIAL OPENING: ICD-10-PCS | Performed by: NURSE PRACTITIONER

## 2025-01-01 PROCEDURE — 83540 ASSAY OF IRON: CPT | Performed by: NURSE PRACTITIONER

## 2025-01-01 PROCEDURE — 99223 1ST HOSP IP/OBS HIGH 75: CPT | Performed by: CLINICAL NURSE SPECIALIST

## 2025-01-01 PROCEDURE — 25810000003 SODIUM CHLORIDE 0.9 % SOLUTION: Performed by: EMERGENCY MEDICINE

## 2025-01-01 PROCEDURE — 36415 COLL VENOUS BLD VENIPUNCTURE: CPT

## 2025-01-01 PROCEDURE — 82746 ASSAY OF FOLIC ACID SERUM: CPT | Performed by: INTERNAL MEDICINE

## 2025-01-01 PROCEDURE — 84100 ASSAY OF PHOSPHORUS: CPT | Performed by: INTERNAL MEDICINE

## 2025-01-01 PROCEDURE — 85610 PROTHROMBIN TIME: CPT | Performed by: NURSE PRACTITIONER

## 2025-01-01 PROCEDURE — 85007 BL SMEAR W/DIFF WBC COUNT: CPT | Performed by: EMERGENCY MEDICINE

## 2025-01-01 PROCEDURE — 94002 VENT MGMT INPAT INIT DAY: CPT

## 2025-01-01 PROCEDURE — 97110 THERAPEUTIC EXERCISES: CPT

## 2025-01-01 PROCEDURE — 82607 VITAMIN B-12: CPT | Performed by: NURSE PRACTITIONER

## 2025-01-01 PROCEDURE — 74018 RADEX ABDOMEN 1 VIEW: CPT

## 2025-01-01 PROCEDURE — 81001 URINALYSIS AUTO W/SCOPE: CPT | Performed by: EMERGENCY MEDICINE

## 2025-01-01 PROCEDURE — 80053 COMPREHEN METABOLIC PANEL: CPT | Performed by: PHYSICIAN ASSISTANT

## 2025-01-01 PROCEDURE — 25010000002 PIPERACILLIN SOD-TAZOBACTAM PER 1 G: Performed by: NURSE PRACTITIONER

## 2025-01-01 PROCEDURE — 84466 ASSAY OF TRANSFERRIN: CPT | Performed by: NURSE PRACTITIONER

## 2025-01-01 PROCEDURE — 83615 LACTATE (LD) (LDH) ENZYME: CPT | Performed by: INTERNAL MEDICINE

## 2025-01-01 PROCEDURE — 25010000002 MORPHINE PER 10 MG: Performed by: CLINICAL NURSE SPECIALIST

## 2025-01-01 PROCEDURE — 80053 COMPREHEN METABOLIC PANEL: CPT | Performed by: EMERGENCY MEDICINE

## 2025-01-01 PROCEDURE — 87070 CULTURE OTHR SPECIMN AEROBIC: CPT | Performed by: NURSE PRACTITIONER

## 2025-01-01 PROCEDURE — 25010000002 VANCOMYCIN 2-0.9 GM/500ML-% SOLUTION: Performed by: NURSE PRACTITIONER

## 2025-01-01 PROCEDURE — 83690 ASSAY OF LIPASE: CPT | Performed by: EMERGENCY MEDICINE

## 2025-01-01 PROCEDURE — 25010000002 MAGNESIUM SULFATE IN D5W 1G/100ML (PREMIX) 1-5 GM/100ML-% SOLUTION: Performed by: FAMILY MEDICINE

## 2025-01-01 PROCEDURE — 87507 IADNA-DNA/RNA PROBE TQ 12-25: CPT | Performed by: EMERGENCY MEDICINE

## 2025-01-01 PROCEDURE — 97535 SELF CARE MNGMENT TRAINING: CPT

## 2025-01-01 PROCEDURE — P9100 PATHOGEN TEST FOR PLATELETS: HCPCS

## 2025-01-01 PROCEDURE — 85025 COMPLETE CBC W/AUTO DIFF WBC: CPT | Performed by: EMERGENCY MEDICINE

## 2025-01-01 PROCEDURE — 94664 DEMO&/EVAL PT USE INHALER: CPT

## 2025-01-01 PROCEDURE — 25010000002 CALCIUM GLUCONATE-NACL 1-0.675 GM/50ML-% SOLUTION: Performed by: NURSE PRACTITIONER

## 2025-01-01 PROCEDURE — 85610 PROTHROMBIN TIME: CPT | Performed by: INTERNAL MEDICINE

## 2025-01-01 PROCEDURE — 93306 TTE W/DOPPLER COMPLETE: CPT

## 2025-01-01 PROCEDURE — 25010000002 MORPHINE PER 10 MG: Performed by: NURSE PRACTITIONER

## 2025-01-01 PROCEDURE — 25510000001 IOPAMIDOL PER 1 ML: Performed by: INTERNAL MEDICINE

## 2025-01-01 PROCEDURE — 87040 BLOOD CULTURE FOR BACTERIA: CPT | Performed by: NURSE PRACTITIONER

## 2025-01-01 RX ORDER — ALBUMIN (HUMAN) 12.5 G/50ML
50 SOLUTION INTRAVENOUS ONCE
Status: COMPLETED | OUTPATIENT
Start: 2025-01-01 | End: 2025-01-01

## 2025-01-01 RX ORDER — LOSARTAN POTASSIUM 25 MG/1
25 TABLET ORAL
Status: DISCONTINUED | OUTPATIENT
Start: 2025-01-01 | End: 2025-01-01

## 2025-01-01 RX ORDER — GABAPENTIN 300 MG/1
300 CAPSULE ORAL EVERY 12 HOURS SCHEDULED
Status: DISCONTINUED | OUTPATIENT
Start: 2025-01-01 | End: 2025-01-01

## 2025-01-01 RX ORDER — POTASSIUM CHLORIDE 1500 MG/1
40 TABLET, EXTENDED RELEASE ORAL
Status: COMPLETED | OUTPATIENT
Start: 2025-01-01 | End: 2025-01-01

## 2025-01-01 RX ORDER — CHLORHEXIDINE GLUCONATE 500 MG/1
1 CLOTH TOPICAL ONCE
Status: COMPLETED | OUTPATIENT
Start: 2025-01-01 | End: 2025-01-01

## 2025-01-01 RX ORDER — POTASSIUM CHLORIDE 1500 MG/1
20 TABLET, EXTENDED RELEASE ORAL
Status: COMPLETED | OUTPATIENT
Start: 2025-01-01 | End: 2025-01-01

## 2025-01-01 RX ORDER — TAMSULOSIN HYDROCHLORIDE 0.4 MG/1
0.4 CAPSULE ORAL DAILY
Status: DISCONTINUED | OUTPATIENT
Start: 2025-01-01 | End: 2025-01-01

## 2025-01-01 RX ORDER — PANTOPRAZOLE SODIUM 40 MG/1
40 TABLET, DELAYED RELEASE ORAL
Status: DISCONTINUED | OUTPATIENT
Start: 2025-01-01 | End: 2025-01-01

## 2025-01-01 RX ORDER — NOREPINEPHRINE BITARTRATE 0.03 MG/ML
.02-.3 INJECTION, SOLUTION INTRAVENOUS
Status: DISCONTINUED | OUTPATIENT
Start: 2025-01-01 | End: 2025-01-01

## 2025-01-01 RX ORDER — ACETAMINOPHEN 325 MG/1
650 TABLET ORAL EVERY 4 HOURS PRN
Status: DISCONTINUED | OUTPATIENT
Start: 2025-01-01 | End: 2025-01-01

## 2025-01-01 RX ORDER — MIDAZOLAM HYDROCHLORIDE 2 MG/2ML
1 INJECTION, SOLUTION INTRAMUSCULAR; INTRAVENOUS
Status: DISCONTINUED | OUTPATIENT
Start: 2025-01-01 | End: 2025-01-01 | Stop reason: HOSPADM

## 2025-01-01 RX ORDER — BISACODYL 5 MG/1
5 TABLET, DELAYED RELEASE ORAL DAILY PRN
Status: DISCONTINUED | OUTPATIENT
Start: 2025-01-01 | End: 2025-01-01

## 2025-01-01 RX ORDER — SODIUM CHLORIDE 9 MG/ML
100 INJECTION, SOLUTION INTRAVENOUS CONTINUOUS
Status: DISPENSED | OUTPATIENT
Start: 2025-01-01 | End: 2025-01-01

## 2025-01-01 RX ORDER — IBUPROFEN 600 MG/1
1 TABLET ORAL
Status: DISCONTINUED | OUTPATIENT
Start: 2025-01-01 | End: 2025-01-01

## 2025-01-01 RX ORDER — DEXTROSE MONOHYDRATE AND SODIUM CHLORIDE 5; .45 G/100ML; G/100ML
75 INJECTION, SOLUTION INTRAVENOUS CONTINUOUS
Status: DISCONTINUED | OUTPATIENT
Start: 2025-01-01 | End: 2025-01-01

## 2025-01-01 RX ORDER — AMOXICILLIN 250 MG
2 CAPSULE ORAL 2 TIMES DAILY
Status: DISCONTINUED | OUTPATIENT
Start: 2025-01-01 | End: 2025-01-01

## 2025-01-01 RX ORDER — METOPROLOL TARTRATE 1 MG/ML
5 INJECTION, SOLUTION INTRAVENOUS ONCE
Status: COMPLETED | OUTPATIENT
Start: 2025-01-01 | End: 2025-01-01

## 2025-01-01 RX ORDER — BISACODYL 10 MG
10 SUPPOSITORY, RECTAL RECTAL DAILY PRN
Status: DISCONTINUED | OUTPATIENT
Start: 2025-01-01 | End: 2025-01-01

## 2025-01-01 RX ORDER — NICOTINE POLACRILEX 4 MG
15 LOZENGE BUCCAL
Status: DISCONTINUED | OUTPATIENT
Start: 2025-01-01 | End: 2025-01-01

## 2025-01-01 RX ORDER — IPRATROPIUM BROMIDE AND ALBUTEROL SULFATE 2.5; .5 MG/3ML; MG/3ML
3 SOLUTION RESPIRATORY (INHALATION)
Status: DISCONTINUED | OUTPATIENT
Start: 2025-01-01 | End: 2025-01-01

## 2025-01-01 RX ORDER — FENTANYL CITRATE-0.9 % NACL/PF 10 MCG/ML
50-300 PLASTIC BAG, INJECTION (ML) INTRAVENOUS
Status: DISCONTINUED | OUTPATIENT
Start: 2025-01-01 | End: 2025-01-01 | Stop reason: HOSPADM

## 2025-01-01 RX ORDER — METOPROLOL TARTRATE 1 MG/ML
2.5 INJECTION, SOLUTION INTRAVENOUS EVERY 6 HOURS
Status: DISCONTINUED | OUTPATIENT
Start: 2025-01-01 | End: 2025-01-01

## 2025-01-01 RX ORDER — MIDAZOLAM HYDROCHLORIDE 2 MG/2ML
2 INJECTION, SOLUTION INTRAMUSCULAR; INTRAVENOUS ONCE
Status: COMPLETED | OUTPATIENT
Start: 2025-01-01 | End: 2025-01-01

## 2025-01-01 RX ORDER — DEXTROSE MONOHYDRATE 25 G/50ML
25 INJECTION, SOLUTION INTRAVENOUS
Status: DISCONTINUED | OUTPATIENT
Start: 2025-01-01 | End: 2025-01-01

## 2025-01-01 RX ORDER — VANCOMYCIN 2 GRAM/500 ML IN 0.9 % SODIUM CHLORIDE INTRAVENOUS
15 ONCE
Status: COMPLETED | OUTPATIENT
Start: 2025-01-01 | End: 2025-01-01

## 2025-01-01 RX ORDER — DILTIAZEM HCL/D5W 125 MG/125
5-15 PLASTIC BAG, INJECTION (ML) INTRAVENOUS
Status: DISCONTINUED | OUTPATIENT
Start: 2025-01-01 | End: 2025-01-01

## 2025-01-01 RX ORDER — METRONIDAZOLE 500 MG/100ML
500 INJECTION, SOLUTION INTRAVENOUS EVERY 8 HOURS
Status: DISCONTINUED | OUTPATIENT
Start: 2025-01-01 | End: 2025-01-01

## 2025-01-01 RX ORDER — DEXMEDETOMIDINE HYDROCHLORIDE 4 UG/ML
.2-1.5 INJECTION, SOLUTION INTRAVENOUS
Status: DISCONTINUED | OUTPATIENT
Start: 2025-01-01 | End: 2025-01-01

## 2025-01-01 RX ORDER — BUDESONIDE 0.25 MG/2ML
0.25 INHALANT ORAL
Status: DISCONTINUED | OUTPATIENT
Start: 2025-01-01 | End: 2025-01-01

## 2025-01-01 RX ORDER — ACETAMINOPHEN 650 MG/1
650 SUPPOSITORY RECTAL EVERY 4 HOURS PRN
Status: DISCONTINUED | OUTPATIENT
Start: 2025-01-01 | End: 2025-01-01

## 2025-01-01 RX ORDER — ESCITALOPRAM OXALATE 10 MG/1
20 TABLET ORAL DAILY
Status: DISCONTINUED | OUTPATIENT
Start: 2025-01-01 | End: 2025-01-01

## 2025-01-01 RX ORDER — SODIUM CHLORIDE 9 MG/ML
100 INJECTION, SOLUTION INTRAVENOUS CONTINUOUS
Status: DISCONTINUED | OUTPATIENT
Start: 2025-01-01 | End: 2025-01-01

## 2025-01-01 RX ORDER — HYDROCORTISONE SODIUM SUCCINATE 100 MG/2ML
100 INJECTION INTRAMUSCULAR; INTRAVENOUS EVERY 6 HOURS
Status: DISCONTINUED | OUTPATIENT
Start: 2025-01-01 | End: 2025-01-01

## 2025-01-01 RX ORDER — ONDANSETRON 4 MG/1
4 TABLET, ORALLY DISINTEGRATING ORAL EVERY 6 HOURS PRN
Status: DISCONTINUED | OUTPATIENT
Start: 2025-01-01 | End: 2025-01-01 | Stop reason: HOSPADM

## 2025-01-01 RX ORDER — FUROSEMIDE 10 MG/ML
20 INJECTION INTRAMUSCULAR; INTRAVENOUS ONCE
Status: COMPLETED | OUTPATIENT
Start: 2025-01-01 | End: 2025-01-01

## 2025-01-01 RX ORDER — DEXTROSE MONOHYDRATE AND SODIUM CHLORIDE 5; .45 G/100ML; G/100ML
75 INJECTION, SOLUTION INTRAVENOUS CONTINUOUS
Status: DISPENSED | OUTPATIENT
Start: 2025-01-01 | End: 2025-01-01

## 2025-01-01 RX ORDER — METOPROLOL TARTRATE 50 MG
50 TABLET ORAL EVERY 12 HOURS SCHEDULED
Status: DISCONTINUED | OUTPATIENT
Start: 2025-01-01 | End: 2025-01-01

## 2025-01-01 RX ORDER — SACCHAROMYCES BOULARDII 250 MG
250 CAPSULE ORAL 2 TIMES DAILY
Status: DISCONTINUED | OUTPATIENT
Start: 2025-01-01 | End: 2025-01-01

## 2025-01-01 RX ORDER — VANCOMYCIN/0.9 % SOD CHLORIDE 750 MG/250
750 PLASTIC BAG, INJECTION (ML) INTRAVENOUS EVERY 24 HOURS
Status: DISCONTINUED | OUTPATIENT
Start: 2025-01-01 | End: 2025-01-01

## 2025-01-01 RX ORDER — PRIMIDONE 50 MG/1
50 TABLET ORAL 3 TIMES DAILY
Status: DISCONTINUED | OUTPATIENT
Start: 2025-01-01 | End: 2025-01-01

## 2025-01-01 RX ORDER — ONDANSETRON 2 MG/ML
4 INJECTION INTRAMUSCULAR; INTRAVENOUS EVERY 6 HOURS PRN
Status: DISCONTINUED | OUTPATIENT
Start: 2025-01-01 | End: 2025-01-01 | Stop reason: HOSPADM

## 2025-01-01 RX ORDER — MAGNESIUM SULFATE 1 G/100ML
1 INJECTION INTRAVENOUS ONCE
Status: COMPLETED | OUTPATIENT
Start: 2025-01-01 | End: 2025-01-01

## 2025-01-01 RX ORDER — POLYETHYLENE GLYCOL 3350 17 G/17G
17 POWDER, FOR SOLUTION ORAL DAILY PRN
Status: DISCONTINUED | OUTPATIENT
Start: 2025-01-01 | End: 2025-01-01

## 2025-01-01 RX ORDER — DEXTROSE MONOHYDRATE, SODIUM CHLORIDE, SODIUM LACTATE, POTASSIUM CHLORIDE, CALCIUM CHLORIDE 5; 600; 310; 179; 20 G/100ML; MG/100ML; MG/100ML; MG/100ML; MG/100ML
100 INJECTION, SOLUTION INTRAVENOUS CONTINUOUS
Status: DISCONTINUED | OUTPATIENT
Start: 2025-01-01 | End: 2025-01-01

## 2025-01-01 RX ORDER — HYDROCORTISONE SODIUM SUCCINATE 100 MG/2ML
50 INJECTION INTRAMUSCULAR; INTRAVENOUS EVERY 6 HOURS
Status: DISCONTINUED | OUTPATIENT
Start: 2025-01-01 | End: 2025-01-01

## 2025-01-01 RX ORDER — PHENYLEPHRINE HCL IN 0.9% NACL 50MG/250ML
.5-3 PLASTIC BAG, INJECTION (ML) INTRAVENOUS
Status: DISCONTINUED | OUTPATIENT
Start: 2025-01-01 | End: 2025-01-01

## 2025-01-01 RX ORDER — LOPERAMIDE HYDROCHLORIDE 2 MG/1
4 CAPSULE ORAL 4 TIMES DAILY PRN
Status: DISCONTINUED | OUTPATIENT
Start: 2025-01-01 | End: 2025-01-01

## 2025-01-01 RX ORDER — IOPAMIDOL 612 MG/ML
100 INJECTION, SOLUTION INTRAVASCULAR
Status: DISCONTINUED | OUTPATIENT
Start: 2025-01-01 | End: 2025-01-01

## 2025-01-01 RX ORDER — ACETAMINOPHEN 160 MG/5ML
650 SOLUTION ORAL EVERY 4 HOURS PRN
Status: DISCONTINUED | OUTPATIENT
Start: 2025-01-01 | End: 2025-01-01

## 2025-01-01 RX ORDER — MIDAZOLAM HYDROCHLORIDE 2 MG/2ML
1 INJECTION, SOLUTION INTRAMUSCULAR; INTRAVENOUS ONCE
Status: DISCONTINUED | OUTPATIENT
Start: 2025-01-01 | End: 2025-01-01

## 2025-01-01 RX ORDER — DEXTROSE MONOHYDRATE, SODIUM CHLORIDE, SODIUM LACTATE, POTASSIUM CHLORIDE, CALCIUM CHLORIDE 5; 600; 310; 179; 20 G/100ML; MG/100ML; MG/100ML; MG/100ML; MG/100ML
75 INJECTION, SOLUTION INTRAVENOUS CONTINUOUS
Status: DISCONTINUED | OUTPATIENT
Start: 2025-01-01 | End: 2025-01-01 | Stop reason: SDUPTHER

## 2025-01-01 RX ORDER — POTASSIUM CHLORIDE 1.5 G/1.58G
40 POWDER, FOR SOLUTION ORAL
Status: COMPLETED | OUTPATIENT
Start: 2025-01-01 | End: 2025-01-01

## 2025-01-01 RX ORDER — POTASSIUM CHLORIDE 1500 MG/1
40 TABLET, EXTENDED RELEASE ORAL DAILY
Status: DISCONTINUED | OUTPATIENT
Start: 2025-01-01 | End: 2025-01-01

## 2025-01-01 RX ORDER — CHLORHEXIDINE GLUCONATE 500 MG/1
1 CLOTH TOPICAL EVERY 24 HOURS
Status: DISCONTINUED | OUTPATIENT
Start: 2025-01-01 | End: 2025-01-01

## 2025-01-01 RX ORDER — MORPHINE SULFATE 2 MG/ML
2 INJECTION, SOLUTION INTRAMUSCULAR; INTRAVENOUS
Status: DISCONTINUED | OUTPATIENT
Start: 2025-01-01 | End: 2025-01-01

## 2025-01-01 RX ORDER — IOPAMIDOL 755 MG/ML
100 INJECTION, SOLUTION INTRAVASCULAR
Status: COMPLETED | OUTPATIENT
Start: 2025-01-01 | End: 2025-01-01

## 2025-01-01 RX ORDER — SODIUM CHLORIDE 0.9 % (FLUSH) 0.9 %
10 SYRINGE (ML) INJECTION AS NEEDED
Status: DISCONTINUED | OUTPATIENT
Start: 2025-01-01 | End: 2025-01-01 | Stop reason: HOSPADM

## 2025-01-01 RX ORDER — PANTOPRAZOLE SODIUM 40 MG/10ML
40 INJECTION, POWDER, LYOPHILIZED, FOR SOLUTION INTRAVENOUS
Status: DISCONTINUED | OUTPATIENT
Start: 2025-01-01 | End: 2025-01-01

## 2025-01-01 RX ORDER — DILTIAZEM HYDROCHLORIDE 5 MG/ML
5 INJECTION INTRAVENOUS ONCE
Status: COMPLETED | OUTPATIENT
Start: 2025-01-01 | End: 2025-01-01

## 2025-01-01 RX ORDER — CALCIUM CARBONATE 500 MG/1
2 TABLET, CHEWABLE ORAL 3 TIMES DAILY PRN
Status: DISCONTINUED | OUTPATIENT
Start: 2025-01-01 | End: 2025-01-01

## 2025-01-01 RX ORDER — CALCIUM GLUCONATE 20 MG/ML
1000 INJECTION, SOLUTION INTRAVENOUS ONCE
Status: COMPLETED | OUTPATIENT
Start: 2025-01-01 | End: 2025-01-01

## 2025-01-01 RX ORDER — INDOMETHACIN 25 MG/1
CAPSULE ORAL
Status: COMPLETED | OUTPATIENT
Start: 2025-01-01 | End: 2025-01-01

## 2025-01-01 RX ORDER — SODIUM BICARBONATE 650 MG/1
650 TABLET ORAL ONCE
Status: COMPLETED | OUTPATIENT
Start: 2025-01-01 | End: 2025-01-01

## 2025-01-01 RX ORDER — INDOMETHACIN 25 MG/1
100 CAPSULE ORAL ONCE
Status: COMPLETED | OUTPATIENT
Start: 2025-01-01 | End: 2025-01-01

## 2025-01-01 RX ADMIN — VECURONIUM BROMIDE 0.6 MCG/KG/MIN: 1 INJECTION, POWDER, LYOPHILIZED, FOR SOLUTION INTRAVENOUS at 18:29

## 2025-01-01 RX ADMIN — DEXTROSE MONOHYDRATE, SODIUM CHLORIDE, SODIUM LACTATE, POTASSIUM CHLORIDE, CALCIUM CHLORIDE 75 ML/HR: 5; 600; 310; 179; 20 INJECTION, SOLUTION INTRAVENOUS at 10:53

## 2025-01-01 RX ADMIN — DEXTROSE AND SODIUM CHLORIDE 75 ML/HR: 5; 450 INJECTION, SOLUTION INTRAVENOUS at 08:56

## 2025-01-01 RX ADMIN — SODIUM CHLORIDE 250 MG: 9 INJECTION, SOLUTION INTRAVENOUS at 08:16

## 2025-01-01 RX ADMIN — INSULIN HUMAN 3 UNITS: 100 INJECTION, SOLUTION PARENTERAL at 13:11

## 2025-01-01 RX ADMIN — IOPAMIDOL 100 ML: 755 INJECTION, SOLUTION INTRAVENOUS at 11:44

## 2025-01-01 RX ADMIN — SODIUM CHLORIDE 500 ML: 9 INJECTION, SOLUTION INTRAVENOUS at 10:16

## 2025-01-01 RX ADMIN — SODIUM BICARBONATE 50 MEQ: 84 INJECTION INTRAVENOUS at 13:11

## 2025-01-01 RX ADMIN — TAMSULOSIN HYDROCHLORIDE 0.4 MG: 0.4 CAPSULE ORAL at 16:12

## 2025-01-01 RX ADMIN — Medication 250 MG: at 08:50

## 2025-01-01 RX ADMIN — TAMSULOSIN HYDROCHLORIDE 0.4 MG: 0.4 CAPSULE ORAL at 09:14

## 2025-01-01 RX ADMIN — NOREPINEPHRINE BITARTRATE 0.27 MCG/KG/MIN: 0.03 INJECTION, SOLUTION INTRAVENOUS at 18:00

## 2025-01-01 RX ADMIN — MINERAL OIL, PETROLATUM: 425; 568 OINTMENT OPHTHALMIC at 08:24

## 2025-01-01 RX ADMIN — POTASSIUM CHLORIDE 40 MEQ: 1500 TABLET, EXTENDED RELEASE ORAL at 11:03

## 2025-01-01 RX ADMIN — Medication 250 MG: at 20:02

## 2025-01-01 RX ADMIN — MAGNESIUM SULFATE IN DEXTROSE 1 G: 10 INJECTION, SOLUTION INTRAVENOUS at 21:53

## 2025-01-01 RX ADMIN — SODIUM BICARBONATE 150 MEQ: 84 INJECTION, SOLUTION INTRAVENOUS at 07:24

## 2025-01-01 RX ADMIN — MINERAL OIL, PETROLATUM: 425; 568 OINTMENT OPHTHALMIC at 04:03

## 2025-01-01 RX ADMIN — CALCIUM GLUCONATE 1000 MG: 20 INJECTION, SOLUTION INTRAVENOUS at 11:03

## 2025-01-01 RX ADMIN — GABAPENTIN 300 MG: 300 CAPSULE ORAL at 08:39

## 2025-01-01 RX ADMIN — PRIMIDONE 50 MG: 50 TABLET ORAL at 15:11

## 2025-01-01 RX ADMIN — INSULIN HUMAN 2 UNITS: 100 INJECTION, SOLUTION PARENTERAL at 00:06

## 2025-01-01 RX ADMIN — PHENYLEPHRINE HYDROCHLORIDE 1.5 MCG/KG/MIN: 10 INJECTION INTRAVENOUS at 01:06

## 2025-01-01 RX ADMIN — SODIUM CHLORIDE 100 ML/HR: 9 INJECTION, SOLUTION INTRAVENOUS at 21:40

## 2025-01-01 RX ADMIN — FUROSEMIDE 20 MG: 10 INJECTION, SOLUTION INTRAMUSCULAR; INTRAVENOUS at 03:14

## 2025-01-01 RX ADMIN — DEXTROSE AND SODIUM CHLORIDE 75 ML/HR: 5; 450 INJECTION, SOLUTION INTRAVENOUS at 05:28

## 2025-01-01 RX ADMIN — HYDROCORTISONE SODIUM SUCCINATE 50 MG: 100 INJECTION, POWDER, FOR SOLUTION INTRAMUSCULAR; INTRAVENOUS at 04:37

## 2025-01-01 RX ADMIN — VASOPRESSIN IN 0.9% SODIUM CHLORIDE 0.03 UNITS/MIN: 20 INJECTION INTRAVENOUS at 06:42

## 2025-01-01 RX ADMIN — HYDROCORTISONE SODIUM SUCCINATE 100 MG: 100 INJECTION, POWDER, FOR SOLUTION INTRAMUSCULAR; INTRAVENOUS at 23:30

## 2025-01-01 RX ADMIN — ONDANSETRON 4 MG: 2 INJECTION INTRAMUSCULAR; INTRAVENOUS at 20:17

## 2025-01-01 RX ADMIN — INSULIN HUMAN 3 UNITS: 100 INJECTION, SOLUTION PARENTERAL at 18:35

## 2025-01-01 RX ADMIN — SODIUM BICARBONATE 100 MEQ: 84 INJECTION INTRAVENOUS at 14:22

## 2025-01-01 RX ADMIN — PROPOFOL 60 MCG/KG/MIN: 10 INJECTION, EMULSION INTRAVENOUS at 20:40

## 2025-01-01 RX ADMIN — Medication 250 MG: at 21:16

## 2025-01-01 RX ADMIN — SODIUM CHLORIDE, POTASSIUM CHLORIDE, SODIUM LACTATE AND CALCIUM CHLORIDE 500 ML: 600; 310; 30; 20 INJECTION, SOLUTION INTRAVENOUS at 09:52

## 2025-01-01 RX ADMIN — PROPOFOL 60 MCG/KG/MIN: 10 INJECTION, EMULSION INTRAVENOUS at 22:43

## 2025-01-01 RX ADMIN — TAMSULOSIN HYDROCHLORIDE 0.4 MG: 0.4 CAPSULE ORAL at 08:50

## 2025-01-01 RX ADMIN — MINERAL OIL, PETROLATUM: 425; 568 OINTMENT OPHTHALMIC at 20:09

## 2025-01-01 RX ADMIN — MINERAL OIL, PETROLATUM: 425; 568 OINTMENT OPHTHALMIC at 16:44

## 2025-01-01 RX ADMIN — CEFTRIAXONE SODIUM 1000 MG: 1 INJECTION, POWDER, FOR SOLUTION INTRAMUSCULAR; INTRAVENOUS at 15:45

## 2025-01-01 RX ADMIN — PROPOFOL 30 MCG/KG/MIN: 10 INJECTION, EMULSION INTRAVENOUS at 00:07

## 2025-01-01 RX ADMIN — IPRATROPIUM BROMIDE AND ALBUTEROL SULFATE 3 ML: .5; 3 SOLUTION RESPIRATORY (INHALATION) at 09:05

## 2025-01-01 RX ADMIN — PANTOPRAZOLE SODIUM 40 MG: 40 INJECTION, POWDER, FOR SOLUTION INTRAVENOUS at 10:01

## 2025-01-01 RX ADMIN — SODIUM CHLORIDE 100 ML/HR: 900 INJECTION INTRAVENOUS at 15:45

## 2025-01-01 RX ADMIN — SODIUM BICARBONATE 150 MEQ: 84 INJECTION, SOLUTION INTRAVENOUS at 15:31

## 2025-01-01 RX ADMIN — Medication 2000 MG: at 10:02

## 2025-01-01 RX ADMIN — PROPOFOL 30 MCG/KG/MIN: 10 INJECTION, EMULSION INTRAVENOUS at 20:19

## 2025-01-01 RX ADMIN — SODIUM CHLORIDE, POTASSIUM CHLORIDE, SODIUM LACTATE AND CALCIUM CHLORIDE 2000 ML: 600; 310; 30; 20 INJECTION, SOLUTION INTRAVENOUS at 14:30

## 2025-01-01 RX ADMIN — VASOPRESSIN IN 0.9% SODIUM CHLORIDE 0.03 UNITS/MIN: 20 INJECTION INTRAVENOUS at 21:12

## 2025-01-01 RX ADMIN — SODIUM CHLORIDE 250 MG: 9 INJECTION, SOLUTION INTRAVENOUS at 08:24

## 2025-01-01 RX ADMIN — PHENYLEPHRINE HYDROCHLORIDE 0.5 MCG/KG/MIN: 10 INJECTION INTRAVENOUS at 18:44

## 2025-01-01 RX ADMIN — Medication 1 APPLICATION: at 08:24

## 2025-01-01 RX ADMIN — METOPROLOL TARTRATE 50 MG: 50 TABLET, FILM COATED ORAL at 09:07

## 2025-01-01 RX ADMIN — INSULIN HUMAN 3 UNITS: 100 INJECTION, SOLUTION PARENTERAL at 07:22

## 2025-01-01 RX ADMIN — IPRATROPIUM BROMIDE AND ALBUTEROL SULFATE 3 ML: .5; 3 SOLUTION RESPIRATORY (INHALATION) at 10:24

## 2025-01-01 RX ADMIN — PRIMIDONE 50 MG: 50 TABLET ORAL at 20:22

## 2025-01-01 RX ADMIN — CHLORHEXIDINE GLUCONATE 1 APPLICATION: 500 CLOTH TOPICAL at 10:02

## 2025-01-01 RX ADMIN — MINERAL OIL, PETROLATUM: 425; 568 OINTMENT OPHTHALMIC at 00:16

## 2025-01-01 RX ADMIN — PRIMIDONE 50 MG: 50 TABLET ORAL at 15:15

## 2025-01-01 RX ADMIN — PROPOFOL 30 MCG/KG/MIN: 10 INJECTION, EMULSION INTRAVENOUS at 03:57

## 2025-01-01 RX ADMIN — METOPROLOL TARTRATE 50 MG: 50 TABLET, FILM COATED ORAL at 20:02

## 2025-01-01 RX ADMIN — MINERAL OIL, PETROLATUM: 425; 568 OINTMENT OPHTHALMIC at 20:47

## 2025-01-01 RX ADMIN — POTASSIUM CHLORIDE 40 MEQ: 1500 TABLET, EXTENDED RELEASE ORAL at 15:09

## 2025-01-01 RX ADMIN — NOREPINEPHRINE BITARTRATE 0.12 MCG/KG/MIN: 0.03 INJECTION, SOLUTION INTRAVENOUS at 06:42

## 2025-01-01 RX ADMIN — PIPERACILLIN AND TAZOBACTAM 4.5 G: 4; .5 INJECTION, POWDER, FOR SOLUTION INTRAVENOUS at 15:14

## 2025-01-01 RX ADMIN — DEXTROSE AND SODIUM CHLORIDE 75 ML/HR: 5; 450 INJECTION, SOLUTION INTRAVENOUS at 19:31

## 2025-01-01 RX ADMIN — SODIUM CHLORIDE 100 ML/HR: 900 INJECTION INTRAVENOUS at 07:24

## 2025-01-01 RX ADMIN — ACETAMINOPHEN 650 MG: 650 SUPPOSITORY RECTAL at 16:55

## 2025-01-01 RX ADMIN — PROPOFOL 50 MCG/KG/MIN: 10 INJECTION, EMULSION INTRAVENOUS at 18:44

## 2025-01-01 RX ADMIN — PRIMIDONE 50 MG: 50 TABLET ORAL at 08:39

## 2025-01-01 RX ADMIN — MINERAL OIL, PETROLATUM: 425; 568 OINTMENT OPHTHALMIC at 08:17

## 2025-01-01 RX ADMIN — Medication 250 MG: at 09:14

## 2025-01-01 RX ADMIN — MINERAL OIL, PETROLATUM: 425; 568 OINTMENT OPHTHALMIC at 00:07

## 2025-01-01 RX ADMIN — POTASSIUM CHLORIDE 40 MEQ: 1.5 POWDER, FOR SOLUTION ORAL at 20:22

## 2025-01-01 RX ADMIN — NOREPINEPHRINE BITARTRATE 0.02 MCG/KG/MIN: 0.03 INJECTION, SOLUTION INTRAVENOUS at 14:15

## 2025-01-01 RX ADMIN — PRIMIDONE 50 MG: 50 TABLET ORAL at 20:02

## 2025-01-01 RX ADMIN — Medication 50 MCG/HR: at 16:48

## 2025-01-01 RX ADMIN — PRIMIDONE 50 MG: 50 TABLET ORAL at 16:11

## 2025-01-01 RX ADMIN — VECURONIUM BROMIDE 0.6 MCG/KG/MIN: 1 INJECTION, POWDER, LYOPHILIZED, FOR SOLUTION INTRAVENOUS at 18:22

## 2025-01-01 RX ADMIN — PRIMIDONE 50 MG: 50 TABLET ORAL at 08:50

## 2025-01-01 RX ADMIN — PANTOPRAZOLE SODIUM 40 MG: 40 INJECTION, POWDER, FOR SOLUTION INTRAVENOUS at 05:14

## 2025-01-01 RX ADMIN — IPRATROPIUM BROMIDE AND ALBUTEROL SULFATE 3 ML: .5; 3 SOLUTION RESPIRATORY (INHALATION) at 18:27

## 2025-01-01 RX ADMIN — POTASSIUM CHLORIDE 40 MEQ: 1500 TABLET, EXTENDED RELEASE ORAL at 09:15

## 2025-01-01 RX ADMIN — NOREPINEPHRINE BITARTRATE 0.3 MCG/KG/MIN: 0.03 INJECTION, SOLUTION INTRAVENOUS at 22:21

## 2025-01-01 RX ADMIN — METOPROLOL TARTRATE 50 MG: 50 TABLET, FILM COATED ORAL at 21:16

## 2025-01-01 RX ADMIN — POTASSIUM CHLORIDE 40 MEQ: 1500 TABLET, EXTENDED RELEASE ORAL at 08:50

## 2025-01-01 RX ADMIN — SODIUM BICARBONATE 150 MEQ: 84 INJECTION, SOLUTION INTRAVENOUS at 07:54

## 2025-01-01 RX ADMIN — VASOPRESSIN IN 0.9% SODIUM CHLORIDE 0.03 UNITS/MIN: 20 INJECTION INTRAVENOUS at 20:19

## 2025-01-01 RX ADMIN — INSULIN HUMAN 3 UNITS: 100 INJECTION, SOLUTION PARENTERAL at 05:13

## 2025-01-01 RX ADMIN — PRIMIDONE 50 MG: 50 TABLET ORAL at 21:40

## 2025-01-01 RX ADMIN — METRONIDAZOLE 500 MG: 500 INJECTION, SOLUTION INTRAVENOUS at 23:39

## 2025-01-01 RX ADMIN — TAMSULOSIN HYDROCHLORIDE 0.4 MG: 0.4 CAPSULE ORAL at 08:39

## 2025-01-01 RX ADMIN — METOPROLOL TARTRATE 50 MG: 50 TABLET, FILM COATED ORAL at 20:34

## 2025-01-01 RX ADMIN — INSULIN HUMAN 3 UNITS: 100 INJECTION, SOLUTION PARENTERAL at 19:30

## 2025-01-01 RX ADMIN — HYDROCORTISONE SODIUM SUCCINATE 100 MG: 100 INJECTION, POWDER, FOR SOLUTION INTRAMUSCULAR; INTRAVENOUS at 11:03

## 2025-01-01 RX ADMIN — SODIUM CHLORIDE 1000 ML: 9 INJECTION, SOLUTION INTRAVENOUS at 13:12

## 2025-01-01 RX ADMIN — VASOPRESSIN IN 0.9% SODIUM CHLORIDE 0.03 UNITS/MIN: 20 INJECTION INTRAVENOUS at 12:00

## 2025-01-01 RX ADMIN — POTASSIUM CHLORIDE 20 MEQ: 1500 TABLET, EXTENDED RELEASE ORAL at 09:15

## 2025-01-01 RX ADMIN — PANTOPRAZOLE SODIUM 40 MG: 40 TABLET, DELAYED RELEASE ORAL at 05:48

## 2025-01-01 RX ADMIN — MORPHINE SULFATE 4 MG: 4 INJECTION, SOLUTION INTRAMUSCULAR; INTRAVENOUS at 13:38

## 2025-01-01 RX ADMIN — HYDROCORTISONE SODIUM SUCCINATE 100 MG: 100 INJECTION, POWDER, FOR SOLUTION INTRAMUSCULAR; INTRAVENOUS at 04:26

## 2025-01-01 RX ADMIN — POTASSIUM CHLORIDE 40 MEQ: 1.5 POWDER, FOR SOLUTION ORAL at 16:59

## 2025-01-01 RX ADMIN — METOPROLOL TARTRATE 2.5 MG: 5 INJECTION INTRAVENOUS at 08:57

## 2025-01-01 RX ADMIN — EPINEPHRINE 1 MG: 0.1 INJECTION INTRAVENOUS at 13:11

## 2025-01-01 RX ADMIN — BUDESONIDE 0.25 MG: 0.25 INHALANT RESPIRATORY (INHALATION) at 05:51

## 2025-01-01 RX ADMIN — Medication 150 MCG/HR: at 00:51

## 2025-01-01 RX ADMIN — Medication 250 MG: at 20:34

## 2025-01-01 RX ADMIN — SODIUM BICARBONATE 150 MEQ: 84 INJECTION, SOLUTION INTRAVENOUS at 15:26

## 2025-01-01 RX ADMIN — GABAPENTIN 300 MG: 300 CAPSULE ORAL at 20:22

## 2025-01-01 RX ADMIN — SODIUM CHLORIDE 100 ML/HR: 9 INJECTION, SOLUTION INTRAVENOUS at 05:51

## 2025-01-01 RX ADMIN — SODIUM CHLORIDE, POTASSIUM CHLORIDE, SODIUM LACTATE AND CALCIUM CHLORIDE 1000 ML: 600; 310; 30; 20 INJECTION, SOLUTION INTRAVENOUS at 12:06

## 2025-01-01 RX ADMIN — METOPROLOL TARTRATE 50 MG: 50 TABLET, FILM COATED ORAL at 08:50

## 2025-01-01 RX ADMIN — Medication 1 APPLICATION: at 10:02

## 2025-01-01 RX ADMIN — BUDESONIDE 0.25 MG: 0.25 INHALANT RESPIRATORY (INHALATION) at 18:27

## 2025-01-01 RX ADMIN — VASOPRESSIN IN 0.9% SODIUM CHLORIDE 0.03 UNITS/MIN: 20 INJECTION INTRAVENOUS at 14:21

## 2025-01-01 RX ADMIN — ESCITALOPRAM OXALATE 20 MG: 10 TABLET ORAL at 08:39

## 2025-01-01 RX ADMIN — PERFLUTREN 6.52 MG: 6.52 INJECTION, SUSPENSION INTRAVENOUS at 11:27

## 2025-01-01 RX ADMIN — POTASSIUM CHLORIDE 20 MEQ: 1500 TABLET, EXTENDED RELEASE ORAL at 11:35

## 2025-01-01 RX ADMIN — PIPERACILLIN AND TAZOBACTAM 4.5 G: 4; .5 INJECTION, POWDER, FOR SOLUTION INTRAVENOUS at 05:05

## 2025-01-01 RX ADMIN — SODIUM BICARBONATE 650 MG: 650 TABLET ORAL at 15:55

## 2025-01-01 RX ADMIN — NOREPINEPHRINE BITARTRATE 0.07 MCG/KG/MIN: 0.03 INJECTION, SOLUTION INTRAVENOUS at 20:09

## 2025-01-01 RX ADMIN — MIDAZOLAM HYDROCHLORIDE 2 MG: 1 INJECTION, SOLUTION INTRAMUSCULAR; INTRAVENOUS at 13:38

## 2025-01-01 RX ADMIN — ALBUMIN (HUMAN) 50 G: 0.25 INJECTION, SOLUTION INTRAVENOUS at 03:43

## 2025-01-01 RX ADMIN — MINERAL OIL, PETROLATUM: 425; 568 OINTMENT OPHTHALMIC at 13:08

## 2025-01-01 RX ADMIN — METOPROLOL TARTRATE 5 MG: 5 INJECTION INTRAVENOUS at 08:36

## 2025-01-01 RX ADMIN — TAMSULOSIN HYDROCHLORIDE 0.4 MG: 0.4 CAPSULE ORAL at 16:59

## 2025-01-01 RX ADMIN — HYDROCORTISONE SODIUM SUCCINATE 100 MG: 100 INJECTION, POWDER, FOR SOLUTION INTRAMUSCULAR; INTRAVENOUS at 10:01

## 2025-01-01 RX ADMIN — ANTACID TABLETS 2 TABLET: 500 TABLET, CHEWABLE ORAL at 01:00

## 2025-01-01 RX ADMIN — METRONIDAZOLE 500 MG: 500 INJECTION, SOLUTION INTRAVENOUS at 08:31

## 2025-01-01 RX ADMIN — DEXTROSE AND SODIUM CHLORIDE 75 ML/HR: 5; 450 INJECTION, SOLUTION INTRAVENOUS at 15:55

## 2025-01-01 RX ADMIN — IPRATROPIUM BROMIDE AND ALBUTEROL SULFATE 3 ML: .5; 3 SOLUTION RESPIRATORY (INHALATION) at 14:15

## 2025-01-01 RX ADMIN — METOPROLOL TARTRATE 5 MG: 5 INJECTION INTRAVENOUS at 21:52

## 2025-01-01 RX ADMIN — PROPOFOL 30 MCG/KG/MIN: 10 INJECTION, EMULSION INTRAVENOUS at 12:00

## 2025-01-01 RX ADMIN — BUDESONIDE 0.25 MG: 0.25 INHALANT RESPIRATORY (INHALATION) at 09:05

## 2025-01-01 RX ADMIN — ESCITALOPRAM OXALATE 20 MG: 10 TABLET ORAL at 16:12

## 2025-01-01 RX ADMIN — CHLORHEXIDINE GLUCONATE 1 APPLICATION: 500 CLOTH TOPICAL at 04:28

## 2025-01-01 RX ADMIN — Medication 250 MG: at 21:40

## 2025-01-01 RX ADMIN — HYDROCORTISONE SODIUM SUCCINATE 100 MG: 100 INJECTION, POWDER, FOR SOLUTION INTRAMUSCULAR; INTRAVENOUS at 16:44

## 2025-01-01 RX ADMIN — ESCITALOPRAM OXALATE 20 MG: 10 TABLET ORAL at 09:07

## 2025-01-01 RX ADMIN — PIPERACILLIN AND TAZOBACTAM 4.5 G: 4; .5 INJECTION, POWDER, FOR SOLUTION INTRAVENOUS at 04:26

## 2025-01-01 RX ADMIN — Medication 5 MG/HR: at 03:14

## 2025-01-01 RX ADMIN — SODIUM BICARBONATE 150 MEQ: 84 INJECTION, SOLUTION INTRAVENOUS at 23:33

## 2025-01-01 RX ADMIN — PIPERACILLIN AND TAZOBACTAM 4.5 G: 4; .5 INJECTION, POWDER, FOR SOLUTION INTRAVENOUS at 13:08

## 2025-01-01 RX ADMIN — PIPERACILLIN AND TAZOBACTAM 4.5 G: 4; .5 INJECTION, POWDER, FOR SOLUTION INTRAVENOUS at 20:43

## 2025-01-01 RX ADMIN — ACETAMINOPHEN 650 MG: 650 SUPPOSITORY RECTAL at 08:24

## 2025-01-01 RX ADMIN — Medication 50 MCG/HR: at 07:26

## 2025-01-01 RX ADMIN — INSULIN HUMAN 3 UNITS: 100 INJECTION, SOLUTION PARENTERAL at 00:07

## 2025-01-01 RX ADMIN — PRIMIDONE 50 MG: 50 TABLET ORAL at 09:07

## 2025-01-01 RX ADMIN — HYDROCORTISONE SODIUM SUCCINATE 50 MG: 100 INJECTION, POWDER, FOR SOLUTION INTRAMUSCULAR; INTRAVENOUS at 22:15

## 2025-01-01 RX ADMIN — MINERAL OIL, PETROLATUM: 425; 568 OINTMENT OPHTHALMIC at 04:27

## 2025-01-01 RX ADMIN — PRIMIDONE 50 MG: 50 TABLET ORAL at 15:09

## 2025-01-01 RX ADMIN — EPINEPHRINE 1 MG: 0.1 INJECTION INTRAVENOUS at 13:07

## 2025-01-01 RX ADMIN — MINERAL OIL, PETROLATUM: 425; 568 OINTMENT OPHTHALMIC at 18:00

## 2025-01-01 RX ADMIN — EPINEPHRINE 1 MG: 0.1 INJECTION INTRAVENOUS at 13:09

## 2025-01-01 RX ADMIN — ESCITALOPRAM OXALATE 20 MG: 10 TABLET ORAL at 08:50

## 2025-01-01 RX ADMIN — Medication 1 APPLICATION: at 20:10

## 2025-01-01 RX ADMIN — PROPOFOL 30 MCG/KG/MIN: 10 INJECTION, EMULSION INTRAVENOUS at 16:35

## 2025-01-01 RX ADMIN — PRIMIDONE 50 MG: 50 TABLET ORAL at 20:34

## 2025-01-01 RX ADMIN — METOPROLOL TARTRATE 50 MG: 50 TABLET, FILM COATED ORAL at 21:40

## 2025-01-01 RX ADMIN — IPRATROPIUM BROMIDE AND ALBUTEROL SULFATE 3 ML: .5; 3 SOLUTION RESPIRATORY (INHALATION) at 05:51

## 2025-01-01 RX ADMIN — PRIMIDONE 50 MG: 50 TABLET ORAL at 16:59

## 2025-01-01 RX ADMIN — POTASSIUM CHLORIDE 40 MEQ: 1500 TABLET, EXTENDED RELEASE ORAL at 15:15

## 2025-01-01 RX ADMIN — POTASSIUM CHLORIDE 20 MEQ: 1500 TABLET, EXTENDED RELEASE ORAL at 15:11

## 2025-01-01 RX ADMIN — NOREPINEPHRINE BITARTRATE 0.25 MCG/KG/MIN: 0.03 INJECTION, SOLUTION INTRAVENOUS at 02:05

## 2025-01-01 RX ADMIN — NOREPINEPHRINE BITARTRATE 0.06 MCG/KG/MIN: 0.03 INJECTION, SOLUTION INTRAVENOUS at 10:19

## 2025-01-01 RX ADMIN — METOPROLOL TARTRATE 50 MG: 50 TABLET, FILM COATED ORAL at 16:59

## 2025-01-01 RX ADMIN — INSULIN HUMAN 2 UNITS: 100 INJECTION, SOLUTION PARENTERAL at 12:33

## 2025-01-01 RX ADMIN — SODIUM BICARBONATE 50 MEQ: 84 INJECTION INTRAVENOUS at 13:15

## 2025-01-01 RX ADMIN — PRIMIDONE 50 MG: 50 TABLET ORAL at 21:16

## 2025-01-01 RX ADMIN — INSULIN HUMAN 4 UNITS: 100 INJECTION, SOLUTION PARENTERAL at 11:07

## 2025-01-01 RX ADMIN — PROPOFOL 5 MCG/KG/MIN: 10 INJECTION, EMULSION INTRAVENOUS at 15:50

## 2025-01-01 RX ADMIN — MORPHINE SULFATE 2 MG: 2 INJECTION, SOLUTION INTRAMUSCULAR; INTRAVENOUS at 15:30

## 2025-01-01 RX ADMIN — Medication 250 MG: at 08:39

## 2025-01-01 RX ADMIN — PIPERACILLIN AND TAZOBACTAM 4.5 G: 4; .5 INJECTION, POWDER, FOR SOLUTION INTRAVENOUS at 20:09

## 2025-01-01 RX ADMIN — METOPROLOL TARTRATE 50 MG: 50 TABLET, FILM COATED ORAL at 08:39

## 2025-01-01 RX ADMIN — GABAPENTIN 300 MG: 300 CAPSULE ORAL at 21:40

## 2025-01-01 RX ADMIN — LOSARTAN POTASSIUM 25 MG: 25 TABLET, FILM COATED ORAL at 15:55

## 2025-01-01 RX ADMIN — Medication 400 MG: at 11:49

## 2025-01-01 RX ADMIN — DILTIAZEM HYDROCHLORIDE 5 MG: 5 INJECTION, SOLUTION INTRAVENOUS at 02:19

## 2025-01-01 RX ADMIN — METRONIDAZOLE 500 MG: 500 INJECTION, SOLUTION INTRAVENOUS at 16:58

## 2025-01-01 RX ADMIN — SODIUM CHLORIDE 250 MG: 9 INJECTION, SOLUTION INTRAVENOUS at 12:33

## 2025-01-15 ENCOUNTER — TELEPHONE (OUTPATIENT)
Dept: NEUROLOGY | Facility: CLINIC | Age: 75
End: 2025-01-15
Payer: MEDICARE

## 2025-01-15 DIAGNOSIS — R25.1 TREMOR: ICD-10-CM

## 2025-01-15 NOTE — TELEPHONE ENCOUNTER
Provider: RUBIO PATEL    Caller: Felix Bartlett Sr.    Relationship to Patient: Self    Pharmacy: [facility]     Phone Number: [phone]      Reason for Call: PT STATES [facility] WILL NOT REFILL GABAPENTIN USING THE CURRENT RX ON FILE, PHARMACY INDICATES PT'S INS PLAN NUMBER HAS CHANGED AND THEY WILL NEED NEW RX FOR GABAPENTIN.    When was the patient last seen: 07/25/24      PLEASE REVIEW AND ADVISE

## 2025-01-21 DIAGNOSIS — I10 ESSENTIAL HYPERTENSION: ICD-10-CM

## 2025-01-21 RX ORDER — METOPROLOL TARTRATE 50 MG
50 TABLET ORAL EVERY 12 HOURS
Qty: 180 TABLET | Refills: 3 | Status: SHIPPED | OUTPATIENT
Start: 2025-01-21

## 2025-01-21 RX ORDER — TAMSULOSIN HYDROCHLORIDE 0.4 MG/1
1 CAPSULE ORAL DAILY
Qty: 90 CAPSULE | Refills: 3 | Status: SHIPPED | OUTPATIENT
Start: 2025-01-21

## 2025-01-21 RX ORDER — ESCITALOPRAM OXALATE 20 MG/1
20 TABLET ORAL DAILY
Qty: 90 TABLET | Refills: 3 | Status: SHIPPED | OUTPATIENT
Start: 2025-01-21

## 2025-01-21 RX ORDER — GABAPENTIN 300 MG/1
300 CAPSULE ORAL 2 TIMES DAILY
Qty: 60 CAPSULE | Refills: 3 | OUTPATIENT
Start: 2025-01-21

## 2025-01-21 NOTE — TELEPHONE ENCOUNTER
CALLED PATIENT BACK ABOUT HIS CONCERNS OF HIS MEDICATION. I CALLED PATIENTS PHARMACY AND THEY STATED HE HAD 2 ACCOUNTS IN THEIR SYSTEM DUE TO HIM HAVING MEDICARE LAST YEAR AND HE NOW HAS [employer]. SHE STATED THEY WOULD NEED A NEW SCRIPT SINCE THEY DONT TRANSFER OR JOIN ACCOUNTS. I SPOKE WITH THE PHARMACIST AND DID A VERBAL OVER THE PHONE SCRIPT FOR A 90 DAY SUPPLY WITH 1 REFILL. HE SHOULD BE RECEIVING HIS REFILL IN THE MAIL WITHIN 5-7 DAYS IS WHAT THEY TOLD ME. I I TRIED CALLING PATIENT BACK BUT NUMBER LISTED FOR HOME PHONE RANG AND SOUNDED LIKE IT PICKED UP BUT NO SOUNDS. I TRIED OTHER NUMBER AND HAD TO LEAVE A VOICEMAIL. WIFES NUMBER WAS DISCONNECTED.     OKAY FOR HUB TO RELAY

## 2025-01-23 ENCOUNTER — OFFICE VISIT (OUTPATIENT)
Dept: NEUROLOGY | Facility: CLINIC | Age: 75
End: 2025-01-23
Payer: OTHER GOVERNMENT

## 2025-01-23 VITALS
HEART RATE: 68 BPM | WEIGHT: 279.2 LBS | SYSTOLIC BLOOD PRESSURE: 170 MMHG | DIASTOLIC BLOOD PRESSURE: 84 MMHG | HEIGHT: 72 IN | RESPIRATION RATE: 16 BRPM | BODY MASS INDEX: 37.82 KG/M2

## 2025-01-23 DIAGNOSIS — R25.1 TREMOR: Primary | ICD-10-CM

## 2025-01-23 RX ORDER — GABAPENTIN 300 MG/1
300 CAPSULE ORAL 3 TIMES DAILY
Qty: 90 CAPSULE | Refills: 1 | Status: SHIPPED | OUTPATIENT
Start: 2025-01-23

## 2025-01-23 NOTE — PROGRESS NOTES
Neurology Consult Note    [facility]  [address], [address]  [place] [address]  Phone: [phone]  Fax: [phone]    Referring Provider:   WILIAN Renteria  Primary Care Provider:  Keyon Dockery MD    Reason for Consult:  Tremor   Subjective         Felix Bartlett . presents to [facility]    History of Present Illness  74-year-old male seen for the follow-up of tremor.  Last in clinic by me on 7/25/2024.  During that encounter we had recommended patient to continue his primidone 50 mg 3 times daily and gabapentin 300 mg twice daily.  He was able to tolerate higher dosing of primidone.  He is already on metoprolol.  Previously declined DBS consideration.    Today patient presents by himself.  Reports to me continued tremors that seem to be worse in the afternoons.  These are with activity.  He does not consider these disabling however they are impactful.  He does have to care for his wife.  He is currently taking his primidone and gabapentin the mornings between 630 or 8.  He may then develop symptoms around 1 or 2.  After the afternoon dose his symptoms will improve as well.  He has tolerated the combination of primidone and gabapentin.      Patient Active Problem List   Diagnosis    COPD (chronic obstructive pulmonary disease)    Prediabetes    Anxiety    Vertebral compression fracture    Gastroesophageal reflux disease    Edema leg-CHF/d, obesity, copd, cirrhosis    Fatty liver    Hyperlipidemia-statin    Hypertension    Nocturnal hypoxia    Macrocytosis    Osteoporosis bd 3.2017 ? 2y    Tremor    Posttraumatic stress disorder    Chronic back pain    Congestive heart failure-diastolic    Tobacco use: 6.2024/12m    Anemia    Heme positive stool    Gait difficulty    Iron deficiency    Elevated ferritin-hetroz hemo    Hereditary hemochromatosis        Past Medical History:   Diagnosis Date    Acid reflux     Arthritis     Asthma     Bile salt-induced  diarrhea 07/23/2013    COPD (chronic obstructive pulmonary disease)     CTS (carpal tunnel syndrome)     Family history of colon cancer 04/09/2021    father      History of adenomatous polyp of colon 04/09/2021    History of alcohol abuse 02/16/2017    History of renal cell cancer-sony 02/16/2017    9.4.14/9.8.14 - Dr Ramos - Office Notes-path low grade renal cell..negative margins..doing well     9.10.14..ro visit...surgery follow up  hypertension-  post op L nephrectomy-incidential renal cell found on CT  abdominal pain-feeling better  fatty liver-bx proven...  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  6.30.14/7.1.14 CBC With Differential/Platelet; Sedimentation Rate-Westergren; C-Reactive Protein, Quant==WBC 12.0; RBC 3.60; Hemoglobin 11.7; Hematocrit 34.4; Neutrophils (Absolute) 7.1; Lymphs (Absolute) 3.6  nothing major here..his problems are alcohol, smoking now o    HL (hearing loss)     Hyperlipidemia     Hypertension     Hypopotassemia-diuretic 02/16/2017    Oxygen dependent     at hs    Peripheral neuropathy     Renal cancer 08/2014    Lt RALPart'l Nx; t1a Clear Cell with negative margin    RUQ pain 07/23/2013    S/P laparoscopic cholecystectomy 09/04/2012    Wellness examination-done 02/16/2017    PROCEDURES:  Prostate exam/Sony/confirmed/1.27.16     Colonoscopy-nl/Gil/LH/7-28-10/5y  EGD/BHP/Gil/3.4.16  Colonoscopy-polyp/Gil/BH/3.18.16/5 yr  EGD-neg/Gil/CARLY/5.1.18     SURGERIES:  T&A  Appendix/1959  Scrotum-cyst/1980's  Lap gb+liver bx/Raya/[facility]/7.23.12  L robotic partial nephrectomy (clear cell grade 1 [id number])/Sony//8.14.14  Aicgwe-dknztyox-eolno+mesh+omen/Micah//5.9.        Social History     Socioeconomic History    Marital  status:      Spouse name: Melanie    Number of children: 1    Years of education: 12   Tobacco Use    Smoking status: Every Day     Current packs/day: 0.50     Average packs/day: 0.5 packs/day for 58.7 years (29.4 ttl pk-yrs)     Types: Cigarettes     Start date: 4/30/1966     Passive exposure: Never    Smokeless tobacco: Never   Vaping Use    Vaping status: Never Used   Substance and Sexual Activity    Alcohol use: No    Drug use: No    Sexual activity: Defer        No Known Allergies       Current Outpatient Medications:     acetaminophen (TYLENOL) 650 MG 8 hr tablet, Take 1 tablet by mouth 2 (Two) Times a Day., Disp: , Rfl:     calcium-vitamin D (OSCAL-500) 500-200 MG-UNIT per tablet, Take 1 tablet by mouth 2 times daily.  , Disp: , Rfl:     cholecalciferol (VITAMIN D3) 10 MCG (400 UNIT) tablet, Take 1 tablet by mouth., Disp: , Rfl:     clotrimazole-betamethasone (LOTRISONE) 1-0.05 % cream, Apply 1 Application topically to the appropriate area as directed 2 (Two) Times a Day. Apply to affected area twice daily, Disp: 45 g, Rfl: 1    diphenhydrAMINE (BENADRYL) 25 mg capsule, Take 1 capsule by mouth 2 (Two) Times a Day., Disp: , Rfl:     escitalopram (LEXAPRO) 20 MG tablet, Take 1 tablet by mouth Daily., Disp: 90 tablet, Rfl: 3    ferrous sulfate 325 (65 FE) MG tablet, Take 1 tablet by mouth Daily With Breakfast., Disp: , Rfl:     fluticasone (FLONASE) 50 MCG/ACT nasal spray, Administer 2 sprays into the nostril(s) as directed by provider Daily., Disp: , Rfl:     KETOTIFEN FUMARATE OP, Apply 1 drop to eye(s) as directed by provider As Needed., Disp: , Rfl:     losartan (COZAAR) 50 MG tablet, Take 1 tablet by mouth Daily., Disp: 90 tablet, Rfl: 3    metoprolol tartrate (LOPRESSOR) 50 MG tablet, Take 1 tablet by mouth Every 12 (Twelve) Hours., Disp: 180 tablet, Rfl: 3    Multiple Vitamins-Minerals (CENTRUM SILVER 50+MEN) tablet, Take 1 tablet by mouth Daily., Disp: , Rfl:     O2 (OXYGEN), Inhale 1 (One) Time.,  "Disp: , Rfl:     omeprazole (priLOSEC) 20 MG capsule, 1 capsule 2 (Two) Times a Day., Disp: , Rfl:     potassium chloride (Klor-Con M10) 10 MEQ CR tablet, Take 1 tablet by mouth 2 (Two) Times a Day., Disp: 180 tablet, Rfl: 3    simvastatin (ZOCOR) 40 MG tablet, Take 1 tablet by mouth Daily., Disp: 90 tablet, Rfl: 1    tamsulosin (FLOMAX) 0.4 MG capsule 24 hr capsule, Take 1 capsule by mouth Daily., Disp: 90 capsule, Rfl: 3    gabapentin (NEURONTIN) 300 MG capsule, Take 1 capsule by mouth 3 (Three) Times a Day., Disp: 90 capsule, Rfl: 1    primidone (MYSOLINE) 50 MG tablet, Take 1 tablet by mouth 3 (Three) Times a Day for 120 days. ., Disp: 90 tablet, Rfl: 3       Objective   Vital Signs:   /84 (BP Location: Left arm, Patient Position: Sitting)   Pulse 68   Resp 16   Ht 182.9 cm (72.01\")   Wt 127 kg (279 lb 3.2 oz)   BMI 37.86 kg/m²       Physical Exam  Vitals and nursing note reviewed.   Constitutional:       Appearance: Normal appearance.   HENT:      Head: Normocephalic.   Eyes:      General: Lids are normal.      Extraocular Movements: Extraocular movements intact.   Pulmonary:      Effort: Pulmonary effort is normal. No respiratory distress.   Skin:     General: Skin is warm and dry.   Neurological:      Mental Status: He is alert.      Motor: Motor strength is normal.  Psychiatric:         Speech: Speech normal.        Neurological Exam  Mental Status  Alert. Oriented to person, place, time and situation. Speech is normal. Language is fluent with no aphasia.    Cranial Nerves  CN III, IV, VI: Extraocular movements intact bilaterally. Normal lids and orbits bilaterally.  CN VII: Full and symmetric facial movement.    Motor   The following abnormal movements were seen: No resting tremor hands.  Head tremor noted mild.  No bradykinesia.   No rigidity.   Tremor with posture (outstretch and bird wing) and intention(finger to nose). Left greater than right. High amplitude and frequency. .   Strength is 5/5 " throughout all four extremities.      Result Review :   The following data was reviewed by: RUBIO Lang on 01/23/2025:  CMP          3/11/2024    09:07 6/11/2024    08:20 6/24/2024    10:18   CMP   Glucose 99  113  75    BUN 13  13  14    Creatinine 1.11  1.17  1.15    Sodium 140  139  140    Potassium 4.4  4.1  4.2    Chloride 103  104  104    Calcium 8.9  9.2  9.0    Total Protein 6.3  6.0  5.9    Albumin 4.2  3.9  3.9    Globulin 2.1  2.1  2.0    Total Bilirubin <0.2  <0.2  <0.2    Alkaline Phosphatase 98  84  82    AST (SGOT) 17  14  13    ALT (SGPT) 15  12  11    BUN/Creatinine Ratio 11.7  11.1  12.2      CBC w/diff          6/11/2024    08:20 6/24/2024    10:18 7/30/2024    08:32   CBC w/Diff   WBC 10.38  12.32  11.1    RBC 2.84  2.79  2.75    Hemoglobin 9.7  9.3  9.4    Hematocrit 28.8  28.8  27.7    .4  103.2  101    MCH 34.2  33.3  34.2    MCHC 33.7  32.3  33.9    RDW 12.0  12.2  12.5    Platelets 232  223  241    Neutrophil Rel % 53.5  49.2  56    Lymphocyte Rel % 36.8  42.0  34    Monocyte Rel % 6.4  6.1  7    Eosinophil Rel % 2.8  2.1  2    Basophil Rel % 0.3  0.4  1         Progress Notes by Andreina Hernández APRN (07/25/2024 09:30)                  Impression:  Felix Bartlett . is a 74 y.o. male who presents for follow-up of tremor.  Patient has been responsive to primidone with the addition of gabapentin.  We will continue current regimen with plans to increase gabapentin to 3 times daily dosing.  Patient aware of potential side effects.    Diagnoses and all orders for this visit:    1. Tremor (Primary)  Orders:  -     gabapentin (NEURONTIN) 300 MG capsule; Take 1 capsule by mouth 3 (Three) Times a Day.  Dispense: 90 capsule; Refill: 1        Plan:  Continue primidone 50 mg tablet, 1 tablet 3 times daily  Increase gabapentin 300 mg capsule, 1 capsule 3 times daily  Contact our office with increase somnolence or memory impairment  Consider DBS in the future if patient  agrees  Follow-up with primary care as scheduled  Follow-up in my clinic 6 months or sooner if needed    The patient and I have discussed the plan of care and he is in full agreement at this time.     Follow Up   Return in about 6 months (around 7/23/2025) for Tremor.            Andreina Hernández, RUBIO  01/23/25  11:26 CST

## 2025-02-10 DIAGNOSIS — R60.0 EDEMA LEG: ICD-10-CM

## 2025-02-10 RX ORDER — POTASSIUM CHLORIDE 750 MG/1
10 TABLET, EXTENDED RELEASE ORAL 2 TIMES DAILY
Qty: 180 TABLET | Refills: 3 | Status: SHIPPED | OUTPATIENT
Start: 2025-02-10

## 2025-02-10 NOTE — TELEPHONE ENCOUNTER
Caller: Felix Bartlett Sr.    Relationship: Self    Best call back number: [phone]     Requested Prescriptions:   Requested Prescriptions     Pending Prescriptions Disp Refills    potassium chloride (Klor-Con M10) 10 MEQ CR tablet 180 tablet 3     Sig: Take 1 tablet by mouth 2 (Two) Times a Day.        Pharmacy where request should be sent: [facility] - [facility] - [address] AT [address] [facility] - [phone]  - [phone] FX     Last office visit with prescribing clinician: 10/1/2024   Last telemedicine visit with prescribing clinician: Visit date not found   Next office visit with prescribing clinician: 2/28/2025     Additional details provided by patient:     Does the patient have less than a 3 day supply:  [x] Yes  [] No    Would you like a call back once the refill request has been completed: [x] Yes [] No    If the office needs to give you a call back, can they leave a voicemail: [] Yes [] No    Jamari Hall Rep   02/10/25 14:03 CST

## 2025-02-13 DIAGNOSIS — E78.2 MIXED HYPERLIPIDEMIA: Chronic | ICD-10-CM

## 2025-02-13 RX ORDER — SIMVASTATIN 40 MG
40 TABLET ORAL DAILY
Qty: 90 TABLET | Refills: 1 | Status: SHIPPED | OUTPATIENT
Start: 2025-02-13

## 2025-02-28 ENCOUNTER — OFFICE VISIT (OUTPATIENT)
Dept: FAMILY MEDICINE CLINIC | Facility: CLINIC | Age: 75
End: 2025-02-28
Payer: MEDICARE

## 2025-02-28 VITALS
HEART RATE: 75 BPM | OXYGEN SATURATION: 99 % | WEIGHT: 280 LBS | HEIGHT: 72 IN | DIASTOLIC BLOOD PRESSURE: 76 MMHG | SYSTOLIC BLOOD PRESSURE: 140 MMHG | BODY MASS INDEX: 37.93 KG/M2

## 2025-02-28 DIAGNOSIS — I10 PRIMARY HYPERTENSION: Chronic | ICD-10-CM

## 2025-02-28 DIAGNOSIS — D64.9 ANEMIA, UNSPECIFIED TYPE: Primary | ICD-10-CM

## 2025-02-28 DIAGNOSIS — I10 ESSENTIAL HYPERTENSION: ICD-10-CM

## 2025-02-28 DIAGNOSIS — J44.9 CHRONIC OBSTRUCTIVE PULMONARY DISEASE, UNSPECIFIED COPD TYPE: Chronic | ICD-10-CM

## 2025-02-28 DIAGNOSIS — E78.2 MIXED HYPERLIPIDEMIA: Chronic | ICD-10-CM

## 2025-02-28 DIAGNOSIS — R25.1 TREMOR: ICD-10-CM

## 2025-02-28 DIAGNOSIS — G47.34 NOCTURNAL HYPOXIA: ICD-10-CM

## 2025-02-28 PROBLEM — R19.5 HEME POSITIVE STOOL: Status: RESOLVED | Noted: 2018-03-28 | Resolved: 2025-02-28

## 2025-02-28 RX ORDER — LOSARTAN POTASSIUM 100 MG/1
100 TABLET ORAL DAILY
Qty: 90 TABLET | Refills: 1 | Status: SHIPPED | OUTPATIENT
Start: 2025-02-28

## 2025-02-28 NOTE — PROGRESS NOTES
Chief Complaint  Hyperlipidemia and Pain (Left leg pain)    Subjective      Felix RIVERS Dhruv Pruett presents to [facility]  History of Present Illness  The patient is a 74-year-old male who presents for knee pain follow-up.    He reports persistent knee pain, which he attributes to a previous injury. He also experiences intermittent hip pain. He suspects a recurrent slipped disc in his lower back, which he believes was previously managed by a chiropractor.    . He has a history of anemia and has been advised to undergo a colonoscopy, but has not yet done so. He reports no presence of blood in his stool, although he notes that his stools are black. His last colonoscopy was performed several years ago.    He is currently on simvastatin 40 mg for cholesterol management.    He has not recently monitored his blood pressure at home, but when he does, it typically reads around 140. He takes his blood pressure medication approximately one hour prior to measurement. He is currently on losartan and metoprolol for blood pressure management.    He has not consulted a pulmonologist for his COPD. He is on oxygen therapy at night and does not use any inhalers. He continues to smoke, albeit less than a quarter of a pack per day. He reports no shortness of breath or cough, but notes that his back pain limits his activity and necessitates periods of rest.    He is under the care of a neurologist for his tremor and is currently on primidone and gabapentin.    He is also seeing a urologist, Dr. Ramos, for urinary issues.    Supplemental Information  He is under the care of a podiatrist for his foot issues. He uses Flonase for allergies, which he finds more effective when taken in the morning.    SOCIAL HISTORY  He admits to smoking about a quarter of a pack per day.    MEDICATIONS  Current: Primidone, gabapentin, simvastatin, losartan, metoprolol, Flonase      Objective   Vital Signs:  /76   Pulse  "75   Ht 182.9 cm (72\")   Wt 127 kg (280 lb)   SpO2 99%   BMI 37.97 kg/m²   Estimated body mass index is 37.97 kg/m² as calculated from the following:    Height as of this encounter: 182.9 cm (72\").    Weight as of this encounter: 127 kg (280 lb).            Physical Exam     Physical Exam        Result Review :  The following labs/imaging/notes were reviewed and discussed with the patient by Keyon Dockery MD on 02/28/2025:   Latest Reference Range & Units 06/24/24 10:18 07/30/24 08:32   Sodium 136 - 145 mmol/L 140    Potassium 3.5 - 5.2 mmol/L 4.2    Chloride 98 - 107 mmol/L 104    CO2 22.0 - 29.0 mmol/L 26.7    BUN 8 - 23 mg/dL 14    Creatinine 0.76 - 1.27 mg/dL 1.15    BUN/Creatinine Ratio 7.0 - 25.0  12.2    EGFR Result >60.0 mL/min/1.73 66.0    Glucose 65 - 99 mg/dL 75    Calcium 8.6 - 10.5 mg/dL 9.0    Alkaline Phosphatase 39 - 117 U/L 82    Total Protein 6.0 - 8.5 g/dL 5.9 (L)    Albumin 3.5 - 5.2 g/dL 3.9    A/G Ratio g/dL 2.0    AST (SGOT) 1 - 40 U/L 13    ALT (SGPT) 1 - 41 U/L 11    Total Bilirubin 0.0 - 1.2 mg/dL <0.2    Iron 38 - 169 ug/dL 110 70   Ferritin 30 - 400 ng/mL 427.00 (H) 359   Iron Saturation 15 - 55 % 34 30   TIBC 250 - 450 ug/dL 328 233 (L)   UIBC 111 - 343 ug/dL 218 163   Globulin gm/dL 2.0    WBC 3.4 - 10.8 x10E3/uL 12.32 (H) 11.1 (H)   RBC 4.14 - 5.80 x10E6/uL 2.79 (L) 2.75 (L)   Hemoglobin 13.0 - 17.7 g/dL 9.3 (L) 9.4 (L)   Hematocrit 37.5 - 51.0 % 28.8 (L) 27.7 (L)   Platelets 150 - 450 x10E3/uL 223 241   RDW 11.6 - 15.4 % 12.2 (L) 12.5   MCV 79 - 97 fL 103.2 (H) 101 (H)   MCH 26.6 - 33.0 pg 33.3 (H) 34.2 (H)   MCHC 31.5 - 35.7 g/dL 32.3 33.9   Neutrophil Rel % Not Estab. % 49.2 56   Lymphocyte Rel % Not Estab. % 42.0 34   Monocyte Rel % Not Estab. % 6.1 7   Eosinophil Rel % Not Estab. % 2.1 2   Basophil Rel % Not Estab. % 0.4 1   Immature Granulocyte Rel % Not Estab. % 0.2 0   Reticulocyte Absolute 0.70 - 1.90 % 2.07 (H)    Neutrophils Absolute 1.4 - 7.0 x10E3/uL 6.06 6.2 "   Lymphocytes Absolute 0.7 - 3.1 x10E3/uL 5.18 (H) 3.8 (H)   Monocytes Absolute 0.1 - 0.9 x10E3/uL 0.75 0.8   Eosinophils Absolute 0.0 - 0.4 x10E3/uL 0.26 0.3   Basophils Absolute 0.0 - 0.2 x10E3/uL 0.05 0.1   Immature Grans, Absolute 0.0 - 0.1 x10E3/uL 0.02 0.0   nRBC 0.0 - 0.2 /100 WBC 0.0    (L): Data is abnormally low  (H): Data is abnormally high   Latest Reference Range & Units 03/11/24 09:07 06/11/24 08:20   Hemoglobin A1C 4.80 - 5.60 % 6.00 (H) 6.10 (H)   (H): Data is abnormally high        Assessment      Diagnoses and all orders for this visit:    1. Anemia, unspecified type (Primary)  -     Ambulatory Referral to Gastroenterology  -     Ambulatory Referral to Pulmonology  -     CBC & Differential  -     Comprehensive Metabolic Panel  -     Lipid Panel  -     Iron Profile  -     Ferritin  -     Vitamin B12 & Folate    2. Mixed hyperlipidemia  -     Ambulatory Referral to Gastroenterology  -     Ambulatory Referral to Pulmonology  -     CBC & Differential  -     Comprehensive Metabolic Panel  -     Lipid Panel  -     Iron Profile  -     Ferritin  -     Vitamin B12 & Folate    3. Tremor  -     Ambulatory Referral to Gastroenterology  -     Ambulatory Referral to Pulmonology  -     CBC & Differential  -     Comprehensive Metabolic Panel  -     Lipid Panel  -     Iron Profile  -     Ferritin  -     Vitamin B12 & Folate    4. Primary hypertension  -     Ambulatory Referral to Gastroenterology  -     Ambulatory Referral to Pulmonology  -     CBC & Differential  -     Comprehensive Metabolic Panel  -     Lipid Panel  -     Iron Profile  -     Ferritin  -     Vitamin B12 & Folate    5. Chronic obstructive pulmonary disease, unspecified COPD type  -     Ambulatory Referral to Gastroenterology  -     Ambulatory Referral to Pulmonology  -     CBC & Differential  -     Comprehensive Metabolic Panel  -     Lipid Panel  -     Iron Profile  -     Ferritin  -     Vitamin B12 & Folate    6. Nocturnal hypoxia  -      Ambulatory Referral to Gastroenterology  -     Ambulatory Referral to Pulmonology  -     CBC & Differential  -     Comprehensive Metabolic Panel  -     Lipid Panel  -     Iron Profile  -     Ferritin  -     Vitamin B12 & Folate    7. Essential hypertension  -     Ambulatory Referral to Gastroenterology  -     Ambulatory Referral to Pulmonology  -     losartan (COZAAR) 100 MG tablet; Take 1 tablet by mouth Daily.  Dispense: 90 tablet; Refill: 1  -     CBC & Differential  -     Comprehensive Metabolic Panel  -     Lipid Panel  -     Iron Profile  -     Ferritin  -     Vitamin B12 & Folate             Assessment & Plan  1. Chronic anemia.  He has been experiencing chronic anemia for an extended period, with no definitive cause identified. His ferritin levels were slightly elevated during the last assessment. A referral to a gastroenterologist will be made for further evaluation, including a colonoscopy and an esophagogastroduodenoscopy (EGD). Laboratory tests will be conducted today to monitor his condition. He is advised to continue his current iron supplementation regimen until further notice.    2. Hypercholesterolemia.  His cholesterol levels are significantly elevated. He is currently on simvastatin 40 mg, which was increased during the last visit. His cholesterol levels will be reassessed today to determine the effectiveness of the current dosage.    3. Essential tremor.  He is under the care of a neurologist for his tremor, which is currently well-managed. He is on primidone and gabapentin for this condition.    4. Urinary issues.  He is under the care of Dr. Ramos for his urinary issues.    5. Hypertension.  His blood pressure was slightly elevated during today's visit, but he reports that it has been well-controlled at home. The dosage of losartan will be increased to 100 mg. He is advised to take two of his current 50 mg tablets until they are depleted, after which a prescription for the 100 mg dosage will  be sent to his pharmacy.    6. Chronic obstructive pulmonary disease (COPD).  He has not seen a pulmonologist for his COPD and is currently on oxygen at bedtime. A referral to a pulmonologist will be made for further evaluation and management.    Follow-up  The patient will follow up in 3 months.    Orders Placed This Encounter   Procedures    Comprehensive Metabolic Panel     Order Specific Question:   Release to patient     Answer:   Routine Release [[id number]]    Lipid Panel     Order Specific Question:   Release to patient     Answer:   Routine Release [[id number]]    Iron Profile     Order Specific Question:   Release to patient     Answer:   Routine Release [[id number]]    Ferritin     Order Specific Question:   Release to patient     Answer:   Routine Release [[id number]]    Vitamin B12 & Folate     Order Specific Question:   Release to patient     Answer:   Routine Release [[id number]]    Ambulatory Referral to Gastroenterology     Referral Priority:   Routine     Referral Type:   Consultation     Referral Reason:   Specialty Services Required     Requested Specialty:   Gastroenterology     Number of Visits Requested:   1    Ambulatory Referral to Pulmonology     Referral Priority:   Routine     Referral Type:   Consultation     Referral Reason:   Specialty Services Required     Requested Specialty:   Pulmonary Disease     Number of Visits Requested:   1    CBC & Differential     Order Specific Question:   Manual Differential     Answer:   No     Order Specific Question:   Release to patient     Answer:   Routine Release [[id number]]       Follow Up   Return in about 3 months (around 5/28/2025) for Anemia w/ labs and GI referral, HTN w/ med changes, HLD, COPD..  Patient was given instructions and counseling regarding his condition or for health maintenance advice. Please see specific information pulled into the AVS if appropriate.         Patient or patient representative verbalized consent for the use of  Ambient Listening during the visit with  Keyon Dockery MD for chart documentation. 2/28/2025  11:03 CST

## 2025-03-01 LAB
ALBUMIN SERPL-MCNC: 4 G/DL (ref 3.5–5.2)
ALBUMIN/GLOB SERPL: 1.5 G/DL
ALP SERPL-CCNC: 104 U/L (ref 39–117)
ALT SERPL-CCNC: 13 U/L (ref 1–41)
AST SERPL-CCNC: 17 U/L (ref 1–40)
BASOPHILS # BLD AUTO: 0.05 10*3/MM3 (ref 0–0.2)
BASOPHILS NFR BLD AUTO: 0.4 % (ref 0–1.5)
BILIRUB SERPL-MCNC: <0.2 MG/DL (ref 0–1.2)
BUN SERPL-MCNC: 12 MG/DL (ref 8–23)
BUN/CREAT SERPL: 10.3 (ref 7–25)
CALCIUM SERPL-MCNC: 9.4 MG/DL (ref 8.6–10.5)
CHLORIDE SERPL-SCNC: 103 MMOL/L (ref 98–107)
CHOLEST SERPL-MCNC: 198 MG/DL (ref 0–200)
CO2 SERPL-SCNC: 26.3 MMOL/L (ref 22–29)
CREAT SERPL-MCNC: 1.17 MG/DL (ref 0.76–1.27)
EGFRCR SERPLBLD CKD-EPI 2021: 64.6 ML/MIN/1.73
EOSINOPHIL # BLD AUTO: 0.36 10*3/MM3 (ref 0–0.4)
EOSINOPHIL NFR BLD AUTO: 2.8 % (ref 0.3–6.2)
ERYTHROCYTE [DISTWIDTH] IN BLOOD BY AUTOMATED COUNT: 12.2 % (ref 12.3–15.4)
FERRITIN SERPL-MCNC: 588 NG/ML (ref 30–400)
FOLATE SERPL-MCNC: 10.2 NG/ML (ref 4.78–24.2)
GLOBULIN SER CALC-MCNC: 2.7 GM/DL
GLUCOSE SERPL-MCNC: 97 MG/DL (ref 65–99)
HCT VFR BLD AUTO: 30.3 % (ref 37.5–51)
HDLC SERPL-MCNC: 32 MG/DL (ref 40–60)
HGB BLD-MCNC: 10.2 G/DL (ref 13–17.7)
IMM GRANULOCYTES # BLD AUTO: 0.04 10*3/MM3 (ref 0–0.05)
IMM GRANULOCYTES NFR BLD AUTO: 0.3 % (ref 0–0.5)
IRON SATN MFR SERPL: 32 % (ref 20–50)
IRON SERPL-MCNC: 106 MCG/DL (ref 59–158)
LDLC SERPL CALC-MCNC: 125 MG/DL (ref 0–100)
LYMPHOCYTES # BLD AUTO: 4.8 10*3/MM3 (ref 0.7–3.1)
LYMPHOCYTES NFR BLD AUTO: 36.8 % (ref 19.6–45.3)
MCH RBC QN AUTO: 34.1 PG (ref 26.6–33)
MCHC RBC AUTO-ENTMCNC: 33.7 G/DL (ref 31.5–35.7)
MCV RBC AUTO: 101.3 FL (ref 79–97)
MONOCYTES # BLD AUTO: 0.91 10*3/MM3 (ref 0.1–0.9)
MONOCYTES NFR BLD AUTO: 7 % (ref 5–12)
NEUTROPHILS # BLD AUTO: 6.88 10*3/MM3 (ref 1.7–7)
NEUTROPHILS NFR BLD AUTO: 52.7 % (ref 42.7–76)
NRBC BLD AUTO-RTO: 0 /100 WBC (ref 0–0.2)
PLATELET # BLD AUTO: 260 10*3/MM3 (ref 140–450)
POTASSIUM SERPL-SCNC: 4.4 MMOL/L (ref 3.5–5.2)
PROT SERPL-MCNC: 6.7 G/DL (ref 6–8.5)
RBC # BLD AUTO: 2.99 10*6/MM3 (ref 4.14–5.8)
SODIUM SERPL-SCNC: 141 MMOL/L (ref 136–145)
TIBC SERPL-MCNC: 329 MCG/DL
TRIGL SERPL-MCNC: 229 MG/DL (ref 0–150)
UIBC SERPL-MCNC: 223 MCG/DL (ref 112–346)
VIT B12 SERPL-MCNC: 484 PG/ML (ref 211–946)
VLDLC SERPL CALC-MCNC: 41 MG/DL (ref 5–40)
WBC # BLD AUTO: 13.04 10*3/MM3 (ref 3.4–10.8)

## 2025-03-04 NOTE — PROGRESS NOTES
[facility] - PODIATRY    Today's Date: 03/06/2025     Patient Name: Felix Bartlett Sr.  MRN: [id number]  CSN: [id number]  PCP: Keyon Dockery MD  Referring Provider: No ref. provider found    SUBJECTIVE     Chief Complaint   Patient presents with    Follow-up     Keyon Dockery MD 02/25/25   3 MO FU FOOT CARE CLINIC   Pt states he is here today for foot/nail care. Pt denies pain.      HPI: Felix Bartlett Sr., a 74 y.o.male, comes to clinic as a(n) established patient complaining of thickened, dystrophic, irregular toenails of both feet and numbness in both feet . Patient has h/o GERD, arthritis, COPD, asthma, HTN, Renal CA .  Here today for thickened, irregular toenails are in need of care. Utilizing cane for ambulation assistance today.  Has difficulty and no longer feels comfortable providing care to himself due to tremors and neuropathy.  Denies pain secondary to neuropathy today.  Reports mild edema but does not wear compression stockings.  Denies open wounds or sores.  States he did not use the previously prescribed Lotrisone cream.  States it is in his junk drawer at home.  Relates previous treatment(s) including care by podiatry . No other pedal complaints at this time.    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  tremor-familial, alcohol  Rx-reviewed  Cozaar 50 mg one half  a day  LAB-next time hepatitis A, B, C         History of TIA (transient ischemic attack) 02/24/2017    No Problem List  5.25.10/age 60     7.1..14/7.1.14 - 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  6.30.14/7.1.14 CBC With Differential/Platelet; Sedimentation Rate-Westergren; C-Reactive Protein, Quant==WBC 12.0; RBC 3.60; Hemoglobin 11.7; Hematocrit 34.4; Neutrophils (Absolute) 7.1; Lymphs (Absolute) 3.6  nothing major here..his problems are alcohol, smoking now o    HL (hearing loss)     Hyperlipidemia     Hypertension     Hypopotassemia-diuretic 02/16/2017    Oxygen dependent     at hs    Peripheral neuropathy     Renal cancer 08/2014    Lt RALPart'l Nx; t1a Clear Cell with negative margin    RUQ pain 07/23/2013    S/P laparoscopic cholecystectomy 09/04/2012    Wellness examination-done 02/16/2017    PROCEDURES:  Prostate exam/Rachel/confirmed/1.27.16     Colonoscopy-nl/Gil//7-28-10/5y  EGD/P/Gil/3.4.16  Colonoscopy-polyp/Gil//3.18.16/5 yr  EGD-neg/Gil//5.1.18     SURGERIES:  T&A  Appendix/1959  Scrotum-cyst/1980's  Lap gb+liver bx/Raya/WB/7.23.12  L robotic partial nephrectomy (clear cell grade 1 [id number])/Rachel//8.14.14  Nyofwh-kgmnqqvb-jaegb+mesh+omen/Micah//5.9.     Past Surgical History:   Procedure Laterality Date    APPENDECTOMY      CHOLECYSTECTOMY      ENDOSCOPY N/A 05/01/2018    Procedure: ESOPHAGOGASTRODUODENOSCOPY WITH ANESTHESIA;  Surgeon: Donnell Cordero DO;  Location: [facility] ENDOSCOPY;  Service: Gastroenterology    HERNIA REPAIR      KIDNEY SURGERY      LAPAROSCOPIC PARTIAL NEPHRECTOMY Left 08/14/2014    Robot Assisted     Family History   Problem Relation Age of Onset    Colon cancer Father     Heart disease Mother     Colon polyps Neg Hx     Esophageal cancer Neg Hx      Social History     Socioeconomic History    Marital status:      Spouse name: Melanie    Number of children: 1    Years of education: 12   Tobacco Use     Smoking status: Every Day     Current packs/day: 0.50     Average packs/day: 0.5 packs/day for 58.8 years (29.4 ttl pk-yrs)     Types: Cigarettes     Start date: 4/30/1966     Passive exposure: Never    Smokeless tobacco: Never   Vaping Use    Vaping status: Never Used   Substance and Sexual Activity    Alcohol use: No    Drug use: No    Sexual activity: Defer     No Known Allergies  Current Outpatient Medications   Medication Sig Dispense Refill    acetaminophen (TYLENOL) 650 MG 8 hr tablet Take 1 tablet by mouth 2 (Two) Times a Day.      calcium-vitamin D (OSCAL-500) 500-200 MG-UNIT per tablet Take 1 tablet by mouth 2 times daily.        cholecalciferol (VITAMIN D3) 10 MCG (400 UNIT) tablet Take 1 tablet by mouth.      clotrimazole-betamethasone (LOTRISONE) 1-0.05 % cream Apply 1 Application topically to the appropriate area as directed 2 (Two) Times a Day. Apply to affected area twice daily 45 g 1    diphenhydrAMINE (BENADRYL) 25 mg capsule Take 1 capsule by mouth 2 (Two) Times a Day.      escitalopram (LEXAPRO) 20 MG tablet Take 1 tablet by mouth Daily. 90 tablet 3    ferrous sulfate 325 (65 FE) MG tablet Take 1 tablet by mouth Daily With Breakfast.      fluticasone (FLONASE) 50 MCG/ACT nasal spray Administer 2 sprays into the nostril(s) as directed by provider Daily.      gabapentin (NEURONTIN) 300 MG capsule Take 1 capsule by mouth 3 (Three) Times a Day. 90 capsule 1    KETOTIFEN FUMARATE OP Apply 1 drop to eye(s) as directed by provider As Needed.      losartan (COZAAR) 100 MG tablet Take 1 tablet by mouth Daily. 90 tablet 1    metoprolol tartrate (LOPRESSOR) 50 MG tablet Take 1 tablet by mouth Every 12 (Twelve) Hours. 180 tablet 3    Multiple Vitamins-Minerals (CENTRUM SILVER 50+MEN) tablet Take 1 tablet by mouth Daily.      O2 (OXYGEN) Inhale 1 (One) Time.      omeprazole (priLOSEC) 20 MG capsule 1 capsule 2 (Two) Times a Day.      potassium chloride (Klor-Con M10) 10 MEQ CR tablet Take 1 tablet by mouth 2  (Two) Times a Day. 180 tablet 3    simvastatin (ZOCOR) 40 MG tablet Take 1 tablet by mouth Daily. 90 tablet 1    tamsulosin (FLOMAX) 0.4 MG capsule 24 hr capsule Take 1 capsule by mouth Daily. 90 capsule 3    primidone (MYSOLINE) 50 MG tablet Take 1 tablet by mouth 3 (Three) Times a Day for 120 days. . 90 tablet 3     No current facility-administered medications for this visit.     Review of Systems   Constitutional:  Negative for chills and fever.   HENT:  Negative for congestion.    Respiratory:  Negative for shortness of breath.    Cardiovascular:  Positive for leg swelling. Negative for chest pain.   Gastrointestinal:  Negative for constipation, diarrhea, nausea and vomiting.   Musculoskeletal:  Positive for back pain and gait problem. Negative for arthralgias and myalgias.   Skin:  Negative for wound.        thickened, elongated, irregular toenails   Neurological:  Positive for tremors and numbness.   Psychiatric/Behavioral:  Negative for agitation and behavioral problems.        OBJECTIVE     Vitals:    25 1003   BP: 140/88   Pulse: 68   SpO2: 99%                   PHYSICAL EXAM  GEN:   Accompanied by none.     Foot/Ankle Exam    GENERAL  Appearance:  appears stated age and obese  Orientation:  AAOx3  Affect:  appropriate  Gait:  involuntary movements (unsteady)  Assistance:  cane use  Right shoe gear: casual shoe  Left shoe gear: casual shoe    VASCULAR     Right Foot Vascularity   Dorsalis pedis:  1+  Posterior tibial:  1+  Skin temperature:  warm  Edema gradin+ and pitting  CFT:  3  Pedal hair growth:  Present  Varicosities:  moderate varicosities     Left Foot Vascularity   Dorsalis pedis:  1+  Posterior tibial:  1+  Skin temperature:  warm  Edema gradin+ and pitting  CFT:  3  Pedal hair growth:  Present  Varicosities:  moderate varicosities     NEUROLOGIC     Right Foot Neurologic   Light touch sensation: diminished  Vibratory sensation: diminished  Hot/Cold sensation:  diminished  Protective Sensation using [place] Monofilament:   Sites intact: 1  Sites tested: 10     Left Foot Neurologic   Light touch sensation: diminished  Vibratory sensation: diminished  Hot/Cold sensation:  diminished  Protective Sensation using [place] Monofilament:   Sites intact: 4  Sites tested: 10    MUSCULOSKELETAL     Right Foot Musculoskeletal   Ecchymosis:  none  Tenderness:  toenail problem    Arch:  Normal  Hallux valgus: No       Left Foot Musculoskeletal   Ecchymosis:  none  Tenderness:  toenail problem  Arch:  Normal  Hallux valgus: No      MUSCLE STRENGTH     Right Foot Muscle Strength   Foot dorsiflexion:  4+  Foot plantar flexion:  4+  Foot inversion:  4+  Foot eversion:  4+     Left Foot Muscle Strength   Foot dorsiflexion:  4+  Foot plantar flexion:  4+  Foot inversion:  4+  Foot eversion:  4+    RANGE OF MOTION     Right Foot Range of Motion   Foot and ankle ROM within normal limits       Left Foot Range of Motion   Foot and ankle ROM within normal limits      DERMATOLOGIC      Right Foot Dermatologic   Skin  Positive for dryness and tinea.   Nails  1.  Positive for elongated, onychomycosis, abnormal thickness, subungual debris and dystrophic nail.  2.  Positive for elongated, onychomycosis, abnormal thickness and subungual debris.  3.  Positive for elongated, onychomycosis, abnormal thickness and subungual debris.  4.  Positive for elongated, onychomycosis, abnormal thickness and subungual debris.  5.  Positive for elongated, onychomycosis, abnormal thickness and subungual debris.  Nails comment:  Gryphotic 1-5     Left Foot Dermatologic   Skin  Positive for dryness and tinea.   Nails comment:  Gryphotic 1-5  Nails  1.  Positive for elongated, onychomycosis, abnormal thickness, subungual debris and dystrophic nail.  2.  Positive for elongated, onychomycosis, abnormal thickness and subungual debris.  3.  Positive for elongated, onychomycosis, abnormal thickness and  subungual debris.  4.  Positive for elongated, onychomycosis, abnormally thick and subungual debris.  5.  Positive for elongated, onychomycosis, abnormally thick and subungual debris.     Right foot additional comments: Tinea pedis to plantar foot     Left foot additional comments: Tinea pedis to plantar foot      RADIOLOGY/NUCLEAR:  No results found.    LABORATORY/CULTURE RESULTS:      PATHOLOGY RESULTS:       ASSESSMENT/PLAN     Diagnoses and all orders for this visit:    1. Onychomycosis (Primary)    2. Other polyneuropathy    3. Peripheral edema    4. Tinea pedis of both feet    5. Tremor    6. Gait abnormality          Comprehensive lower extremity examination and evaluation was performed.  Discussed findings and treatment plan including risks, benefits, and treatment options with patient in detail. Patient agreed with treatment plan.  After verbal consent obtained, nail(s) x10 debrided of length and thickness with nail nipper without incidence  Patient may maintain nails and calluses at home utilizing emery board or pumice stone between visits as needed   Tinea pedis still present to bilateral feet-instructed patient to utilize the previously prescribed Lotrisone.  Instructed to apply twice daily for next 3 to 4 weeks.  Instructed patient to keep feet as dry as possible, wear moisture wicking socks, and to allow shoes to dry between wear become damp or wet.  Also recommend antiperspirant spray.  Continue use of cane for ambulation assistance.   Continue to recommend use of compression stockings and keeping feet elevated while he is at rest.  Patient to return in 3 months for routine foot nail care services.  Encourage to call sooner with questions or concerns.      An After Visit Summary was printed and given to the patient at discharge, including (if requested) any available informative/educational handouts regarding diagnosis, treatment, or medications. All questions were answered to patient/family  satisfaction. Should symptoms fail to improve or worsen they agree to call or return to clinic or to go to the Emergency Department. Discussed the importance of following up with any needed screening tests/labs/specialist appointments and any requested follow-up recommended by me today. Importance of maintaining follow-up discussed and patient accepts that missed appointments can delay diagnosis and potentially lead to worsening of conditions.  Return in about 3 months (around 6/6/2025) for Follow-up with APRN, Follow-up in Foot Care Clinic., or sooner if acute issues arise.  Advance Care Planning   ACP discussion was declined by the patient. Patient does not have an advance directive, declines further assistance.       I spent 10 minutes caring for Felix on this date of service. This time includes time spent by me in the following activities: preparing for the visit, performing a medically appropriate examination and/or evaluation, counseling and educating the patient/family/caregiver, and documenting information in the medical record  I spent 5 minutes on the separately reported service of toenail debridement. This time is not included in the time used to support the E/M service also reported today.      This document has been electronically signed by RUBIO Merrill on March 6, 2025 11:44 CST

## 2025-03-06 ENCOUNTER — OFFICE VISIT (OUTPATIENT)
Age: 75
End: 2025-03-06
Payer: MEDICARE

## 2025-03-06 VITALS
OXYGEN SATURATION: 99 % | SYSTOLIC BLOOD PRESSURE: 140 MMHG | BODY MASS INDEX: 37.93 KG/M2 | HEIGHT: 72 IN | HEART RATE: 68 BPM | WEIGHT: 280 LBS | DIASTOLIC BLOOD PRESSURE: 88 MMHG

## 2025-03-06 DIAGNOSIS — R60.0 PERIPHERAL EDEMA: ICD-10-CM

## 2025-03-06 DIAGNOSIS — R26.9 GAIT ABNORMALITY: ICD-10-CM

## 2025-03-06 DIAGNOSIS — R25.1 TREMOR: ICD-10-CM

## 2025-03-06 DIAGNOSIS — G62.89 OTHER POLYNEUROPATHY: ICD-10-CM

## 2025-03-06 DIAGNOSIS — B35.3 TINEA PEDIS OF BOTH FEET: ICD-10-CM

## 2025-03-06 DIAGNOSIS — B35.1 ONYCHOMYCOSIS: Primary | ICD-10-CM

## 2025-03-11 NOTE — PROGRESS NOTES
[facility] - PODIATRY    Today's Date: 08/29/2024     Patient Name: Felix Bartlett Sr.  MRN: [id number]  CSN: [id number]  PCP: Bassam Rosario MD  Referring Provider: No ref. provider found    SUBJECTIVE     Chief Complaint   Patient presents with    Follow-up     Bassam Rosario MD 04/01/2024 3 MTH F/U FOR FOOT CARE- pt states feet doing ok, left great nail is a little sore and left foot has been swelling some recently- pt denies pain      HPI: Felix Bartlett Sr., a 74 y.o.male, comes to clinic as a(n) established patient complaining of thickened, dystrophic, irregular toenails of both feet and numbness in both feet . Patient has h/o GERD, arthritis, COPD, asthma, HTN, Renal CA .  Patient here today for routine nail care due to thickened, irregular toenails of both feet. Reports tenderness in shoes when nails are at an excessive length and thickness.  Notes tenderness in toes due to nail thickness/length but no pain due to neuropathy . Continues to use a cane for ambulation assistance.  Patient has no new complaints today other than noticed that his left foot has been swelling a little more than usual.  Does not wear compression stockings.  Relates previous treatment(s) including care by podiatry . No other pedal complaints at this time.    Past Medical History:   Diagnosis Date    Acid reflux     Arthritis     Asthma     COPD (chronic obstructive pulmonary disease)     CTS (carpal tunnel syndrome)     HL (hearing loss)     Hyperlipidemia     Hypertension     Oxygen dependent     at hs    Peripheral neuropathy     Renal cancer 08/2014    Lt RALPart'l Nx; t1a Clear Cell with negative margin     Past Surgical History:   Procedure Laterality Date    APPENDECTOMY      CHOLECYSTECTOMY      ENDOSCOPY N/A 05/01/2018    Procedure: ESOPHAGOGASTRODUODENOSCOPY WITH ANESTHESIA;  Surgeon: Donnell Cordero DO;  Location: [facility] ENDOSCOPY;  Service: Gastroenterology    HERNIA REPAIR      KIDNEY  SURGERY      LAPAROSCOPIC PARTIAL NEPHRECTOMY Left 08/14/2014    Robot Assisted     Family History   Problem Relation Age of Onset    Colon cancer Father     Heart disease Mother     Colon polyps Neg Hx     Esophageal cancer Neg Hx      Social History     Socioeconomic History    Marital status:      Spouse name: Melanie    Number of children: 1    Years of education: 12   Tobacco Use    Smoking status: Every Day     Current packs/day: 0.50     Average packs/day: 0.5 packs/day for 58.3 years (29.2 ttl pk-yrs)     Types: Cigarettes     Start date: 4/30/1966     Passive exposure: Never    Smokeless tobacco: Never   Vaping Use    Vaping status: Never Used   Substance and Sexual Activity    Alcohol use: No    Drug use: No    Sexual activity: Never     No Known Allergies  Current Outpatient Medications   Medication Sig Dispense Refill    acetaminophen (TYLENOL) 650 MG 8 hr tablet Take 1 tablet by mouth 2 (Two) Times a Day.      calcium-vitamin D (OSCAL-500) 500-200 MG-UNIT per tablet Take 1 tablet by mouth 2 times daily.        cholecalciferol (VITAMIN D3) 10 MCG (400 UNIT) tablet Take 1 tablet by mouth.      diphenhydrAMINE (BENADRYL) 25 mg capsule Take 1 capsule by mouth 2 (Two) Times a Day.      escitalopram (LEXAPRO) 20 MG tablet Take 1 tablet by mouth Daily. 90 tablet 3    ferrous sulfate 325 (65 FE) MG tablet Take 1 tablet by mouth Daily With Breakfast.      fluticasone (FLONASE) 50 MCG/ACT nasal spray       gabapentin (NEURONTIN) 300 MG capsule Take 1 capsule by mouth 2 (Two) Times a Day. 60 capsule 3    KETOTIFEN FUMARATE OP Apply 1 drop to eye(s) as directed by provider As Needed.      losartan (COZAAR) 50 MG tablet Take 1 tablet by mouth Daily. 90 tablet 3    metoprolol tartrate (LOPRESSOR) 50 MG tablet Take 1 tablet by mouth Every 12 (Twelve) Hours. 180 tablet 3    Multiple Vitamins-Minerals (CENTRUM SILVER 50+MEN) tablet Take 1 tablet by mouth Daily.      O2 (OXYGEN) Inhale 1 (One) Time.      omeprazole  (priLOSEC) 20 MG capsule 1 capsule 2 (Two) Times a Day.      potassium chloride (Klor-Con M10) 10 MEQ CR tablet Take 1 tablet by mouth 2 (Two) Times a Day. 180 tablet 3    primidone (MYSOLINE) 50 MG tablet Take 1 tablet by mouth 3 (Three) Times a Day for 120 days. . 90 tablet 3    simvastatin (ZOCOR) 20 MG tablet Take 1 tablet by mouth Daily. 90 tablet 3    tamsulosin (FLOMAX) 0.4 MG capsule 24 hr capsule Take 1 capsule by mouth Daily. 90 capsule 3     No current facility-administered medications for this visit.     Review of Systems   Constitutional:  Negative for chills and fever.   HENT:  Negative for congestion.    Respiratory:  Negative for shortness of breath.    Cardiovascular:  Positive for leg swelling. Negative for chest pain.   Gastrointestinal:  Negative for constipation, diarrhea, nausea and vomiting.   Musculoskeletal:  Positive for arthralgias and back pain. Negative for myalgias.   Skin:  Negative for wound.        thickened, elongated, irregular toenails   Neurological:  Positive for tremors and numbness.   Psychiatric/Behavioral:  Negative for agitation and behavioral problems.        OBJECTIVE     Vitals:    24 0949   BP: 134/80   Pulse: 74   SpO2: 96%               PHYSICAL EXAM  GEN:   Accompanied by none.     Foot/Ankle Exam    GENERAL  Appearance:  appears stated age and obese  Orientation:  AAOx3  Affect:  appropriate  Gait:  involuntary movements (unsteady)  Assistance:  cane use  Right shoe gear: casual shoe  Left shoe gear: casual shoe    VASCULAR     Right Foot Vascularity   Dorsalis pedis:  1+  Posterior tibial:  1+  Skin temperature:  warm  Edema gradin+ and pitting  CFT:  3  Pedal hair growth:  Present  Varicosities:  moderate varicosities     Left Foot Vascularity   Dorsalis pedis:  1+  Posterior tibial:  1+  Skin temperature:  warm  Edema gradin+ and pitting  CFT:  3  Pedal hair growth:  Present  Varicosities:  moderate varicosities     NEUROLOGIC     Right Foot  Neurologic   Light touch sensation: diminished  Vibratory sensation: diminished  Hot/Cold sensation: diminished  Protective Sensation using [place] Monofilament:   Sites intact: 1  Sites tested: 10     Left Foot Neurologic   Light touch sensation: diminished  Vibratory sensation: diminished  Hot/Cold sensation:  diminished  Protective Sensation using [place] Monofilament:   Sites intact: 4  Sites tested: 10    MUSCULOSKELETAL     Right Foot Musculoskeletal   Ecchymosis:  none  Tenderness:  toenail problem    Arch:  Normal  Hallux valgus: No       Left Foot Musculoskeletal   Ecchymosis:  none  Tenderness:  toenail problem  Arch:  Normal  Hallux valgus: No      MUSCLE STRENGTH     Right Foot Muscle Strength   Foot dorsiflexion:  4+  Foot plantar flexion:  4+  Foot inversion:  4+  Foot eversion:  4+     Left Foot Muscle Strength   Foot dorsiflexion:  4+  Foot plantar flexion:  4+  Foot inversion:  4+  Foot eversion:  4+    RANGE OF MOTION     Right Foot Range of Motion   Foot and ankle ROM within normal limits       Left Foot Range of Motion   Foot and ankle ROM within normal limits      DERMATOLOGIC      Right Foot Dermatologic   Skin  Positive for dryness.   Nails  1.  Positive for elongated, onychomycosis, abnormal thickness, subungual debris and dystrophic nail.  2.  Positive for elongated, onychomycosis, abnormal thickness and subungual debris.  3.  Positive for elongated, onychomycosis, abnormal thickness and subungual debris.  4.  Positive for elongated, onychomycosis, abnormal thickness and subungual debris.  5.  Positive for elongated, onychomycosis, abnormal thickness and subungual debris.  Nails comment:  Gryphotic 1-5     Left Foot Dermatologic   Skin  Positive for dryness.   Nails comment:  Gryphotic 1-5  Nails  1.  Positive for elongated, onychomycosis, abnormal thickness, subungual debris and dystrophic nail.  2.  Positive for elongated, onychomycosis, abnormal thickness and subungual  debris.  3.  Positive for elongated, onychomycosis, abnormal thickness and subungual debris.  4.  Positive for elongated, onychomycosis, abnormally thick and subungual debris.  5.  Positive for elongated, onychomycosis, abnormally thick and subungual debris.      RADIOLOGY/NUCLEAR:  No results found.    LABORATORY/CULTURE RESULTS:      PATHOLOGY RESULTS:       ASSESSMENT/PLAN     Diagnoses and all orders for this visit:    1. Onychomycosis (Primary)    2. Other polyneuropathy    3. Peripheral edema    4. Unsteady gait    5. Pain in both feet    6. Tremor              Comprehensive lower extremity examination and evaluation was performed.  Discussed findings and treatment plan including risks, benefits, and treatment options with patient in detail. Patient agreed with treatment plan.  After verbal consent obtained, nail(s) x10 debrided of length and thickness with nail nipper without incidence  Patient may maintain nails and calluses at home utilizing emery board or pumice stone between visits as needed   Continue use of cane for ambulation assistance.   Discussed with patient that he would likely benefit from use of compression stockings.  Also encourage patient to keep his feet elevated while he is at rest and above the level of his heart.  Patient to return in 3 months for routine foot nail care services.  Encourage to call sooner with questions or concerns.      An After Visit Summary was printed and given to the patient at discharge, including (if requested) any available informative/educational handouts regarding diagnosis, treatment, or medications. All questions were answered to patient/family satisfaction. Should symptoms fail to improve or worsen they agree to call or return to clinic or to go to the Emergency Department. Discussed the importance of following up with any needed screening tests/labs/specialist appointments and any requested follow-up recommended by me today. Importance of maintaining  on 3/12/Yes follow-up discussed and patient accepts that missed appointments can delay diagnosis and potentially lead to worsening of conditions.  Return in about 3 months (around 11/29/2024) for Follow-up with APRN, Follow-up in Foot Care Clinic., or sooner if acute issues arise.      This document has been electronically signed by RUBIO Merrill on August 29, 2024 16:17 CDT

## 2025-03-27 ENCOUNTER — OFFICE VISIT (OUTPATIENT)
Dept: ENT CLINIC | Age: 75
End: 2025-03-27
Payer: OTHER GOVERNMENT

## 2025-03-27 VITALS — HEIGHT: 72 IN | BODY MASS INDEX: 38.19 KG/M2 | WEIGHT: 282 LBS

## 2025-03-27 DIAGNOSIS — H61.23 BILATERAL IMPACTED CERUMEN: Primary | ICD-10-CM

## 2025-03-27 PROCEDURE — 69210 REMOVE IMPACTED EAR WAX UNI: CPT | Performed by: PHYSICIAN ASSISTANT

## 2025-03-27 NOTE — PROGRESS NOTES
Mr. Gagnon is a pleasant 74-year-old  male that presents for a 6-month cerumen check.  Patient reports that he has noticed gradual diminished hearing and feels like he has recurrent cerumen.      Physical examination with the microscope confirmed a cerumen impaction to be present bilaterally.  With microscopic guidance, the cerumen impaction was successfully removed with alligator graspers and suction with no complications.  Copious amounts of cerumen was successfully extracted.  Neck exam demonstrated no lymphadenopathy or thyromegaly.      Impression: Successful cerumen disimpaction with microscopy instrumentation-bilateral      Plan: Patient is to follow-up in 6 months for cerumen check.  He was reminded to call if he has any questions or concerns.      Electronically signed by CAROLIN SHAFFER PA-C on 3/27/25 at 11:56 AM CDT

## 2025-04-25 DIAGNOSIS — R25.1 TREMOR: ICD-10-CM

## 2025-04-25 RX ORDER — GABAPENTIN 300 MG/1
300 CAPSULE ORAL 3 TIMES DAILY
Qty: 270 CAPSULE | Refills: 0 | Status: SHIPPED | OUTPATIENT
Start: 2025-04-25

## 2025-05-02 NOTE — PROGRESS NOTES
Chief Complaint   Patient presents with    Anemia     Had labs 2-28-25 h-10.2       PCP: Keyon Dockery MD  REFER: Keyon Dockery MD    Subjective     HPI    Felix Bartlett Sr. referred to office for evaluation of anemia.    He has not observed bright red blood per rectum.  There is not associated abdominal pain.  There is not change in bowels.  He denies  weight loss.  Felix Bartlett Sr. Has observed dark stools and experiences occasional acid reflux.  Bowel habit described as intermittent constipation that is often relieved by intake of certain foods.  It is not uncommon that these foods will then result in diarrhea.  Felix Bartlett Sr. Is compliant with daily acid reflux tables as well as iron tablets.     CT Abdomen With Contrast (11/22/2024 10:46)     Positive family history of colon cancer in father    UPPER GI ENDOSCOPY (05/01/2018 08:41)   Endoscopy (Dr Cordero) 2016-duodenal nodule, active esophagitis-bx of nodule showed small intestinal type villi with nonspecific inflammation,       SCANNED - COLONOSCOPY (03/18/2016 00:00) -tubular adenoma   Colonoscopy 2010- normal       Lab Results - Last 18 Months   Lab Units 05/05/25  1058 02/28/25  0959 07/30/24  0832 06/24/24  1018 06/11/24  0820 03/11/24  0907   HEMOGLOBIN g/dL 9.8* 10.2* 9.4* 9.3* 9.7* 10.2*   HEMATOCRIT % 30.1* 30.3* 27.7* 28.8* 28.8* 30.7*   MCV fL 103.8* 101.3* 101* 103.2* 101.4* 101.3*   WBC 10*3/mm3 12.93* 13.04* 11.1* 12.32* 10.38 11.08*   PLATELETS 10*3/mm3 220 260 241 223 232 261       Lab Results - Last 18 Months   Lab Units 05/05/25  1058 02/28/25  0959 06/24/24  1018 06/11/24  0820 03/11/24  0907   GLUCOSE mg/dL 89 97 75 113* 99   SODIUM mmol/L 137 141 140 139 140   POTASSIUM mmol/L 4.2 4.4 4.2 4.1 4.4   CREATININE mg/dL 1.17 1.17 1.15 1.17 1.11   BUN mg/dL 14 12 14 13 13   BUN / CREAT RATIO  12.0 10.3 12.2 11.1 11.7   ALK PHOS U/L 103 104 82 84 98   ALT (SGPT) U/L 22 13 11 12 15   AST (SGOT) U/L 20 17 13 14 17   BILIRUBIN mg/dL <0.2  "<0.2 <0.2 <0.2 <0.2   ALBUMIN g/dL 4.0 4.0 3.9 3.9 4.2       No results for input(s): \"EGFRIFNONA\", \"EGFRIFAFRI\", \"EGFRRESULT\" in the last 91745 hours.    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  6.30.14/7.1.14 CBC With Differential/Platelet; Sedimentation Rate-Westergren; C-Reactive Protein, Quant==WBC 12.0; RBC 3.60; Hemoglobin 11.7; Hematocrit 34.4; Neutrophils (Absolute) 7.1; Lymphs (Absolute) 3.6  nothing major here..his problems are alcohol, smoking now o    HL (hearing loss)     Hyperlipidemia     Hypertension     Hypopotassemia-diuretic 02/16/2017    Oxygen dependent     at hs    Peripheral neuropathy     Renal cancer 08/2014    Lt RALPart'l Nx; t1a Clear Cell with " negative margin    RUQ pain 07/23/2013    S/P laparoscopic cholecystectomy 09/04/2012    Wellness examination-done 02/16/2017    PROCEDURES:  Prostate exam/[place]/confirmed/1.27.16     Colonoscopy-nl/[place]//7-28-10/5y  EGD/P/[place]/3.4.16  Colonoscopy-polyp/[place]//3.18.16/5 yr  EGD-neg/[place]//5.1.18     SURGERIES:  T&A  Appendix/1959  Scrotum-cyst/1980's  Lap gb+liver bx/[place]/[facility]/7.23.12  L robotic partial nephrectomy (clear cell grade 1 [id number])/[place]//8.14.14  Vtnmjk-tpxgghti-vthya+mesh+omen/[place]//5.9.     Past Surgical History:   Procedure Laterality Date    APPENDECTOMY      CHOLECYSTECTOMY      ENDOSCOPY N/A 05/01/2018    Procedure: ESOPHAGOGASTRODUODENOSCOPY WITH ANESTHESIA;  Surgeon: Donnell Cordero DO;  Location:  [facility];  Service: Gastroenterology    HERNIA REPAIR      KIDNEY SURGERY      LAPAROSCOPIC PARTIAL NEPHRECTOMY Left 08/14/2014    Robot Assisted     Outpatient Medications Marked as Taking for the 5/5/25 encounter (Office Visit) with Miguel Hastings APRN   Medication Sig Dispense Refill    acetaminophen (TYLENOL) 650 MG 8 hr tablet Take 1 tablet by mouth 2 (Two) Times a Day.      calcium-vitamin D (OSCAL-500) 500-200 MG-UNIT per tablet Take 1 tablet by mouth 2 times daily.        cholecalciferol (VITAMIN D3) 10 MCG (400 UNIT) tablet Take 1 tablet by mouth.      clotrimazole-betamethasone (LOTRISONE) 1-0.05 % cream Apply 1 Application topically to the appropriate area as directed 2 (Two) Times a Day. Apply to affected area twice daily 45 g 1    diphenhydrAMINE (BENADRYL) 25 mg capsule Take 1 capsule by mouth 2 (Two) Times a Day.      escitalopram (LEXAPRO) 20 MG tablet Take 1 tablet by mouth Daily. 90 tablet 3    ferrous sulfate 325 (65 FE) MG tablet Take 1 tablet by mouth Daily With Breakfast.      fluticasone (FLONASE) 50 MCG/ACT nasal spray Administer 2 sprays into the nostril(s) as directed by provider Daily.      gabapentin (NEURONTIN) 300 MG capsule Take 1  "capsule by mouth 3 (Three) Times a Day. 270 capsule 0    KETOTIFEN FUMARATE OP Apply 1 drop to eye(s) as directed by provider As Needed.      losartan (COZAAR) 100 MG tablet Take 1 tablet by mouth Daily. 90 tablet 1    metoprolol tartrate (LOPRESSOR) 50 MG tablet Take 1 tablet by mouth Every 12 (Twelve) Hours. 180 tablet 3    Multiple Vitamins-Minerals (CENTRUM SILVER 50+MEN) tablet Take 1 tablet by mouth Daily.      omeprazole (priLOSEC) 20 MG capsule 1 capsule 2 (Two) Times a Day.      potassium chloride (Klor-Con M10) 10 MEQ CR tablet Take 1 tablet by mouth 2 (Two) Times a Day. 180 tablet 3    simvastatin (ZOCOR) 40 MG tablet Take 1 tablet by mouth Daily. 90 tablet 1    tamsulosin (FLOMAX) 0.4 MG capsule 24 hr capsule Take 1 capsule by mouth Daily. 90 capsule 3     No Known Allergies  Social History     Socioeconomic History    Marital status:      Spouse name: Melanie    Number of children: 1    Years of education: 12   Tobacco Use    Smoking status: Every Day     Current packs/day: 0.50     Average packs/day: 0.5 packs/day for 59.0 years (29.5 ttl pk-yrs)     Types: Cigarettes     Start date: 4/30/1966     Passive exposure: Never    Smokeless tobacco: Never   Vaping Use    Vaping status: Never Used   Substance and Sexual Activity    Alcohol use: No    Drug use: No    Sexual activity: Defer     Review of Systems   Constitutional:  Negative for fever and unexpected weight change.   HENT:  Negative for trouble swallowing.    Respiratory:  Negative for shortness of breath.    Cardiovascular:  Negative for chest pain.   Gastrointestinal:  Negative for abdominal pain and anal bleeding.     Objective   Vitals:    05/05/25 0942   BP: 144/84   Pulse: 60   Temp: 97 °F (36.1 °C)   SpO2: 99%   Weight: 125 kg (276 lb)   Height: 182.9 cm (72\")     Physical Exam  Constitutional:       Appearance: Normal appearance. He is well-developed.   Eyes:      General: No scleral icterus.  Cardiovascular:      Heart sounds: Normal " heart sounds. No murmur heard.  Pulmonary:      Effort: Pulmonary effort is normal.   Abdominal:      General: Bowel sounds are normal. There is no distension.      Palpations: Abdomen is soft.      Tenderness: There is no abdominal tenderness. There is no guarding.   Skin:     General: Skin is warm and dry.      Coloration: Skin is not jaundiced.   Neurological:      Mental Status: He is alert.   Psychiatric:         Behavior: Behavior is cooperative.       Imaging Results (Most Recent)       None          Body mass index is 37.43 kg/m².  Assessment & Plan   Diagnoses and all orders for this visit:    1. Iron deficiency (Primary)  -     Case Request; Standing  -     Implement Anesthesia Orders Day of Procedure; Standing  -     Follow Anesthesia Guidelines / Protocol; Future  -     CBC (No Diff); Future  -     Comprehensive Metabolic Panel; Future  -     Case Request    2. Family history of colon cancer    Other orders  -     sodium-potassium-magnesium sulfates (Suprep Bowel Prep Kit) 17.5-3.13-1.6 GM/177ML solution oral solution; Take 2 bottles by mouth Take As Directed. Take 1 bottle night before and 1 bottle morning of procedure.  Time as directed  Dispense: 354 mL; Refill: 0        COLONOSCOPY WITH ANESTHESIA- (examination of colon) (N/A)      CT scan from Nov 22 2024 unremarkable from GI perspective.  There was a noted solid nodule at tail of pancreas.  This has been observed on previous CT imaging, nodule has been abraham, no dilation of duct, no abnormality to labs.     Patient is to continue all blood pressure and cardiac medications prior to procedure and has been advised to take medications morning of procedure   Pt verbalized understanding       All risks, benefits, alternatives, and indications of colonoscopy procedure have been discussed with the patient. Risks to include perforation of the colon requiring possible surgery or colostomy, risk of bleeding from biopsies or removal of colon tissue,  possibility of missing a colon polyp or cancer, or adverse drug reaction.  Benefits to include the diagnosis and management of disease of the colon and rectum. Alternatives to include barium enema, radiographic evaluation, lab testing or no intervention. Pt verbalizes understanding and agrees to proceed with procedure.        Miguel Hastings, APRN  05/09/25

## 2025-05-05 ENCOUNTER — OFFICE VISIT (OUTPATIENT)
Dept: GASTROENTEROLOGY | Facility: CLINIC | Age: 75
End: 2025-05-05
Payer: MEDICARE

## 2025-05-05 ENCOUNTER — LAB (OUTPATIENT)
Dept: LAB | Facility: HOSPITAL | Age: 75
End: 2025-05-05
Payer: MEDICARE

## 2025-05-05 VITALS
DIASTOLIC BLOOD PRESSURE: 84 MMHG | TEMPERATURE: 97 F | WEIGHT: 276 LBS | HEART RATE: 60 BPM | HEIGHT: 72 IN | OXYGEN SATURATION: 99 % | BODY MASS INDEX: 37.38 KG/M2 | SYSTOLIC BLOOD PRESSURE: 144 MMHG

## 2025-05-05 DIAGNOSIS — Z80.0 FAMILY HISTORY OF COLON CANCER: ICD-10-CM

## 2025-05-05 DIAGNOSIS — E61.1 IRON DEFICIENCY: Primary | ICD-10-CM

## 2025-05-05 DIAGNOSIS — E61.1 IRON DEFICIENCY: ICD-10-CM

## 2025-05-05 LAB
ALBUMIN SERPL-MCNC: 4 G/DL (ref 3.5–5.2)
ALBUMIN/GLOB SERPL: 1.5 G/DL
ALP SERPL-CCNC: 103 U/L (ref 39–117)
ALT SERPL W P-5'-P-CCNC: 22 U/L (ref 1–41)
ANION GAP SERPL CALCULATED.3IONS-SCNC: 9 MMOL/L (ref 5–15)
AST SERPL-CCNC: 20 U/L (ref 1–40)
BILIRUB SERPL-MCNC: <0.2 MG/DL (ref 0–1.2)
BUN SERPL-MCNC: 14 MG/DL (ref 8–23)
BUN/CREAT SERPL: 12 (ref 7–25)
CALCIUM SPEC-SCNC: 9.6 MG/DL (ref 8.6–10.5)
CHLORIDE SERPL-SCNC: 103 MMOL/L (ref 98–107)
CO2 SERPL-SCNC: 25 MMOL/L (ref 22–29)
CREAT SERPL-MCNC: 1.17 MG/DL (ref 0.76–1.27)
DEPRECATED RDW RBC AUTO: 48.5 FL (ref 37–54)
EGFRCR SERPLBLD CKD-EPI 2021: 64.2 ML/MIN/1.73
ERYTHROCYTE [DISTWIDTH] IN BLOOD BY AUTOMATED COUNT: 12.7 % (ref 12.3–15.4)
GLOBULIN UR ELPH-MCNC: 2.7 GM/DL
GLUCOSE SERPL-MCNC: 89 MG/DL (ref 65–99)
HCT VFR BLD AUTO: 30.1 % (ref 37.5–51)
HGB BLD-MCNC: 9.8 G/DL (ref 13–17.7)
MCH RBC QN AUTO: 33.8 PG (ref 26.6–33)
MCHC RBC AUTO-ENTMCNC: 32.6 G/DL (ref 31.5–35.7)
MCV RBC AUTO: 103.8 FL (ref 79–97)
PLATELET # BLD AUTO: 220 10*3/MM3 (ref 140–450)
PMV BLD AUTO: 11.3 FL (ref 6–12)
POTASSIUM SERPL-SCNC: 4.2 MMOL/L (ref 3.5–5.2)
PROT SERPL-MCNC: 6.7 G/DL (ref 6–8.5)
RBC # BLD AUTO: 2.9 10*6/MM3 (ref 4.14–5.8)
SODIUM SERPL-SCNC: 137 MMOL/L (ref 136–145)
WBC NRBC COR # BLD AUTO: 12.93 10*3/MM3 (ref 3.4–10.8)

## 2025-05-05 PROCEDURE — 99214 OFFICE O/P EST MOD 30 MIN: CPT | Performed by: NURSE PRACTITIONER

## 2025-05-05 PROCEDURE — 3077F SYST BP >= 140 MM HG: CPT | Performed by: NURSE PRACTITIONER

## 2025-05-05 PROCEDURE — 36415 COLL VENOUS BLD VENIPUNCTURE: CPT

## 2025-05-05 PROCEDURE — 85027 COMPLETE CBC AUTOMATED: CPT

## 2025-05-05 PROCEDURE — 80053 COMPREHEN METABOLIC PANEL: CPT

## 2025-05-05 PROCEDURE — 1160F RVW MEDS BY RX/DR IN RCRD: CPT | Performed by: NURSE PRACTITIONER

## 2025-05-05 PROCEDURE — 3079F DIAST BP 80-89 MM HG: CPT | Performed by: NURSE PRACTITIONER

## 2025-05-05 PROCEDURE — 1159F MED LIST DOCD IN RCRD: CPT | Performed by: NURSE PRACTITIONER

## 2025-05-05 RX ORDER — SODIUM, POTASSIUM,MAG SULFATES 17.5-3.13G
2 SOLUTION, RECONSTITUTED, ORAL ORAL TAKE AS DIRECTED
Qty: 354 ML | Refills: 0 | Status: SHIPPED | OUTPATIENT
Start: 2025-05-05

## 2025-05-09 ENCOUNTER — TELEPHONE (OUTPATIENT)
Dept: NEUROLOGY | Facility: CLINIC | Age: 75
End: 2025-05-09
Payer: OTHER GOVERNMENT

## 2025-05-09 DIAGNOSIS — R25.1 TREMOR: ICD-10-CM

## 2025-05-09 NOTE — TELEPHONE ENCOUNTER
Medication requested (name and dose):      primidone (MYSOLINE) 50 MG tablet ()    Take 1 tablet by mouth 3 (Three) Times a Day for 120 days.     Pharmacy where request should be sent:      [facility] - [facility] - [address] [facility] AT PORTAL TO REGISTERED [facility] - [phone]  - [phone]      Additional details provided by patient:  PATIENT STATES HE HAS PLENTY OF MEDICATION TO LAST UNTIL THIS IS MAILED.    Best call back number:  [phone]    Does the patient have less than a 3 day supply:  [] Yes  [x] No    Jamari Banks Rep  25, 15:11 CDT

## 2025-05-12 RX ORDER — PRIMIDONE 50 MG/1
50 TABLET ORAL 3 TIMES DAILY
Qty: 270 TABLET | Refills: 0
Start: 2025-05-12 | End: 2025-09-09

## 2025-05-21 DIAGNOSIS — R25.1 TREMOR: ICD-10-CM

## 2025-05-21 RX ORDER — PRIMIDONE 50 MG/1
50 TABLET ORAL 3 TIMES DAILY
Qty: 270 TABLET | Refills: 3 | Status: SHIPPED | OUTPATIENT
Start: 2025-05-21 | End: 2026-05-21

## 2025-05-21 NOTE — TELEPHONE ENCOUNTER
Caller: Felix Bartlett Sr.    Relationship: Self    Best call back number: [phone]     Requested Prescriptions:   Requested Prescriptions     Pending Prescriptions Disp Refills    primidone (MYSOLINE) 50 MG tablet       Sig: Take 1 tablet by mouth 3 (Three) Times a Day for 120 days. .        Pharmacy where request should be sent: [id number] - [address] AT [address] & [facility] - [phone]  - [phone]     Last office visit with prescribing clinician: 1/23/2025   Last telemedicine visit with prescribing clinician: Visit date not found   Next office visit with prescribing clinician: 7/24/2025     Additional details provided by patient: THE PT HAS 3 PILLS LEFT. HE TRIED TO HAVE THIS REFILLED TWO WEEKS AGO BUT IT WAS NEVER SENT TO HIS PHARMACY.     Does the patient have less than a 3 day supply:  [x] Yes  [] No    Would you like a call back once the refill request has been completed: [] Yes [x] No    If the office needs to give you a call back, can they leave a voicemail: [] Yes [x] No    Jamari Sagastume Rep   05/21/25 12:12 CDT

## 2025-05-29 NOTE — PROGRESS NOTES
[facility] - PODIATRY    Today's Date: 06/06/2025     Patient Name: Felix Bartlett Sr.  MRN: [id number]  CSN: [id number]  PCP: Keyon Dockery MD  Referring Provider: No ref. provider found    SUBJECTIVE     Chief Complaint   Patient presents with    Follow-up     PCP: Keyon Dockery MD 03/27/25  Return in about 3 months (around 6/6/2025) for Follow-up with APRN, Follow-up in [facility].   Pt states he is here today for foot/nail care. Pt complains today of left great toe pain. Pain level 1/10. Pt has neuropathy. Pt is ambulating with a cane.      HPI: Felix Bartlett Sr., a 75 y.o.male, comes to clinic as a(n) established patient complaining of toenail/callus issues. Patient has h/o GERD, arthritis, COPD, asthma, HTN, Renal CA. Complains that toenails are long, thick, irregular and in need of care. Notes discomfort around left greater toenail however denies drainage or injury. Feels as if the nail is digging in.Utilizing cane for ambulation assistance today.  No longer feels comfortable providing care to himself due to tremors and neuropathy. Complains of pain 1 out of 10 to left greater toe. Reports mild edema but does not wear compression stockings. Has been applying Lotrisone cream on occasion. States that he typically wears socks at all times except for when he showers.   Denies open wounds or sores.  Relates previous treatment(s) including care by podiatry. No other pedal complaints at this time.    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  6.30.14/7.1.14 CBC With Differential/Platelet; Sedimentation Rate-Westergren; C-Reactive Protein, Quant==WBC 12.0; RBC 3.60; Hemoglobin 11.7; Hematocrit 34.4; Neutrophils (Absolute) 7.1; Lymphs (Absolute) 3.6  nothing major here..his problems are alcohol, smoking now o    HL (hearing loss)     Hyperlipidemia     Hypertension     Hypopotassemia-diuretic 02/16/2017    Oxygen dependent     at     Peripheral neuropathy     Renal cancer 08/2014    Lt RALPart'l Nx; t1a Clear Cell with negative margin    RUQ pain 07/23/2013    S/P laparoscopic cholecystectomy 09/04/2012    Wellness examination-done 02/16/2017    PROCEDURES:  Prostate exam/Rachel/confirmed/1.27.16     Colonoscopy-nl/Gil//7-28-10/5y  EGD/P/Gil/3.4.16  Colonoscopy-polyp/Gil//3.18.16/5 yr  EGD-neg/Gil//5.1.18     SURGERIES:  T&A  Appendix/1959  Scrotum-cyst/1980's  Lap gb+liver bx/Raya/[facility]/7.23.12  L robotic partial nephrectomy (clear cell grade 1 [id number])/Given//8.14.14  Rnpjor-xmhifgez-melpl+mesh+omen/Micah//5.9.     Past Surgical History:   Procedure Laterality Date    APPENDECTOMY      CHOLECYSTECTOMY      ENDOSCOPY N/A 05/01/2018    Procedure: ESOPHAGOGASTRODUODENOSCOPY WITH ANESTHESIA;  Surgeon: Donnell Cordero DO;  Location: [facility] ENDOSCOPY;  Service: Gastroenterology    HERNIA REPAIR      KIDNEY SURGERY      LAPAROSCOPIC PARTIAL NEPHRECTOMY Left 08/14/2014    Robot Assisted     Family History   Problem Relation Age of Onset    Colon cancer Father     Heart  disease Mother     Colon polyps Neg Hx     Esophageal cancer Neg Hx      Social History     Socioeconomic History    Marital status:      Spouse name: Melanie    Number of children: 1    Years of education: 12   Tobacco Use    Smoking status: Every Day     Current packs/day: 0.50     Average packs/day: 0.5 packs/day for 59.1 years (29.6 ttl pk-yrs)     Types: Cigarettes     Start date: 4/30/1966     Passive exposure: Never    Smokeless tobacco: Never   Vaping Use    Vaping status: Never Used   Substance and Sexual Activity    Alcohol use: No    Drug use: No    Sexual activity: Defer     No Known Allergies  Current Outpatient Medications   Medication Sig Dispense Refill    acetaminophen (TYLENOL) 650 MG 8 hr tablet Take 1 tablet by mouth 2 (Two) Times a Day.      calcium-vitamin D (OSCAL-500) 500-200 MG-UNIT per tablet Take 1 tablet by mouth 2 times daily.        cholecalciferol (VITAMIN D3) 10 MCG (400 UNIT) tablet Take 1 tablet by mouth.      clotrimazole-betamethasone (LOTRISONE) 1-0.05 % cream Apply 1 Application topically to the appropriate area as directed 2 (Two) Times a Day. Apply to affected area twice daily 45 g 1    diphenhydrAMINE (BENADRYL) 25 mg capsule Take 1 capsule by mouth 2 (Two) Times a Day.      escitalopram (LEXAPRO) 20 MG tablet Take 1 tablet by mouth Daily. 90 tablet 3    ferrous sulfate 325 (65 FE) MG tablet Take 1 tablet by mouth Daily With Breakfast.      fluticasone (FLONASE) 50 MCG/ACT nasal spray Administer 2 sprays into the nostril(s) as directed by provider Daily.      gabapentin (NEURONTIN) 300 MG capsule Take 1 capsule by mouth 3 (Three) Times a Day. 270 capsule 0    KETOTIFEN FUMARATE OP Apply 1 drop to eye(s) as directed by provider As Needed.      losartan (COZAAR) 100 MG tablet Take 1 tablet by mouth Daily. 90 tablet 1    metoprolol tartrate (LOPRESSOR) 50 MG tablet Take 1 tablet by mouth Every 12 (Twelve) Hours. 180 tablet 3    Multiple Vitamins-Minerals (CENTRUM SILVER  50+MEN) tablet Take 1 tablet by mouth Daily.      O2 (OXYGEN) Inhale 1 (One) Time.      omeprazole (priLOSEC) 20 MG capsule 1 capsule 2 (Two) Times a Day.      potassium chloride (Klor-Con M10) 10 MEQ CR tablet Take 1 tablet by mouth 2 (Two) Times a Day. 180 tablet 3    primidone (MYSOLINE) 50 MG tablet Take 1 tablet by mouth 3 (Three) Times a Day. . 270 tablet 3    simvastatin (ZOCOR) 40 MG tablet Take 1 tablet by mouth Daily. 90 tablet 1    sodium-potassium-magnesium sulfates (Suprep Bowel Prep Kit) 17.5-3.13-1.6 GM/177ML solution oral solution Take 2 bottles by mouth Take As Directed. Take 1 bottle night before and 1 bottle morning of procedure.  Time as directed 354 mL 0    tamsulosin (FLOMAX) 0.4 MG capsule 24 hr capsule Take 1 capsule by mouth Daily. 90 capsule 3     No current facility-administered medications for this visit.     Review of Systems   Constitutional:  Negative for chills and fever.   HENT:  Negative for congestion.    Respiratory:  Negative for shortness of breath.    Cardiovascular:  Positive for leg swelling. Negative for chest pain.   Gastrointestinal:  Negative for constipation, diarrhea, nausea and vomiting.   Musculoskeletal:  Positive for back pain and gait problem. Negative for arthralgias and myalgias.   Skin:  Negative for wound.        thickened, elongated, irregular toenails   Neurological:  Positive for tremors and numbness.   Psychiatric/Behavioral:  Negative for agitation and behavioral problems.        OBJECTIVE     Vitals:    06/06/25 0957   BP: 140/62   Pulse: 67   SpO2: 98%                     PHYSICAL EXAM  GEN:   Accompanied by none.     Foot/Ankle Exam    GENERAL  Appearance:  appears stated age and obese  Orientation:  AAOx3  Affect:  appropriate  Gait:  involuntary movements (unsteady)  Assistance:  cane use  Right shoe gear: casual shoe  Left shoe gear: casual shoe    VASCULAR     Right Foot Vascularity   Dorsalis pedis:  1+  Posterior tibial:  1+  Skin temperature:   warm  Edema gradin+ and pitting  CFT:  3  Pedal hair growth:  Present  Varicosities:  moderate varicosities     Left Foot Vascularity   Dorsalis pedis:  1+  Posterior tibial:  1+  Skin temperature:  warm  Edema gradin+ and pitting  CFT:  3  Pedal hair growth:  Present  Varicosities:  moderate varicosities     NEUROLOGIC     Right Foot Neurologic   Light touch sensation: diminished  Vibratory sensation: diminished  Hot/Cold sensation: diminished  Protective Sensation using [place] Monofilament:   Sites intact: 1  Sites tested: 10     Left Foot Neurologic   Light touch sensation: diminished  Vibratory sensation: diminished  Hot/Cold sensation:  diminished  Protective Sensation using [place] Monofilament:   Sites intact: 4  Sites tested: 10    MUSCULOSKELETAL     Right Foot Musculoskeletal   Ecchymosis:  none  Tenderness:  toenail problem    Arch:  Normal  Hallux valgus: No       Left Foot Musculoskeletal   Ecchymosis:  none  Tenderness:  toenail problem  Arch:  Normal  Hallux valgus: No      MUSCLE STRENGTH     Right Foot Muscle Strength   Foot dorsiflexion:  4+  Foot plantar flexion:  4+  Foot inversion:  4+  Foot eversion:  4+     Left Foot Muscle Strength   Foot dorsiflexion:  4+  Foot plantar flexion:  4+  Foot inversion:  4+  Foot eversion:  4+    RANGE OF MOTION     Right Foot Range of Motion   Foot and ankle ROM within normal limits       Left Foot Range of Motion   Foot and ankle ROM within normal limits      DERMATOLOGIC      Right Foot Dermatologic   Skin  Positive for dryness and tinea.   Nails  1.  Positive for elongated, onychomycosis, abnormal thickness, subungual debris and dystrophic nail.  2.  Positive for elongated, onychomycosis, abnormal thickness and subungual debris.  3.  Positive for elongated, onychomycosis, abnormal thickness and subungual debris.  4.  Positive for elongated, onychomycosis, abnormal thickness and subungual debris.  5.  Positive for elongated,  onychomycosis, abnormal thickness and subungual debris.  Nails comment:  Gryphotic 1-5     Left Foot Dermatologic   Skin  Positive for dryness and tinea.   Nails comment:  Gryphotic 1-5  Nails  1.  Positive for elongated, onychomycosis, abnormal thickness, subungual debris and dystrophic nail.  2.  Positive for elongated, onychomycosis, abnormal thickness and subungual debris.  3.  Positive for elongated, onychomycosis, abnormal thickness and subungual debris.  4.  Positive for elongated, onychomycosis, abnormally thick and subungual debris.  5.  Positive for elongated, onychomycosis, abnormally thick and subungual debris.     Right foot additional comments: Tinea pedis to plantar foot     Left foot additional comments: Tinea pedis to plantar foot      RADIOLOGY/NUCLEAR:  No results found.    LABORATORY/CULTURE RESULTS:      PATHOLOGY RESULTS:       ASSESSMENT/PLAN     Diagnoses and all orders for this visit:    1. Onychomycosis (Primary)    2. Pain around toenail, left foot    3. Other polyneuropathy    4. Peripheral edema    5. Tinea pedis of both feet    6. Tremor    7. Gait abnormality            Comprehensive lower extremity examination and evaluation was performed.  Discussed findings and treatment plan including risks, benefits, and treatment options with patient in detail. Patient agreed with treatment plan.  After verbal consent obtained, nail(s) x10 debrided of length and thickness with nail nipper without incidence  Patient may maintain nails and calluses at home utilizing emery board or pumice stone between visits as needed   Tinea pedis persists. Re instructed patient to utilize the previously prescribed Lotrisone.  Instructed to apply twice daily for next 3 to 4 weeks.  Instructed patient to keep feet as dry as possible, wear moisture wicking socks, and to allow shoes to dry between wear become damp or wet.  Also recommend antiperspirant spray, bleaching of socks and shower. Recommend he allow his feet  to receive air and take his socks off for this at least for a short period of time daily.  Continue use of cane for ambulation assistance.   Continue to recommend use of compression stockings and keeping feet elevated while he is at rest.  Patient to return in 3 months for routine foot nail care services.  Encourage to call sooner with questions or concerns.      An After Visit Summary was printed and given to the patient at discharge, including (if requested) any available informative/educational handouts regarding diagnosis, treatment, or medications. All questions were answered to patient/family satisfaction. Should symptoms fail to improve or worsen they agree to call or return to clinic or to go to the Emergency Department. Discussed the importance of following up with any needed screening tests/labs/specialist appointments and any requested follow-up recommended by me today. Importance of maintaining follow-up discussed and patient accepts that missed appointments can delay diagnosis and potentially lead to worsening of conditions.  Return in about 3 months (around 9/6/2025) for Follow-up with APRN, Follow-up in [facility]., or sooner if acute issues arise.  Advance Care Planning   ACP discussion was declined by the patient. Patient does not have an advance directive, declines further assistance.       I spent 10 minutes caring for Felix on this date of service. This time includes time spent by me in the following activities: preparing for the visit, performing a medically appropriate examination and/or evaluation, counseling and educating the patient/family/caregiver, and documenting information in the medical record  I spent 5 minutes on the separately reported service of toenail debridement. This time is not included in the time used to support the E/M service also reported today.      This document has been electronically signed by RUBIO Merrill on June 6, 2025 10:28 CDT

## 2025-06-06 ENCOUNTER — OFFICE VISIT (OUTPATIENT)
Age: 75
End: 2025-06-06
Payer: MEDICARE

## 2025-06-06 VITALS
HEART RATE: 67 BPM | BODY MASS INDEX: 37.38 KG/M2 | HEIGHT: 72 IN | OXYGEN SATURATION: 98 % | SYSTOLIC BLOOD PRESSURE: 140 MMHG | WEIGHT: 276 LBS | DIASTOLIC BLOOD PRESSURE: 62 MMHG

## 2025-06-06 DIAGNOSIS — G62.89 OTHER POLYNEUROPATHY: ICD-10-CM

## 2025-06-06 DIAGNOSIS — B35.3 TINEA PEDIS OF BOTH FEET: ICD-10-CM

## 2025-06-06 DIAGNOSIS — M79.675 PAIN AROUND TOENAIL, LEFT FOOT: ICD-10-CM

## 2025-06-06 DIAGNOSIS — R26.9 GAIT ABNORMALITY: ICD-10-CM

## 2025-06-06 DIAGNOSIS — B35.1 ONYCHOMYCOSIS: Primary | ICD-10-CM

## 2025-06-06 DIAGNOSIS — R25.1 TREMOR: ICD-10-CM

## 2025-06-06 DIAGNOSIS — R60.0 PERIPHERAL EDEMA: ICD-10-CM

## 2025-06-17 PROBLEM — R19.7 DIARRHEA OF PRESUMED INFECTIOUS ORIGIN: Status: ACTIVE | Noted: 2025-06-17

## 2025-06-17 PROBLEM — E87.6 HYPOKALEMIA: Status: ACTIVE | Noted: 2017-02-16

## 2025-06-17 PROBLEM — N17.9 AKI (ACUTE KIDNEY INJURY): Status: ACTIVE | Noted: 2025-01-01

## 2025-06-17 PROBLEM — L30.9 DERMATITIS NEGLECTA: Status: ACTIVE | Noted: 2025-01-01

## 2025-06-17 PROBLEM — R74.01 TRANSAMINITIS: Status: ACTIVE | Noted: 2025-01-01

## 2025-06-17 NOTE — H&P
[facility] Medicine Services  HISTORY AND PHYSICAL    Date of Admission: 6/17/2025  Primary Care Physician: Keyon Dockery MD    Subjective   Primary Historian: Patient    Chief Complaint: Severe diarrhea and weakness    History of Present Illness  Felix Bartlett is a 75-year-old male followed by local [facility] administration.  He has a history of bile salt induced diarrhea status postcholecystectomy, COPD, renal cell carcinoma status post partial nephrectomy of the left, hypertension, hearing loss, hypokalemia secondary to diuretics.  Patient presented to [facility] ED with complaints of generalized weakness and diarrhea since Friday.  He reports to this provider that this morning he had to have a bowel movement and could not get off of the toilet.  Caregiver could not get him up either therefore EMS was called.  Patient was transported to [facility] emergency department for evaluation.  Workup revealed potassium 3.1, CO2 15, and ion gap 18, BUN 30.1, creatinine 3.01 with a baseline of 1.1, glucose 211, , ALT 85-no history of transaminitis, lactic acid 3.3, now 2.0, lipase 13, WBC 12.07 hemoglobin 9.4, hematocrit 27.5, platelets 120,000, 11.1 bandemia, urinalysis without acute findings, stool studies ordered chest x-ray without acute findings, CT of the abdomen pelvis without contrast stable without acute findings.  Patient has severe tremors and has difficulty with speech unsure if secondary to tremors or profound dry mouth.  He states he is unsure when the last time he ate.  He is an extremely poor historian.  He did receive normal saline 500 mL bolus in the ED.  Additional liter ordered, will continue hydration with 100 mL/hour and repeat labs in the a.m.  Patient denies pain.  He just complains of weakness.  He is asking for his night medications gabapentin and primidone stating that is his treatment for his tremors.  He is admitted for further evaluation  treatment        Review of Systems   Otherwise complete ROS reviewed and negative except as mentioned in the HPI.    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  6.30.14/7.1.14 CBC With Differential/Platelet; Sedimentation Rate-Westergren; C-Reactive Protein, Quant==WBC 12.0; RBC 3.60; Hemoglobin 11.7; Hematocrit 34.4; Neutrophils (Absolute) 7.1; Lymphs (Absolute) 3.6  nothing major here..his problems are alcohol, smoking now o    HL (hearing loss)     Hyperlipidemia     Hypertension     Hypopotassemia-diuretic 02/16/2017    Oxygen dependent     at hs    Peripheral neuropathy     Renal cancer 08/2014    Lt RALPart'l Nx; t1a Clear Cell with negative margin    RUQ pain 07/23/2013    S/P laparoscopic cholecystectomy 09/04/2012    Wellness examination-done 02/16/2017    PROCEDURES:  Prostate exam/Sony/confirmed/1.27.16     Colonoscopy-nl/Gil/LH/7-28-10/5y  EGD/BHP/Gil/3.4.16  Colonoscopy-polyp/Gil/BH/3.18.16/5 yr  EGD-neg/Gil/BH/5.1.18     SURGERIES:  T&A  Appendix/1959   Scrotum-cyst/1980's  Lap gb+liver bx/Raya/[facility]/7.23.12  L robotic partial nephrectomy (clear cell grade 1 [id number])/Rachel//8.14.14  Mjzuam-twdovaog-iaguz+mesh+omen/Micah//5.9.     Past Surgical History:  Past Surgical History:   Procedure Laterality Date    APPENDECTOMY      CHOLECYSTECTOMY      ENDOSCOPY N/A 05/01/2018    Procedure: ESOPHAGOGASTRODUODENOSCOPY WITH ANESTHESIA;  Surgeon: Donnell Cordero DO;  Location: [facility] ENDOSCOPY;  Service: Gastroenterology    HERNIA REPAIR      KIDNEY SURGERY      LAPAROSCOPIC PARTIAL NEPHRECTOMY Left 08/14/2014    Robot Assisted     Social History:  reports that he has been smoking cigarettes. He started smoking about 59 years ago. He has a 29.6 pack-year smoking history. He has never been exposed to tobacco smoke. He has never used smokeless tobacco. He reports that he does not drink alcohol and does not use drugs.    Family History: family history includes Colon cancer in his father; Heart disease in his mother.       Allergies:  No Known Allergies    Medications:  Prior to Admission medications    Medication Sig Start Date End Date Taking? Authorizing Provider   acetaminophen (TYLENOL) 650 MG 8 hr tablet Take 1 tablet by mouth 2 (Two) Times a Day.    ProviderTrish MD   calcium-vitamin D (OSCAL-500) 500-200 MG-UNIT per tablet Take 1 tablet by mouth 2 times daily.      Trish Fields MD   cholecalciferol (VITAMIN D3) 10 MCG (400 UNIT) tablet Take 1 tablet by mouth.    ProviderTrish MD   clotrimazole-betamethasone (LOTRISONE) 1-0.05 % cream Apply 1 Application topically to the appropriate area as directed 2 (Two) Times a Day. Apply to affected area twice daily 12/4/24   Milagro Anderson APRN   diphenhydrAMINE (BENADRYL) 25 mg capsule Take 1 capsule by mouth 2 (Two) Times a Day.    ProviderTrish MD   escitalopram (LEXAPRO) 20 MG tablet Take 1 tablet by mouth Daily. 1/21/25   Keyon Dockery MD   ferrous sulfate 325 (65 FE) MG tablet Take  1 tablet by mouth Daily With Breakfast.    Trish Fields MD   fluticasone (FLONASE) 50 MCG/ACT nasal spray Administer 2 sprays into the nostril(s) as directed by provider Daily. 1/21/21   Trish Fields MD   gabapentin (NEURONTIN) 300 MG capsule Take 1 capsule by mouth 3 (Three) Times a Day. 4/25/25   Andreina Hernández APRN   KETOTIFEN FUMARATE OP Apply 1 drop to eye(s) as directed by provider As Needed.    Trish Fields MD   losartan (COZAAR) 100 MG tablet Take 1 tablet by mouth Daily. 2/28/25   Keyon Dockery MD   metoprolol tartrate (LOPRESSOR) 50 MG tablet Take 1 tablet by mouth Every 12 (Twelve) Hours. 1/21/25   Keyon Dockery MD   Multiple Vitamins-Minerals (CENTRUM SILVER 50+MEN) tablet Take 1 tablet by mouth Daily.    Trish Fields MD   O2 (OXYGEN) Inhale 1 (One) Time.    Trish Fields MD   omeprazole (priLOSEC) 20 MG capsule 1 capsule 2 (Two) Times a Day. 6/26/12   Trish Fields MD   potassium chloride (Klor-Con M10) 10 MEQ CR tablet Take 1 tablet by mouth 2 (Two) Times a Day. 2/10/25   Keyon Dockery MD   primidone (MYSOLINE) 50 MG tablet Take 1 tablet by mouth 3 (Three) Times a Day. . 5/21/25 5/21/26  Andreina Hernández APRN   simvastatin (ZOCOR) 40 MG tablet Take 1 tablet by mouth Daily. 2/13/25   Keyon Dockery MD   sodium-potassium-magnesium sulfates (Suprep Bowel Prep Kit) 17.5-3.13-1.6 GM/177ML solution oral solution Take 2 bottles by mouth Take As Directed. Take 1 bottle night before and 1 bottle morning of procedure.  Time as directed 5/5/25   Miguel Hastings APRN   tamsulosin (FLOMAX) 0.4 MG capsule 24 hr capsule Take 1 capsule by mouth Daily. 1/21/25   Keyon Dockery MD     I have utilized all available immediate resources to obtain, update, or review the patient's current medications (including all prescriptions, over-the-counter products, herbals, cannabis/cannabidiol products, and vitamin/mineral/dietary (nutritional) supplements).    Objective  "    Vital Signs: /54 (BP Location: Right arm, Patient Position: Lying)   Pulse 82   Temp 98.4 °F (36.9 °C) (Oral)   Resp 16   Ht 182.9 cm (72\")   Wt 116 kg (255 lb)   SpO2 98%   BMI 34.58 kg/m²   Physical Exam  Vitals reviewed.   Constitutional:       Appearance: He is obese. He is ill-appearing.   HENT:      Head: Normocephalic and atraumatic.      Mouth/Throat:      Mouth: Mucous membranes are dry.      Pharynx: Oropharynx is clear.   Eyes:      Extraocular Movements: Extraocular movements intact.      Conjunctiva/sclera: Conjunctivae normal.   Cardiovascular:      Rate and Rhythm: Normal rate and regular rhythm.   Pulmonary:      Effort: Pulmonary effort is normal.      Breath sounds: Normal breath sounds.   Abdominal:      General: There is no distension.      Palpations: Abdomen is soft.   Musculoskeletal:      Cervical back: Normal range of motion and neck supple.      Right lower leg: No edema.      Left lower leg: No edema.   Skin:     General: Skin is warm and dry.   Neurological:      Mental Status: He is alert and oriented to person, place, and time.      Comments: Tremor, patient states at baseline, difficulty with speech, suspect secondary to tremor versus tardive dyskinesia versus dry mouth   Psychiatric:         Mood and Affect: Mood normal.         Behavior: Behavior normal.        Results Reviewed:  Lab Results (last 24 hours)       Procedure Component Value Units Date/Time    Gastrointestinal Panel, PCR - Stool, Per Rectum [[id number]] Collected: 06/17/25 1506    Specimen: Stool from Per Rectum Updated: 06/17/25 1514    Clostridioides difficile Toxin - Stool, Per Rectum [[id number]] Collected: 06/17/25 1506    Specimen: Stool from Per Rectum Updated: 06/17/25 1513    Narrative:      The following orders were created for panel order Clostridioides difficile Toxin - Stool, Per Rectum.  Procedure                               Abnormality         Status                     ---------          "                      -----------         ------                     Clostridioides difficile...[[id number]]                      In process                   Please view results for these tests on the individual orders.    Clostridioides difficile Toxin, PCR - Stool, Per Rectum [[id number]] Collected: 06/17/25 1506    Specimen: Stool from Per Rectum Updated: 06/17/25 1513    STAT Lactic Acid, Reflex [[id number]]  (Normal) Collected: 06/17/25 1316    Specimen: Blood Updated: 06/17/25 1345     Lactate 2.0 mmol/L     Urinalysis With Culture If Indicated - Urine, Clean Catch [[id number]]  (Abnormal) Collected: 06/17/25 1027    Specimen: Urine, Clean Catch Updated: 06/17/25 1059     Color, UA Dark Yellow     Appearance, UA Turbid     pH, UA <=5.0     Specific Gravity, UA 1.022     Glucose, UA Negative     Ketones, UA Trace     Bilirubin, UA Small (1+)     Blood, UA Large (3+)     Protein,  mg/dL (2+)     Leuk Esterase, UA Trace     Nitrite, UA Negative     Urobilinogen, UA 1.0 E.U./dL    Narrative:      In absence of clinical symptoms, the presence of pyuria, bacteria, and/or nitrites on the urinalysis result does not correlate with infection.    Urinalysis, Microscopic Only - Urine, Clean Catch [[id number]]  (Abnormal) Collected: 06/17/25 1027    Specimen: Urine, Clean Catch Updated: 06/17/25 1059     RBC, UA None Seen /HPF      WBC, UA 3-5 /HPF      Comment: Urine culture not indicated.        Bacteria, UA None Seen /HPF      Squamous Epithelial Cells, UA 0-2 /HPF      Hyaline Casts, UA 0-2 /LPF      Granular Casts, UA 3-6 /LPF      Amorphous Crystals, UA Moderate/2+ /HPF      Starch, UA Small/1+ /HPF      Methodology Manual Light Microscopy    Manual Differential [[id number]]  (Abnormal) Collected: 06/17/25 1016    Specimen: Blood Updated: 06/17/25 1058     Neutrophil % 52.5 %      Lymphocyte % 23.2 %      Monocyte % 8.1 %      Eosinophil % 0.0 %      Basophil % 0.0 %      Bands %  11.1 %      Atypical Lymphocyte %  5.1 %      Neutrophils Absolute 7.68 10*3/mm3      Lymphocytes Absolute 3.42 10*3/mm3      Monocytes Absolute 0.98 10*3/mm3      Eosinophils Absolute 0.00 10*3/mm3      Basophils Absolute 0.00 10*3/mm3      RBC Morphology Normal     WBC Morphology Normal     Platelet Estimate Decreased     Clumped Platelets Present     Giant Platelets Slight/1+    CBC & Differential [[id number]]  (Abnormal) Collected: 06/17/25 1016    Specimen: Blood Updated: 06/17/25 1058    Narrative:      The following orders were created for panel order CBC & Differential.  Procedure                               Abnormality         Status                     ---------                               -----------         ------                     CBC Auto Differential[[id number]]        Abnormal            Final result                 Please view results for these tests on the individual orders.    CBC Auto Differential [[id number]]  (Abnormal) Collected: 06/17/25 1016    Specimen: Blood Updated: 06/17/25 1058     WBC 12.07 10*3/mm3      RBC 2.80 10*6/mm3      Hemoglobin 9.4 g/dL      Hematocrit 27.5 %      MCV 98.2 fL      MCH 33.6 pg      MCHC 34.2 g/dL      RDW 13.1 %      RDW-SD 47.3 fl      MPV 13.5 fL      Platelets 120 10*3/mm3     Lactic Acid, Plasma [[id number]]  (Abnormal) Collected: 06/17/25 1016    Specimen: Blood Updated: 06/17/25 1055     Lactate 3.3 mmol/L     Comprehensive Metabolic Panel [[id number]]  (Abnormal) Collected: 06/17/25 1016    Specimen: Blood Updated: 06/17/25 1052     Glucose 211 mg/dL      BUN 30.1 mg/dL      Creatinine 3.01 mg/dL      Sodium 140 mmol/L      Potassium 3.1 mmol/L      Chloride 107 mmol/L      CO2 15.0 mmol/L      Calcium 8.9 mg/dL      Total Protein 6.9 g/dL      Albumin 3.8 g/dL      ALT (SGPT) 85 U/L      AST (SGOT) 109 U/L      Alkaline Phosphatase 113 U/L      Total Bilirubin 0.6 mg/dL      Globulin 3.1 gm/dL      A/G Ratio 1.2 g/dL      BUN/Creatinine Ratio 10.0     Anion Gap 18.0 mmol/L      eGFR  20.7 mL/min/1.73     Narrative:      GFR Categories in Chronic Kidney Disease (CKD)              GFR Category          GFR (mL/min/1.73)    Interpretation  G1                    90 or greater        Normal or high (1)  G2                    60-89                Mild decrease (1)  G3a                   45-59                Mild to moderate decrease  G3b                   30-44                Moderate to severe decrease  G4                    15-29                Severe decrease  G5                    14 or less           Kidney failure    (1)In the absence of evidence of kidney disease, neither GFR category G1 or G2 fulfill the criteria for CKD.    eGFR calculation 2021 CKD-EPI creatinine equation, which does not include race as a factor    Lipase [[id number]]  (Normal) Collected: 06/17/25 1016    Specimen: Blood Updated: 06/17/25 1047     Lipase 13 U/L           Imaging Results (Last 24 Hours)       Procedure Component Value Units Date/Time    CT Abdomen Pelvis Without Contrast [[id number]] Collected: 06/17/25 1146     Updated: 06/17/25 1157    Narrative:      EXAMINATION: CT ABDOMEN PELVIS WO CONTRAST-     HISTORY: Abdominal pain. Generalized weakness. Diarrhea.     In order to have a CT radiation dose as low as reasonably achievable  Automated Exposure Control was utilized for adjustment of the mA and/or  KV according to patient size.     CT Dose DLP = 785.23 mGy.cm.  (If there are multiple studies performed at the same time this  represents the total dose).     Images are stored in PACS per institutional protocol.        Noncontrast abdomen/pelvis CT.  Axial, sagittal, and coronal sequences.     COMPARISON:  11/22/2024 and 11/22/2016.     Normal heart size.  No acute abnormality at the lung bases.     Cholecystectomy clips are present.  Normal noncontrast appearance of the spleen.  Spleen = 13 x 6 x 7 cm. A few splenic calcifications are present.     Discrete oval solid mass in the distal pancreas tail measures 30  "mm in  diameter on axial image 22. This finding measured 27 mm on 11/22/2024  and 20 mm on 11/22/2016.  The homogeneous density (matching adjacent spleen) and smooth margins  suggest that this is a splenule.     Stable LEFT adrenal nodule.  Normal RIGHT adrenal gland.  Unchanged partially exophytic 30 x 26 mm fat-containing LEFT renal  lesion.  Exophytic 31 x 30 mm RIGHT renal cyst compared with 32 x 32 mm on  11/22/2024 and 24 x 21 mm on 11/22/2016.  No renal stone or hydronephrosis.     Mild aortic calcification with no aneurysm.  No bowel dilation.  No sign of colitis or diverticulitis.  No pelvic mass or fluid.  Normal appearance of the urinary bladder.       Impression:      1. Stable renal findings.  2. Small solid \"mass\" at the tip of the pancreas tail has benign imaging  characteristics. Splenule suspected.  3. No fluid collection or inflammatory process.     This report was signed and finalized on 6/17/2025 11:54 AM by Dr. José Pizarro MD.       XR Chest 1 View [[id number]] Collected: 06/17/25 1116     Updated: 06/17/25 1120    Narrative:      EXAM: XR CHEST 1 VW-         HISTORY: weak       COMPARISON: 5/8/2016.     TECHNIQUE:  Frontal view(s) of the chest submitted.     FINDINGS:    The lungs are grossly clear bilaterally. No effusion or pneumothorax is  visualized. Heart and mediastinum are unremarkable.          Impression:         1. No active disease in the chest.     This report was signed and finalized on 6/17/2025 11:17 AM by Dilan Sykes.               Assessment / Plan   Assessment:   Active Hospital Problems    Diagnosis     **DANIEL (acute kidney injury)     Transaminitis, acute     Diarrhea of presumed infectious origin     Dermatitis neglecta     Hypokalemia     Tremor        Treatment Plan  The patient will be admitted to Dr. Jackson's service here at [facility].     DANIEL secondary to diarrhea-presumed infectious; 1500 mL normal saline bolus, continue with normal saline 100 " mL/hour, labs in AM.  Start Rocephin and Flagyl for now    Transaminitis, acute; labs in a.m.    Hypokalemia; replace, labs in a.m.    Tremor, chronic; Home medications resumed    Dermatitis neglecta;     Medications reviewed and restarted as appropriate  DVT prophylaxis with SCDs  Labs in a.m.    Medical Decision Making  Number and Complexity of problems: 6  4 problems, acute, high complexity, unchanged  2 problems, chronic, moderate complexity, stable  Differential Diagnosis: None    Conditions and Status        Condition is unchanged.     [facility] Data  External documents reviewed: No  Cardiac tracing (EKG, telemetry) interpretation: Reviewed  Radiology interpretation: Reviewed  Labs reviewed: Yes  Any tests that were considered but not ordered: No     Decision rules/scores evaluated (example [id number], Wells, etc): No     Discussed with: Patient and Dr. Jackson     Care Planning  Shared decision making: Patient and Dr. Jackson  Code status and discussions: Full    Disposition  Social Determinants of Health that impact treatment or disposition: TBD  Estimated length of stay is 2+ days.     I confirmed that the patient's advanced care plan is present, code status is documented, and a surrogate decision maker is listed in the patient's medical record.     The patient's surrogate decision maker is daughter Nadya .     The patient was seen and examined by me on 6/17/2025 at 12:40 PM.    Electronically signed by RUBIO Fuentes, 06/17/25, 15:44 CDT.

## 2025-06-17 NOTE — PLAN OF CARE
Goal Outcome Evaluation:   A&Ox3-4 poor historian, on room air, resting in bed. Tremors & garbled speech at baseline. Hourly rounding. VSS. IV antibx given. IV fluids running. Stool sample sent. Contact for r/o C. Diff. Fall risk- bed alarm- safety maintained.

## 2025-06-17 NOTE — ED PROVIDER NOTES
Subjective   History of Present Illness  Patient is a 75-year-old male with a history of COPD who presents to the ER with generalized weakness.  Patient states he has had generalized weakness along with diarrhea for the last 5 days.  Patient does complain of some mild pain to his left lower abdomen.  He denies any fever, chest pain shortness of air, vomiting, urinary changes, neurologic changes.  He denies being on recent antibiotics.      Review of Systems   HENT: Negative.     Eyes: Negative.    Respiratory: Negative.     Cardiovascular: Negative.    Gastrointestinal:  Positive for abdominal pain and diarrhea.   Endocrine: Negative.    Genitourinary: Negative.    Musculoskeletal: Negative.    Skin: Negative.    Allergic/Immunologic: Negative.    Neurological:  Positive for weakness.   Hematological: Negative.    Psychiatric/Behavioral: Negative.     All other systems reviewed and are negative.      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  6.30.14/7.1.14 CBC With Differential/Platelet; Sedimentation  Rate-Westergren; C-Reactive Protein, Quant==WBC 12.0; RBC 3.60; Hemoglobin 11.7; Hematocrit 34.4; Neutrophils (Absolute) 7.1; Lymphs (Absolute) 3.6  nothing major here..his problems are alcohol, smoking now o    HL (hearing loss)     Hyperlipidemia     Hypertension     Hypopotassemia-diuretic 02/16/2017    Oxygen dependent     at hs    Peripheral neuropathy     Renal cancer 08/2014    Lt RALPart'l Nx; t1a Clear Cell with negative margin    RUQ pain 07/23/2013    S/P laparoscopic cholecystectomy 09/04/2012    Wellness examination-done 02/16/2017    PROCEDURES:  Prostate exam/Rachel/confirmed/1.27.16     Colonoscopy-nl/Gil//7-28-10/5y  EGD/P/Gil/3.4.16  Colonoscopy-polyp/Gil//3.18.16/5 yr  EGD-neg/[place]//5.1.18     SURGERIES:  T&A  Appendix/1959  Scrotum-cyst/1980's  Lap gb+liver bx/Raya/[facility]/7.23.12  L robotic partial nephrectomy (clear cell grade 1 [id number])/Rachel//8.14.14  Cewehe-ctccatsm-inpgc+mesh+omen/[place]//5.9.       No Known Allergies    Past Surgical History:   Procedure Laterality Date    APPENDECTOMY      CHOLECYSTECTOMY      ENDOSCOPY N/A 05/01/2018    Procedure: ESOPHAGOGASTRODUODENOSCOPY WITH ANESTHESIA;  Surgeon: Donnell Cordero DO;  Location: [facility] ENDOSCOPY;  Service: Gastroenterology    HERNIA REPAIR      KIDNEY SURGERY      LAPAROSCOPIC PARTIAL NEPHRECTOMY Left 08/14/2014    Robot Assisted       Family History   Problem Relation Age of Onset    Colon cancer Father     Heart disease Mother     Colon polyps Neg Hx     Esophageal cancer Neg Hx        Social History     Socioeconomic History    Marital status:      Spouse name: Melanie    Number of children: 1    Years of education: 12   Tobacco Use    Smoking status: Every Day     Current packs/day: 0.50     Average packs/day: 0.5 packs/day for 59.1 years (29.6 ttl pk-yrs)     Types: Cigarettes     Start date: 4/30/1966     Passive exposure: Never    Smokeless tobacco: Never   Vaping Use    Vaping status: Never Used    Substance and Sexual Activity    Alcohol use: No    Drug use: No    Sexual activity: Defer           Objective   Physical Exam  Vitals and nursing note reviewed.   Constitutional:       Appearance: He is well-developed.   HENT:      Head: Normocephalic and atraumatic.   Eyes:      Conjunctiva/sclera: Conjunctivae normal.      Pupils: Pupils are equal, round, and reactive to light.   Cardiovascular:      Rate and Rhythm: Normal rate and regular rhythm.      Heart sounds: Normal heart sounds.   Pulmonary:      Effort: Pulmonary effort is normal.      Breath sounds: Normal breath sounds.   Abdominal:      Palpations: Abdomen is soft.      Tenderness: There is abdominal tenderness in the left lower quadrant. There is no right CVA tenderness, left CVA tenderness, guarding or rebound.   Musculoskeletal:         General: No deformity. Normal range of motion.      Cervical back: Normal range of motion.   Skin:     General: Skin is warm.   Neurological:      Mental Status: He is alert and oriented to person, place, and time.   Psychiatric:         Behavior: Behavior normal.         Procedures           ED Course      EKG as interpreted by me: Sinus tachycardia with a rate of 117 with PACs, no acute ischemia or infarction                          Total (NIH Stroke Scale): 0          Lab Results (last 24 hours)       Procedure Component Value Units Date/Time    CBC & Differential [[id number]]  (Abnormal) Collected: 06/17/25 1016    Specimen: Blood Updated: 06/17/25 1058    Narrative:      The following orders were created for panel order CBC & Differential.  Procedure                               Abnormality         Status                     ---------                               -----------         ------                     CBC Auto Differential[[id number]]        Abnormal            Final result                 Please view results for these tests on the individual orders.    Comprehensive Metabolic Panel [[id number]]   (Abnormal) Collected: 06/17/25 1016    Specimen: Blood Updated: 06/17/25 1052     Glucose 211 mg/dL      BUN 30.1 mg/dL      Creatinine 3.01 mg/dL      Sodium 140 mmol/L      Potassium 3.1 mmol/L      Chloride 107 mmol/L      CO2 15.0 mmol/L      Calcium 8.9 mg/dL      Total Protein 6.9 g/dL      Albumin 3.8 g/dL      ALT (SGPT) 85 U/L      AST (SGOT) 109 U/L      Alkaline Phosphatase 113 U/L      Total Bilirubin 0.6 mg/dL      Globulin 3.1 gm/dL      A/G Ratio 1.2 g/dL      BUN/Creatinine Ratio 10.0     Anion Gap 18.0 mmol/L      eGFR 20.7 mL/min/1.73     Narrative:      GFR Categories in Chronic Kidney Disease (CKD)              GFR Category          GFR (mL/min/1.73)    Interpretation  G1                    90 or greater        Normal or high (1)  G2                    60-89                Mild decrease (1)  G3a                   45-59                Mild to moderate decrease  G3b                   30-44                Moderate to severe decrease  G4                    15-29                Severe decrease  G5                    14 or less           Kidney failure    (1)In the absence of evidence of kidney disease, neither GFR category G1 or G2 fulfill the criteria for CKD.    eGFR calculation 2021 CKD-EPI creatinine equation, which does not include race as a factor    Lipase [[id number]]  (Normal) Collected: 06/17/25 1016    Specimen: Blood Updated: 06/17/25 1047     Lipase 13 U/L     Lactic Acid, Plasma [[id number]]  (Abnormal) Collected: 06/17/25 1016    Specimen: Blood Updated: 06/17/25 1055     Lactate 3.3 mmol/L     CBC Auto Differential [[id number]]  (Abnormal) Collected: 06/17/25 1016    Specimen: Blood Updated: 06/17/25 1058     WBC 12.07 10*3/mm3      RBC 2.80 10*6/mm3      Hemoglobin 9.4 g/dL      Hematocrit 27.5 %      MCV 98.2 fL      MCH 33.6 pg      MCHC 34.2 g/dL      RDW 13.1 %      RDW-SD 47.3 fl      MPV 13.5 fL      Platelets 120 10*3/mm3     Manual Differential [[id number]]  (Abnormal) Collected:  "06/17/25 1016    Specimen: Blood Updated: 06/17/25 1058     Neutrophil % 52.5 %      Lymphocyte % 23.2 %      Monocyte % 8.1 %      Eosinophil % 0.0 %      Basophil % 0.0 %      Bands %  11.1 %      Atypical Lymphocyte % 5.1 %      Neutrophils Absolute 7.68 10*3/mm3      Lymphocytes Absolute 3.42 10*3/mm3      Monocytes Absolute 0.98 10*3/mm3      Eosinophils Absolute 0.00 10*3/mm3      Basophils Absolute 0.00 10*3/mm3      RBC Morphology Normal     WBC Morphology Normal     Platelet Estimate Decreased     Clumped Platelets Present     Giant Platelets Slight/1+    Urinalysis With Culture If Indicated - Urine, Clean Catch [[id number]]  (Abnormal) Collected: 06/17/25 1027    Specimen: Urine, Clean Catch Updated: 06/17/25 1059     Color, UA Dark Yellow     Appearance, UA Turbid     pH, UA <=5.0     Specific Gravity, UA 1.022     Glucose, UA Negative     Ketones, UA Trace     Bilirubin, UA Small (1+)     Blood, UA Large (3+)     Protein,  mg/dL (2+)     Leuk Esterase, UA Trace     Nitrite, UA Negative     Urobilinogen, UA 1.0 E.U./dL    Narrative:      In absence of clinical symptoms, the presence of pyuria, bacteria, and/or nitrites on the urinalysis result does not correlate with infection.    Urinalysis, Microscopic Only - Urine, Clean Catch [[id number]]  (Abnormal) Collected: 06/17/25 1027    Specimen: Urine, Clean Catch Updated: 06/17/25 1059     RBC, UA None Seen /HPF      WBC, UA 3-5 /HPF      Comment: Urine culture not indicated.        Bacteria, UA None Seen /HPF      Squamous Epithelial Cells, UA 0-2 /HPF      Hyaline Casts, UA 0-2 /LPF      Granular Casts, UA 3-6 /LPF      Amorphous Crystals, UA Moderate/2+ /HPF      Starch, UA Small/1+ /HPF      Methodology Manual Light Microscopy           CT Abdomen Pelvis Without Contrast   Final Result   1. Stable renal findings.   2. Small solid \"mass\" at the tip of the pancreas tail has benign imaging   characteristics. Splenule suspected.   3. No fluid collection " or inflammatory process.       This report was signed and finalized on 6/17/2025 11:54 AM by Dr. José Pizarro MD.          XR Chest 1 View   Final Result       1. No active disease in the chest.       This report was signed and finalized on 6/17/2025 11:17 AM by Dilan Sykes.                       Medical Decision Making  Patient is a 75-year-old male with a history of COPD who presents to the ER with generalized weakness.  Patient states he has had generalized weakness along with diarrhea for the last 5 days.  Patient does complain of some mild pain to his left lower abdomen.  He denies any fever, chest pain shortness of air, vomiting, urinary changes, neurologic changes.  He denies being on recent antibiotics.    Differential diagnosis: Viral syndrome, dehydration, electrolyte abnormality, colitis    Labs showed hyperglycemia, mild hypokalemia, leukocytosis, anemia that was stable for the patient, thrombocytopenia, elevated ALT and AST, an elevated BUN and creatinine consistent with acute kidney injury, an elevated lactate and hematuria.  Chest x-ray was negative.  CT scan of the abdomen pelvis showed stable renal findings and a solid mass at the tip of the pancreas consistent with a splenule.  There was no fluid collection or inflammatory process.  Workup consistent with severe dehydration from diarrhea.  Stool studies have been ordered but the patient has been unable to give a sample at this time.  I discussed the case with Gabby Wilder with the hospitalist service and patient was admitted to Dr. Jackson's team for further treatment.    Problems Addressed:  DANIEL (acute kidney injury): complicated acute illness or injury  Anemia, unspecified type: complicated acute illness or injury  Dehydration: complicated acute illness or injury  Diarrhea, unspecified type: complicated acute illness or injury  Generalized weakness: complicated acute illness or injury  Thrombocytopenia: complicated acute illness or  injury    Amount and/or Complexity of Data Reviewed  Labs: ordered. Decision-making details documented in ED Course.  Radiology: ordered. Decision-making details documented in ED Course.  ECG/medicine tests: ordered. Decision-making details documented in ED Course.  Discussion of management or test interpretation with external provider(s): Gabby Wilder with the hospitalist service    Risk  Prescription drug management.  Decision regarding hospitalization.        Final diagnoses:   DANIEL (acute kidney injury)   Dehydration   Anemia, unspecified type   Thrombocytopenia   Diarrhea, unspecified type   Generalized weakness       ED Disposition  ED Disposition       ED Disposition   Decision to Admit    Condition   --    Comment   Level of Care: Med/Surg [1]   Diagnosis: DANIEL (acute kidney injury) [[id number]]   Admitting Physician: AYLIN DIAS [[id number]]   Attending Physician: AYLIN DIAS [[id number]]   Certification: I Certify That Inpatient Hospital Services Are Medically Necessary For Greater Than 2 Midnights                 No follow-up provider specified.       Medication List      No changes were made to your prescriptions during this visit.            Diana Kumar MD  06/17/25 9019

## 2025-06-18 NOTE — THERAPY EVALUATION
Patient Name: Felix Bartlett Sr.  : 1950    MRN: [id number]                              Today's Date: 2025       Admit Date: 2025    Visit Dx:     ICD-10-CM ICD-9-CM   1. DANIEL (acute kidney injury)  N17.9 584.9   2. Dehydration  E86.0 276.51   3. Anemia, unspecified type  D64.9 285.9   4. Thrombocytopenia  D69.6 287.5   5. Diarrhea, unspecified type  R19.7 787.91   6. Generalized weakness  R53.1 780.79   7. Impaired mobility [Z74.09]  Z74.09 799.89   8. Dysphagia, unspecified type  R13.10 787.20     Patient Active Problem List   Diagnosis    COPD (chronic obstructive pulmonary disease)    Prediabetes    Anxiety    Vertebral compression fracture    Gastroesophageal reflux disease    Edema leg-CHF/d, obesity, copd, cirrhosis    Fatty liver    Hyperlipidemia-statin    Hypertension    Hypokalemia    Nocturnal hypoxia    Macrocytosis    Osteoporosis bd 3. ? 2y    Tremor    Posttraumatic stress disorder    Chronic back pain    Congestive heart failure-diastolic    Tobacco use: 12m    Anemia    Gait difficulty    Iron deficiency    Elevated ferritin-hetroz hemo    Hereditary hemochromatosis    DANIEL (acute kidney injury)    Transaminitis, acute    Diarrhea of presumed infectious origin    Dermatitis neglecta     Past Medical History:   Diagnosis Date    Acid reflux     Arthritis     Asthma     Bile salt-induced diarrhea 2013    COPD (chronic obstructive pulmonary disease)     CTS (carpal tunnel syndrome)     Family history of colon cancer 2021    father      History of adenomatous polyp of colon 2021    History of alcohol abuse 2017    History of renal cell cancer-sony 2017    9.4.14/9.8.14 - Dr Ramos - Office Notes-path low grade renal cell..negative margins..doing well     9.10.14..ro visit...surgery follow up  hypertension-  post op L nephrectomy-incidential renal cell found on CT  abdominal pain-feeling better  fatty liver-bx proven...  tremor-familial,  alcohol  Rx-reviewed  Cozaar 50 mg one half a day  LAB-next time hepatitis A, B, C         History of TIA (transient ischemic attack) 02/24/2017    No Problem List  5.25.10/age 60     7.1..14/7.1.14 - 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  6.30.14/7.1.14 CBC With Differential/Platelet; Sedimentation Rate-Westergren; C-Reactive Protein, Quant==WBC 12.0; RBC 3.60; Hemoglobin 11.7; Hematocrit 34.4; Neutrophils (Absolute) 7.1; Lymphs (Absolute) 3.6  nothing major here..his problems are alcohol, smoking now o    HL (hearing loss)     Hyperlipidemia     Hypertension     Hypopotassemia-diuretic 02/16/2017    Oxygen dependent     at     Peripheral neuropathy     Renal cancer 08/2014    Lt RALPart'l Nx; t1a Clear Cell with negative margin    RUQ pain 07/23/2013    S/P laparoscopic cholecystectomy 09/04/2012    Wellness examination-done 02/16/2017    PROCEDURES:  Prostate exam/Rachel/confirmed/1.27.16     Colonoscopy-nl/Gil//7-28-10/5y  EGD/P/Gil/3.4.16  Colonoscopy-polyp/Gil//3.18.16/5 yr  EGD-neg/Gil//5.1.18     SURGERIES:  T&A  Appendix/1959  Scrotum-cyst/1980's  Lap gb+liver bx/Raya/[facility]/7.23.12  L robotic partial nephrectomy (clear cell grade 1 [id number])/Rachel//8.14.14  Kckgwk-ckzsdqfu-oahio+mesh+omen/Micah//5.9.     Past Surgical History:   Procedure Laterality Date    APPENDECTOMY      CHOLECYSTECTOMY      ENDOSCOPY N/A 05/01/2018    Procedure: ESOPHAGOGASTRODUODENOSCOPY WITH ANESTHESIA;  Surgeon: Donnell Cordero DO;  Location: [facility] ENDOSCOPY;  Service: Gastroenterology    HERNIA REPAIR      KIDNEY SURGERY      LAPAROSCOPIC PARTIAL NEPHRECTOMY Left 08/14/2014    Robot Assisted      General Information       Row Name 06/18/25 0856          OT Time and Intention    Subjective Information complains of;pain  -MM     Document Type evaluation  Dx: DANIEL, Transaminitis, Diarrhea, Dermatitis neglecta, Iron deficiency, hypokalemia and Tremor.  -MM     Mode of  Treatment occupational therapy  -MM       Row Name 06/18/25 0856          General Information    Patient Profile Reviewed yes  -MM     Prior Level of Function independent:;all household mobility;community mobility;ADL's  -MM     Existing Precautions/Restrictions fall  -MM     Barriers to Rehab medically complex;previous functional deficit  -MM       Row Name 06/18/25 0856          Living Environment    Current Living Arrangements home  tub/shower combo with shower chair, raised toilets, hurrycane.  -MM     People in Home spouse  has assistance within the home.  -MM       Row Name 06/18/25 0856          Home Main Entrance    Number of Stairs, Main Entrance other (see comments)  ramp.  -MM       Row Name 06/18/25 0856          Stairs Within Home, Primary    Number of Stairs, Within Home, Primary none  -MM       Row Name 06/18/25 0856          Cognition    Orientation Status (Cognition) oriented x 4  -MM       Row Name 06/18/25 0856          Safety Issues/Impairments Affecting Functional Mobility    Safety Issues Affecting Function (Mobility) at risk behavior observed;awareness of need for assistance;insight into deficits/self-awareness;safety precaution awareness;safety precautions follow-through/compliance  -MM     Impairments Affecting Function (Mobility) pain;strength;range of motion (ROM);endurance/activity tolerance;balance  -MM               User Key  (r) = Recorded By, (t) = Taken By, (c) = Cosigned By      Initials Name Provider Type    MM Jewel Núñez, OTR/L Occupational Therapist                     Mobility/ADL's       Row Name 06/18/25 0843          Bed Mobility    Bed Mobility rolling left;rolling right;supine-sit;sit-supine  -MM     Rolling Left [place] (Bed Mobility) contact guard;verbal cues  -MM     Rolling Right [place] (Bed Mobility) contact guard;verbal cues  -MM     Supine-Sit [place] (Bed Mobility) contact guard;verbal cues  -MM     Sit-Supine [place] (Bed Mobility)  contact guard;verbal cues  -MM     Assistive Device (Bed Mobility) head of bed elevated;bed rails  -MM       Row Name 06/18/25 0843          Transfers    Transfers sit-stand transfer;stand-sit transfer  -MM     Comment, (Transfers) two attempts, second attempt from elevated bed height. Pt is unable to stand fully upright and poor standing tolerance, further functional mobility deferred.  -MM       Row Name 06/18/25 0843          Sit-Stand Transfer    Sit-Stand [place] (Transfers) moderate assist (50% patient effort);2 person assist;verbal cues  -MM       Row Name 06/18/25 0843          Stand-Sit Transfer    Stand-Sit [place] (Transfers) moderate assist (50% patient effort);2 person assist;verbal cues  -MM       Row Name 06/18/25 0843          Activities of Daily Living    BADL Assessment/Intervention lower body dressing  -MM       Row Name 06/18/25 0843          Lower Body Dressing Assessment/Training    [place] Level (Lower Body Dressing) don;socks;dependent (less than 25% patient effort);verbal cues;set up  -MM     Position (Lower Body Dressing) edge of bed sitting  -MM               User Key  (r) = Recorded By, (t) = Taken By, (c) = Cosigned By      Initials Name Provider Type    MM Jewel Núñez, OTR/L Occupational Therapist                   Obj/Interventions       Row Name 06/18/25 0843          Sensory Assessment (Somatosensory)    Sensory Assessment (Somatosensory) UE sensation intact  -MM       Row Name 06/18/25 0843          Range of Motion Comprehensive    General Range of Motion bilateral upper extremity ROM WNL  -MM       Row Name 06/18/25 0843          Strength Comprehensive (MMT)    Comment, General Manual Muscle Testing (MMT) Assessment Difficult to assess 2' resting and intention tremors.  -MM       Row Name 06/18/25 0843          Balance    Balance Assessment sitting static balance;sitting dynamic balance;standing static balance;standing dynamic balance  -MM     Static Sitting  Balance contact guard;supervision  -MM     Dynamic Sitting Balance contact guard;verbal cues  -MM     Position, Sitting Balance supported;unsupported;sitting edge of bed  -MM     Static Standing Balance moderate assist;2-person assist;verbal cues  -MM     Dynamic Standing Balance moderate assist;2-person assist;verbal cues  -MM     Position/Device Used, Standing Balance supported;other (see comments)  HHA x2.  -MM               User Key  (r) = Recorded By, (t) = Taken By, (c) = Cosigned By      Initials Name Provider Type    Jewel Shook, OTR/L Occupational Therapist                   Goals/Plan       Row Name 06/18/25 1608          Transfer Goal 1 (OT)    Activity/Assistive Device (Transfer Goal 1, OT) toilet;bed-to-chair/chair-to-bed;shower chair  -MM     [place] Level/Cues Needed (Transfer Goal 1, OT) minimum assist (75% or more patient effort);verbal cues required  -MM     Time Frame (Transfer Goal 1, OT) long term goal (LTG);by discharge  -MM     Progress/Outcome (Transfer Goal 1, OT) new goal  -MM       Row Name 06/18/25 1608          Dressing Goal 1 (OT)    Activity/Device (Dressing Goal 1, OT) dressing skills, all  -MM     [place]/Cues Needed (Dressing Goal 1, OT) moderate assist (50-74% patient effort);verbal cues required;set-up required  -MM     Time Frame (Dressing Goal 1, OT) long term goal (LTG);by discharge  -MM     Progress/Outcome (Dressing Goal 1, OT) new goal  -MM       Row Name 06/18/25 1603          Toileting Goal 1 (OT)    Activity/Device (Toileting Goal 1, OT) toileting skills, all;commode, bedside without drop arms  -MM     [place] Level/Cues Needed (Toileting Goal 1, OT) moderate assist (50-74% patient effort);verbal cues required;set-up required  -MM     Time Frame (Toileting Goal 1, OT) long term goal (LTG);by discharge  -MM     Progress/Outcome (Toileting Goal 1, OT) new goal  -MM       Row Name 06/18/25 1602          Problem Specific Goal 1 (OT)    Problem Specific  Goal 1 (OT) Pt will independently implement one pain management technique to decrease pain and improve functional adl performance.  -MM     Time Frame (Problem Specific Goal 1, OT) long term goal (LTG);by discharge  -MM     Progress/Outcome (Problem Specific Goal 1, OT) new goal  -MM       Row Name 06/18/25 1608          Therapy Assessment/Plan (OT)    Planned Therapy Interventions (OT) activity tolerance training;BADL retraining;functional balance retraining;occupation/activity based interventions;patient/caregiver education/training;ROM/therapeutic exercise;strengthening exercise;transfer/mobility retraining;adaptive equipment training;cognitive/visual perception retraining;neuromuscular control/coordination retraining;passive ROM/stretching  -MM               User Key  (r) = Recorded By, (t) = Taken By, (c) = Cosigned By      Initials Name Provider Type    MM Jewel Núñez, OTR/L Occupational Therapist                   Clinical Impression       Row Name 06/18/25 0843          Pain Assessment    Pain Location abdomen  -MM     Pain Side/Orientation generalized  -MM     Pain Management Interventions activity modification encouraged;exercise or physical activity utilized;movement retraining implemented;positioning techniques utilized  -MM       Row Name 06/18/25 0843          Pain Scale: FACES Pre/Post-Treatment    Pain: FACES Scale, Pretreatment 0-->no hurt  -MM     Posttreatment Pain Rating 2-->hurts little bit  -MM       Row Name 06/18/25 0843          Plan of Care Review    Plan of Care Reviewed With patient  -MM     Progress no change  -MM     Outcome Evaluation OT evaluation completed. Pt is A&Ox4. Pt is difficult to understand at times. Pt with reports of abdomen pain post tx, jaja fajardo 2. CGA for bed mobility. Dependent to don socks. two attempts for sit to stand t/f mod A x2, second attempt from elevated bed height. Pt is unable to stand fully upright and poor standing tolerance, further functional  mobility deferred. Pt with BUE resting and intention tremors. Skilled OT recommended to address adls, functional mobility and endurance. Recommended d/c SNF.  -MM       Row Name 06/18/25 0843          Therapy Assessment/Plan (OT)    Patient/Family Therapy Goal Statement (OT) --  -MM     Rehab Potential (OT) fair  -MM     Criteria for Skilled Therapeutic Interventions Met (OT) yes;meets criteria;skilled treatment is necessary  -MM     Therapy Frequency (OT) 5 times/wk  -MM     Predicted Duration of Therapy Intervention (OT) until hospital discharge  -MM       Row Name 06/18/25 0843          Therapy Plan Review/Discharge Plan (OT)    Anticipated Discharge Disposition (OT) skilled nursing facility  -MM       Row Name 06/18/25 0843          Positioning and Restraints    Pre-Treatment Position in bed  -MM     Post Treatment Position bed  -MM     In Bed notified nsg;fowlers;call light within reach;encouraged to call for assist;side rails up x2;exit alarm on  -MM               User Key  (r) = Recorded By, (t) = Taken By, (c) = Cosigned By      Initials Name Provider Type    MM Jewel Núñez, OTR/L Occupational Therapist                   Outcome Measures       Row Name 06/18/25 0843          How much help from another is currently needed...    Putting on and taking off regular lower body clothing? 1  -MM     Bathing (including washing, rinsing, and drying) 2  -MM     Toileting (which includes using toilet bed pan or urinal) 2  -MM     Putting on and taking off regular upper body clothing 2  -MM     Taking care of personal grooming (such as brushing teeth) 3  -MM     Eating meals 3  -MM     AM-PAC 6 Clicks Score (OT) 13  -MM       Row Name 06/18/25 0844 06/18/25 0800       How much help from another person do you currently need...    Turning from your back to your side while in flat bed without using bedrails? 3  -AJ 3  -KJ    Moving from lying on back to sitting on the side of a flat bed without bedrails? 3  -AJ 3  -KJ     Moving to and from a bed to a chair (including a wheelchair)? 2  -AJ 2  -KJ    Standing up from a chair using your arms (e.g., wheelchair, bedside chair)? 2  -AJ 2  -KJ    Climbing 3-5 steps with a railing? 1  -AJ 1  -KJ    To walk in hospital room? 2  -AJ 2  -KJ    AM-PAC 6 Clicks Score (PT) 13  -AJ 13  -KJ    Highest Level of Mobility Goal Move to Chair/Commode-4  -AJ Move to Chair/Commode-4  -KJ      Row Name 06/18/25 0500          How much help from another person do you currently need...    Turning from your back to your side while in flat bed without using bedrails? 2  -LL     Moving from lying on back to sitting on the side of a flat bed without bedrails? 2  -LL     Moving to and from a bed to a chair (including a wheelchair)? 2  -LL     Standing up from a chair using your arms (e.g., wheelchair, bedside chair)? 2  -LL     Climbing 3-5 steps with a railing? 2  -LL     To walk in hospital room? 2  -LL     AM-PAC 6 Clicks Score (PT) 12  -LL     Highest Level of Mobility Goal Move to Chair/Commode-4  -LL       Row Name 06/18/25 0844 06/18/25 0843       Functional Assessment    Outcome Measure Options AM-PAC 6 Clicks Basic Mobility (PT)  -AJ AM-PAC 6 Clicks Daily Activity (OT)  -MM              User Key  (r) = Recorded By, (t) = Taken By, (c) = Cosigned By      Initials Name Provider Type    KJ Gavi Abbasi, RN Registered Nurse    Jewel Shook E, OTR/L Occupational Therapist    Alice Mcmahan RN Registered Nurse    Fenry Martinez, PT DPT Physical Therapist                    Occupational Therapy Education       Title: PT OT SLP Therapies (In Progress)       Topic: Occupational Therapy (In Progress)       Point: ADL training (In Progress)       Learning Progress Summary            Patient Acceptance, E, NR by MM at 6/18/2025 1610                      Point: Precautions (In Progress)       Learning Progress Summary            Patient Acceptance, E, NR by MM at 6/18/2025 1610                       Point: Body mechanics (In Progress)       Learning Progress Summary            Patient Acceptance, E, NR by JOSHUA at 6/18/2025 1610                                      User Key       Initials Effective Dates Name Provider Type Discipline     07/11/23 -  Jewel Núñez OTR/L Occupational Therapist OT                  OT Recommendation and Plan  Planned Therapy Interventions (OT): activity tolerance training, BADL retraining, functional balance retraining, occupation/activity based interventions, patient/caregiver education/training, ROM/therapeutic exercise, strengthening exercise, transfer/mobility retraining, adaptive equipment training, cognitive/visual perception retraining, neuromuscular control/coordination retraining, passive ROM/stretching  Therapy Frequency (OT): 5 times/wk  Plan of Care Review  Plan of Care Reviewed With: patient  Progress: no change  Outcome Evaluation: OT evaluation completed. Pt is A&Ox4. Pt is difficult to understand at times. Pt with reports of abdomen pain post tx, jaja fajardo 2. CGA for bed mobility. Dependent to don socks. two attempts for sit to stand t/f mod A x2, second attempt from elevated bed height. Pt is unable to stand fully upright and poor standing tolerance, further functional mobility deferred. Pt with BUE resting and intention tremors. Skilled OT recommended to address adls, functional mobility and endurance. Recommended d/c SNF.     Time Calculation:         Time Calculation- OT       Row Name 06/18/25 0843             Time Calculation- OT    OT Start Time 0843  -MM      OT Stop Time 0914  -MM      OT Time Calculation (min) 31 min  -MM      OT Received On 06/18/25  -MM      OT Goal Re-Cert Due Date 06/28/25  -MM                User Key  (r) = Recorded By, (t) = Taken By, (c) = Cosigned By      Initials Name Provider Type    Jewel Shook, OTR/L Occupational Therapist                  Therapy Charges for Today       Code Description Service Date Service  Provider Modifiers Qty    [id number] HC OT EVAL MOD COMPLEXITY 2 6/18/2025 Jewel Núñez, OTR/L GO 1                 Jewel Núñez, SANFORDR/L  6/18/2025

## 2025-06-18 NOTE — PAYOR COMM NOTE
"Felix Bartlett Sr. (75 y.o. Male)  [id number]   Admit 6/17  [facility] phone     Fax            Date of Birth   1950    Social Security Number       Address   [address] [address]    Home Phone   [phone]    MRN   [id number]       Baptism   Taoism    Marital Status                               Admission Date   6/17/2025    Admission Type   Emergency    Admitting Provider   Devin Jackson MD    Attending Provider   Devin Jackson MD    Department, Room/Bed   [facility] 3C, 390/1       Discharge Date       Discharge Disposition       Discharge Destination                                 Attending Provider: Devin Jackson MD    Allergies: No Known Allergies    Isolation: None   Infection: None   Code Status: CPR    Ht: 182.9 cm (72\")   Wt: 116 kg (255 lb)    Admission Cmt: None   Principal Problem: DANIEL (acute kidney injury) [N17.9]                   Active Insurance as of 6/17/2025       Primary Coverage       Payor Plan Insurance Group Employer/Plan Group    [facility] DEPT [id number]        Payor Plan Address Payor Plan Phone Number Payor Plan Fax Number Effective Dates    [facility] OFFICE OF COMMUNITY CARE [phone]  6/17/2025 - None Entered    [address] [address]       [facility] [id number]         Subscriber Name Subscriber Birth Date Member ID       FELIX BARTLETT SR. 1950 [id number]               Secondary Coverage       Payor Plan Insurance Group Employer/Plan Group    [facility] CCN OPTUM        Payor Plan Address Payor Plan Phone Number Payor Plan Fax Number Effective Dates    [address] [phone]  6/7/2023 - None Entered    [facility] [id number]         Subscriber Name Subscriber Birth Date Member ID       FELIX BARTLETT SR. 1950                      Emergency Contacts        (Rel.) Home Phone Work Phone Mobile Phone    " Nadya Shirley (Daughter) -- -- [phone]                 History & Physical        Gabby Wilder, APRN at 06/17/25 1240              [facility] Medicine Services  HISTORY AND PHYSICAL    Date of Admission: 6/17/2025  Primary Care Physician: Keyon Dockery MD    Subjective   Primary Historian: Patient    Chief Complaint: Severe diarrhea and weakness    History of Present Illness  Felix Bartlett is a 75-year-old male followed by local [facility].  He has a history of bile salt induced diarrhea status postcholecystectomy, COPD, renal cell carcinoma status post partial nephrectomy of the left, hypertension, hearing loss, hypokalemia secondary to diuretics.  Patient presented to [facility] ED with complaints of generalized weakness and diarrhea since Friday.  He reports to this provider that this morning he had to have a bowel movement and could not get off of the toilet.  Caregiver could not get him up either therefore EMS was called.  Patient was transported to [facility] emergency department for evaluation.  Workup revealed potassium 3.1, CO2 15, and ion gap 18, BUN 30.1, creatinine 3.01 with a baseline of 1.1, glucose 211, , ALT 85-no history of transaminitis, lactic acid 3.3, now 2.0, lipase 13, WBC 12.07 hemoglobin 9.4, hematocrit 27.5, platelets 120,000, 11.1 bandemia, urinalysis without acute findings, stool studies ordered chest x-ray without acute findings, CT of the abdomen pelvis without contrast stable without acute findings.  Patient has severe tremors and has difficulty with speech unsure if secondary to tremors or profound dry mouth.  He states he is unsure when the last time he ate.  He is an extremely poor historian.  He did receive normal saline 500 mL bolus in the ED.  Additional liter ordered, will continue hydration with 100 mL/hour and repeat labs in the a.m.  Patient denies pain.  He just complains of weakness.  He is asking for his  night medications gabapentin and primidone stating that is his treatment for his tremors.  He is admitted for further evaluation treatment        Review of Systems   Otherwise complete ROS reviewed and negative except as mentioned in the HPI.    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  6.30.14/7.1.14 CBC With Differential/Platelet; Sedimentation Rate-Westergren; C-Reactive Protein, Quant==WBC 12.0; RBC 3.60; Hemoglobin 11.7; Hematocrit 34.4; Neutrophils (Absolute) 7.1; Lymphs (Absolute) 3.6  nothing major here..his problems are alcohol, smoking now o    HL (hearing loss)     Hyperlipidemia     Hypertension     Hypopotassemia-diuretic 02/16/2017    Oxygen dependent     at hs    Peripheral neuropathy     Renal cancer 08/2014    Lt RALPart'l Nx; t1a Clear Cell with negative margin    RUQ pain 07/23/2013    S/P laparoscopic cholecystectomy 09/04/2012    Wellness examination-done 02/16/2017    PROCEDURES:  Prostate exam/Sony/confirmed/1.27.16     Colonoscopy-nl/Gil//7-28-10/5y   EGD/BHP/[place]/3.4.16  Colonoscopy-polyp/Reading//3.18.16/5 yr  EGD-neg/[place]//5.1.18     SURGERIES:  T&A  Appendix/1959  Scrotum-cyst/1980's  Lap gb+liver bx/Raya/WB/7.23.12  L robotic partial nephrectomy (clear cell grade 1 [id number])/[place]//8.14.14  Qzfogg-jjnffrhr-lgqvq+mesh+omen/[place]//5.9.     Past Surgical History:  Past Surgical History:   Procedure Laterality Date    APPENDECTOMY      CHOLECYSTECTOMY      ENDOSCOPY N/A 05/01/2018    Procedure: ESOPHAGOGASTRODUODENOSCOPY WITH ANESTHESIA;  Surgeon: Donnell Cordero DO;  Location: [facility] ENDOSCOPY;  Service: Gastroenterology    HERNIA REPAIR      KIDNEY SURGERY      LAPAROSCOPIC PARTIAL NEPHRECTOMY Left 08/14/2014    Robot Assisted     Social History:  reports that he has been smoking cigarettes. He started smoking about 59 years ago. He has a 29.6 pack-year smoking history. He has never been exposed to tobacco smoke. He has never used smokeless tobacco. He reports that he does not drink alcohol and does not use drugs.    Family History: family history includes Colon cancer in his father; Heart disease in his mother.       Allergies:  No Known Allergies    Medications:  Prior to Admission medications    Medication Sig Start Date End Date Taking? Authorizing Provider   acetaminophen (TYLENOL) 650 MG 8 hr tablet Take 1 tablet by mouth 2 (Two) Times a Day.    ProviderTrish MD   calcium-vitamin D (OSCAL-500) 500-200 MG-UNIT per tablet Take 1 tablet by mouth 2 times daily.      ProviderTrish MD   cholecalciferol (VITAMIN D3) 10 MCG (400 UNIT) tablet Take 1 tablet by mouth.    ProviderTrish MD   clotrimazole-betamethasone (LOTRISONE) 1-0.05 % cream Apply 1 Application topically to the appropriate area as directed 2 (Two) Times a Day. Apply to affected area twice daily 12/4/24   Milagro Anderson APRN   diphenhydrAMINE (BENADRYL) 25 mg capsule Take 1 capsule by mouth 2 (Two) Times a Day.    ProviderTrish MD   escitalopram  (LEXAPRO) 20 MG tablet Take 1 tablet by mouth Daily. 1/21/25   Keyon Dockery MD   ferrous sulfate 325 (65 FE) MG tablet Take 1 tablet by mouth Daily With Breakfast.    Trish Fields MD   fluticasone (FLONASE) 50 MCG/ACT nasal spray Administer 2 sprays into the nostril(s) as directed by provider Daily. 1/21/21   Trish Fields MD   gabapentin (NEURONTIN) 300 MG capsule Take 1 capsule by mouth 3 (Three) Times a Day. 4/25/25   Andreina Hernández APRN   KETOTIFEN FUMARATE OP Apply 1 drop to eye(s) as directed by provider As Needed.    Trish Fields MD   losartan (COZAAR) 100 MG tablet Take 1 tablet by mouth Daily. 2/28/25   Keyon Dockery MD   metoprolol tartrate (LOPRESSOR) 50 MG tablet Take 1 tablet by mouth Every 12 (Twelve) Hours. 1/21/25   Keyon Dockery MD   Multiple Vitamins-Minerals (CENTRUM SILVER 50+MEN) tablet Take 1 tablet by mouth Daily.    Trish Fields MD   O2 (OXYGEN) Inhale 1 (One) Time.    Trish Fields MD   omeprazole (priLOSEC) 20 MG capsule 1 capsule 2 (Two) Times a Day. 6/26/12   Trish Fields MD   potassium chloride (Klor-Con M10) 10 MEQ CR tablet Take 1 tablet by mouth 2 (Two) Times a Day. 2/10/25   Keyon Dockery MD   primidone (MYSOLINE) 50 MG tablet Take 1 tablet by mouth 3 (Three) Times a Day. . 5/21/25 5/21/26  Andreina Hernández APRN   simvastatin (ZOCOR) 40 MG tablet Take 1 tablet by mouth Daily. 2/13/25   Keyon Dockery MD   sodium-potassium-magnesium sulfates (Suprep Bowel Prep Kit) 17.5-3.13-1.6 GM/177ML solution oral solution Take 2 bottles by mouth Take As Directed. Take 1 bottle night before and 1 bottle morning of procedure.  Time as directed 5/5/25   Miguel Hastings APRN   tamsulosin (FLOMAX) 0.4 MG capsule 24 hr capsule Take 1 capsule by mouth Daily. 1/21/25   Keyon Dockery MD     I have utilized all available immediate resources to obtain, update, or review the patient's current medications (including all prescriptions,  "over-the-counter products, herbals, cannabis/cannabidiol products, and vitamin/mineral/dietary (nutritional) supplements).    Objective     Vital Signs: /54 (BP Location: Right arm, Patient Position: Lying)   Pulse 82   Temp 98.4 °F (36.9 °C) (Oral)   Resp 16   Ht 182.9 cm (72\")   Wt 116 kg (255 lb)   SpO2 98%   BMI 34.58 kg/m²   Physical Exam  Vitals reviewed.   Constitutional:       Appearance: He is obese. He is ill-appearing.   HENT:      Head: Normocephalic and atraumatic.      Mouth/Throat:      Mouth: Mucous membranes are dry.      Pharynx: Oropharynx is clear.   Eyes:      Extraocular Movements: Extraocular movements intact.      Conjunctiva/sclera: Conjunctivae normal.   Cardiovascular:      Rate and Rhythm: Normal rate and regular rhythm.   Pulmonary:      Effort: Pulmonary effort is normal.      Breath sounds: Normal breath sounds.   Abdominal:      General: There is no distension.      Palpations: Abdomen is soft.   Musculoskeletal:      Cervical back: Normal range of motion and neck supple.      Right lower leg: No edema.      Left lower leg: No edema.   Skin:     General: Skin is warm and dry.   Neurological:      Mental Status: He is alert and oriented to person, place, and time.      Comments: Tremor, patient states at baseline, difficulty with speech, suspect secondary to tremor versus tardive dyskinesia versus dry mouth   Psychiatric:         Mood and Affect: Mood normal.         Behavior: Behavior normal.        Results Reviewed:  Lab Results (last 24 hours)       Procedure Component Value Units Date/Time    Gastrointestinal Panel, PCR - Stool, Per Rectum [[id number]] Collected: 06/17/25 1506    Specimen: Stool from Per Rectum Updated: 06/17/25 1514    Clostridioides difficile Toxin - Stool, Per Rectum [[id number]] Collected: 06/17/25 1506    Specimen: Stool from Per Rectum Updated: 06/17/25 1513    Narrative:      The following orders were created for panel order Clostridioides " difficile Toxin - Stool, Per Rectum.  Procedure                               Abnormality         Status                     ---------                               -----------         ------                     Clostridioides difficile...[[id number]]                      In process                   Please view results for these tests on the individual orders.    Clostridioides difficile Toxin, PCR - Stool, Per Rectum [[id number]] Collected: 06/17/25 1506    Specimen: Stool from Per Rectum Updated: 06/17/25 1513    STAT Lactic Acid, Reflex [[id number]]  (Normal) Collected: 06/17/25 1316    Specimen: Blood Updated: 06/17/25 1345     Lactate 2.0 mmol/L     Urinalysis With Culture If Indicated - Urine, Clean Catch [[id number]]  (Abnormal) Collected: 06/17/25 1027    Specimen: Urine, Clean Catch Updated: 06/17/25 1059     Color, UA Dark Yellow     Appearance, UA Turbid     pH, UA <=5.0     Specific Gravity, UA 1.022     Glucose, UA Negative     Ketones, UA Trace     Bilirubin, UA Small (1+)     Blood, UA Large (3+)     Protein,  mg/dL (2+)     Leuk Esterase, UA Trace     Nitrite, UA Negative     Urobilinogen, UA 1.0 E.U./dL    Narrative:      In absence of clinical symptoms, the presence of pyuria, bacteria, and/or nitrites on the urinalysis result does not correlate with infection.    Urinalysis, Microscopic Only - Urine, Clean Catch [[id number]]  (Abnormal) Collected: 06/17/25 1027    Specimen: Urine, Clean Catch Updated: 06/17/25 1059     RBC, UA None Seen /HPF      WBC, UA 3-5 /HPF      Comment: Urine culture not indicated.        Bacteria, UA None Seen /HPF      Squamous Epithelial Cells, UA 0-2 /HPF      Hyaline Casts, UA 0-2 /LPF      Granular Casts, UA 3-6 /LPF      Amorphous Crystals, UA Moderate/2+ /HPF      Starch, UA Small/1+ /HPF      Methodology Manual Light Microscopy    Manual Differential [[id number]]  (Abnormal) Collected: 06/17/25 1016    Specimen: Blood Updated: 06/17/25 1058     Neutrophil % 52.5 %       Lymphocyte % 23.2 %      Monocyte % 8.1 %      Eosinophil % 0.0 %      Basophil % 0.0 %      Bands %  11.1 %      Atypical Lymphocyte % 5.1 %      Neutrophils Absolute 7.68 10*3/mm3      Lymphocytes Absolute 3.42 10*3/mm3      Monocytes Absolute 0.98 10*3/mm3      Eosinophils Absolute 0.00 10*3/mm3      Basophils Absolute 0.00 10*3/mm3      RBC Morphology Normal     WBC Morphology Normal     Platelet Estimate Decreased     Clumped Platelets Present     Giant Platelets Slight/1+    CBC & Differential [[id number]]  (Abnormal) Collected: 06/17/25 1016    Specimen: Blood Updated: 06/17/25 1058    Narrative:      The following orders were created for panel order CBC & Differential.  Procedure                               Abnormality         Status                     ---------                               -----------         ------                     CBC Auto Differential[[id number]]        Abnormal            Final result                 Please view results for these tests on the individual orders.    CBC Auto Differential [[id number]]  (Abnormal) Collected: 06/17/25 1016    Specimen: Blood Updated: 06/17/25 1058     WBC 12.07 10*3/mm3      RBC 2.80 10*6/mm3      Hemoglobin 9.4 g/dL      Hematocrit 27.5 %      MCV 98.2 fL      MCH 33.6 pg      MCHC 34.2 g/dL      RDW 13.1 %      RDW-SD 47.3 fl      MPV 13.5 fL      Platelets 120 10*3/mm3     Lactic Acid, Plasma [[id number]]  (Abnormal) Collected: 06/17/25 1016    Specimen: Blood Updated: 06/17/25 1055     Lactate 3.3 mmol/L     Comprehensive Metabolic Panel [[id number]]  (Abnormal) Collected: 06/17/25 1016    Specimen: Blood Updated: 06/17/25 1052     Glucose 211 mg/dL      BUN 30.1 mg/dL      Creatinine 3.01 mg/dL      Sodium 140 mmol/L      Potassium 3.1 mmol/L      Chloride 107 mmol/L      CO2 15.0 mmol/L      Calcium 8.9 mg/dL      Total Protein 6.9 g/dL      Albumin 3.8 g/dL      ALT (SGPT) 85 U/L      AST (SGOT) 109 U/L      Alkaline Phosphatase 113 U/L       Total Bilirubin 0.6 mg/dL      Globulin 3.1 gm/dL      A/G Ratio 1.2 g/dL      BUN/Creatinine Ratio 10.0     Anion Gap 18.0 mmol/L      eGFR 20.7 mL/min/1.73     Narrative:      GFR Categories in Chronic Kidney Disease (CKD)              GFR Category          GFR (mL/min/1.73)    Interpretation  G1                    90 or greater        Normal or high (1)  G2                    60-89                Mild decrease (1)  G3a                   45-59                Mild to moderate decrease  G3b                   30-44                Moderate to severe decrease  G4                    15-29                Severe decrease  G5                    14 or less           Kidney failure    (1)In the absence of evidence of kidney disease, neither GFR category G1 or G2 fulfill the criteria for CKD.    eGFR calculation 2021 CKD-EPI creatinine equation, which does not include race as a factor    Lipase [[id number]]  (Normal) Collected: 06/17/25 1016    Specimen: Blood Updated: 06/17/25 1047     Lipase 13 U/L           Imaging Results (Last 24 Hours)       Procedure Component Value Units Date/Time    CT Abdomen Pelvis Without Contrast [[id number]] Collected: 06/17/25 1146     Updated: 06/17/25 1157    Narrative:      EXAMINATION: CT ABDOMEN PELVIS WO CONTRAST-     HISTORY: Abdominal pain. Generalized weakness. Diarrhea.     In order to have a CT radiation dose as low as reasonably achievable  Automated Exposure Control was utilized for adjustment of the mA and/or  KV according to patient size.     CT Dose DLP = 785.23 mGy.cm.  (If there are multiple studies performed at the same time this  represents the total dose).     Images are stored in PACS per institutional protocol.        Noncontrast abdomen/pelvis CT.  Axial, sagittal, and coronal sequences.     COMPARISON:  11/22/2024 and 11/22/2016.     Normal heart size.  No acute abnormality at the lung bases.     Cholecystectomy clips are present.  Normal noncontrast appearance of the  "spleen.  Spleen = 13 x 6 x 7 cm. A few splenic calcifications are present.     Discrete oval solid mass in the distal pancreas tail measures 30 mm in  diameter on axial image 22. This finding measured 27 mm on 11/22/2024  and 20 mm on 11/22/2016.  The homogeneous density (matching adjacent spleen) and smooth margins  suggest that this is a splenule.     Stable LEFT adrenal nodule.  Normal RIGHT adrenal gland.  Unchanged partially exophytic 30 x 26 mm fat-containing LEFT renal  lesion.  Exophytic 31 x 30 mm RIGHT renal cyst compared with 32 x 32 mm on  11/22/2024 and 24 x 21 mm on 11/22/2016.  No renal stone or hydronephrosis.     Mild aortic calcification with no aneurysm.  No bowel dilation.  No sign of colitis or diverticulitis.  No pelvic mass or fluid.  Normal appearance of the urinary bladder.       Impression:      1. Stable renal findings.  2. Small solid \"mass\" at the tip of the pancreas tail has benign imaging  characteristics. Splenule suspected.  3. No fluid collection or inflammatory process.     This report was signed and finalized on 6/17/2025 11:54 AM by Dr. José Pizarro MD.       XR Chest 1 View [[id number]] Collected: 06/17/25 1116     Updated: 06/17/25 1120    Narrative:      EXAM: XR CHEST 1 VW-         HISTORY: weak       COMPARISON: 5/8/2016.     TECHNIQUE:  Frontal view(s) of the chest submitted.     FINDINGS:    The lungs are grossly clear bilaterally. No effusion or pneumothorax is  visualized. Heart and mediastinum are unremarkable.          Impression:         1. No active disease in the chest.     This report was signed and finalized on 6/17/2025 11:17 AM by Dilan Sykes.               Assessment / Plan   Assessment:   Active Hospital Problems    Diagnosis     **DANIEL (acute kidney injury)     Transaminitis, acute     Diarrhea of presumed infectious origin     Dermatitis neglecta     Hypokalemia     Tremor        Treatment Plan  The patient will be admitted to Dr. Jackson's service " here at [facility].     DANIEL secondary to diarrhea-presumed infectious; 1500 mL normal saline bolus, continue with normal saline 100 mL/hour, labs in AM.  Start Rocephin and Flagyl for now    Transaminitis, acute; labs in a.m.    Hypokalemia; replace, labs in a.m.    Tremor, chronic; Home medications resumed    Dermatitis neglecta;     Medications reviewed and restarted as appropriate  DVT prophylaxis with SCDs  Labs in a.m.    Medical Decision Making  Number and Complexity of problems: 6  4 problems, acute, high complexity, unchanged  2 problems, chronic, moderate complexity, stable  Differential Diagnosis: None    Conditions and Status        Condition is unchanged.     [facility] Data  External documents reviewed: No  Cardiac tracing (EKG, telemetry) interpretation: Reviewed  Radiology interpretation: Reviewed  Labs reviewed: Yes  Any tests that were considered but not ordered: No     Decision rules/scores evaluated (example [id number], Wells, etc): No     Discussed with: Patient and Dr. Jackson     Care Planning  Shared decision making: Patient and Dr. Jackson  Code status and discussions: Full    Disposition  Social Determinants of Health that impact treatment or disposition: TBD  Estimated length of stay is 2+ days.     I confirmed that the patient's advanced care plan is present, code status is documented, and a surrogate decision maker is listed in the patient's medical record.     The patient's surrogate decision maker is daughter Nadya .     The patient was seen and examined by me on 6/17/2025 at 12:40 PM.    Electronically signed by RUBIO Fuentes, 06/17/25, 15:44 CDT.               Electronically signed by Gabby Wilder APRN at 06/17/25 9169          Emergency Department Notes        Diana Kumar MD at 06/17/25 0975          Subjective   History of Present Illness  Patient is a 75-year-old male with a history of COPD who presents to the ER with generalized weakness.  Patient  states he has had generalized weakness along with diarrhea for the last 5 days.  Patient does complain of some mild pain to his left lower abdomen.  He denies any fever, chest pain shortness of air, vomiting, urinary changes, neurologic changes.  He denies being on recent antibiotics.      Review of Systems   HENT: Negative.     Eyes: Negative.    Respiratory: Negative.     Cardiovascular: Negative.    Gastrointestinal:  Positive for abdominal pain and diarrhea.   Endocrine: Negative.    Genitourinary: Negative.    Musculoskeletal: Negative.    Skin: Negative.    Allergic/Immunologic: Negative.    Neurological:  Positive for weakness.   Hematological: Negative.    Psychiatric/Behavioral: Negative.     All other systems reviewed and are negative.      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  6.30.14/7.1.14 CBC With Differential/Platelet; Sedimentation Rate-Westergren; C-Reactive Protein, Quant==WBC 12.0; RBC 3.60; Hemoglobin 11.7; Hematocrit 34.4; Neutrophils (Absolute) 7.1; Lymphs (Absolute) 3.6  nothing  major here..his problems are alcohol, smoking now o    HL (hearing loss)     Hyperlipidemia     Hypertension     Hypopotassemia-diuretic 02/16/2017    Oxygen dependent     at hs    Peripheral neuropathy     Renal cancer 08/2014    Lt RALPart'l Nx; t1a Clear Cell with negative margin    RUQ pain 07/23/2013    S/P laparoscopic cholecystectomy 09/04/2012    Wellness examination-done 02/16/2017    PROCEDURES:  Prostate exam/[place]/confirmed/1.27.16     Colonoscopy-nl/Gil//7-28-10/5y  EGD/BHP/Gil/3.4.16  Colonoscopy-polyp/Gil//3.18.16/5 yr  EGD-neg/[place]//5.1.18     SURGERIES:  T&A  Appendix/1959  Scrotum-cyst/1980's  Lap gb+liver bx/Raya/WB/7.23.12  L robotic partial nephrectomy (clear cell grade 1 [id number])/[place]//8.14.14  Dfflcz-ftutfciu-yjmgh+mesh+omen/[place]//5.9.       No Known Allergies    Past Surgical History:   Procedure Laterality Date    APPENDECTOMY      CHOLECYSTECTOMY      ENDOSCOPY N/A 05/01/2018    Procedure: ESOPHAGOGASTRODUODENOSCOPY WITH ANESTHESIA;  Surgeon: Donnell Cordero DO;  Location: [facility] ENDOSCOPY;  Service: Gastroenterology    HERNIA REPAIR      KIDNEY SURGERY      LAPAROSCOPIC PARTIAL NEPHRECTOMY Left 08/14/2014    Robot Assisted       Family History   Problem Relation Age of Onset    Colon cancer Father     Heart disease Mother     Colon polyps Neg Hx     Esophageal cancer Neg Hx        Social History     Socioeconomic History    Marital status:      Spouse name: Melanie    Number of children: 1    Years of education: 12   Tobacco Use    Smoking status: Every Day     Current packs/day: 0.50     Average packs/day: 0.5 packs/day for 59.1 years (29.6 ttl pk-yrs)     Types: Cigarettes     Start date: 4/30/1966     Passive exposure: Never    Smokeless tobacco: Never   Vaping Use    Vaping status: Never Used   Substance and Sexual Activity    Alcohol use: No    Drug use: No    Sexual activity: Defer           Objective   Physical Exam  Vitals and nursing note  reviewed.   Constitutional:       Appearance: He is well-developed.   HENT:      Head: Normocephalic and atraumatic.   Eyes:      Conjunctiva/sclera: Conjunctivae normal.      Pupils: Pupils are equal, round, and reactive to light.   Cardiovascular:      Rate and Rhythm: Normal rate and regular rhythm.      Heart sounds: Normal heart sounds.   Pulmonary:      Effort: Pulmonary effort is normal.      Breath sounds: Normal breath sounds.   Abdominal:      Palpations: Abdomen is soft.      Tenderness: There is abdominal tenderness in the left lower quadrant. There is no right CVA tenderness, left CVA tenderness, guarding or rebound.   Musculoskeletal:         General: No deformity. Normal range of motion.      Cervical back: Normal range of motion.   Skin:     General: Skin is warm.   Neurological:      Mental Status: He is alert and oriented to person, place, and time.   Psychiatric:         Behavior: Behavior normal.         Procedures          ED Course      EKG as interpreted by me: Sinus tachycardia with a rate of 117 with PACs, no acute ischemia or infarction                          Total (NIH Stroke Scale): 0          Lab Results (last 24 hours)       Procedure Component Value Units Date/Time    CBC & Differential [[id number]]  (Abnormal) Collected: 06/17/25 1016    Specimen: Blood Updated: 06/17/25 1058    Narrative:      The following orders were created for panel order CBC & Differential.  Procedure                               Abnormality         Status                     ---------                               -----------         ------                     CBC Auto Differential[[id number]]        Abnormal            Final result                 Please view results for these tests on the individual orders.    Comprehensive Metabolic Panel [[id number]]  (Abnormal) Collected: 06/17/25 1016    Specimen: Blood Updated: 06/17/25 1052     Glucose 211 mg/dL      BUN 30.1 mg/dL      Creatinine 3.01 mg/dL      Sodium  140 mmol/L      Potassium 3.1 mmol/L      Chloride 107 mmol/L      CO2 15.0 mmol/L      Calcium 8.9 mg/dL      Total Protein 6.9 g/dL      Albumin 3.8 g/dL      ALT (SGPT) 85 U/L      AST (SGOT) 109 U/L      Alkaline Phosphatase 113 U/L      Total Bilirubin 0.6 mg/dL      Globulin 3.1 gm/dL      A/G Ratio 1.2 g/dL      BUN/Creatinine Ratio 10.0     Anion Gap 18.0 mmol/L      eGFR 20.7 mL/min/1.73     Narrative:      GFR Categories in Chronic Kidney Disease (CKD)              GFR Category          GFR (mL/min/1.73)    Interpretation  G1                    90 or greater        Normal or high (1)  G2                    60-89                Mild decrease (1)  G3a                   45-59                Mild to moderate decrease  G3b                   30-44                Moderate to severe decrease  G4                    15-29                Severe decrease  G5                    14 or less           Kidney failure    (1)In the absence of evidence of kidney disease, neither GFR category G1 or G2 fulfill the criteria for CKD.    eGFR calculation 2021 CKD-EPI creatinine equation, which does not include race as a factor    Lipase [[id number]]  (Normal) Collected: 06/17/25 1016    Specimen: Blood Updated: 06/17/25 1047     Lipase 13 U/L     Lactic Acid, Plasma [[id number]]  (Abnormal) Collected: 06/17/25 1016    Specimen: Blood Updated: 06/17/25 1055     Lactate 3.3 mmol/L     CBC Auto Differential [[id number]]  (Abnormal) Collected: 06/17/25 1016    Specimen: Blood Updated: 06/17/25 1058     WBC 12.07 10*3/mm3      RBC 2.80 10*6/mm3      Hemoglobin 9.4 g/dL      Hematocrit 27.5 %      MCV 98.2 fL      MCH 33.6 pg      MCHC 34.2 g/dL      RDW 13.1 %      RDW-SD 47.3 fl      MPV 13.5 fL      Platelets 120 10*3/mm3     Manual Differential [[id number]]  (Abnormal) Collected: 06/17/25 1016    Specimen: Blood Updated: 06/17/25 1058     Neutrophil % 52.5 %      Lymphocyte % 23.2 %      Monocyte % 8.1 %      Eosinophil % 0.0 %       "Basophil % 0.0 %      Bands %  11.1 %      Atypical Lymphocyte % 5.1 %      Neutrophils Absolute 7.68 10*3/mm3      Lymphocytes Absolute 3.42 10*3/mm3      Monocytes Absolute 0.98 10*3/mm3      Eosinophils Absolute 0.00 10*3/mm3      Basophils Absolute 0.00 10*3/mm3      RBC Morphology Normal     WBC Morphology Normal     Platelet Estimate Decreased     Clumped Platelets Present     Giant Platelets Slight/1+    Urinalysis With Culture If Indicated - Urine, Clean Catch [[id number]]  (Abnormal) Collected: 06/17/25 1027    Specimen: Urine, Clean Catch Updated: 06/17/25 1059     Color, UA Dark Yellow     Appearance, UA Turbid     pH, UA <=5.0     Specific Gravity, UA 1.022     Glucose, UA Negative     Ketones, UA Trace     Bilirubin, UA Small (1+)     Blood, UA Large (3+)     Protein,  mg/dL (2+)     Leuk Esterase, UA Trace     Nitrite, UA Negative     Urobilinogen, UA 1.0 E.U./dL    Narrative:      In absence of clinical symptoms, the presence of pyuria, bacteria, and/or nitrites on the urinalysis result does not correlate with infection.    Urinalysis, Microscopic Only - Urine, Clean Catch [[id number]]  (Abnormal) Collected: 06/17/25 1027    Specimen: Urine, Clean Catch Updated: 06/17/25 1059     RBC, UA None Seen /HPF      WBC, UA 3-5 /HPF      Comment: Urine culture not indicated.        Bacteria, UA None Seen /HPF      Squamous Epithelial Cells, UA 0-2 /HPF      Hyaline Casts, UA 0-2 /LPF      Granular Casts, UA 3-6 /LPF      Amorphous Crystals, UA Moderate/2+ /HPF      Starch, UA Small/1+ /HPF      Methodology Manual Light Microscopy           CT Abdomen Pelvis Without Contrast   Final Result   1. Stable renal findings.   2. Small solid \"mass\" at the tip of the pancreas tail has benign imaging   characteristics. Splenule suspected.   3. No fluid collection or inflammatory process.       This report was signed and finalized on 6/17/2025 11:54 AM by Dr. José Pizarro MD.          XR Chest 1 View   Final Result "       1. No active disease in the chest.       This report was signed and finalized on 6/17/2025 11:17 AM by Dilan Sykes.                       Medical Decision Making  Patient is a 75-year-old male with a history of COPD who presents to the ER with generalized weakness.  Patient states he has had generalized weakness along with diarrhea for the last 5 days.  Patient does complain of some mild pain to his left lower abdomen.  He denies any fever, chest pain shortness of air, vomiting, urinary changes, neurologic changes.  He denies being on recent antibiotics.    Differential diagnosis: Viral syndrome, dehydration, electrolyte abnormality, colitis    Labs showed hyperglycemia, mild hypokalemia, leukocytosis, anemia that was stable for the patient, thrombocytopenia, elevated ALT and AST, an elevated BUN and creatinine consistent with acute kidney injury, an elevated lactate and hematuria.  Chest x-ray was negative.  CT scan of the abdomen pelvis showed stable renal findings and a solid mass at the tip of the pancreas consistent with a splenule.  There was no fluid collection or inflammatory process.  Workup consistent with severe dehydration from diarrhea.  Stool studies have been ordered but the patient has been unable to give a sample at this time.  I discussed the case with Gabby Wilder with the hospitalist service and patient was admitted to Dr. Jackson's team for further treatment.    Problems Addressed:  DANIEL (acute kidney injury): complicated acute illness or injury  Anemia, unspecified type: complicated acute illness or injury  Dehydration: complicated acute illness or injury  Diarrhea, unspecified type: complicated acute illness or injury  Generalized weakness: complicated acute illness or injury  Thrombocytopenia: complicated acute illness or injury    Amount and/or Complexity of Data Reviewed  Labs: ordered. Decision-making details documented in ED Course.  Radiology: ordered. Decision-making  details documented in ED Course.  ECG/medicine tests: ordered. Decision-making details documented in ED Course.  Discussion of management or test interpretation with external provider(s): Gabby Wilder with the hospitalist service    Risk  Prescription drug management.  Decision regarding hospitalization.        Final diagnoses:   DANIEL (acute kidney injury)   Dehydration   Anemia, unspecified type   Thrombocytopenia   Diarrhea, unspecified type   Generalized weakness       ED Disposition  ED Disposition       ED Disposition   Decision to Admit    Condition   --    Comment   Level of Care: Med/Surg [1]   Diagnosis: DANIEL (acute kidney injury) [[id number]]   Admitting Physician: AYLIN DIAS [[id number]]   Attending Physician: AYLIN DIAS [[id number]]   Certification: I Certify That Inpatient Hospital Services Are Medically Necessary For Greater Than 2 Midnights                 No follow-up provider specified.       Medication List      No changes were made to your prescriptions during this visit.            Diana Kumar MD  06/17/25 1227      Electronically signed by Diana Kumar MD at 06/17/25 1227       Vital Signs (last day)       Date/Time Temp Temp src Pulse Resp BP Patient Position SpO2    06/18/25 0434 99.3 (37.4) Oral 111 18 154/73 Lying 95    06/18/25 0430 -- -- 134 -- -- -- --    06/18/25 0243 99.4 (37.4) Oral 108 20 154/64 Lying 95    06/17/25 2307 98.9 (37.2) Oral 94 18 149/67 Lying 95    06/17/25 1946 97.8 (36.6) Oral 93 18 144/58 Lying 97    06/17/25 1500 98.4 (36.9) Oral 82 16 161/54 Lying 98    06/17/25 1401 -- -- 79 -- 151/74 -- 98    06/17/25 1301 -- -- 91 -- 172/83 -- 100    06/17/25 1246 -- -- 89 -- 157/66 -- 99    06/17/25 1116 -- -- 98 -- 154/64 -- 93    06/17/25 0933 -- -- -- 24 142/80 Lying --    06/17/25 0931 -- -- 119 -- -- -- --    06/17/25 0930 98.4 (36.9) Oral -- -- -- -- 97          Current Facility-Administered Medications   Medication Dose Route Frequency Provider Last  Rate Last Admin    acetaminophen (TYLENOL) tablet 650 mg  650 mg Oral Q4H PRN Gabby Wilder APRN        Or    acetaminophen (TYLENOL) suppository 650 mg  650 mg Rectal Q4H PRN Gabby Wilder APRN        escitalopram (LEXAPRO) tablet 20 mg  20 mg Oral Daily Gabby Wilder APRN        gabapentin (NEURONTIN) capsule 300 mg  300 mg Oral Q12H Gabby Wilder APRN   300 mg at 06/17/25 2022    metoprolol tartrate (LOPRESSOR) tablet 50 mg  50 mg Oral Q12H Gabby Wilder APRN   50 mg at 06/17/25 1659    metroNIDAZOLE (FLAGYL) IVPB 500 mg  500 mg Intravenous Q8H Gabby Wilder APRN 200 mL/hr at 06/18/25 0831 500 mg at 06/18/25 0831    ondansetron ODT (ZOFRAN-ODT) disintegrating tablet 4 mg  4 mg Oral Q6H PRN Gabby Wilder APRN        Or    ondansetron (ZOFRAN) injection 4 mg  4 mg Intravenous Q6H PRN Gabby Wilder APRN        pantoprazole (PROTONIX) EC tablet 40 mg  40 mg Oral Q AM Gabby Wilder APRN        primidone (MYSOLINE) tablet 50 mg  50 mg Oral TID Gabby Wilder APRN   50 mg at 06/17/25 2022    sodium chloride 0.9 % flush 10 mL  10 mL Intravenous PRN Diana Kumar MD        sodium chloride 0.9 % infusion  100 mL/hr Intravenous Continuous Gabby Wilder APRN 100 mL/hr at 06/18/25 0724 100 mL/hr at 06/18/25 0724    tamsulosin (FLOMAX) 24 hr capsule 0.4 mg  0.4 mg Oral Daily Gabby Wilder APRN   0.4 mg at 06/17/25 1659 6/18  Patient post void residual was 527 mls after voiding 50 mls. 400 mls of dark concentrated urine drained from in and out cath. Patient failed dysphagia, NPO and speech consulted placed. IVF and IV ABX infusing per order.

## 2025-06-18 NOTE — CASE MANAGEMENT/SOCIAL WORK
Continued Stay Note  CARLY Ruffin     Patient Name: Felix Bartlett Sr.  MRN: [id number]  Today's Date: 6/18/2025    Admit Date: 6/17/2025    Plan: SNF Referrals   Discharge Plan       Row Name 06/18/25 1507       Plan    Plan SNF Referrals    Patient/Family in Agreement with Plan yes    Plan Comments Pt is requesting a referral to [facility] in [place]. Referral being sent.                   Discharge Codes    No documentation.                       ARIANNA Crump

## 2025-06-18 NOTE — PLAN OF CARE
Goal Outcome Evaluation:  Plan of Care Reviewed With: patient        Progress: no change                              Patient post void residual was 527 mls after voiding 50 mls. 400 mls of dark concentrated urine drained from in and out cath. Patient failed dysphagia, NPO and speech consulted placed. IVF and IV ABX infusing per order.

## 2025-06-18 NOTE — PLAN OF CARE
Goal Outcome Evaluation:  Plan of Care Reviewed With: patient           Outcome Evaluation: See note    Anticipated Discharge Disposition (SLP): unknown          SLP Swallowing Diagnosis: mild, oral dysphagia, functional pharyngeal phase, other (see comments) (Secondary to being edentulous) (06/18/25 1400)

## 2025-06-18 NOTE — PLAN OF CARE
Goal Outcome Evaluation:  Plan of Care Reviewed With: patient           Outcome Evaluation: PT eval complete. Pt in low fowlers position, AOx4, dysarthric speech at baseline and tremors while at rest. Pt reports he is usually indep and cares for wife, but also reports he does have caregivers who help care for he and his wife also. Today pt performed bed mobility with CGA and verbal cues. Once sitting EOB, tremors intensified in UE both at rest and with any other UE motion. Pt demo functional AROM in B LE but was unable to hold strength against therpaist resistance. Pt attempted sit>stand but unable to clear bed and required bed to be raised and Mod Ax2 to stand. Pt did weight shift to perform 2 steps at EOB before fatigue and returning to recliner. Pt returned to fowlers position and required dep A for therapist to place urinal as pt needed to void. Pt will benefit from skilled PT services to improve t/f, decrease fall risk, and return to PLOF. Recommend SNF when medically stable.    Anticipated Discharge Disposition (PT): skilled nursing facility

## 2025-06-18 NOTE — CASE MANAGEMENT/SOCIAL WORK
Discharge Planning Assessment   [facility]     Patient Name: Felix Bartlett Sr.  MRN: [id number]  Today's Date: 6/18/2025    Admit Date: 6/17/2025        Discharge Needs Assessment       Row Name 06/18/25 1016       Living Environment    People in Home spouse    Current Living Arrangements home    Potentially Unsafe Housing Conditions none    In the past 12 months has the electric, gas, oil, or water company threatened to shut off services in your home? No    Primary Care Provided by self;other (see comments)  has caregivers    Provides Primary Care For no one    Family Caregiver if Needed child(sally), adult    Quality of Family Relationships helpful;involved;supportive    Able to Return to Prior Arrangements yes       Resource/Environmental Concerns    Resource/Environmental Concerns none    Transportation Concerns none       Transportation Needs    In the past 12 months, has lack of transportation kept you from medical appointments or from getting medications? no    In the past 12 months, has lack of transportation kept you from meetings, work, or from getting things needed for daily living? No       Food Insecurity    Within the past 12 months, you worried that your food would run out before you got the money to buy more. Never true    Within the past 12 months, the food you bought just didn't last and you didn't have money to get more. Never true       Transition Planning    Patient/Family Anticipates Transition to home with family;home with help/services    Transportation Anticipated family or friend will provide       Discharge Needs Assessment    Equipment Currently Used at Home canevince    Concerns to be Addressed discharge planning;denies needs/concerns at this time    Do you want help finding or keeping work or a job? I do not need or want help    Do you want help with school or training? For example, starting or completing job training or getting a high school diploma, GED or equivalent No     Anticipated Changes Related to Illness none    Equipment Needed After Discharge none    Discharge Coordination/Progress Lives at home with wife. Has paid caregivers that assist. Uses a straight cane for ambulation.  [facility] No noted needs at this time, but CM/SS will continue to follow and be available to assist with any discharge planning or needs.                   Discharge Plan    No documentation.                      Demographic Summary    No documentation.                  Functional Status    No documentation.                  Psychosocial    No documentation.                  Abuse/Neglect    No documentation.                  Legal    No documentation.                  Substance Abuse    No documentation.                  Patient Forms    No documentation.                     Adeline Harris RN

## 2025-06-18 NOTE — THERAPY EVALUATION
Acute Care - Speech Language Pathology   Swallow Initial Evaluation [facility]     Patient Name: Felix Bartlett Sr.  : 1950  MRN: [id number]  Today's Date: 2025               Admit Date: 2025    SPEECH-LANGUAGE PATHOLOGY EVALUATION - SWALLOW  Subjective: The patient was seen on this date for a Clinical Swallow evaluation.  Patient was alert and cooperative.  Significant history: DANIEL, diarrhea, hypokalemia, tremor, left lower abdominal pain, renal cell carcinoma s/p partial left nephrectomy, COPD, HTN, HL, hx cholecystecomy.   Objective: Textures given included thin liquid, puree consistency, and regular consistency.  Assessment: Difficulties were noted with regular consistency.  Observations:  No overt s/s of aspiration observed. Pt had inefficient mastication of the regular solid due to being edentulous. Pt states he typically eats softer foods. He expectorated the regular solid trial.   SLP Findings:  Patient presents with mild oral dysphagia due to being edentulous, without esophageal component.   Recommendations: Diet Textures: thin liquid, soft to chewfood with whole meats.  Medications should be taken whole with thin liquids.   Recommended Strategies: 1:1 assistance with meals, Upright for PO, small bites and sips, and alternate liquids and solids. Oral care before breakfast, after all meals and PRN.  Dysphagia therapy is recommended PRN to monitor diet tolerance.   Jeimy Johnston, CCC-SLP 2025 14:38 CDT     Visit Dx:     ICD-10-CM ICD-9-CM   1. DANIEL (acute kidney injury)  N17.9 584.9   2. Dehydration  E86.0 276.51   3. Anemia, unspecified type  D64.9 285.9   4. Thrombocytopenia  D69.6 287.5   5. Diarrhea, unspecified type  R19.7 787.91   6. Generalized weakness  R53.1 780.79   7. Impaired mobility [Z74.09]  Z74.09 799.89   8. Dysphagia, unspecified type  R13.10 787.20     Patient Active Problem List   Diagnosis    COPD (chronic obstructive pulmonary disease)    Prediabetes    Anxiety     Vertebral compression fracture    Gastroesophageal reflux disease    Edema leg-CHF/d, obesity, copd, cirrhosis    Fatty liver    Hyperlipidemia-statin    Hypertension    Hypokalemia    Nocturnal hypoxia    Macrocytosis    Osteoporosis bd 3.2017 ? 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  tremor-familial, alcohol  Rx-reviewed  Cozaar 50 mg one half a day  LAB-next time hepatitis A, B, C         History of TIA (transient ischemic attack) 02/24/2017    No Problem List  5.25.10/age 60     7.1..14/7.1.14 - 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  6.30.14/7.1.14 CBC With Differential/Platelet; Sedimentation Rate-Westergren; C-Reactive Protein, Quant==WBC 12.0; RBC 3.60; Hemoglobin 11.7; Hematocrit 34.4; Neutrophils (Absolute) 7.1; Lymphs (Absolute) 3.6  nothing major here..his problems are alcohol, smoking now o    HL (hearing loss)     Hyperlipidemia     Hypertension     Hypopotassemia-diuretic  02/16/2017    Oxygen dependent     at hs    Peripheral neuropathy     Renal cancer 08/2014    Lt RALPart'l Nx; t1a Clear Cell with negative margin    RUQ pain 07/23/2013    S/P laparoscopic cholecystectomy 09/04/2012    Wellness examination-done 02/16/2017    PROCEDURES:  Prostate exam/[place]/confirmed/1.27.16     Colonoscopy-nl/Gil//7-28-10/5y  EGD/[facility]/[place]/3.4.16  Colonoscopy-polyp/Gil//3.18.16/5 yr  EGD-neg/Gil//5.1.18     SURGERIES:  T&A  Appendix/1959  Scrotum-cyst/1980's  Lap gb+liver bx/Raya/[facility]/7.23.12  L robotic partial nephrectomy (clear cell grade 1 [id number])/[place]//8.14.14  Oownfe-efzqddyk-rudog+mesh+omen/[place]//5.9.     Past Surgical History:   Procedure Laterality Date    APPENDECTOMY      CHOLECYSTECTOMY      ENDOSCOPY N/A 05/01/2018    Procedure: ESOPHAGOGASTRODUODENOSCOPY WITH ANESTHESIA;  Surgeon: Donnell Cordero DO;  Location:  [facility];  Service: Gastroenterology    HERNIA REPAIR      KIDNEY SURGERY      LAPAROSCOPIC PARTIAL NEPHRECTOMY Left 08/14/2014    Robot Assisted       SLP Recommendation and Plan  SLP Swallowing Diagnosis: mild, oral dysphagia, functional pharyngeal phase, other (see comments) (Secondary to being edentulous) (06/18/25 1400)  SLP Diet Recommendation: soft to chew textures, whole, thin liquids (06/18/25 1400)  Recommended Precautions and Strategies: upright posture during/after eating, small bites of food and sips of liquid, alternate between small bites of food and sips of liquid, general aspiration precautions (06/18/25 1400)  SLP Rec. for Method of Medication Administration: meds whole, with thin liquids (06/18/25 1400)     Monitor for Signs of Aspiration: yes, cough, gurgly voice, throat clearing (06/18/25 1400)     Swallow Criteria for Skilled Therapeutic Interventions Met: demonstrates skilled criteria (06/18/25 1400)  Anticipated Discharge Disposition (SLP): unknown (06/18/25 1400)  Rehab Potential/Prognosis, Swallowing: good, to  achieve stated therapy goals (06/18/25 1400)  Therapy Frequency (Swallow): PRN (06/18/25 1400)  Predicted Duration Therapy Intervention (Days): 1 week (06/18/25 1400)  Oral Care Recommendations: Oral Care before breakfast, after meals and PRN (06/18/25 1400)                                        Outcome Evaluation: See note      SWALLOW EVALUATION (Last 72 Hours)       SLP Adult Swallow Evaluation       Row Name 06/18/25 1400                   Rehab Evaluation    Document Type evaluation  -MB        Subjective Information no complaints  -MB        Patient Observations alert;cooperative  -MB        Patient/Family/Caregiver Comments/Observations No family present  -MB           General Information    Patient Profile Reviewed yes  -MB        Pertinent History Of Current Problem DANIEL, diarrhea, hypokalemia, tremor, left lower abdominal pain, renal cell carcinoma s/p partial left nephrectomy, COPD, HTN, HL, hx cholecystecomy.  -MB        Current Method of Nutrition NPO  -MB        Precautions/Limitations, Vision WFL;difficult to assess  -MB        Precautions/Limitations, Hearing WFL;for purposes of eval  -MB        Prior Level of Function-Communication motor speech impairment;other (see comments)  Secondary to baseline tremor  -MB        Prior Level of Function-Swallowing soft to chew;thin liquids  -MB        Plans/Goals Discussed with patient  -MB        Barriers to Rehab none identified  -MB        Patient's Goals for Discharge return to PO diet  -MB           Pain    Additional Documentation Pain Scale: FACES Pre/Post-Treatment (Group)  -MB           Pain Scale: FACES Pre/Post-Treatment    Pain: FACES Scale, Pretreatment 0-->no hurt  -MB           Oral Motor Structure and Function    Dentition Assessment edentulous, does not have dentures  -MB        Secretion Management WNL/WFL  -MB        Mucosal Quality dry  -MB           Oral Musculature and Cranial Nerve Assessment    Oral Motor General Assessment generalized  oral motor weakness  -MB        Oral Motor, Comment Resting tremor  -MB           General Eating/Swallowing Observations    Eating/Swallowing Skills fed by SLP  -MB        Positioning During Eating upright in bed  -MB        Utensils Used spoon;straw  -MB        Consistencies Trialed regular textures;pureed;thin liquids  -MB           Clinical Swallow Eval    Oral Prep Phase impaired  -MB        Oral Transit WFL  -MB        Oral Residue impaired  -MB        Pharyngeal Phase no overt signs/symptoms of pharyngeal impairment  -MB        Esophageal Phase unremarkable  -MB        Clinical Swallow Evaluation Summary See note  -MB           Oral Prep Concerns    Oral Prep Concerns inefficient mastication;spits out food prior to swallow  -MB        Inefficient Mastication regular consistencies  -MB        Spits Out Food Prior to Swallow regular consistencies  -MB           Oral Residue Concerns    Oral Residue Concerns other (see comments)  spit out cracker  -MB           SLP Evaluation Clinical Impression    SLP Swallowing Diagnosis mild;oral dysphagia;functional pharyngeal phase;other (see comments)  Secondary to being edentulous  -MB        Functional Impact risk of malnutrition  -MB        Rehab Potential/Prognosis, Swallowing good, to achieve stated therapy goals  -MB        Swallow Criteria for Skilled Therapeutic Interventions Met demonstrates skilled criteria  -MB           Recommendations    Therapy Frequency (Swallow) PRN  -MB        Predicted Duration Therapy Intervention (Days) 1 week  -MB        SLP Diet Recommendation soft to chew textures;whole;thin liquids  -MB        Recommended Precautions and Strategies upright posture during/after eating;small bites of food and sips of liquid;alternate between small bites of food and sips of liquid;general aspiration precautions  -MB        Oral Care Recommendations Oral Care before breakfast, after meals and PRN  -MB        SLP Rec. for Method of Medication  Administration meds whole;with thin liquids  -MB        Monitor for Signs of Aspiration yes;cough;gurgly voice;throat clearing  -MB        Anticipated Discharge Disposition (SLP) unknown  -MB           Swallow Goals (SLP)    Swallow LTGs Swallow Long Term Goal (free text)  -MB        Swallow STGs diet tolerance goal selection (SLP)  -MB        Diet Tolerance Goal Selection (SLP) Patient will tolerate trials of  -MB           (LTG) Swallow    (LTG) Swallow Pt will tolerate least restrictive diet with no overt s/s of aspiration.  -MB        [place] (Swallow Long Term Goal) with minimal cues (75-90% accuracy)  -MB        Time Frame (Swallow Long Term Goal) by discharge  -MB        Barriers (Swallow Long Term Goal) n/a  -MB        Progress/Outcomes (Swallow Long Term Goal) new goal  -MB        Comment (Swallow Long Term Goal) n/a  -MB           (STG) Patient will tolerate trials of    Consistencies Trialed (Tolerate trials) soft to chew (whole) textures;thin liquids  -MB        Desired Outcome (Tolerate trials) without signs/symptoms of aspiration;with adequate oral prep/transit/clearance  -MB        [place] (Tolerate trials) with minimal cues (75-90% accuracy)  -MB        Time Frame (Tolerate trials) by discharge  -MB        Progress/Outcomes (Tolerate trials) new goal  -MB        Comment (Tolerate trials) n/a  -MB                  User Key  (r) = Recorded By, (t) = Taken By, (c) = Cosigned By      Initials Name Effective Dates    Jeimy Araiza, CCC-SLP 02/03/23 -                     EDUCATION  The patient has been educated in the following areas:   Dysphagia (Swallowing Impairment).        SLP GOALS       Row Name 06/18/25 1400             (LTG) Swallow    (LTG) Swallow Pt will tolerate least restrictive diet with no overt s/s of aspiration.  -MB      [place] (Swallow Long Term Goal) with minimal cues (75-90% accuracy)  -MB      Time Frame (Swallow Long Term Goal) by discharge  -MB      Barriers  (Swallow Long Term Goal) n/a  -MB      Progress/Outcomes (Swallow Long Term Goal) new goal  -MB      Comment (Swallow Long Term Goal) n/a  -MB         (STG) Patient will tolerate trials of    Consistencies Trialed (Tolerate trials) soft to chew (whole) textures;thin liquids  -MB      Desired Outcome (Tolerate trials) without signs/symptoms of aspiration;with adequate oral prep/transit/clearance  -MB      [place] (Tolerate trials) with minimal cues (75-90% accuracy)  -MB      Time Frame (Tolerate trials) by discharge  -MB      Progress/Outcomes (Tolerate trials) new goal  -MB      Comment (Tolerate trials) n/a  -MB                User Key  (r) = Recorded By, (t) = Taken By, (c) = Cosigned By      Initials Name Provider Type    Jeimy Araiza CCC-SLP Speech and Language Pathologist                         Time Calculation:    Time Calculation- SLP       Row Name 06/18/25 1437             Time Calculation- SLP    SLP Start Time 1400  -MB      SLP Stop Time 1438  -MB      SLP Time Calculation (min) 38 min  -MB      SLP Received On 06/18/25  -MB      SLP Goal Re-Cert Due Date 06/28/25  -MB         Untimed Charges    70809-EB Eval Oral Pharyng Swallow Minutes 38  -MB         Total Minutes    Untimed Charges Total Minutes 38  -MB       Total Minutes 38  -MB                User Key  (r) = Recorded By, (t) = Taken By, (c) = Cosigned By      Initials Name Provider Type    Jeimy Araiza CCC-SLP Speech and Language Pathologist                    Therapy Charges for Today       Code Description Service Date Service Provider Modifiers Qty    [id number] HC ST EVAL ORAL PHARYNG SWALLOW 3 6/18/2025 Jeimy Johnston CCC-SLP GN 1                 RAÚL Zapata  6/18/2025

## 2025-06-18 NOTE — THERAPY EVALUATION
Patient Name: Felix Bartlett Sr.  : 1950    MRN: [id number]                              Today's Date: 2025       Admit Date: 2025    Visit Dx:     ICD-10-CM ICD-9-CM   1. DANIEL (acute kidney injury)  N17.9 584.9   2. Dehydration  E86.0 276.51   3. Anemia, unspecified type  D64.9 285.9   4. Thrombocytopenia  D69.6 287.5   5. Diarrhea, unspecified type  R19.7 787.91   6. Generalized weakness  R53.1 780.79   7. Impaired mobility [Z74.09]  Z74.09 799.89     Patient Active Problem List   Diagnosis    COPD (chronic obstructive pulmonary disease)    Prediabetes    Anxiety    Vertebral compression fracture    Gastroesophageal reflux disease    Edema leg-CHF/d, obesity, copd, cirrhosis    Fatty liver    Hyperlipidemia-statin    Hypertension    Hypokalemia    Nocturnal hypoxia    Macrocytosis    Osteoporosis bd 3. ? 2y    Tremor    Posttraumatic stress disorder    Chronic back pain    Congestive heart failure-diastolic    Tobacco use: 12m    Anemia    Gait difficulty    Iron deficiency    Elevated ferritin-hetroz hemo    Hereditary hemochromatosis    DANIEL (acute kidney injury)    Transaminitis, acute    Diarrhea of presumed infectious origin    Dermatitis neglecta     Past Medical History:   Diagnosis Date    Acid reflux     Arthritis     Asthma     Bile salt-induced diarrhea 2013    COPD (chronic obstructive pulmonary disease)     CTS (carpal tunnel syndrome)     Family history of colon cancer 2021    father      History of adenomatous polyp of colon 2021    History of alcohol abuse 2017    History of renal cell cancer-sony 2017    9.4.14/9.8.14 - Dr Ramos - Office Notes-path low grade renal cell..negative margins..doing well     9.10.14..ro visit...surgery follow up  hypertension-  post op L nephrectomy-incidential renal cell found on CT  abdominal pain-feeling better  fatty liver-bx proven...  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  6.30.14/7.1.14 CBC With Differential/Platelet; Sedimentation Rate-Westergren; C-Reactive Protein, Quant==WBC 12.0; RBC 3.60; Hemoglobin 11.7; Hematocrit 34.4; Neutrophils (Absolute) 7.1; Lymphs (Absolute) 3.6  nothing major here..his problems are alcohol, smoking now o    HL (hearing loss)     Hyperlipidemia     Hypertension     Hypopotassemia-diuretic 02/16/2017    Oxygen dependent     at hs    Peripheral neuropathy     Renal cancer 08/2014    Lt RALPart'l Nx; t1a Clear Cell with negative margin    RUQ pain 07/23/2013    S/P laparoscopic cholecystectomy 09/04/2012    Wellness examination-done 02/16/2017    PROCEDURES:  Prostate exam/[place]/confirmed/1.27.16     Colonoscopy-nl/Gil//7-28-10/5y  EGD/P/Gil/3.4.16  Colonoscopy-polyp/Gil//3.18.16/5 yr  EGD-neg/Gil//5.1.18     SURGERIES:  T&A  Appendix/1959  Scrotum-cyst/1980's  Lap gb+liver bx/Raya/[facility]/7.23.12  L robotic partial nephrectomy (clear cell grade 1 [id number])/Rachel//8.14.14  Nfntdj-jxiypfuf-zlztz+mesh+omen/[place]//5.9.     Past Surgical History:   Procedure Laterality Date    APPENDECTOMY      CHOLECYSTECTOMY      ENDOSCOPY N/A 05/01/2018    Procedure: ESOPHAGOGASTRODUODENOSCOPY WITH ANESTHESIA;  Surgeon: Donnell Cordero DO;  Location: [facility] ENDOSCOPY;  Service: Gastroenterology    HERNIA REPAIR      KIDNEY SURGERY      LAPAROSCOPIC PARTIAL NEPHRECTOMY Left 08/14/2014    Robot Assisted      General Information       Row Name 06/18/25 0844          Physical Therapy Time and Intention    Document Type evaluation  pt presents with generalized weakness, diarrhea and LLQ pain Dx; DANIEL and acuter renal failure  -AJ     Mode of Treatment physical therapy  -AJ       Row Name 06/18/25 0844          General Information     Patient Profile Reviewed yes  -AJ     Prior Level of Function independent:;all household mobility;community mobility;gait;home management;ADL's  pt presents he was usually indep but reports he has caregivers who help also  -AJ     Existing Precautions/Restrictions fall;other (see comments)  resting tremors  -AJ     Barriers to Rehab medically complex;previous functional deficit  -AJ       Row Name 06/18/25 0844          Living Environment    Current Living Arrangements home  -AJ     People in Home spouse  -AJ       Row Name 06/18/25 0844          Home Main Entrance    Number of Stairs, Main Entrance other (see comments)  ramp  -AJ       Row Name 06/18/25 0844          Stairs Within Home, Primary    Number of Stairs, Within Home, Primary none  -AJ       Row Name 06/18/25 0844          Cognition    Orientation Status (Cognition) oriented x 4  -AJ       Row Name 06/18/25 0844          Safety Issues/Impairments Affecting Functional Mobility    Safety Issues Affecting Function (Mobility) safety precaution awareness;safety precautions follow-through/compliance;awareness of need for assistance;insight into deficits/self-awareness  -AJ     Impairments Affecting Function (Mobility) pain;strength;range of motion (ROM);endurance/activity tolerance  -AJ               User Key  (r) = Recorded By, (t) = Taken By, (c) = Cosigned By      Initials Name Provider Type    Ferny Martinez, PT DPT Physical Therapist                   Mobility       Row Name 06/18/25 0844          Bed Mobility    Bed Mobility rolling left;rolling right;supine-sit;sit-supine  -AJ     Rolling Left [place] (Bed Mobility) contact guard  -AJ     Rolling Right [place] (Bed Mobility) contact guard  -AJ     Supine-Sit [place] (Bed Mobility) contact guard  -AJ     Sit-Supine [place] (Bed Mobility) contact guard  -AJ     Assistive Device (Bed Mobility) head of bed elevated;bed rails  -AJ       Row Name 06/18/25 0844          Sit-Stand  Transfer    Sit-Stand [place] (Transfers) moderate assist (50% patient effort);2 person assist  -AJ     Comment, (Sit-Stand Transfer) required 2 attempts and bed to be raised  -       Row Name 06/18/25 0844          Gait/Stairs (Locomotion)    [place] Level (Gait) moderate assist (50% patient effort);2 person assist  -AJ     Assistive Device (Gait) other (see comments)  HHAx2  -     Distance in Feet (Gait) 2  side steps EOB  -AJ     Deviations/Abnormal Patterns (Gait) bilateral deviations  -     Bilateral Gait Deviations forward flexed posture  -               User Key  (r) = Recorded By, (t) = Taken By, (c) = Cosigned By      Initials Name Provider Type    Ferny Martinez PT DPT Physical Therapist                   Obj/Interventions       Row Name 06/18/25 0844          Range of Motion Comprehensive    General Range of Motion bilateral lower extremity ROM WFL  -       Row Name 06/18/25 0844          Strength Comprehensive (MMT)    General Manual Muscle Testing (MMT) Assessment lower extremity strength deficits identified  -     Comment, General Manual Muscle Testing (MMT) Assessment grossly 3/5. unable to determine if tremors affected overall strength but unable to hold against resistance  -       Row Name 06/18/25 0844          Motor Skills    Motor Skills functional endurance  -     Functional Endurance limited  -       Row Name 06/18/25 0844          Balance    Balance Assessment sitting static balance;standing static balance;sitting dynamic balance;standing dynamic balance  -     Static Sitting Balance contact guard  -     Dynamic Sitting Balance contact guard  -     Position, Sitting Balance unsupported;sitting edge of bed  -     Static Standing Balance moderate assist  -     Dynamic Standing Balance moderate assist  -     Position/Device Used, Standing Balance supported;other (see comments)  HHAx2  -AJ     Balance Interventions sitting;standing;sit to  stand;supported;static;dynamic  -       Row Name 06/18/25 0844          Sensory Assessment (Somatosensory)    Sensory Assessment (Somatosensory) sensation intact  -               User Key  (r) = Recorded By, (t) = Taken By, (c) = Cosigned By      Initials Name Provider Type    Ferny Martinez, PT DPT Physical Therapist                   Goals/Plan       Row Name 06/18/25 0844          Bed Mobility Goal 1 (PT)    Activity/Assistive Device (Bed Mobility Goal 1, PT) rolling to left;rolling to right;sit to supine;supine to sit  -AJ     [place] Level/Cues Needed (Bed Mobility Goal 1, PT) modified independence  -AJ     Time Frame (Bed Mobility Goal 1, PT) long term goal (LTG);10 days  -AJ     Progress/Outcomes (Bed Mobility Goal 1, PT) new goal  -       Row Name 06/18/25 0844          Transfer Goal 1 (PT)    Activity/Assistive Device (Transfer Goal 1, PT) sit-to-stand/stand-to-sit;bed-to-chair/chair-to-bed;toilet;wheelchair transfer  -AJ     [place] Level/Cues Needed (Transfer Goal 1, PT) minimum assist (75% or more patient effort)  -AJ     Time Frame (Transfer Goal 1, PT) long term goal (LTG);10 days  -AJ     Progress/Outcome (Transfer Goal 1, PT) new goal  -       Row Name 06/18/25 0844          Gait Training Goal 1 (PT)    Activity/Assistive Device (Gait Training Goal 1, PT) gait (walking locomotion);decrease asymmetrical patterns;decrease fall risk;diminish gait deviation;forward stepping;improve balance and speed;increase endurance/gait distance;increase energy conservation;assistive device use;walker, rolling;other (see comments)  AD as needed  -AJ     [place] Level (Gait Training Goal 1, PT) minimum assist (75% or more patient effort)  -AJ     Distance (Gait Training Goal 1, PT) 10'  -AJ     Time Frame (Gait Training Goal 1, PT) long term goal (LTG);10 days  -AJ     Progress/Outcome (Gait Training Goal 1, PT) new goal  -       Row Name 06/18/25 0844          Balance Goal 1 (PT)     Activity/Assistive Device (Balance Goal) sitting static balance;sitting dynamic balance;sit to stand dynamic balance;standing static balance;standing dynamic balance;with functional activities/occupations;with functional mobility activities;with functional reaching activities;unsupported  -AJ     [place] Level/Cues Needed (Balance Goal 1, PT) contact guard required  -AJ     Time Frame (Balance Goal 1, PT) long-term goal (LTG);by discharge  -AJ     Progress/Outcomes (Balance Goal 1, PT) other (see comments)  new goal  -AJ       Row Name 06/18/25 0802          Therapy Assessment/Plan (PT)    Planned Therapy Interventions (PT) balance training;bed mobility training;gait training;home exercise program;patient/family education;transfer training;postural re-education;ROM (range of motion);strengthening;neuromuscular re-education  -               User Key  (r) = Recorded By, (t) = Taken By, (c) = Cosigned By      Initials Name Provider Type    AJ Ferny Gibson, PT DPT Physical Therapist                   Clinical Impression       Row Name 06/18/25 0893          Pain    Pain Location abdomen  -AJ     Pain Side/Orientation generalized  -     Pain Management Interventions nursing notified  -AJ     Response to Pain Interventions no change per patient report  -     Additional Documentation Pain Scale: FACES Pre/Post-Treatment (Group)  -       Row Name 06/18/25 0844          Pain Scale: FACES Pre/Post-Treatment    Pain: FACES Scale, Pretreatment 2-->hurts little bit  -     Posttreatment Pain Rating 2-->hurts little bit  -       Row Name 06/18/25 0837          Plan of Care Review    Plan of Care Reviewed With patient  -     Outcome Evaluation PT eval complete. Pt in low fowlers position, AOx4, dysarthric speech at baseline and tremors while at rest. Pt reports he is usually indep and cares for wife, but also reports he does have caregivers who help care for he and his wife also. Today pt performed bed mobility  with CGA and verbal cues. Once sitting EOB, tremors intensified in UE both at rest and with any other UE motion. Pt demo functional AROM in B LE but was unable to hold strength against therpaist resistance. Pt attempted sit>stand but unable to clear bed and required bed to be raised and Mod Ax2 to stand. Pt did weight shift to perform 2 steps at EOB before fatigue and returning to recliner. Pt returned to fowlers position and required dep A for therapist to place urinal as pt needed to void. Pt will benefit from skilled PT services to improve t/f, decrease fall risk, and return to PLOF. Recommend SNF when medically stable.  -ALMA       Row Name 06/18/25 0844          Therapy Assessment/Plan (PT)    Patient/Family Therapy Goals Statement (PT) none stated  -ALMA     Rehab Potential (PT) fair  -ALMA     Criteria for Skilled Interventions Met (PT) yes;meets criteria;skilled treatment is necessary  -ALMA     Therapy Frequency (PT) 2 times/day  -ALMA     Predicted Duration of Therapy Intervention (PT) until d/c  -ALMA       Row Name 06/18/25 0844          Positioning and Restraints    Pre-Treatment Position in bed  -     Post Treatment Position bed  -AJ     In Bed notified nsg;fowlers;call light within reach;encouraged to call for assist;exit alarm on;side rails up x2  -ALMA               User Key  (r) = Recorded By, (t) = Taken By, (c) = Cosigned By      Initials Name Provider Type    Ferny Martinez, PT DPT Physical Therapist                   Outcome Measures       Row Name 06/18/25 0844 06/18/25 0500       How much help from another person do you currently need...    Turning from your back to your side while in flat bed without using bedrails? 3  -AJ 2  -LL    Moving from lying on back to sitting on the side of a flat bed without bedrails? 3  -AJ 2  -LL    Moving to and from a bed to a chair (including a wheelchair)? 2  -AJ 2  -LL    Standing up from a chair using your arms (e.g., wheelchair, bedside chair)? 2  -AJ 2  -LL     Climbing 3-5 steps with a railing? 1  - 2  -LL    To walk in hospital room? 2  - 2  -LL    AM-PAC 6 Clicks Score (PT) 13  - 12  -LL    Highest Level of Mobility Goal Move to Chair/Commode-4  -AJ Move to Chair/Commode-4  -LL      Row Name 06/18/25 0844          Functional Assessment    Outcome Measure Options AM-PAC 6 Clicks Basic Mobility (PT)  -ALMA               User Key  (r) = Recorded By, (t) = Taken By, (c) = Cosigned By      Initials Name Provider Type     Alice Barton, RN Registered Nurse    Ferny Martinez, PT DPT Physical Therapist                                 Physical Therapy Education       Title: PT OT SLP Therapies (Done)       Topic: Physical Therapy (Done)       Point: Mobility training (Done)       Learning Progress Summary            Patient Acceptance, E, VU by ALMA at 6/18/2025 1045    Comment: role of PT, fall risk, d/c planning                      Point: Home exercise program (Done)       Learning Progress Summary            Patient Acceptance, E, VU by ALMA at 6/18/2025 1045    Comment: role of PT, fall risk, d/c planning                      Point: Body mechanics (Done)       Learning Progress Summary            Patient Acceptance, E, VU by ALMA at 6/18/2025 1045    Comment: role of PT, fall risk, d/c planning                      Point: Precautions (Done)       Learning Progress Summary            Patient Acceptance, E, VU by ALMA at 6/18/2025 1045    Comment: role of PT, fall risk, d/c planning                                      User Key       Initials Effective Dates Name Provider Type [facility] 08/15/24 -  Ferny Gibson, MARIEL DPT Physical Therapist PT                  PT Recommendation and Plan  Planned Therapy Interventions (PT): balance training, bed mobility training, gait training, home exercise program, patient/family education, transfer training, postural re-education, ROM (range of motion), strengthening, neuromuscular re-education  Outcome Evaluation: PT eval complete.  Pt in low fowlers position, AOx4, dysarthric speech at baseline and tremors while at rest. Pt reports he is usually indep and cares for wife, but also reports he does have caregivers who help care for he and his wife also. Today pt performed bed mobility with CGA and verbal cues. Once sitting EOB, tremors intensified in UE both at rest and with any other UE motion. Pt demo functional AROM in B LE but was unable to hold strength against therpaist resistance. Pt attempted sit>stand but unable to clear bed and required bed to be raised and Mod Ax2 to stand. Pt did weight shift to perform 2 steps at EOB before fatigue and returning to recliner. Pt returned to fowlers position and required dep A for therapist to place urinal as pt needed to void. Pt will benefit from skilled PT services to improve t/f, decrease fall risk, and return to PLOF. Recommend SNF when medically stable.     Time Calculation:         PT Charges       Row Name 06/18/25 0844             Time Calculation    Start Time 0844  -      Stop Time 0914  +8 for chart review and communication with staff  -ALMA      Time Calculation (min) 30 min  -AJ      PT Received On 06/18/25  -AJ      PT Goal Re-Cert Due Date 06/28/25  -AJ         Untimed Charges    PT Eval/Re-eval Minutes 38  -AJ         Total Minutes    Untimed Charges Total Minutes 38  -AJ       Total Minutes 38  -AJ                User Key  (r) = Recorded By, (t) = Taken By, (c) = Cosigned By      Initials Name Provider Type    AJ Ferny Gibson PT DPT Physical Therapist                  Therapy Charges for Today       Code Description Service Date Service Provider Modifiers Qty    [id number] HC PT EVAL MOD COMPLEXITY 3 6/18/2025 Ferny Gibson PT DPT GP 1            PT G-Codes  Outcome Measure Options: AM-PAC 6 Clicks Basic Mobility (PT)  AM-PAC 6 Clicks Score (PT): 13  PT Discharge Summary  Anticipated Discharge Disposition (PT): skilled nursing facility    Ferny Gibson PT DPT  6/18/2025

## 2025-06-18 NOTE — PROGRESS NOTES
Patient Name: Felix Bartlett .  Date of Admission: 6/17/2025  Today's Date: 06/18/25  Length of Stay: 1  Primary Care Physician: Keyon Dockery MD    Subjective   Chief Complaint: Severe diarrhea and weakness   HPI   Today: Alert and oriented, appears as if he has been bathe overnight.  Tremors are severe, patient unable to hold urinal or oral mouth swab.  He states his wife is in room 328.  I am concerned regarding return home for despite having a caregiver.  I did ask patient if he would consider inpatient rehab and he is agreeable at this time.  He is complaining of dry mouth.  Patient seen by speech therapy since my assessment, I reviewed note which states - mild, oral dysphagia, functional pharyngeal phase.  Per day documentation patient did have a single episode of incontinent stool.  Negative studies..  Ultrasound of the liver ordered due to transaminitis, new finding.  It is negative.  Minimal improvement in creatinine 2.54 today.  Will continue to hydrate with normal saline 100 mL/hour.  Potassium normalized with treatment.  Chronic anemia-anemia studies consistent with chronic disease.     Documented weights    06/17/25 0930   Weight: 116 kg (255 lb)          Intake/Output Summary (Last 24 hours) at 6/18/2025 1448  Last data filed at 6/18/2025 1300  Gross per 24 hour   Intake --   Output 800 ml   Net -800 ml       Results for orders placed in visit on 08/21/17    Adult Transthoracic Echo Complete    Interpretation Summary  · Left ventricular systolic function is normal. Estimated EF = 60%.  · Left ventricular wall thickness is consistent with mild concentric hypertrophy.  · Left ventricular diastolic dysfunction (grade I) consistent with impaired relaxation.  · No evidence of pulmonary hypertension is present.       Review of Systems   All pertinent negatives and positives are as above. All other systems have been reviewed and are negative unless otherwise stated.     Objective    Temp:  [97.4 °F (36.3  °C)-99.4 °F (37.4 °C)] 97.4 °F (36.3 °C)  Heart Rate:  [] 98  Resp:  [16-20] 17  BP: (117-161)/(54-73) 147/59  Physical Exam  Vitals reviewed.   Constitutional:       Appearance: He is ill-appearing.      Comments: Has had a bath   HENT:      Head: Normocephalic and atraumatic.      Mouth/Throat:      Mouth: Mucous membranes are dry.      Pharynx: Oropharynx is clear.      Comments: Edentulous  Eyes:      Extraocular Movements: Extraocular movements intact.      Conjunctiva/sclera: Conjunctivae normal.   Neck:      Comments: Tremors worse when patient trying to follow commands  Cardiovascular:      Rate and Rhythm: Normal rate and regular rhythm.   Pulmonary:      Effort: Pulmonary effort is normal. No respiratory distress.   Abdominal:      General: Abdomen is protuberant.      Palpations: Abdomen is soft.   Musculoskeletal:      Cervical back: Neck supple.      Right lower leg: No edema.      Left lower leg: No edema.   Skin:     General: Skin is warm and dry.   Neurological:      Mental Status: He is alert and oriented to person, place, and time.      Comments: Severe tremors to include head, mouth, upper extremities.  Patient unable to hold urinal.  Will need to be assisted with eating   Psychiatric:         Mood and Affect: Mood normal.         Behavior: Behavior normal.       Results Review:  I have reviewed the labs, radiology results, and diagnostic studies.    Laboratory Data:   Results from last 7 days   Lab Units 06/18/25  0553 06/17/25  1016   WBC 10*3/mm3 8.57 12.07*   HEMOGLOBIN g/dL 8.2* 9.4*   HEMATOCRIT % 25.5* 27.5*   PLATELETS 10*3/mm3 87* 120*        Results from last 7 days   Lab Units 06/18/25  0553 06/17/25  1016   SODIUM mmol/L 143 140   POTASSIUM mmol/L 3.7 3.1*   CHLORIDE mmol/L 111* 107   CO2 mmol/L 16.0* 15.0*   BUN mg/dL 36.6* 30.1*   CREATININE mg/dL 2.54* 3.01*   CALCIUM mg/dL 8.1* 8.9   BILIRUBIN mg/dL 0.4 0.6   ALK PHOS U/L 95 113   ALT (SGPT) U/L 90* 85*   AST (SGOT) U/L 118*  109*   GLUCOSE mg/dL 167* 211*       Culture Data:     Microbiology Results (last 10 days)       Procedure Component Value - Date/Time    Gastrointestinal Panel, PCR - Stool, Per Rectum [[id number]]  (Normal) Collected: 06/17/25 1506    Lab Status: Final result Specimen: Stool from Per Rectum Updated: 06/17/25 1642     Campylobacter Not Detected     Plesiomonas shigelloides Not Detected     Salmonella Not Detected     Vibrio Not Detected     Vibrio cholerae Not Detected     Yersinia enterocolitica Not Detected     Enteroaggregative E. coli (EAEC) Not Detected     Enteropathogenic E. coli (EPEC) Not Detected     Enterotoxigenic E. coli (ETEC) lt/st Not Detected     Shiga-like toxin-producing E. coli (STEC) stx1/stx2 Not Detected     Shigella/Enteroinvasive E. coli (EIEC) Not Detected     Cryptosporidium Not Detected     Cyclospora cayetanensis Not Detected     Entamoeba histolytica Not Detected     Giardia lamblia Not Detected     Adenovirus F40/41 Not Detected     Astrovirus Not Detected     Norovirus GI/GII Not Detected     Rotavirus A Not Detected     Sapovirus (I, II, IV or V) Not Detected    Clostridioides difficile Toxin - Stool, Per Rectum [[id number]]  (Normal) Collected: 06/17/25 1506    Lab Status: Final result Specimen: Stool from Per Rectum Updated: 06/17/25 1612    Narrative:      The following orders were created for panel order Clostridioides difficile Toxin - Stool, Per Rectum.  Procedure                               Abnormality         Status                     ---------                               -----------         ------                     Clostridioides difficile...[[id number]]  Normal              Final result                 Please view results for these tests on the individual orders.    Clostridioides difficile Toxin, PCR - Stool, Per Rectum [[id number]]  (Normal) Collected: 06/17/25 1506    Lab Status: Final result Specimen: Stool from Per Rectum Updated: 06/17/25 1612     Toxigenic C.  difficile by PCR Negative    Narrative:      The result indicates the absence of toxigenic C. difficile from stool specimen.              Radiology Data:   Imaging Results (Last 7 Days)       Procedure Component Value Units Date/Time    US Liver [[id number]] Collected: 06/18/25 1231     Updated: 06/18/25 1237    Narrative:      EXAM/TECHNIQUE: US LIVER-     INDICATION: acute transaminitis     COMPARISON: CT 6/17/2025     FINDINGS:     Visualized portion of the pancreas is unremarkable. The pancreatic tail  and known pancreatic tail lesion are not visualized on this exam.     Liver echotexture and echogenicity are within normal limits. No solid  liver lesion. Patent portal vein with appropriate directional flow.     Patient is status post cholecystectomy. No biliary ductal dilatation.  Common bile duct is 3 mm diameter.     Right kidney is 11.3 cm in length and demonstrates normal cortical  thickness and echogenicity. No shadowing renal calculi or  hydronephrosis. 3.3 cm exophytic right upper pole renal cyst without  complex features.       Impression:      1.  No sonographic evidence of cirrhosis or steatosis.  2.  No biliary ductal dilatation status post cholecystectomy.     This report was signed and finalized on 6/18/2025 12:34 PM by Dr. Fawad Valadez MD.       CT Abdomen Pelvis Without Contrast [[id number]] Collected: 06/17/25 1146     Updated: 06/17/25 1157    Narrative:      EXAMINATION: CT ABDOMEN PELVIS WO CONTRAST-     HISTORY: Abdominal pain. Generalized weakness. Diarrhea.     In order to have a CT radiation dose as low as reasonably achievable  Automated Exposure Control was utilized for adjustment of the mA and/or  KV according to patient size.     CT Dose DLP = 785.23 mGy.cm.  (If there are multiple studies performed at the same time this  represents the total dose).     Images are stored in PACS per institutional protocol.        Noncontrast abdomen/pelvis CT.  Axial, sagittal, and coronal  "sequences.     COMPARISON:  11/22/2024 and 11/22/2016.     Normal heart size.  No acute abnormality at the lung bases.     Cholecystectomy clips are present.  Normal noncontrast appearance of the spleen.  Spleen = 13 x 6 x 7 cm. A few splenic calcifications are present.     Discrete oval solid mass in the distal pancreas tail measures 30 mm in  diameter on axial image 22. This finding measured 27 mm on 11/22/2024  and 20 mm on 11/22/2016.  The homogeneous density (matching adjacent spleen) and smooth margins  suggest that this is a splenule.     Stable LEFT adrenal nodule.  Normal RIGHT adrenal gland.  Unchanged partially exophytic 30 x 26 mm fat-containing LEFT renal  lesion.  Exophytic 31 x 30 mm RIGHT renal cyst compared with 32 x 32 mm on  11/22/2024 and 24 x 21 mm on 11/22/2016.  No renal stone or hydronephrosis.     Mild aortic calcification with no aneurysm.  No bowel dilation.  No sign of colitis or diverticulitis.  No pelvic mass or fluid.  Normal appearance of the urinary bladder.       Impression:      1. Stable renal findings.  2. Small solid \"mass\" at the tip of the pancreas tail has benign imaging  characteristics. Splenule suspected.  3. No fluid collection or inflammatory process.     This report was signed and finalized on 6/17/2025 11:54 AM by Dr. José Pizarro MD.       XR Chest 1 View [[id number]] Collected: 06/17/25 1116     Updated: 06/17/25 1120    Narrative:      EXAM: XR CHEST 1 VW-         HISTORY: weak       COMPARISON: 5/8/2016.     TECHNIQUE:  Frontal view(s) of the chest submitted.     FINDINGS:    The lungs are grossly clear bilaterally. No effusion or pneumothorax is  visualized. Heart and mediastinum are unremarkable.          Impression:         1. No active disease in the chest.     This report was signed and finalized on 6/17/2025 11:17 AM by Dilan Sykes.                I have reviewed the patient's current medications.     escitalopram, 20 mg, Oral, Daily  gabapentin, 300 " mg, Oral, Q12H  metoprolol tartrate, 50 mg, Oral, Q12H  pantoprazole, 40 mg, Oral, Q AM  primidone, 50 mg, Oral, TID  tamsulosin, 0.4 mg, Oral, Daily            Assessment/Plan   Assessment  Active Hospital Problems    Diagnosis     **DANIEL (acute kidney injury)     Transaminitis, acute     Diarrhea of presumed infectious origin     Dermatitis neglecta     Iron deficiency     Hypokalemia     Tremor        Treatment Plan    DANIEL secondary to diarrhea-presumed infectious; 1500 mL normal saline bolus, continue with normal saline 100 mL/hour, no significant improvement, continue hydration labs in AM.  Diarrhea resolved, negative stool studies-discontinue antibiotics     Transaminitis, acute; ultrasound liver-negative labs in a.m.     Hypokalemia; replace = resolved labs in a.m.     Tremor, chronic; Home medications resumed     Dermatitis neglecta; significantly improved since bath provided by staff.  Suspect patient is unable to provide self-care due to profound tremors.  Consult  for possible rehab or nursing home placement     Medications reviewed and restarted as appropriate  DVT prophylaxis with SCDs  Labs in a.m.     Medical Decision Making  Number and Complexity of problems: 6  1 problem, acute, high complexity unchanged  3 problems, acute, high complexity, improving  2 problems, chronic, moderate complexity, stable  Differential Diagnosis: None     Conditions and Status        Condition is unchanged.     [facility] Data  External documents reviewed: No  Cardiac tracing (EKG, telemetry) interpretation: Reviewed  Radiology interpretation: Reviewed  Labs reviewed: Yes  Any tests that were considered but not ordered: No     Decision rules/scores evaluated (example [id number], Wells, etc): No     Discussed with: Patient and Dr. Jackson     Care Planning  Shared decision making: Patient and Dr. Jackson  Code status and discussions: Full  Surrogate Decision Maker daughter Nadya Qiu  Determinants of Health that impact treatment or disposition: To be determined I expect the patient to be discharged to to be determined in 2+ days.     Electronically signed by WANG Fuentes, 06/18/25, 14:48 CDT.

## 2025-06-18 NOTE — PLAN OF CARE
Goal Outcome Evaluation:  Plan of Care Reviewed With: patient        Progress: no change  Outcome Evaluation: OT evaluation completed. Pt is A&Ox4. Pt is difficult to understand at times. Pt with reports of abdomen pain post tx, jaja fajardo 2. CGA for bed mobility. Dependent to don socks. two attempts for sit to stand t/f mod A x2, second attempt from elevated bed height. Pt is unable to stand fully upright and poor standing tolerance, further functional mobility deferred. Pt with BUE resting and intention tremors. Skilled OT recommended to address adls, functional mobility and endurance. Recommended d/c SNF.    Anticipated Discharge Disposition (OT): skilled nursing facility

## 2025-06-19 NOTE — THERAPY TREATMENT NOTE
Acute Care - Physical Therapy Treatment Note  [facility]     Patient Name: Felix Bartlett Sr.  : 1950  MRN: [id number]  Today's Date: 2025      Visit Dx:     ICD-10-CM ICD-9-CM   1. DANIEL (acute kidney injury)  N17.9 584.9   2. Dehydration  E86.0 276.51   3. Anemia, unspecified type  D64.9 285.9   4. Thrombocytopenia  D69.6 287.5   5. Diarrhea, unspecified type  R19.7 787.91   6. Generalized weakness  R53.1 780.79   7. Impaired mobility [Z74.09]  Z74.09 799.89   8. Dysphagia, unspecified type  R13.10 787.20     Patient Active Problem List   Diagnosis    COPD (chronic obstructive pulmonary disease)    Prediabetes    Anxiety    Vertebral compression fracture    Gastroesophageal reflux disease    Edema leg-CHF/d, obesity, copd, cirrhosis    Fatty liver    Hyperlipidemia-statin    Hypertension    Hypokalemia    Nocturnal hypoxia    Macrocytosis    Osteoporosis bd 3. ? 2y    Tremor    Posttraumatic stress disorder    Chronic back pain    Congestive heart failure-diastolic    Tobacco use: 12m    Anemia    Gait difficulty    Iron deficiency    Elevated ferritin-hetroz hemo    Hereditary hemochromatosis    DANIEL (acute kidney injury)    Transaminitis, acute    Diarrhea of presumed infectious origin    Dermatitis neglecta     Past Medical History:   Diagnosis Date    Acid reflux     Arthritis     Asthma     Bile salt-induced diarrhea 2013    COPD (chronic obstructive pulmonary disease)     CTS (carpal tunnel syndrome)     Family history of colon cancer 2021    father      History of adenomatous polyp of colon 2021    History of alcohol abuse 2017    History of renal cell cancer-sony 2017    9.4.14/9.8.14 - Dr Ramos - Office Notes-path low grade renal cell..negative margins..doing well     9.10.14..ro visit...surgery follow up  hypertension-  post op L nephrectomy-incidential renal cell found on CT  abdominal pain-feeling better  fatty liver-bx proven...  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  6.30.14/7.1.14 CBC With Differential/Platelet; Sedimentation Rate-Westergren; C-Reactive Protein, Quant==WBC 12.0; RBC 3.60; Hemoglobin 11.7; Hematocrit 34.4; Neutrophils (Absolute) 7.1; Lymphs (Absolute) 3.6  nothing major here..his problems are alcohol, smoking now o    HL (hearing loss)     Hyperlipidemia     Hypertension     Hypopotassemia-diuretic 02/16/2017    Oxygen dependent     at     Peripheral neuropathy     Renal cancer 08/2014    Lt RALPart'l Nx; t1a Clear Cell with negative margin    RUQ pain 07/23/2013    S/P laparoscopic cholecystectomy 09/04/2012    Wellness examination-done 02/16/2017    PROCEDURES:  Prostate exam/Rachel/confirmed/1.27.16     Colonoscopy-nl/Gil//7-28-10/5y  EGD/P/Gil/3.4.16  Colonoscopy-polyp/Gil//3.18.16/5 yr  EGD-neg/Gil//5.1.18     SURGERIES:  T&A  Appendix/1959  Scrotum-cyst/1980's  Lap gb+liver bx/Raya/[facility]/7.23.12  L robotic partial nephrectomy (clear cell grade 1 [id number])/Rachel//8.14.14  Kzdzut-vcwyrumy-usvfd+mesh+omen/Micah//5.9.     Past Surgical History:   Procedure Laterality Date    APPENDECTOMY      CHOLECYSTECTOMY      ENDOSCOPY N/A 05/01/2018    Procedure: ESOPHAGOGASTRODUODENOSCOPY WITH ANESTHESIA;  Surgeon: Donnell Cordero DO;  Location: [facility] ENDOSCOPY;  Service: Gastroenterology    HERNIA REPAIR      KIDNEY SURGERY      LAPAROSCOPIC PARTIAL NEPHRECTOMY Left 08/14/2014    Robot Assisted     PT Assessment (Last 12 Hours)       PT Evaluation and Treatment       Row Name 06/19/25 7353          Physical Therapy Time and Intention    Subjective Information complains of;weakness  -AE     Document Type therapy note (daily note)  -AE     Mode of Treatment physical  therapy  -AE       Row Name 06/19/25 1043          General Information    Existing Precautions/Restrictions fall  Chronic tremor with movement and rest  -AE       Row Name 06/19/25 1043          Pain    Pretreatment Pain Rating 0/10 - no pain  -AE       Row Name 06/19/25 1043          Bed Mobility    Rolling Left [place] (Bed Mobility) contact guard  x 3  -AE     Rolling Right [place] (Bed Mobility) contact guard  x 2  -AE       Row Name 06/19/25 1043          Sit-Stand Transfer    Sit-Stand [place] (Transfers) moderate assist (50% patient effort);2 person assist  sit to stand x3  -AE       Row Name 06/19/25 1043          Gait/Stairs (Locomotion)    [place] Level (Gait) moderate assist (50% patient effort);2 person assist  -AE     Assistive Device (Gait) --  HHA  -AE     Distance in Feet (Gait) --  Side steps to HOB then steps to chair  -AE     Bilateral Gait Deviations forward flexed posture  -AE       Row Name 06/19/25 1043          Positioning and Restraints    Pre-Treatment Position in bed  -AE     Post Treatment Position chair  -AE     In Chair sitting;call light within reach  -AE               User Key  (r) = Recorded By, (t) = Taken By, (c) = Cosigned By      Initials Name Provider Type    AE Kathy Noel, PTA Physical Therapist Assistant                    Physical Therapy Education       Title: PT OT SLP Therapies (In Progress)       Topic: Physical Therapy (Done)       Point: Mobility training (Done)       Learning Progress Summary            Patient Acceptance, E, VU by ALMA at 6/18/2025 1045    Comment: role of PT, fall risk, d/c planning                      Point: Home exercise program (Done)       Learning Progress Summary            Patient Acceptance, E, VU by ALMA at 6/18/2025 1045    Comment: role of PT, fall risk, d/c planning                      Point: Body mechanics (Done)       Learning Progress Summary            Patient Acceptance, E, VU by ALMA at 6/18/2025 1045     Comment: role of PT, fall risk, d/c planning                      Point: Precautions (Done)       Learning Progress Summary            Patient Acceptance, E, VU by ALMA at 6/18/2025 1045    Comment: role of PT, fall risk, d/c planning                                      User Key       Initials Effective Dates Name Provider Type Discipline    ALMA 08/15/24 -  Ferny Gibson, PT DPT Physical Therapist PT                  PT Recommendation and Plan     Progress: improving  Outcome Evaluation: Pt had no complaints and has dysarthric speech at baseline and tremors while at rest and needs assist with eating.Pt reports he is usually indep and cares for wife, but also reports he does have caregivers who help care for he and his wife also.Pt was cga sidelying to sit using bedrail with vc's.Pt was mod x2 HHA to stand and was able to stand x2 and take side steps towards HOB X 2.Pt took a seated rest then took steps over to chair mod x2 assist HHA.Pt would benefit from using RW next tx.He would also benefit from contrinued PT at SNF but continues to refuse.       Time Calculation:    PT Charges       Row Name 06/19/25 1204             Time Calculation    Start Time 1043  -AE      Stop Time 1121  -AE      Time Calculation (min) 38 min  -AE      PT Received On 06/19/25  -AE         Time Calculation- PT    Total Timed Code Minutes- PT 38 minute(s)  -AE         Timed Charges    33012 - PT Therapeutic Activity Minutes 38  -AE         Total Minutes    Timed Charges Total Minutes 38  -AE       Total Minutes 38  -AE                User Key  (r) = Recorded By, (t) = Taken By, (c) = Cosigned By      Initials Name Provider Type    AE Kathy Noel PTA Physical Therapist Assistant                  Therapy Charges for Today       Code Description Service Date Service Provider Modifiers Qty    [id number]  PT THERAPEUTIC ACT EA 15 MIN 6/19/2025 Kathy Noel PTA GP 3            PT G-Codes  Outcome Measure Options: AM-PAC 6 Clicks Basic  Mobility (PT)  AM-PAC 6 Clicks Score (PT): 14  AM-PAC 6 Clicks Score (OT): 13    Kathy Noel, PTA  6/19/2025

## 2025-06-19 NOTE — PLAN OF CARE
Goal Outcome Evaluation:  Plan of Care Reviewed With: patient        Progress: improving  Outcome Evaluation:  FOLLOWING FOR DISCHARGE PLANNING. O2 @ NIGHT. PT. INCONTINENT OF URINE AND STOOL. TREMORS CONT. K+= 2.8  ORAL K+ PER ORDER.  NO DISTRESS NOTED.

## 2025-06-19 NOTE — PLAN OF CARE
Goal Outcome Evaluation:  Plan of Care Reviewed With: patient        Progress: improving  Outcome Evaluation: Pt had no complaints and has dysarthric speech at baseline and tremors while at rest and needs assist with eating.Pt reports he is usually indep and cares for wife, but also reports he does have caregivers who help care for he and his wife also.Pt was cga sidelying to sit using bedrail with vc's.Pt was mod x2 HHA to stand and was able to stand x2 and take side steps towards HOB X 2.Pt took a seated rest then took steps over to chair mod x2 assist HHA.Pt would benefit from using RW next tx.He would also benefit from contrinued PT at SNF but continues to refuse.

## 2025-06-19 NOTE — PROGRESS NOTES
[facility] Medicine Services  INPATIENT PROGRESS NOTE    Patient Name: Felix Bartlett Sr.  Date of Admission: 6/17/2025  Today's Date: 06/19/25  Length of Stay: 2  Primary Care Physician: Keyon Dockery MD    Subjective   Chief Complaint: f/u raúl    HPI   Patient seen resting in bed.  He was asleep but easily awoken.  Denies any overt complaints.  Main complaint is the quality of her food.  Denies chest pain or shortness of breath.  No dizziness or nausea.  He does have fairly significant resting tremor.  Continues on room air.  Vital stable.        Review of Systems   All pertinent negatives and positives are as above. All other systems have been reviewed and are negative unless otherwise stated.     Objective    Temp:  [98.3 °F (36.8 °C)-100.3 °F (37.9 °C)] 98.7 °F (37.1 °C)  Heart Rate:  [80-91] 83  Resp:  [16-18] 18  BP: (129-155)/(57-88) 129/88  Physical Exam  GEN: Awake, alert, interactive, in NAD, appears ill and frail  HEENT: EOMI, Anicteric, Trachea midline  Lungs:  no wheezing/rales/rhonchi  Heart: RRR, +S1/s2, no rub  ABD: soft, nt/nd, +BS, no guarding/rebound  Extremities: no peripheral edema or injuries noted  Skin: no petechiae  Neuro: resting tremor, TUKR w/o obvious focal deficit  Psych: normal mood, somewhat flat affect        Results Review:  I have reviewed the labs, radiology results, and diagnostic studies.    Laboratory Data:   Results from last 7 days   Lab Units 06/19/25  0325 06/18/25  0553 06/17/25  1016   WBC 10*3/mm3 7.41 8.57 12.07*   HEMOGLOBIN g/dL 7.5* 8.2* 9.4*   HEMATOCRIT % 22.9* 25.5* 27.5*   PLATELETS 10*3/mm3 74* 87* 120*        Results from last 7 days   Lab Units 06/19/25  0325 06/18/25  0553 06/17/25  1016   SODIUM mmol/L 146* 143 140   POTASSIUM mmol/L 2.8* 3.7 3.1*   CHLORIDE mmol/L 114* 111* 107   CO2 mmol/L 16.0* 16.0* 15.0*   BUN mg/dL 40.3* 36.6* 30.1*   CREATININE mg/dL 2.05* 2.54* 3.01*   CALCIUM mg/dL 7.6* 8.1* 8.9   BILIRUBIN mg/dL  "0.4 0.4 0.6   ALK PHOS U/L 81 95 113   ALT (SGPT) U/L 84* 90* 85*   AST (SGOT) U/L 87* 118* 109*   GLUCOSE mg/dL 138* 167* 211*       Culture Data:   No results found for: \"BLOODCX\", \"URINECX\", \"WOUNDCX\", \"MRSACX\", \"RESPCX\", \"STOOLCX\"    Radiology Data:   Imaging Results (Last 24 Hours)       ** No results found for the last 24 hours. **            I have reviewed the patient's current medications.     Assessment/Plan   Assessment  Active Hospital Problems    Diagnosis     **DANIEL (acute kidney injury)     Transaminitis, acute     Diarrhea of presumed infectious origin     Dermatitis neglecta     Iron deficiency     Hypokalemia     Tremor        Treatment Plan  #1 DANIEL -improving with IV fluids slowly.  GFR up to 32 with creatinine 2.05.  Presented at 20 and 3.01.  On May 5 he was 1.17 and 64.  No hydronephrosis seen on CT abdomen.  Patient with renal cysts and masses similar to prior.  Continue fluids but changed from normal saline to D5 half-normal given he is becoming hypernatremic and hyperchloremic.    #2 transaminitis -unclear etiology.  Appears to be trending down.  Was normal in May.  Denies any alcohol in 10 years.  Liver ultrasound nonacute.  Curious if patient possibly had rhabdomyolysis.  CK was never checked.  Can add on now but may not be accurate.    #3 hypokalemia -replace and check mag.    #4 anemia -H&H has trended down but in the setting of fluid resuscitation.  No obvious signs of bleeding.  Monitor closely.    #5 thrombocytopenia -unclear etiology.  Do not see obvious infection for consumption.  Again no reported liver history.  He is on Neurontin and Protonix both of which can cause this.  Will hold.  Monitor for any signs of bleeding or transfusion needs.  Recheck CBC tonight at 6 PM.    #6 chronic tremor -has caregiver at home but only about 4 to 5 hours a day.  Lives at home with his spouse alone.  Spouse also hospitalized.  Therapies here are recommending placement to rehab but patient is " declining and wants to go home.  I discussed them the fact he has to be safe for discharge home without 24-hour care and currently he is not ambulatory or safe enough for DC home alone.  Told him to work hard with therapy teams and will monitor.    Medical Decision Making  Number and Complexity of problems: 4 acute, multiple chronic  Differential Diagnosis: as above    Conditions and Status        New to me, not at goal     MDM Data  External documents reviewed: none  Cardiac tracing (EKG, telemetry) interpretation: none  Radiology interpretation: reports reviewed  Labs reviewed: as above  Any tests that were considered but not ordered: none     Decision rules/scores evaluated (example [id number], [place], etc): none     Discussed with: patient, nursing     Care Planning  Shared decision making: Patient apprised of current labs, vitals, imaging and treatment plan.  They are agreeable with proceeding with plans as discussed.    Code status and discussions: full code    Disposition  Social Determinants of Health that impact treatment or disposition: none  I expect the patient to be discharged to ??.         Electronically signed by Blake Hernandez DO, 06/19/25, 14:20 CDT.

## 2025-06-19 NOTE — PLAN OF CARE
Goal Outcome Evaluation:  Plan of Care Reviewed With: patient        Progress: no change  Outcome Evaluation: ivf in progress. no c/o pain. tremor and slurred speech cont. tolerated soft diet and swallowing pills.  cont to monitor.

## 2025-06-19 NOTE — PLAN OF CARE
"Goal Outcome Evaluation:  Plan of Care Reviewed With: patient, caregiver     Progress: improving     Anticipated Discharge Disposition (SLP): unknown     Treatment Assessment (SLP): continued (06/19/25 0825)  Treatment Assessment Comments (SLP): see note (06/19/25 0825)  Plan for Continued Treatment (SLP): continue treatment per plan of care (06/19/25 0825)    Patient seen to address dysphagia. Patient seen over a portion of breakfast. Patient motor speech continues to be slurred. Tremors noted. Patient stated \"some foods are too tough\". ST provided trials of peaches and milk from a straw. Extended mastication noted. Patient able to consume without overt s/s aspiration. Patient displayed belching and hiccups often. Patient continues with weakness and debility which increase his risk of aspiration. Patient diet downgraded due to being edentulous and per request to soft with ground meat and thin liquids. ST will continue POC.     Luci Dunn, SLP 6/19/2025 10:15 CDT    "

## 2025-06-19 NOTE — CASE MANAGEMENT/SOCIAL WORK
"Continued Stay Note   [place]     Patient Name: Felix Bartlett Sr.  MRN: [id number]  Today's Date: 6/19/2025    Admit Date: 6/17/2025    Plan: Home   Discharge Plan       Row Name 06/19/25 1033       Plan    Plan Home    Patient/Family in Agreement with Plan yes    Plan Comments Spoke with pt and he now says he is going home at d/c. His wife is a patient on 3A and he says he has to get her \"sorted out\" before he can go anywhere. He plans on his wife going home at d/c with their caregivers. Informed him that therapy has recommended rehab for him and he declines at this time. Updated Julissa at [place] [phone].                   Discharge Codes    No documentation.                       ARIANNA Crump    "

## 2025-06-19 NOTE — PLAN OF CARE
Goal Outcome Evaluation:      IV fluids continue. Safety maintained. No falls or injuries this shift. Call light within reach

## 2025-06-19 NOTE — THERAPY TREATMENT NOTE
"Acute Care - Speech Language Pathology   Swallow Treatment Note [facility]     Patient Name: Felix Bartlett Sr.  : 1950  MRN: [id number]  Today's Date: 2025               Admit Date: 2025    Patient seen to address dysphagia. Patient seen over a portion of breakfast. Patient motor speech continues to be slurred. Tremors noted. Patient stated \"some foods are too tough\". ST provided trials of peaches and milk from a straw. Extended mastication noted. Patient able to consume without overt s/s aspiration. Patient displayed belching and hiccups often. Patient continues with weakness and debility which increase his risk of aspiration. Patient diet downgraded due to being edentulous and per request to soft with ground meat and thin liquids. ST will continue POC.     Luci Dunn SLP 2025 10:18 CDT      Visit Dx:     ICD-10-CM ICD-9-CM   1. DANIEL (acute kidney injury)  N17.9 584.9   2. Dehydration  E86.0 276.51   3. Anemia, unspecified type  D64.9 285.9   4. Thrombocytopenia  D69.6 287.5   5. Diarrhea, unspecified type  R19.7 787.91   6. Generalized weakness  R53.1 780.79   7. Impaired mobility [Z74.09]  Z74.09 799.89   8. Dysphagia, unspecified type  R13.10 787.20     Patient Active Problem List   Diagnosis    COPD (chronic obstructive pulmonary disease)    Prediabetes    Anxiety    Vertebral compression fracture    Gastroesophageal reflux disease    Edema leg-CHF/d, obesity, copd, cirrhosis    Fatty liver    Hyperlipidemia-statin    Hypertension    Hypokalemia    Nocturnal hypoxia    Macrocytosis    Osteoporosis bd 3.2017 ? 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  6.30.14/7.1.14 CBC With Differential/Platelet; Sedimentation Rate-Westergren; C-Reactive Protein, Quant==WBC 12.0; RBC 3.60; Hemoglobin 11.7; Hematocrit 34.4; Neutrophils (Absolute) 7.1; Lymphs (Absolute) 3.6  nothing major here..his problems are alcohol, smoking now o    HL (hearing loss)     Hyperlipidemia     Hypertension     Hypopotassemia-diuretic 02/16/2017    Oxygen dependent     at hs    Peripheral neuropathy     Renal cancer 08/2014    Lt RALPart'l Nx; t1a Clear Cell with negative margin    RUQ pain 07/23/2013    S/P laparoscopic cholecystectomy 09/04/2012    Wellness examination-done 02/16/2017    PROCEDURES:  Prostate exam/[place]/confirmed/1.27.16     Colonoscopy-nl/Gil/LH/7-28-10/5y  EGD/BHP/Gil/3.4.16  Colonoscopy-polyp/Gil/BH/3.18.16/5 yr  EGD-neg/Gil/BH/5.1.18     SURGERIES:  T&A  Appendix/1959  Scrotum-cyst/1980's  Lap gb+liver bx/Raya/[facility]/7.23.12  L robotic partial nephrectomy (clear cell grade 1 [id number])/Sony/CARLY/8.14.14   Zjmeds-xdndtufw-cbkpu+mesh+omen/[place]//5.9.     Past Surgical History:   Procedure Laterality Date    APPENDECTOMY      CHOLECYSTECTOMY      ENDOSCOPY N/A 05/01/2018    Procedure: ESOPHAGOGASTRODUODENOSCOPY WITH ANESTHESIA;  Surgeon: Donnell Cordero DO;  Location: [facility] ENDOSCOPY;  Service: Gastroenterology    HERNIA REPAIR      KIDNEY SURGERY      LAPAROSCOPIC PARTIAL NEPHRECTOMY Left 08/14/2014    Robot Assisted       SLP Recommendation and Plan     SLP Diet Recommendation: soft to chew textures, ground, thin liquids (06/19/25 0825)  Recommended Precautions and Strategies: upright posture during/after eating, small bites of food and sips of liquid, alternate between small bites of food and sips of liquid, general aspiration precautions, reflux precautions (06/19/25 0825)  SLP Rec. for Method of Medication Administration: meds whole, with thin liquids (06/19/25 0825)     Monitor for Signs of Aspiration: yes, cough, gurgly voice, throat clearing (06/19/25 0825)        Anticipated Discharge Disposition (SLP): unknown (06/19/25 0825)     Therapy Frequency (Swallow): PRN (06/19/25 0825)  Predicted Duration Therapy Intervention (Days): 1 week (06/19/25 0825)  Oral Care Recommendations: Oral Care before breakfast, after meals and PRN (06/19/25 0825)        Daily Summary of Progress (SLP): progress towards functional goals is fair (06/19/25 0825)               Treatment Assessment (SLP): continued (06/19/25 0825)  Treatment Assessment Comments (SLP): see note (06/19/25 0825)  Plan for Continued Treatment (SLP): continue treatment per plan of care (06/19/25 0825)         Progress: improving      SWALLOW EVALUATION (Last 72 Hours)       SLP Adult Swallow Evaluation       Row Name 06/19/25 0825 06/18/25 1400                Rehab Evaluation    Document Type therapy note (daily note)  -MD evaluation  -MB       Subjective Information complains of  food being difficult to chew  -MD no complaints  -MB       Patient  Observations alert;cooperative;agree to therapy  -MD alert;cooperative  -MB       Patient/Family/Caregiver Comments/Observations No family present  -MD No family present  -MB          General Information    Patient Profile Reviewed -- yes  -MB       Pertinent History Of Current Problem -- DANIEL, diarrhea, hypokalemia, tremor, left lower abdominal pain, renal cell carcinoma s/p partial left nephrectomy, COPD, HTN, HL, hx cholecystecomy.  -MB       Current Method of Nutrition -- NPO  -MB       Precautions/Limitations, Vision -- WFL;difficult to assess  -MB       Precautions/Limitations, Hearing -- WFL;for purposes of eval  -MB       Prior Level of Function-Communication -- motor speech impairment;other (see comments)  Secondary to baseline tremor  -MB       Prior Level of Function-Swallowing -- soft to chew;thin liquids  -MB       Plans/Goals Discussed with -- patient  -MB       Barriers to Rehab -- none identified  -MB       Patient's Goals for Discharge -- return to PO diet  -MB          Pain    Additional Documentation -- Pain Scale: FACES Pre/Post-Treatment (Group)  -MB          Pain Scale: FACES Pre/Post-Treatment    Pain: FACES Scale, Pretreatment 0-->no hurt  -MD 0-->no hurt  -MB       Posttreatment Pain Rating 0-->no hurt  -MD --          Oral Motor Structure and Function    Dentition Assessment -- edentulous, does not have dentures  -MB       Secretion Management -- WNL/WFL  -MB       Mucosal Quality -- dry  -MB          Oral Musculature and Cranial Nerve Assessment    Oral Motor General Assessment -- generalized oral motor weakness  -MB       Oral Motor, Comment -- Resting tremor  -MB          General Eating/Swallowing Observations    Eating/Swallowing Skills -- fed by SLP  -MB       Positioning During Eating -- upright in bed  -MB       Utensils Used -- spoon;straw  -MB       Consistencies Trialed -- regular textures;pureed;thin liquids  -MB          Clinical Swallow Eval    Oral Prep Phase -- impaired  -MB        Oral Transit -- WFL  -MB       Oral Residue -- impaired  -MB       Pharyngeal Phase -- no overt signs/symptoms of pharyngeal impairment  -MB       Esophageal Phase -- unremarkable  -MB       Clinical Swallow Evaluation Summary -- See note  -MB          Oral Prep Concerns    Oral Prep Concerns -- inefficient mastication;spits out food prior to swallow  -MB       Inefficient Mastication -- regular consistencies  -MB       Spits Out Food Prior to Swallow -- regular consistencies  -MB          Oral Residue Concerns    Oral Residue Concerns -- other (see comments)  spit out cracker  -MB          SLP Evaluation Clinical Impression    SLP Swallowing Diagnosis -- mild;oral dysphagia;functional pharyngeal phase;other (see comments)  Secondary to being edentulous  -MB       Functional Impact -- risk of malnutrition  -MB       Rehab Potential/Prognosis, Swallowing -- good, to achieve stated therapy goals  -MB       Swallow Criteria for Skilled Therapeutic Interventions Met -- demonstrates skilled criteria  -MB          SLP Treatment Clinical Impressions    Treatment Assessment (SLP) continued  -MD --       Treatment Assessment Comments (SLP) see note  -MD --       Daily Summary of Progress (SLP) progress towards functional goals is fair  -MD --       Barriers to Overall Progress (SLP) Baseline deficits  -MD --       Plan for Continued Treatment (SLP) continue treatment per plan of care  -MD --       Care Plan Review risks/benefits reviewed;current/potential barriers reviewed;patient/other agree to care plan  -MD --       Care Plan Review, Other Participant(s) caregiver  -MD --          Recommendations    Therapy Frequency (Swallow) PRN  -MD PRN  -MB       Predicted Duration Therapy Intervention (Days) 1 week  -MD 1 week  -MB       SLP Diet Recommendation soft to chew textures;ground;thin liquids  -MD soft to chew textures;whole;thin liquids  -MB       Recommended Precautions and Strategies upright posture during/after  eating;small bites of food and sips of liquid;alternate between small bites of food and sips of liquid;general aspiration precautions;reflux precautions  -MD upright posture during/after eating;small bites of food and sips of liquid;alternate between small bites of food and sips of liquid;general aspiration precautions  -MB       Oral Care Recommendations Oral Care before breakfast, after meals and PRN  -MD Oral Care before breakfast, after meals and PRN  -MB       SLP Rec. for Method of Medication Administration meds whole;with thin liquids  -MD meds whole;with thin liquids  -MB       Monitor for Signs of Aspiration yes;cough;gurgly voice;throat clearing  -MD yes;cough;gurgly voice;throat clearing  -MB       Anticipated Discharge Disposition (SLP) unknown  -MD unknown  -MB          Swallow Goals (SLP)    Swallow LTGs Swallow Long Term Goal (free text)  -MD Swallow Long Term Goal (free text)  -MB       Swallow STGs diet tolerance goal selection (SLP)  -MD diet tolerance goal selection (SLP)  -MB       Diet Tolerance Goal Selection (SLP) Patient will tolerate trials of  -MD Patient will tolerate trials of  -MB          (LTG) Swallow    (LTG) Swallow Pt will tolerate least restrictive diet with no overt s/s of aspiration.  -MD Pt will tolerate least restrictive diet with no overt s/s of aspiration.  -MB       [place] (Swallow Long Term Goal) with minimal cues (75-90% accuracy)  -MD with minimal cues (75-90% accuracy)  -MB       Time Frame (Swallow Long Term Goal) by discharge  -MD by discharge  -MB       Barriers (Swallow Long Term Goal) previous functional deficits  -MD n/a  -MB       Progress/Outcomes (Swallow Long Term Goal) progress slower than expected  -MD new goal  -MB       Comment (Swallow Long Term Goal) -- n/a  -MB          (STG) Patient will tolerate trials of    Consistencies Trialed (Tolerate trials) soft to chew (ground) textures;thin liquids  -MD soft to chew (whole) textures;thin liquids  -MB        Desired Outcome (Tolerate trials) without signs/symptoms of aspiration;with adequate oral prep/transit/clearance  -MD without signs/symptoms of aspiration;with adequate oral prep/transit/clearance  -MB       [place] (Tolerate trials) with minimal cues (75-90% accuracy)  -MD with minimal cues (75-90% accuracy)  -MB       Time Frame (Tolerate trials) by discharge  -MD by discharge  -MB       Progress/Outcomes (Tolerate trials) progress slower than expected  -MD new goal  -MB       Comment (Tolerate trials) -- n/a  -MB                 User Key  (r) = Recorded By, (t) = Taken By, (c) = Cosigned By      Initials Name Effective Dates    Jeimy Araiza, CCC-SLP 02/03/23 -     Luci Grijalva, SLP 06/21/22 -                     EDUCATION  The patient has been educated in the following areas:   Dysphagia (Swallowing Impairment).        SLP GOALS       Row Name 06/19/25 0825 06/18/25 1400          (LTG) Swallow    (LTG) Swallow Pt will tolerate least restrictive diet with no overt s/s of aspiration.  -MD Pt will tolerate least restrictive diet with no overt s/s of aspiration.  -MB     [place] (Swallow Long Term Goal) with minimal cues (75-90% accuracy)  -MD with minimal cues (75-90% accuracy)  -MB     Time Frame (Swallow Long Term Goal) by discharge  -MD by discharge  -MB     Barriers (Swallow Long Term Goal) previous functional deficits  -MD n/a  -MB     Progress/Outcomes (Swallow Long Term Goal) progress slower than expected  -MD new goal  -MB     Comment (Swallow Long Term Goal) -- n/a  -MB        (STG) Patient will tolerate trials of    Consistencies Trialed (Tolerate trials) soft to chew (ground) textures;thin liquids  -MD soft to chew (whole) textures;thin liquids  -MB     Desired Outcome (Tolerate trials) without signs/symptoms of aspiration;with adequate oral prep/transit/clearance  -MD without signs/symptoms of aspiration;with adequate oral prep/transit/clearance  -MB     [place] (Tolerate  trials) with minimal cues (75-90% accuracy)  -MD with minimal cues (75-90% accuracy)  -MB     Time Frame (Tolerate trials) by discharge  -MD by discharge  -MB     Progress/Outcomes (Tolerate trials) progress slower than expected  -MD new goal  -MB     Comment (Tolerate trials) -- n/a  -MB               User Key  (r) = Recorded By, (t) = Taken By, (c) = Cosigned By      Initials Name Provider Type    Jeimy Araiza, CCC-SLP Speech and Language Pathologist    Luci Grijalva, SLP Speech and Language Pathologist                         Time Calculation:    Time Calculation- SLP       Row Name 06/19/25 1015             Time Calculation- SLP    SLP Start Time 0825  -MD      SLP Stop Time 0848  -MD      SLP Time Calculation (min) 23 min  -MD      SLP Received On 06/19/25  -MD         Untimed Charges    [id number] Treatment Swallow Minutes 23  -MD         Total Minutes    Untimed Charges Total Minutes 23  -MD       Total Minutes 23  -MD                User Key  (r) = Recorded By, (t) = Taken By, (c) = Cosigned By      Initials Name Provider Type    Luci Grijalva, SLP Speech and Language Pathologist                    Therapy Charges for Today       Code Description Service Date Service Provider Modifiers Qty    [id number] HC ST TREATMENT SWALLOW 2 6/19/2025 Luci Dunn, SLP GN 1                 SEBASTIAN Sargent  6/19/2025

## 2025-06-20 NOTE — THERAPY TREATMENT NOTE
Acute Care - Physical Therapy Treatment Note  [facility]     Patient Name: Felix Bartlett Sr.  : 1950  MRN: [id number]  Today's Date: 2025      Visit Dx:     ICD-10-CM ICD-9-CM   1. DANIEL (acute kidney injury)  N17.9 584.9   2. Dehydration  E86.0 276.51   3. Anemia, unspecified type  D64.9 285.9   4. Thrombocytopenia  D69.6 287.5   5. Diarrhea, unspecified type  R19.7 787.91   6. Generalized weakness  R53.1 780.79   7. Impaired mobility [Z74.09]  Z74.09 799.89   8. Dysphagia, unspecified type  R13.10 787.20     Patient Active Problem List   Diagnosis    COPD (chronic obstructive pulmonary disease)    Prediabetes    Anxiety    Vertebral compression fracture    Gastroesophageal reflux disease    Edema leg-CHF/d, obesity, copd, cirrhosis    Fatty liver    Hyperlipidemia-statin    Hypertension    Hypokalemia    Nocturnal hypoxia    Macrocytosis    Osteoporosis bd 3. ? 2y    Tremor    Posttraumatic stress disorder    Chronic back pain    Congestive heart failure-diastolic    Tobacco use: 12m    Anemia    Gait difficulty    Iron deficiency    Elevated ferritin-hetroz hemo    Hereditary hemochromatosis    DANIEL (acute kidney injury)    Transaminitis, acute    Diarrhea of presumed infectious origin    Dermatitis neglecta     Past Medical History:   Diagnosis Date    Acid reflux     Arthritis     Asthma     Bile salt-induced diarrhea 2013    COPD (chronic obstructive pulmonary disease)     CTS (carpal tunnel syndrome)     Family history of colon cancer 2021    father      History of adenomatous polyp of colon 2021    History of alcohol abuse 2017    History of renal cell cancer-sony 2017    9.4.14/9.8.14 - Dr Ramos - Office Notes-path low grade renal cell..negative margins..doing well     9.10.14..ro visit...surgery follow up  hypertension-  post op L nephrectomy-incidential renal cell found on CT  abdominal pain-feeling better  fatty liver-bx proven...  tremor-familial,  alcohol  Rx-reviewed  Cozaar 50 mg one half a day  LAB-next time hepatitis A, B, C         History of TIA (transient ischemic attack) 02/24/2017    No Problem List  5.25.10/age 60     7.1..14/7.1.14 - [facility] - Letter L eye nonarteritic anterior ischemic neuropathy..  6.30.14/7.1.14 CBC With Differential/Platelet; Sedimentation Rate-Westergren; C-Reactive Protein, Quant==WBC 12.0; RBC 3.60; Hemoglobin 11.7; Hematocrit 34.4; Neutrophils (Absolute) 7.1; Lymphs (Absolute) 3.6  nothing major here..his problems are alcohol, smoking now o    HL (hearing loss)     Hyperlipidemia     Hypertension     Hypopotassemia-diuretic 02/16/2017    Oxygen dependent     at     Peripheral neuropathy     Renal cancer 08/2014    Lt RALPart'l Nx; t1a Clear Cell with negative margin    RUQ pain 07/23/2013    S/P laparoscopic cholecystectomy 09/04/2012    Wellness examination-done 02/16/2017    PROCEDURES:  Prostate exam/Rachel/confirmed/1.27.16     Colonoscopy-nl/Gil//7-28-10/5y  EGD/P/Gil/3.4.16  Colonoscopy-polyp/Gil//3.18.16/5 yr  EGD-neg/Gil//5.1.18     SURGERIES:  T&A  Appendix/1959  Scrotum-cyst/1980's  Lap gb+liver bx/Raya/[facility]/7.23.12  L robotic partial nephrectomy (clear cell grade 1 [id number])/Rachel//8.14.14  Rnbrsk-sygoptvj-tzhar+mesh+omen/Micah//5.9.     Past Surgical History:   Procedure Laterality Date    APPENDECTOMY      CHOLECYSTECTOMY      ENDOSCOPY N/A 05/01/2018    Procedure: ESOPHAGOGASTRODUODENOSCOPY WITH ANESTHESIA;  Surgeon: Donnell Cordero DO;  Location: [facility] ENDOSCOPY;  Service: Gastroenterology    HERNIA REPAIR      KIDNEY SURGERY      LAPAROSCOPIC PARTIAL NEPHRECTOMY Left 08/14/2014    Robot Assisted     PT Assessment (Last 12 Hours)       PT Evaluation and Treatment       Row Name 06/20/25 0807          Physical Therapy Time and Intention    Subjective Information complains of;weakness  -AE     Document Type therapy note (daily note)  -AE     Mode of Treatment physical  therapy  -AE       Row Name 06/20/25 0807          General Information    Existing Precautions/Restrictions fall  -AE       Row Name 06/20/25 0807          Pain    Pretreatment Pain Rating 2/10  -AE     Posttreatment Pain Rating 3/10  -AE     Pain Location abdomen  -AE     Pain Side/Orientation generalized  -AE       Row Name 06/20/25 0807          Bed Mobility    Rolling Left [place] (Bed Mobility) contact guard  -AE     Rolling Right [place] (Bed Mobility) contact guard  -AE     Supine-Sit [place] (Bed Mobility) contact guard  -AE       Row Name 06/20/25 0807          Sit-Stand Transfer    Sit-Stand [place] (Transfers) minimum assist (75% patient effort);2 person assist  -AE       Row Name 06/20/25 0807          Gait/Stairs (Locomotion)    [place] Level (Gait) minimum assist (75% patient effort);2 person assist  -AE     Assistive Device (Gait) walker, front-wheeled  -AE     Patient was able to Ambulate --  steps to recliner  -AE       Row Name 06/20/25 0807          Knee (Therapeutic Exercise)    Knee (Therapeutic Exercise) AROM (active range of motion)  -AE     Knee AROM (Therapeutic Exercise) LAQ (long arc quad);10 repetitions  -AE       Row Name 06/20/25 0807          Ankle (Therapeutic Exercise)    Ankle (Therapeutic Exercise) AROM (active range of motion)  -AE     Ankle AROM (Therapeutic Exercise) dorsiflexion;plantarflexion;10 repetitions  -AE       Row Name 06/20/25 0807          Positioning and Restraints    Pre-Treatment Position in bed  -AE     Post Treatment Position chair  -AE     In Chair sitting;call light within reach  nursing aware  -AE               User Key  (r) = Recorded By, (t) = Taken By, (c) = Cosigned By      Initials Name Provider Type    AE Kathy Noel PTA Physical Therapist Assistant                    Physical Therapy Education       Title: PT OT SLP Therapies (In Progress)       Topic: Physical Therapy (Done)       Point: Mobility training (Done)        Learning Progress Summary            Patient Acceptance, E, VU by ALMA at 6/18/2025 1045    Comment: role of PT, fall risk, d/c planning                      Point: Home exercise program (Done)       Learning Progress Summary            Patient Acceptance, E, VU by ALMA at 6/18/2025 1045    Comment: role of PT, fall risk, d/c planning                      Point: Body mechanics (Done)       Learning Progress Summary            Patient Acceptance, E, VU by ALMA at 6/18/2025 1045    Comment: role of PT, fall risk, d/c planning                      Point: Precautions (Done)       Learning Progress Summary            Patient Acceptance, E, VU by ALMA at 6/18/2025 1045    Comment: role of PT, fall risk, d/c planning                                      User Key       Initials Effective Dates Name Provider Type Discipline    ALMA 08/15/24 -  Ferny Gibson, PT DPT Physical Therapist PT                  PT Recommendation and Plan     Progress: improving  Outcome Evaluation: Pt c/o abdominal pain 2/10.Pt has dysarthric speech at baseline and chronic tremors.Pt reports he is usually indep and cares for wife, but also reports he does have caregivers who help care for he and his wife also.Pt was cga sidelying to sit using bedrail with vc's.Pt was min x2 to stand and took steps over to chair min x2 assist with RW.He would benefit from continued PT at SNF but continues to refuse.       Time Calculation:    PT Charges       Row Name 06/20/25 1005             Time Calculation    Start Time 0807  -AE      Stop Time 0837  -AE      Time Calculation (min) 30 min  -AE      PT Received On 06/20/25  -AE         Time Calculation- PT    Total Timed Code Minutes- PT 30 minute(s)  -AE         Timed Charges    40640 - PT Therapeutic Activity Minutes 30  -AE         Total Minutes    Timed Charges Total Minutes 30  -AE       Total Minutes 30  -AE                User Key  (r) = Recorded By, (t) = Taken By, (c) = Cosigned By      Initials Name Provider Type     AE Kathy Noel PTA Physical Therapist Assistant                  Therapy Charges for Today       Code Description Service Date Service Provider Modifiers Qty    77427979258 HC PT THERAPEUTIC ACT EA 15 MIN 6/19/2025 Kathy Noel PTA GP 3    59627430027 HC PT THERAPEUTIC ACT EA 15 MIN 6/20/2025 Kathy Noel PTA GP 2            PT G-Codes  Outcome Measure Options: AM-PAC 6 Clicks Basic Mobility (PT)  AM-PAC 6 Clicks Score (PT): 14  AM-PAC 6 Clicks Score (OT): 13    Kathy Noel PTA  6/20/2025

## 2025-06-20 NOTE — PLAN OF CARE
Goal Outcome Evaluation:  Plan of Care Reviewed With: patient        Progress: no change  Outcome Evaluation: Pt up most of the shift, constant requests and calling out. No c/o pain. Incontinent of urine and stool this shift. Refusing turns. Tremors and garbled speech persist. Coughing and choking on water, made NPO for speech reeval. Pt keeps asking for water, education provided. Safety maintained. Bed alarm on.

## 2025-06-20 NOTE — PLAN OF CARE
Goal Outcome Evaluation:  Plan of Care Reviewed With: patient           Outcome Evaluation: OT tx completed. Pt fowlers in bed upon arrival, reports incontinence of bowels. Pt rolls R<>L w/ CGA for toileting hygiene and changing of brief to be completes. Pt then completes sup>sit w/ SBA and several sit<>stand with min A and slightly raised bed to FWW. Pt eventually t/fs to recliner where he is left w/ call light. Cont OT POC    Anticipated Discharge Disposition (OT): skilled nursing facility

## 2025-06-20 NOTE — PLAN OF CARE
Goal Outcome Evaluation:  Plan of Care Reviewed With: patient        Progress: no change  Outcome Evaluation: See note    Anticipated Discharge Disposition (SLP): unknown          SLP Swallowing Diagnosis: mild, oral dysphagia, functional pharyngeal phase, other (see comments) (06/20/25 5062)

## 2025-06-20 NOTE — PLAN OF CARE
Goal Outcome Evaluation:  Plan of Care Reviewed With: patient        Progress: no change  Outcome Evaluation:  FOLLOWING FOR DISCHARGE PLANNING. IV FLUIDS PER ORDER. K+= 2.8  ORAL K+ PER ORDER. PT. INCONTINENT OF URINE AND STOOL. ROOM AIR. NO C/O'S PAIN VOICED. NO DISTRESS NOTED.

## 2025-06-20 NOTE — CASE MANAGEMENT/SOCIAL WORK
Continued Stay Note   [place]     Patient Name: Felix Bartlett Sr.  MRN: [id number]  Today's Date: 6/20/2025    Admit Date: 6/17/2025    Plan: SNF Referrals   Discharge Plan       Row Name 06/20/25 1446       Plan    Plan SNF Referrals    Patient/Family in Agreement with Plan yes    Provided Post Acute Provider List? Yes    Post Acute Provider List Nursing Home    Provided Post Acute Provider Quality & Resource List? Yes    Post Acute Provider Quality and Resource List Nursing Home    Delivered To Support Person    Method of Delivery Telephone    Plan Comments Spoke with pt's daughter Nadya [phone]. She is the  for pt and for pt's spouse who is on 3A. She wants them to be in a facility together. Discussed assisted living but explained to her that would not be an option if they are unable to take care of themselves. Pt's spouse is essentially bed bound and he helps take care of her when he is well. Right now he will need rehab. Discussed options and referral was already sent to [place]. [facility] to [facility] [place].                   Discharge Codes    No documentation.                       ARIANNA Crump

## 2025-06-20 NOTE — PLAN OF CARE
Goal Outcome Evaluation:  Plan of Care Reviewed With: patient        Progress: improving  Outcome Evaluation: Pt c/o abdominal pain 2/10.Pt has dysarthric speech at baseline and chronic tremors.Pt reports he is usually indep and cares for wife, but also reports he does have caregivers who help care for he and his wife also.Pt was cga sidelying to sit using bedrail with vc's.Pt was min x2 to stand and took steps over to chair min x2 assist with RW.He would benefit from continued PT at SNF but continues to refuse.

## 2025-06-20 NOTE — THERAPY TREATMENT NOTE
Patient Name: Felix Bartlett Sr.  : 1950    MRN: [id number]                              Today's Date: 2025       Admit Date: 2025    Visit Dx:     ICD-10-CM ICD-9-CM   1. DANIEL (acute kidney injury)  N17.9 584.9   2. Dehydration  E86.0 276.51   3. Anemia, unspecified type  D64.9 285.9   4. Thrombocytopenia  D69.6 287.5   5. Diarrhea, unspecified type  R19.7 787.91   6. Generalized weakness  R53.1 780.79   7. Impaired mobility [Z74.09]  Z74.09 799.89   8. Dysphagia, unspecified type  R13.10 787.20     Patient Active Problem List   Diagnosis    COPD (chronic obstructive pulmonary disease)    Prediabetes    Anxiety    Vertebral compression fracture    Gastroesophageal reflux disease    Edema leg-CHF/d, obesity, copd, cirrhosis    Fatty liver    Hyperlipidemia-statin    Hypertension    Hypokalemia    Nocturnal hypoxia    Macrocytosis    Osteoporosis bd 3. ? 2y    Tremor    Posttraumatic stress disorder    Chronic back pain    Congestive heart failure-diastolic    Tobacco use: 12m    Anemia    Gait difficulty    Iron deficiency    Elevated ferritin-hetroz hemo    Hereditary hemochromatosis    DANIEL (acute kidney injury)    Transaminitis, acute    Diarrhea of presumed infectious origin    Dermatitis neglecta     Past Medical History:   Diagnosis Date    Acid reflux     Arthritis     Asthma     Bile salt-induced diarrhea 2013    COPD (chronic obstructive pulmonary disease)     CTS (carpal tunnel syndrome)     Family history of colon cancer 2021    father      History of adenomatous polyp of colon 2021    History of alcohol abuse 2017    History of renal cell cancer-sony 2017    9.4.14/9.8.14 - Dr Ramos - Office Notes-path low grade renal cell..negative margins..doing well     9.10.14..ro visit...surgery follow up  hypertension-  post op L nephrectomy-incidential renal cell found on CT  abdominal pain-feeling better  fatty liver-bx proven...  tremor-familial,  alcohol  Rx-reviewed  Cozaar 50 mg one half a day  LAB-next time hepatitis A, B, C         History of TIA (transient ischemic attack) 02/24/2017    No Problem List  5.25.10/age 60     7.1..14/7.1.14 - [facility] - Letter L eye nonarteritic anterior ischemic neuropathy..  6.30.14/7.1.14 CBC With Differential/Platelet; Sedimentation Rate-Westergren; C-Reactive Protein, Quant==WBC 12.0; RBC 3.60; Hemoglobin 11.7; Hematocrit 34.4; Neutrophils (Absolute) 7.1; Lymphs (Absolute) 3.6  nothing major here..his problems are alcohol, smoking now o    HL (hearing loss)     Hyperlipidemia     Hypertension     Hypopotassemia-diuretic 02/16/2017    Oxygen dependent     at     Peripheral neuropathy     Renal cancer 08/2014    Lt RALPart'l Nx; t1a Clear Cell with negative margin    RUQ pain 07/23/2013    S/P laparoscopic cholecystectomy 09/04/2012    Wellness examination-done 02/16/2017    PROCEDURES:  Prostate exam/Rachel/confirmed/1.27.16     Colonoscopy-nl/Gil//7-28-10/5y  EGD/P/Gil/3.4.16  Colonoscopy-polyp/Gil//3.18.16/5 yr  EGD-neg/Gil//5.1.18     SURGERIES:  T&A  Appendix/1959  Scrotum-cyst/1980's  Lap gb+liver bx/Raya/[facility]/7.23.12  L robotic partial nephrectomy (clear cell grade 1 L5sM3M0)/Rachel//8.14.14  Srewvq-sfyvddvz-jqxlg+mesh+omen/Micah//5.9.     Past Surgical History:   Procedure Laterality Date    APPENDECTOMY      CHOLECYSTECTOMY      ENDOSCOPY N/A 05/01/2018    Procedure: ESOPHAGOGASTRODUODENOSCOPY WITH ANESTHESIA;  Surgeon: Donnell Cordero DO;  Location: [facility] ENDOSCOPY;  Service: Gastroenterology    HERNIA REPAIR      KIDNEY SURGERY      LAPAROSCOPIC PARTIAL NEPHRECTOMY Left 08/14/2014    Robot Assisted      General Information       Row Name 06/20/25 1135          OT Time and Intention    Subjective Information no complaints  -KP     Document Type therapy note (daily note)  -KP     Mode of Treatment occupational therapy  -KP     Patient Effort good  -KP       Row Name  06/20/25 1135          General Information    Existing Precautions/Restrictions fall  -       Row Name 06/20/25 1135          Cognition    Orientation Status (Cognition) oriented x 4  -       Row Name 06/20/25 1135          Safety Issues/Impairments Affecting Functional Mobility    Impairments Affecting Function (Mobility) pain;strength;range of motion (ROM);endurance/activity tolerance;balance  -               User Key  (r) = Recorded By, (t) = Taken By, (c) = Cosigned By      Initials Name Provider Type    Carolann Victoria OTR/L Occupational Therapist                     Mobility/ADL's       Row Name 06/20/25 1135          Bed Mobility    Bed Mobility supine-sit;rolling right;rolling left  -     Rolling Left [place] (Bed Mobility) contact guard  -     Rolling Right [place] (Bed Mobility) contact guard  -     Supine-Sit [place] (Bed Mobility) standby assist  -     Assistive Device (Bed Mobility) bed rails;head of bed elevated  -       Row Name 06/20/25 1135          Transfers    Transfers sit-stand transfer;stand-sit transfer;bed-chair transfer  -       Row Name 06/20/25 1135          Bed-Chair Transfer    Bed-Chair [place] (Transfers) minimum assist (75% patient effort);verbal cues  -     Assistive Device (Bed-Chair Transfers) walker, front-wheeled  -       Row Name 06/20/25 1135          Sit-Stand Transfer    Sit-Stand [place] (Transfers) minimum assist (75% patient effort);verbal cues  -     Assistive Device (Sit-Stand Transfers) walker, front-wheeled  -       Row Name 06/20/25 1135          Stand-Sit Transfer    Stand-Sit [place] (Transfers) minimum assist (75% patient effort);verbal cues  -     Assistive Device (Stand-Sit Transfers) walker, front-wheeled  -       Row Name 06/20/25 1135          Activities of Daily Living    BADL Assessment/Intervention toileting  -       Row Name 06/20/25 1135          Toileting Assessment/Training    Comment,  (Toileting) pt rolled in bed w/ CGA and use of bedrails for toileting hygiene and donning new brief following incontinence in bed  -               User Key  (r) = Recorded By, (t) = Taken By, (c) = Cosigned By      Initials Name Provider Type    Carolann Victoria OTR/L Occupational Therapist                   Obj/Interventions       Row Name 06/20/25 1135          Balance    Balance Assessment sitting static balance;sitting dynamic balance;standing static balance;standing dynamic balance  -     Static Sitting Balance standby assist  -     Dynamic Sitting Balance standby assist  -     Position, Sitting Balance unsupported;sitting edge of bed  -     Static Standing Balance contact guard  -     Dynamic Standing Balance contact guard;minimal assist  -     Position/Device Used, Standing Balance supported;walker, front-wheeled  -               User Key  (r) = Recorded By, (t) = Taken By, (c) = Cosigned By      Initials Name Provider Type    Carolann Victoria, OTR/L Occupational Therapist                   Goals/Plan    No documentation.                  Clinical Impression       Row Name 06/20/25 1135          Pain Assessment    Pretreatment Pain Rating 0/10 - no pain  -     Posttreatment Pain Rating 0/10 - no pain  -       Row Name 06/20/25 1132          Plan of Care Review    Plan of Care Reviewed With patient  -     Outcome Evaluation OT tx completed. Pt fowlers in bed upon arrival, reports incontinence of bowels. Pt rolls R<>L w/ CGA for toileting hygiene and changing of brief to be completes. Pt then completes sup>sit w/ SBA and several sit<>stand with min A and slightly raised bed to FWW. Pt eventually t/fs to recliner where he is left w/ call light. Cont OT POC  -       Row Name 06/20/25 1136          Therapy Plan Review/Discharge Plan (OT)    Anticipated Discharge Disposition (OT) skilled nursing facility  -       Row Name 06/20/25 1132          Positioning and Restraints     Pre-Treatment Position in bed  -KP     Post Treatment Position chair  -KP     In Chair notified nsg;call light within reach;sitting  -KP               User Key  (r) = Recorded By, (t) = Taken By, (c) = Cosigned By      Initials Name Provider Type    Carolann Victoria OTR/L Occupational Therapist                   Outcome Measures       Row Name 06/20/25 1135          How much help from another is currently needed...    Putting on and taking off regular lower body clothing? 2  -KP     Bathing (including washing, rinsing, and drying) 2  -KP     Toileting (which includes using toilet bed pan or urinal) 2  -KP     Putting on and taking off regular upper body clothing 2  -KP     Taking care of personal grooming (such as brushing teeth) 3  -KP     Eating meals 3  -KP     AM-PAC 6 Clicks Score (OT) 14  -KP       Row Name 06/20/25 0800          How much help from another person do you currently need...    Turning from your back to your side while in flat bed without using bedrails? 3  -TB     Moving from lying on back to sitting on the side of a flat bed without bedrails? 3  -TB     Moving to and from a bed to a chair (including a wheelchair)? 2  -TB     Standing up from a chair using your arms (e.g., wheelchair, bedside chair)? 2  -TB     Climbing 3-5 steps with a railing? 2  -TB     To walk in hospital room? 2  -TB     AM-PAC 6 Clicks Score (PT) 14  -TB     Highest Level of Mobility Goal Move to Chair/Commode-4  -TB       Row Name 06/20/25 1135          Functional Assessment    Outcome Measure Options AM-PAC 6 Clicks Daily Activity (OT)  -KP               User Key  (r) = Recorded By, (t) = Taken By, (c) = Cosigned By      Initials Name Provider Type    TB Teresa Kc LPN Licensed Nurse    Carolann Victoria OTR/L Occupational Therapist                    Occupational Therapy Education       Title: PT OT SLP Therapies (In Progress)       Topic: Occupational Therapy (In Progress)       Point: ADL training (Done)        Learning Progress Summary            Patient Acceptance, E,D, VU,DU,NR by  at 6/20/2025 1311    Acceptance, E, NR by MM at 6/18/2025 1610                      Point: Precautions (Done)       Learning Progress Summary            Patient Acceptance, E,D, VU,DU,NR by  at 6/20/2025 1311    Acceptance, E, NR by MM at 6/18/2025 1610                      Point: Body mechanics (Done)       Learning Progress Summary            Patient Acceptance, E,D, VU,DU,NR by  at 6/20/2025 1311    Acceptance, E, NR by MM at 6/18/2025 1610                                      User Key       Initials Effective Dates Name Provider Type Discipline     07/11/23 -  Jewel Núñez OTR/L Occupational Therapist OT     10/08/24 -  Carolann Cardona OTR/L Occupational Therapist OT                  OT Recommendation and Plan     Plan of Care Review  Plan of Care Reviewed With: patient  Outcome Evaluation: OT tx completed. Pt fowlers in bed upon arrival, reports incontinence of bowels. Pt rolls R<>L w/ CGA for toileting hygiene and changing of brief to be completes. Pt then completes sup>sit w/ SBA and several sit<>stand with min A and slightly raised bed to FWW. Pt eventually t/fs to recliner where he is left w/ call light. Cont OT POC     Time Calculation:         Time Calculation- OT       Row Name 06/20/25 1135             Time Calculation- OT    OT Start Time 1135  -KP      OT Stop Time 1158  -      OT Time Calculation (min) 23 min  -      Total Timed Code Minutes- OT 23 minute(s)  -      OT Received On 06/20/25  -         Timed Charges    80422 - OT Self Care/Mgmt Minutes 23  -KP         Total Minutes    Timed Charges Total Minutes 23  -KP       Total Minutes 23  -KP                User Key  (r) = Recorded By, (t) = Taken By, (c) = Cosigned By      Initials Name Provider Type     Carolann Cardona OTR/L Occupational Therapist                  Therapy Charges for Today       Code Description Service Date Service Provider  Modifiers Qty    37188114955 HC OT SELF CARE/MGMT/TRAIN EA 15 MIN 6/20/2025 Carolann Cardona, OTR/L GO 2                 Carolann Cardona OTR/SILVESTRE  6/20/2025

## 2025-06-20 NOTE — THERAPY RE-EVALUATION
Acute Care - Speech Language Pathology   Swallow Re-Evaluation [facility]     Patient Name: Felix Bartlett Sr.  : 1950  MRN: [id number]  Today's Date: 2025               Admit Date: 2025    SPEECH-LANGUAGE PATHOLOGY EVALUATION - SWALLOW  Subjective: The patient was seen on this date for a repeat Clinical Swallow evaluation.  Patient was alert and cooperative.  Significant history: DANIEL, diarrhea, hypokalemia, tremor, left lower abdominal pain, renal cell carcinoma s/p partial left nephrectomy, COPD, HTN, HL, hx cholecystecomy. Report of coughing/choking with water overnight.   Objective: Textures given included thin liquid.  Assessment: Difficulties were noted with none of the above consistencies.  Observations:  No overt s/s of aspiration observed. Pt states he was reclined when drinking water overnight.   SLP Findings:  Patient presents with mild oral dysphagia secondary to being edentulous, without esophageal component, rule out pharyngeal dysphagia.  Recommendations: Diet Textures: thin liquid, soft to chewfood with ground meats.  Medications should be taken whole with thin liquids or pudding.   Recommended Strategies: 1:1 assistance with meals, Upright for PO, small bites and sips, and alternate liquids and solids. Oral care before breakfast, after all meals and PRN.  Dysphagia therapy is recommended.   Jeimy Johnston, CCC-SLP 2025 09:14 CDT     Visit Dx:     ICD-10-CM ICD-9-CM   1. DANIEL (acute kidney injury)  N17.9 584.9   2. Dehydration  E86.0 276.51   3. Anemia, unspecified type  D64.9 285.9   4. Thrombocytopenia  D69.6 287.5   5. Diarrhea, unspecified type  R19.7 787.91   6. Generalized weakness  R53.1 780.79   7. Impaired mobility [Z74.09]  Z74.09 799.89   8. Dysphagia, unspecified type  R13.10 787.20     Patient Active Problem List   Diagnosis    COPD (chronic obstructive pulmonary disease)    Prediabetes    Anxiety    Vertebral compression fracture    Gastroesophageal reflux  disease    Edema leg-CHF/d, obesity, copd, cirrhosis    Fatty liver    Hyperlipidemia-statin    Hypertension    Hypokalemia    Nocturnal hypoxia    Macrocytosis    Osteoporosis bd 3.2017 ? 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  6.30.14/7.1.14 CBC With Differential/Platelet; Sedimentation Rate-Westergren; C-Reactive Protein, Quant==WBC 12.0; RBC 3.60; Hemoglobin 11.7; Hematocrit 34.4; Neutrophils (Absolute) 7.1; Lymphs (Absolute) 3.6  nothing major here..his problems are alcohol, smoking now o    HL (hearing loss)     Hyperlipidemia     Hypertension     Hypopotassemia-diuretic 02/16/2017    Oxygen dependent     at hs    Peripheral neuropathy      Renal cancer 08/2014    Lt RALPart'l Nx; t1a Clear Cell with negative margin    RUQ pain 07/23/2013    S/P laparoscopic cholecystectomy 09/04/2012    Wellness examination-done 02/16/2017    PROCEDURES:  Prostate exam/Rachel/confirmed/1.27.16     Colonoscopy-nl/Gil//7-28-10/5y  EGD/BHP/Iron/3.4.16  Colonoscopy-polyp/Iron//3.18.16/5 yr  EGD-neg/Iron//5.1.18     SURGERIES:  T&A  Appendix/1959  Scrotum-cyst/1980's  Lap gb+liver bx/Raya/[facility]/7.23.12  L robotic partial nephrectomy (clear cell grade 1 [id number])/[facility]//8.14.14  Yotpas-tpwxrcpj-jtrml+mesh+omen/Micah//5.9.     Past Surgical History:   Procedure Laterality Date    APPENDECTOMY      CHOLECYSTECTOMY      ENDOSCOPY N/A 05/01/2018    Procedure: ESOPHAGOGASTRODUODENOSCOPY WITH ANESTHESIA;  Surgeon: Donnell Cordero DO;  Location:  [facility];  Service: Gastroenterology    HERNIA REPAIR      KIDNEY SURGERY      LAPAROSCOPIC PARTIAL NEPHRECTOMY Left 08/14/2014    Robot Assisted       SLP Recommendation and Plan  SLP Swallowing Diagnosis: mild, oral dysphagia, functional pharyngeal phase, other (see comments) (06/20/25 0833)  SLP Diet Recommendation: soft to chew textures, ground, thin liquids (06/20/25 0833)  Recommended Precautions and Strategies: upright posture during/after eating, small bites of food and sips of liquid, alternate between small bites of food and sips of liquid, general aspiration precautions, reflux precautions (06/20/25 0833)  SLP Rec. for Method of Medication Administration: meds whole, with thin liquids (06/20/25 0833)     Monitor for Signs of Aspiration: yes, cough, gurgly voice, throat clearing (06/20/25 0833)     Swallow Criteria for Skilled Therapeutic Interventions Met: demonstrates skilled criteria (06/20/25 0833)  Anticipated Discharge Disposition (SLP): unknown (06/20/25 0833)  Rehab Potential/Prognosis, Swallowing: good, to achieve stated therapy goals (06/20/25 0833)  Therapy Frequency (Swallow): PRN  (06/20/25 0833)  Predicted Duration Therapy Intervention (Days): 1 week (06/20/25 0833)  Oral Care Recommendations: Oral Care before breakfast, after meals and PRN (06/20/25 0833)                                        Progress: no change  Outcome Evaluation: See note      SWALLOW EVALUATION (Last 72 Hours)       SLP Adult Swallow Evaluation       Row Name 06/20/25 0833 06/19/25 0825 06/18/25 1400             Rehab Evaluation    Document Type re-evaluation  -MB therapy note (daily note)  -MD evaluation  -MB      Subjective Information no complaints  -MB complains of  food being difficult to chew  -MD no complaints  -MB      Patient Observations alert;cooperative  -MB alert;cooperative;agree to therapy  -MD alert;cooperative  -MB      Patient/Family/Caregiver Comments/Observations No family present  -MB No family present  -MD No family present  -MB         General Information    Patient Profile Reviewed yes  -MB -- yes  -MB      Pertinent History Of Current Problem DANIEL, diarrhea, hypokalemia, tremor, left lower abdominal pain, renal cell carcinoma s/p partial left nephrectomy, COPD, HTN, HL, hx cholecystecomy. Report of coughing/choking with water overnight.  -MB -- DANIEL, diarrhea, hypokalemia, tremor, left lower abdominal pain, renal cell carcinoma s/p partial left nephrectomy, COPD, HTN, HL, hx cholecystecomy.  -MB      Current Method of Nutrition NPO  -MB -- NPO  -MB      Precautions/Limitations, Vision WFL;difficult to assess  -MB -- WFL;difficult to assess  -MB      Precautions/Limitations, Hearing WFL;for purposes of eval  -MB -- WFL;for purposes of eval  -MB      Prior Level of Function-Communication motor speech impairment;other (see comments)  Secondary to baseline tremor  -MB -- motor speech impairment;other (see comments)  Secondary to baseline tremor  -MB      Prior Level of Function-Swallowing soft to chew;thin liquids  -MB -- soft to chew;thin liquids  -MB      Plans/Goals Discussed with patient  -MB --  patient  -MB      Barriers to Rehab none identified  -MB -- none identified  -MB      Patient's Goals for Discharge patient did not state  -MB -- return to PO diet  -MB         Pain    Additional Documentation -- -- Pain Scale: FACES Pre/Post-Treatment (Group)  -MB         Pain Scale: FACES Pre/Post-Treatment    Pain: FACES Scale, Pretreatment 0-->no hurt  -MB 0-->no hurt  -MD 0-->no hurt  -MB      Posttreatment Pain Rating -- 0-->no hurt  -MD --         Oral Motor Structure and Function    Dentition Assessment edentulous, does not have dentures  -MB -- edentulous, does not have dentures  -MB      Secretion Management WNL/WFL  -MB -- WNL/WFL  -MB      Mucosal Quality dry  -MB -- dry  -MB         Oral Musculature and Cranial Nerve Assessment    Oral Motor General Assessment -- -- generalized oral motor weakness  -MB      Oral Motor, Comment -- -- Resting tremor  -MB         General Eating/Swallowing Observations    Eating/Swallowing Skills fed by SLP  -MB -- fed by SLP  -MB      Positioning During Eating upright in chair  -MB -- upright in bed  -MB      Utensils Used straw  -MB -- spoon;straw  -MB      Consistencies Trialed thin liquids  -MB -- regular textures;pureed;thin liquids  -MB         Clinical Swallow Eval    Oral Prep Phase WF  -MB -- impaired  -MB      Oral Transit WFL  -MB -- WFL  -MB      Oral Residue WFL  -MB -- impaired  -MB      Pharyngeal Phase no overt signs/symptoms of pharyngeal impairment  -MB -- no overt signs/symptoms of pharyngeal impairment  -MB      Esophageal Phase unremarkable  -MB -- unremarkable  -MB      Clinical Swallow Evaluation Summary See note  -MB -- See note  -MB         Oral Prep Concerns    Oral Prep Concerns -- -- inefficient mastication;spits out food prior to swallow  -MB      Inefficient Mastication -- -- regular consistencies  -MB      Spits Out Food Prior to Swallow -- -- regular consistencies  -MB         Oral Residue Concerns    Oral Residue Concerns -- -- other (see  comments)  spit out cracker  -MB         SLP Evaluation Clinical Impression    SLP Swallowing Diagnosis mild;oral dysphagia;functional pharyngeal phase;other (see comments)  -MB -- mild;oral dysphagia;functional pharyngeal phase;other (see comments)  Secondary to being edentulous  -MB      Functional Impact risk of malnutrition  -MB -- risk of malnutrition  -MB      Rehab Potential/Prognosis, Swallowing good, to achieve stated therapy goals  -MB -- good, to achieve stated therapy goals  -MB      Swallow Criteria for Skilled Therapeutic Interventions Met demonstrates skilled criteria  -MB -- demonstrates skilled criteria  -MB         SLP Treatment Clinical Impressions    Treatment Assessment (SLP) -- continued  -MD --      Treatment Assessment Comments (SLP) -- see note  -MD --      Daily Summary of Progress (SLP) -- progress towards functional goals is fair  -MD --      Barriers to Overall Progress (SLP) -- Baseline deficits  -MD --      Plan for Continued Treatment (SLP) -- continue treatment per plan of care  -MD --      Care Plan Review -- risks/benefits reviewed;current/potential barriers reviewed;patient/other agree to care plan  -MD --      Care Plan Review, Other Participant(s) -- caregiver  -MD --         Recommendations    Therapy Frequency (Swallow) PRN  -MB PRN  -MD PRN  -MB      Predicted Duration Therapy Intervention (Days) 1 week  -MB 1 week  -MD 1 week  -MB      SLP Diet Recommendation soft to chew textures;ground;thin liquids  -MB soft to chew textures;ground;thin liquids  -MD soft to chew textures;whole;thin liquids  -MB      Recommended Precautions and Strategies upright posture during/after eating;small bites of food and sips of liquid;alternate between small bites of food and sips of liquid;general aspiration precautions;reflux precautions  -MB upright posture during/after eating;small bites of food and sips of liquid;alternate between small bites of food and sips of liquid;general aspiration  precautions;reflux precautions  -MD upright posture during/after eating;small bites of food and sips of liquid;alternate between small bites of food and sips of liquid;general aspiration precautions  -MB      Oral Care Recommendations Oral Care before breakfast, after meals and PRN  -MB Oral Care before breakfast, after meals and PRN  -MD Oral Care before breakfast, after meals and PRN  -MB      SLP Rec. for Method of Medication Administration meds whole;with thin liquids  -MB meds whole;with thin liquids  -MD meds whole;with thin liquids  -MB      Monitor for Signs of Aspiration yes;cough;gurgly voice;throat clearing  -MB yes;cough;gurgly voice;throat clearing  -MD yes;cough;gurgly voice;throat clearing  -MB      Anticipated Discharge Disposition (SLP) unknown  -MB unknown  -MD unknown  -MB         Swallow Goals (SLP)    Swallow LTGs -- Swallow Long Term Goal (free text)  -MD Swallow Long Term Goal (free text)  -MB      Swallow STGs -- diet tolerance goal selection (SLP)  -MD diet tolerance goal selection (SLP)  -MB      Diet Tolerance Goal Selection (SLP) -- Patient will tolerate trials of  -MD Patient will tolerate trials of  -MB         (LTG) Swallow    (LTG) Swallow Pt will tolerate least restrictive diet with no overt s/s of aspiration.  -MB Pt will tolerate least restrictive diet with no overt s/s of aspiration.  -MD Pt will tolerate least restrictive diet with no overt s/s of aspiration.  -MB      [place] (Swallow Long Term Goal) with minimal cues (75-90% accuracy)  -MB with minimal cues (75-90% accuracy)  -MD with minimal cues (75-90% accuracy)  -MB      Time Frame (Swallow Long Term Goal) by discharge  -MB by discharge  -MD by discharge  -MB      Barriers (Swallow Long Term Goal) -- previous functional deficits  -MD n/a  -MB      Progress/Outcomes (Swallow Long Term Goal) -- progress slower than expected  -MD new goal  -MB      Comment (Swallow Long Term Goal) -- -- n/a  -MB         (STG) Patient will  tolerate trials of    Consistencies Trialed (Tolerate trials) soft to chew (ground) textures;thin liquids  -MB soft to chew (ground) textures;thin liquids  -MD soft to chew (whole) textures;thin liquids  -MB      Desired Outcome (Tolerate trials) without signs/symptoms of aspiration;with adequate oral prep/transit/clearance  -MB without signs/symptoms of aspiration;with adequate oral prep/transit/clearance  -MD without signs/symptoms of aspiration;with adequate oral prep/transit/clearance  -MB      [place] (Tolerate trials) with minimal cues (75-90% accuracy)  -MB with minimal cues (75-90% accuracy)  -MD with minimal cues (75-90% accuracy)  -MB      Time Frame (Tolerate trials) by discharge  -MB by discharge  -MD by discharge  -MB      Progress/Outcomes (Tolerate trials) -- progress slower than expected  -MD new goal  -MB      Comment (Tolerate trials) -- -- n/a  -MB                User Key  (r) = Recorded By, (t) = Taken By, (c) = Cosigned By      Initials Name Effective Dates    Jeimy Araiza, CCC-SLP 02/03/23 -     Luci Grijalva, SLP 06/21/22 -                     EDUCATION  The patient has been educated in the following areas:   Dysphagia (Swallowing Impairment).        SLP GOALS       Row Name 06/20/25 0833 06/19/25 0825 06/18/25 1400       (LTG) Swallow    (LTG) Swallow Pt will tolerate least restrictive diet with no overt s/s of aspiration.  -MB Pt will tolerate least restrictive diet with no overt s/s of aspiration.  -MD Pt will tolerate least restrictive diet with no overt s/s of aspiration.  -MB    [place] (Swallow Long Term Goal) with minimal cues (75-90% accuracy)  -MB with minimal cues (75-90% accuracy)  -MD with minimal cues (75-90% accuracy)  -MB    Time Frame (Swallow Long Term Goal) by discharge  -MB by discharge  -MD by discharge  -MB    Barriers (Swallow Long Term Goal) -- previous functional deficits  -MD n/a  -MB    Progress/Outcomes (Swallow Long Term Goal) -- progress  slower than expected  -MD new goal  -MB    Comment (Swallow Long Term Goal) -- -- n/a  -MB       (STG) Patient will tolerate trials of    Consistencies Trialed (Tolerate trials) soft to chew (ground) textures;thin liquids  -MB soft to chew (ground) textures;thin liquids  -MD soft to chew (whole) textures;thin liquids  -MB    Desired Outcome (Tolerate trials) without signs/symptoms of aspiration;with adequate oral prep/transit/clearance  -MB without signs/symptoms of aspiration;with adequate oral prep/transit/clearance  -MD without signs/symptoms of aspiration;with adequate oral prep/transit/clearance  -MB    [place] (Tolerate trials) with minimal cues (75-90% accuracy)  -MB with minimal cues (75-90% accuracy)  -MD with minimal cues (75-90% accuracy)  -MB    Time Frame (Tolerate trials) by discharge  -MB by discharge  -MD by discharge  -MB    Progress/Outcomes (Tolerate trials) -- progress slower than expected  -MD new goal  -MB    Comment (Tolerate trials) -- -- n/a  -MB              User Key  (r) = Recorded By, (t) = Taken By, (c) = Cosigned By      Initials Name Provider Type    Jeimy Araiza CCC-SLP Speech and Language Pathologist    Luci Grijalva, SLP Speech and Language Pathologist                         Time Calculation:    Time Calculation- SLP       Row Name 06/20/25 0914             Time Calculation- SLP    SLP Start Time 0833  -MB      SLP Stop Time 0914  -MB      SLP Time Calculation (min) 41 min  -MB      SLP Received On 06/20/25  -MB      SLP Goal Re-Cert Due Date 06/30/25  -MB         Untimed Charges    71843-KF Eval Oral Pharyng Swallow Minutes 41  -MB         Total Minutes    Untimed Charges Total Minutes 41  -MB       Total Minutes 41  -MB                User Key  (r) = Recorded By, (t) = Taken By, (c) = Cosigned By      Initials Name Provider Type    Jeimy Araiza CCC-SLP Speech and Language Pathologist                    Therapy Charges for Today       Code Description  Service Date Service Provider Modifiers Qty    [id number]  ST EVAL ORAL PHARYNG SWALLOW 3 6/20/2025 Jeimy Johnston, JOVITA-SLP GN 1                 Jeimy Johnston CCC-SLP  6/20/2025

## 2025-06-20 NOTE — PROGRESS NOTES
[facility]  INPATIENT PROGRESS NOTE    Patient Name: Felix Bartlett Sr.  Date of Admission: 6/17/2025  Today's Date: 06/20/25  Length of Stay: 3  Primary Care Physician: Keyon Dockery MD    Subjective   Chief Complaint: f/u raúl    HPI   Patient seen sitting in chair at bedside.  Denies any new complaints.  No chest pain or shortness of breath.  Tolerating IV fluids.  Tolerating p.o.  Continues to be weak.  Was able to get up with help from bed and take 2 steps to chair.  Has not been able to ambulate.  Discussed need for rehab to eventually get home.  Unsafe for him to return home alone at this time.        Review of Systems   All pertinent negatives and positives are as above. All other systems have been reviewed and are negative unless otherwise stated.     Objective    Temp:  [98.4 °F (36.9 °C)-98.9 °F (37.2 °C)] 98.4 °F (36.9 °C)  Heart Rate:  [] 89  Resp:  [16-18] 16  BP: (149-169)/(61-94) 158/76  Physical Exam  GEN: Awake, alert, interactive, in NAD, appears ill and frail  HEENT: EOMI, Anicteric, Trachea midline  Lungs:  no wheezing/rales/rhonchi  Heart: RRR, +S1/s2, no rub  ABD: soft, nt/nd, +BS, no guarding/rebound  Extremities: no peripheral edema or injuries noted  Skin: no petechiae  Neuro: resting tremor, TURK w/o obvious focal deficit  Psych: normal mood, somewhat flat affect        Results Review:  I have reviewed the labs, radiology results, and diagnostic studies.    Laboratory Data:   Results from last 7 days   Lab Units 06/20/25  0338 06/19/25  1825 06/19/25  0325   WBC 10*3/mm3 6.17 7.24 7.41   HEMOGLOBIN g/dL 7.9* 8.0* 7.5*   HEMATOCRIT % 24.4* 24.1* 22.9*   PLATELETS 10*3/mm3 68* 72* 74*        Results from last 7 days   Lab Units 06/20/25  0338 06/19/25  0325 06/18/25  0553   SODIUM mmol/L 143 146* 143   POTASSIUM mmol/L 2.8* 2.8* 3.7   CHLORIDE mmol/L 113* 114* 111*   CO2 mmol/L 17.0* 16.0* 16.0*   BUN mg/dL 35.9* 40.3* 36.6*  "  CREATININE mg/dL 1.81* 2.05* 2.54*   CALCIUM mg/dL 7.5* 7.6* 8.1*   BILIRUBIN mg/dL 0.5 0.4 0.4   ALK PHOS U/L 80 81 95   ALT (SGPT) U/L 82* 84* 90*   AST (SGOT) U/L 73* 87* 118*   GLUCOSE mg/dL 180* 138* 167*       Culture Data:   No results found for: \"BLOODCX\", \"URINECX\", \"WOUNDCX\", \"MRSACX\", \"RESPCX\", \"STOOLCX\"    Radiology Data:   Imaging Results (Last 24 Hours)       ** No results found for the last 24 hours. **            I have reviewed the patient's current medications.     Assessment/Plan   Assessment  Active Hospital Problems    Diagnosis     **DANIEL (acute kidney injury)     Transaminitis, acute     Diarrhea of presumed infectious origin     Dermatitis neglecta     Iron deficiency     Hypokalemia     Tremor        Treatment Plan  #1 DANIEL -improving with IV fluids slowly.  GFR up to 38 with creatinine 1.8.  Presented at 20 and 3.01.  On May 5 he was 1.17 and 64.  No hydronephrosis seen on CT abdomen.  Patient with renal cysts and masses similar to prior.  Patient may have had rhabdo.  CK was checked yesterday and came back at 775.  Was not checked on arrival.  Continue IV fluids with current D5/half-normal.  Recheck CK tomorrow.  Recheck renal function tomorrow.    #2 transaminitis -unclear etiology.  Appears to be trending down.  Was normal in May.  Denies any alcohol in 10 years.  Liver ultrasound nonacute.  Again possible mild rhabdomyolysis.    #3 hypokalemia -continue to replace and monitor.    #4 anemia -H&H trended down initially with fluids but has stabilized.  No signs of active bleeding.  Continue to monitor..    #5 thrombocytopenia -unclear etiology.  Do not see obvious infection for consumption.  Again no reported liver history.  He is on Neurontin and Protonix both of which can cause this.  Holding meds.  Monitor for any signs of bleeding or transfusion needs.      #6 chronic tremor -has caregiver at home but only about 4 to 5 hours a day.  Lives at home with his spouse alone.  Spouse also " hospitalized.  Therapies here are recommending placement.  Discussed potential for both he and his spouse was currently hospitalized as well to go to the same facility if able.   is aware.  Discussing with family.    Medical Decision Making  Number and Complexity of problems: 4 acute, multiple chronic  Differential Diagnosis: as above    Conditions and Status  Slowly improving.  Not yet at goal.     [facility] Data  External documents reviewed: none  Cardiac tracing (EKG, telemetry) interpretation: none  Radiology interpretation: reports reviewed  Labs reviewed: as above  Any tests that were considered but not ordered: none     Decision rules/scores evaluated (example [id number], [place], etc): none     Discussed with: patient, nursing, social workers     Care Planning  Shared decision making: Patient apprised of current labs, vitals, imaging and treatment plan.  They are agreeable with proceeding with plans as discussed.    Code status and discussions: full code    Disposition  Social Determinants of Health that impact treatment or disposition: none  I expect the patient to be discharged to short-term rehab.  Patient is not safe to return home at this time.        Electronically signed by Blake Hernandez DO, 06/20/25, 12:20 CDT.

## 2025-06-21 NOTE — THERAPY TREATMENT NOTE
Acute Care - Physical Therapy Treatment Note  [facility]     Patient Name: Felix Bartlett Sr.  : 1950  MRN: [id number]  Today's Date: 2025      Visit Dx:     ICD-10-CM ICD-9-CM   1. DANIEL (acute kidney injury)  N17.9 584.9   2. Dehydration  E86.0 276.51   3. Anemia, unspecified type  D64.9 285.9   4. Thrombocytopenia  D69.6 287.5   5. Diarrhea, unspecified type  R19.7 787.91   6. Generalized weakness  R53.1 780.79   7. Impaired mobility [Z74.09]  Z74.09 799.89   8. Dysphagia, unspecified type  R13.10 787.20     Patient Active Problem List   Diagnosis    COPD (chronic obstructive pulmonary disease)    Prediabetes    Anxiety    Vertebral compression fracture    Gastroesophageal reflux disease    Edema leg-CHF/d, obesity, copd, cirrhosis    Fatty liver    Hyperlipidemia-statin    Hypertension    Hypokalemia    Nocturnal hypoxia    Macrocytosis    Osteoporosis bd 3. ? 2y    Tremor    Posttraumatic stress disorder    Chronic back pain    Congestive heart failure-diastolic    Tobacco use: 12m    Anemia    Gait difficulty    Iron deficiency    Elevated ferritin-hetroz hemo    Hereditary hemochromatosis    DANIEL (acute kidney injury)    Transaminitis, acute    Diarrhea of presumed infectious origin    Dermatitis neglecta     Past Medical History:   Diagnosis Date    Acid reflux     Arthritis     Asthma     Bile salt-induced diarrhea 2013    COPD (chronic obstructive pulmonary disease)     CTS (carpal tunnel syndrome)     Family history of colon cancer 2021    father      History of adenomatous polyp of colon 2021    History of alcohol abuse 2017    History of renal cell cancer-sony 2017    9.4.14/9.8.14 - Dr Ramos - Office Notes-path low grade renal cell..negative margins..doing well     9.10.14..ro visit...surgery follow up  hypertension-  post op L nephrectomy-incidential renal cell found on CT  abdominal pain-feeling better  fatty liver-bx proven...  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  6.30.14/7.1.14 CBC With Differential/Platelet; Sedimentation Rate-Westergren; C-Reactive Protein, Quant==WBC 12.0; RBC 3.60; Hemoglobin 11.7; Hematocrit 34.4; Neutrophils (Absolute) 7.1; Lymphs (Absolute) 3.6  nothing major here..his problems are alcohol, smoking now o    HL (hearing loss)     Hyperlipidemia     Hypertension     Hypopotassemia-diuretic 02/16/2017    Oxygen dependent     at     Peripheral neuropathy     Renal cancer 08/2014    Lt RALPart'l Nx; t1a Clear Cell with negative margin    RUQ pain 07/23/2013    S/P laparoscopic cholecystectomy 09/04/2012    Wellness examination-done 02/16/2017    PROCEDURES:  Prostate exam/Rachel/confirmed/1.27.16     Colonoscopy-nl/Gil//7-28-10/5y  EGD/P/Gil/3.4.16  Colonoscopy-polyp/Gil//3.18.16/5 yr  EGD-neg/Gil//5.1.18     SURGERIES:  T&A  Appendix/1959  Scrotum-cyst/1980's  Lap gb+liver bx/Raya/[facility]/7.23.12  L robotic partial nephrectomy (clear cell grade 1 [id number])/Rachel//8.14.14  Babnzi-bebjkzvq-mantq+mesh+omen/Micah//5.9.     Past Surgical History:   Procedure Laterality Date    APPENDECTOMY      CHOLECYSTECTOMY      ENDOSCOPY N/A 05/01/2018    Procedure: ESOPHAGOGASTRODUODENOSCOPY WITH ANESTHESIA;  Surgeon: Donnell Cordero DO;  Location: [facility] ENDOSCOPY;  Service: Gastroenterology    HERNIA REPAIR      KIDNEY SURGERY      LAPAROSCOPIC PARTIAL NEPHRECTOMY Left 08/14/2014    Robot Assisted     PT Assessment (Last 12 Hours)       PT Evaluation and Treatment       Row Name 06/21/25 5333          Physical Therapy Time and Intention    Subjective Information complains of;weakness;fatigue  -JOSE     Document Type therapy note (daily note)  -JOSE     Mode of Treatment  physical therapy  -       Row Name 06/21/25 1543          General Information    Existing Precautions/Restrictions fall  -       Row Name 06/21/25 1543          Pain    Pretreatment Pain Rating 0/10 - no pain  -JOSE     Posttreatment Pain Rating 0/10 - no pain  -       Row Name 06/21/25 1543          Bed Mobility    Supine-Sit Airway Heights (Bed Mobility) verbal cues;minimum assist (75% patient effort)  -     Sit-Supine Airway Heights (Bed Mobility) verbal cues;minimum assist (75% patient effort);moderate assist (50% patient effort)  -       Row Name 06/21/25 1543          Transfers    Comment, (Transfers) attempted to stand x3, pt was unable even with bed elevated  -       Row Name 06/21/25 1543          Motor Skills    Comments, Therapeutic Exercise sitting AROM BLE X 20  -JOSE     Additional Documentation Comments, Therapeutic Exercise (Row)  -       Row Name 06/21/25 1543          Positioning and Restraints    Pre-Treatment Position in bed  -JOSE     Post Treatment Position bed  -JOSE     In Bed fowlers;call light within reach;encouraged to call for assist;with nsg;side rails up x2  -JOSE               User Key  (r) = Recorded By, (t) = Taken By, (c) = Cosigned By      Initials Name Provider Type    Josesito Cardenas, PTA Physical Therapist Assistant                    Physical Therapy Education       Title: PT OT SLP Therapies (In Progress)       Topic: Physical Therapy (Done)       Point: Mobility training (Done)       Learning Progress Summary            Patient Acceptance, E, VU by ALMA at 6/18/2025 1045    Comment: role of PT, fall risk, d/c planning                      Point: Home exercise program (Done)       Learning Progress Summary            Patient Acceptance, E, VU by ALMA at 6/18/2025 1045    Comment: role of PT, fall risk, d/c planning                      Point: Body mechanics (Done)       Learning Progress Summary            Patient Acceptance, E, VU by ALMA at 6/18/2025 1045    Comment: role of  PT, fall risk, d/c planning                      Point: Precautions (Done)       Learning Progress Summary            Patient Acceptance, E, VU by ALMA at 6/18/2025 1045    Comment: role of PT, fall risk, d/c planning                                      User Key       Initials Effective Dates Name Provider Type Nash MARQUEZ 08/15/24 -  Ferny Gibson, PT DPT Physical Therapist PT                  PT Recommendation and Plan         Outcome Measures       Row Name 06/21/25 1543             How much help from another person do you currently need...    Turning from your back to your side while in flat bed without using bedrails? 3  -JOSE      Moving from lying on back to sitting on the side of a flat bed without bedrails? 3  -JOSE      Moving to and from a bed to a chair (including a wheelchair)? 2  -JOSE      Standing up from a chair using your arms (e.g., wheelchair, bedside chair)? 2  -JOSE      Climbing 3-5 steps with a railing? 2  -JOSE      To walk in hospital room? 2  -JOSE      AM-PAC 6 Clicks Score (PT) 14  -JOSE         Functional Assessment    Outcome Measure Options AM-PAC 6 Clicks Basic Mobility (PT)  -JOSE                User Key  (r) = Recorded By, (t) = Taken By, (c) = Cosigned By      Initials Name Provider Type    Josesito Cardenas, PTA Physical Therapist Assistant                     Time Calculation:    PT Charges       Row Name 06/21/25 1543             Time Calculation    Start Time 1543  -JOSE      Stop Time 1608  -JOSE      Time Calculation (min) 25 min  -JOSE      PT Received On 06/21/25  -JOSE         Time Calculation- PT    Total Timed Code Minutes- PT 25 minute(s)  -JOSE         Timed Charges    89034 - PT Therapeutic Exercise Minutes 10  -JOSE      74520 - PT Therapeutic Activity Minutes 15  -JOSE         Total Minutes    Timed Charges Total Minutes 25  -JOSE       Total Minutes 25  -JOSE                User Key  (r) = Recorded By, (t) = Taken By, (c) = Cosigned By      Initials Name Provider Type    Josesito Cardenas  [employer] Physical Therapist Assistant                  Therapy Charges for Today       Code Description Service Date Service Provider Modifiers Qty    97291937375 HC PT THERAPEUTIC ACT EA 15 MIN 6/21/2025 Josesito Solomon, TEDDY GP 1    98559212802 HC PT THER PROC EA 15 MIN 6/21/2025 Josesito Solomon, TEDDY GP 1            PT G-Codes  Outcome Measure Options: AM-PAC 6 Clicks Basic Mobility (PT)  AM-PAC 6 Clicks Score (PT): 14  AM-PAC 6 Clicks Score (OT): 14    Josesito Solomon PTA  6/21/2025

## 2025-06-21 NOTE — PLAN OF CARE
Goal Outcome Evaluation:           Progress: no change       Problem: Adult Inpatient Plan of Care  Goal: Plan of Care Review  Outcome: Progressing  Flowsheets (Taken 6/21/2025 0514)  Progress: no change  Goal: Patient-Specific Goal (Individualized)  Outcome: Progressing  Goal: Absence of Hospital-Acquired Illness or Injury  Outcome: Progressing  Intervention: Identify and Manage Fall Risk  Recent Flowsheet Documentation  Taken 6/21/2025 0500 by Citlalli Mendez RN  Safety Promotion/Fall Prevention: safety round/check completed  Taken 6/21/2025 0400 by Citlalli Mendez RN  Safety Promotion/Fall Prevention: safety round/check completed  Taken 6/21/2025 0300 by Citlalli Mendez RN  Safety Promotion/Fall Prevention: safety round/check completed  Taken 6/21/2025 0200 by Citlalli Mendez RN  Safety Promotion/Fall Prevention: safety round/check completed  Taken 6/21/2025 0100 by Citlalli Mendez RN  Safety Promotion/Fall Prevention: safety round/check completed  Taken 6/21/2025 0000 by Citlalli Mendez RN  Safety Promotion/Fall Prevention: safety round/check completed  Taken 6/20/2025 2300 by Citlalli Mendez RN  Safety Promotion/Fall Prevention: safety round/check completed  Taken 6/20/2025 2200 by Citlalli Mendez RN  Safety Promotion/Fall Prevention: safety round/check completed  Taken 6/20/2025 2100 by Citlalli Mendez RN  Safety Promotion/Fall Prevention: safety round/check completed  Taken 6/20/2025 2000 by Citlalli Mendez RN  Safety Promotion/Fall Prevention: safety round/check completed  Taken 6/20/2025 1900 by Citlalli Mendez RN  Safety Promotion/Fall Prevention: safety round/check completed  Intervention: Prevent Skin Injury  Recent Flowsheet Documentation  Taken 6/21/2025 0500 by Citlalli Mendez RN  Body Position:   turned   supine  Taken 6/21/2025 0300 by Citlalli Mendez RN  Body Position:   turned   right  Taken 6/21/2025 0100 by Citlalli Mendez RN  Body Position:   turned   left  Taken  6/20/2025 2300 by Citlalli Mendez RN  Body Position:   turned   supine  Taken 6/20/2025 2100 by Citlalli Mendez RN  Body Position:   turned   supine  Taken 6/20/2025 2000 by Citlalli Mendez RN  Skin Protection:   incontinence pads utilized   silicone foam dressing in place  Taken 6/20/2025 1900 by Citlalli Mendez RN  Body Position:   turned   left  Intervention: Prevent and Manage VTE (Venous Thromboembolism) Risk  Recent Flowsheet Documentation  Taken 6/20/2025 2000 by Citlalli Mendez RN  VTE Prevention/Management:   bilateral   SCDs (sequential compression devices) on  Intervention: Prevent Infection  Recent Flowsheet Documentation  Taken 6/20/2025 2000 by Citlalli Mendez RN  Infection Prevention: hand hygiene promoted  Goal: Optimal Comfort and Wellbeing  Outcome: Progressing  Intervention: Provide Person-Centered Care  Recent Flowsheet Documentation  Taken 6/20/2025 2000 by Citlalli Mendez RN  Trust Relationship/Rapport: care explained  Goal: Readiness for Transition of Care  Outcome: Progressing

## 2025-06-21 NOTE — PLAN OF CARE
Goal Outcome Evaluation:  Plan of Care Reviewed With: patient        Progress: improving  Outcome Evaluation: PT/OT CONT. TO WORK WITH PT. EXTERNAL CATH IN PLACE. PT. INCONTINENT O F STOOL. K+= 2.9  ORAL K+ PER ORDER. ROOM AIR. NO C/O'S PAIN VOICED. NO DISTRESS NOTED.

## 2025-06-21 NOTE — PROGRESS NOTES
[facility] Medicine Services  INPATIENT PROGRESS NOTE    Patient Name: Felix Bartlett Sr.  Date of Admission: 6/17/2025  Today's Date: 06/21/25  Length of Stay: 4  Primary Care Physician: Keyon Dockery MD    Subjective   Chief Complaint: f/u raúl    HPI   Patient seen resting in bed.  Continues to be about the same.  No new complaints.  On room air.  Denies any chest pain or shortness of breath.  Afebrile overnight.        Review of Systems   All pertinent negatives and positives are as above. All other systems have been reviewed and are negative unless otherwise stated.     Objective    Temp:  [98.1 °F (36.7 °C)-98.4 °F (36.9 °C)] 98.1 °F (36.7 °C)  Heart Rate:  [63-89] 72  Resp:  [16] 16  BP: (144-158)/(71-76) 155/71  Physical Exam  GEN: Awake, alert, interactive, in NAD, appears ill and frail  HEENT: EOMI, Anicteric, Trachea midline  Lungs:  no wheezing/rales/rhonchi  Heart: RRR, +S1/s2, no rub  ABD: soft, nt/nd, +BS, no guarding/rebound  Extremities: no peripheral edema or injuries noted  Skin: no petechiae  Neuro: resting tremor, TURK w/o obvious focal deficit  Psych: normal mood, somewhat flat affect        Results Review:  I have reviewed the labs, radiology results, and diagnostic studies.    Laboratory Data:   Results from last 7 days   Lab Units 06/21/25  0551 06/20/25  0338 06/19/25  1825   WBC 10*3/mm3 6.32 6.17 7.24   HEMOGLOBIN g/dL 8.0* 7.9* 8.0*   HEMATOCRIT % 23.4* 24.4* 24.1*   PLATELETS 10*3/mm3 53* 68* 72*        Results from last 7 days   Lab Units 06/21/25  0551 06/20/25  0338 06/19/25  0325   SODIUM mmol/L 142 143 146*   POTASSIUM mmol/L 2.9* 2.8* 2.8*   CHLORIDE mmol/L 111* 113* 114*   CO2 mmol/L 17.0* 17.0* 16.0*   BUN mg/dL 30.0* 35.9* 40.3*   CREATININE mg/dL 1.41* 1.81* 2.05*   CALCIUM mg/dL 7.3* 7.5* 7.6*   BILIRUBIN mg/dL 0.6 0.5 0.4   ALK PHOS U/L 86 80 81   ALT (SGPT) U/L 96* 82* 84*   AST (SGOT) U/L 87* 73* 87*   GLUCOSE mg/dL 163* 180* 138*  "      Culture Data:   No results found for: \"BLOODCX\", \"URINECX\", \"WOUNDCX\", \"MRSACX\", \"RESPCX\", \"STOOLCX\"    Radiology Data:   Imaging Results (Last 24 Hours)       ** No results found for the last 24 hours. **            I have reviewed the patient's current medications.     Assessment/Plan   Assessment  Active Hospital Problems    Diagnosis     **DANIEL (acute kidney injury)     Transaminitis, acute     Diarrhea of presumed infectious origin     Dermatitis neglecta     Iron deficiency     Hypokalemia     Tremor        Treatment Plan  #1 DANIEL -continues to improve with IV fluids..  GFR up to 51 with creatinine 1.4.  Presented at 20 and 3.01.  On May 5 he was 1.17 and 64.  No hydronephrosis seen on CT abdomen.  Patient with renal cysts and masses similar to prior.  Patient likely had some mild rhabdo.  CK was checked 6/19 and came back at 775.  Was not checked on arrival.  With ongoing fluids down to 142 today.  Given overall improvement will DC fluids and monitor with p.o. intake alone.  Recheck labs tomorrow.    #2 transaminitis -unclear etiology.  Was trending down with fluids and supportive care but now slightly up again today.  Was normal in May.  Denies any alcohol in 10 years.  Liver ultrasound nonacute.  Again possible mild rhabdomyolysis but that has improved and labs persist.  Patient does have thrombocytopenia as well.    #3 hypokalemia -continue to replace and monitor.    #4 anemia -H&H trended down initially with fluids but has stabilized.  No signs of active bleeding.  Continue to monitor..    #5 thrombocytopenia -unclear etiology.  Do not see obvious infection for consumption.  Again no reported liver history.  He is on Neurontin and Protonix both of which can cause this.  Have been holding meds with no improvement.  In fact platelet continues to trend down.  Given transaminitis and this we will consult hematology for their input.  Continue to monitor for any signs of bleeding or transfusion needs.    #6 " chronic tremor -has caregiver at home but only about 4 to 5 hours a day.  Lives at home with his spouse alone.  Spouse also hospitalized.  Therapies here are recommending placement.  Discussed potential for both he and his spouse was currently hospitalized as well to go to the same facility if able.   is aware.  Discussing with family.    #7 hypertension -has been tolerating metoprolol.  Blood pressure remains elevated however.  Losartan has been on hold due to DANIEL on arrival.  Will start back at low-dose 25 mg daily and monitor.  Patient was taking 100 mg at home.    Medical Decision Making  Number and Complexity of problems: 4 acute, multiple chronic  Differential Diagnosis: as above    Conditions and Status  Slowly improving.  Not yet at goal.     MDM Data  External documents reviewed: none  Cardiac tracing (EKG, telemetry) interpretation: none  Radiology interpretation: reports reviewed  Labs reviewed: as above  Any tests that were considered but not ordered: none     Decision rules/scores evaluated (example [id number], [place], etc): none     Discussed with: patient, nursing, social workers     Care Planning  Shared decision making: Patient apprised of current labs, vitals, imaging and treatment plan.  They are agreeable with proceeding with plans as discussed.    Code status and discussions: full code    Disposition  Social Determinants of Health that impact treatment or disposition: none  I expect the patient to be discharged to short-term rehab.  Patient is not safe to return home at this time.        Electronically signed by Blake Hernandez DO, 06/21/25, 09:13 CDT.

## 2025-06-22 PROBLEM — R50.9 FUO (FEVER OF UNKNOWN ORIGIN): Status: ACTIVE | Noted: 2025-01-01

## 2025-06-22 PROBLEM — I77.4 CELIAC ARTERY STENOSIS: Status: ACTIVE | Noted: 2025-01-01

## 2025-06-22 PROBLEM — J96.01 ACUTE HYPOXIC RESPIRATORY FAILURE: Status: ACTIVE | Noted: 2025-06-22

## 2025-06-22 PROBLEM — A41.9 SHOCK, SEPTIC: Status: ACTIVE | Noted: 2025-06-22

## 2025-06-22 PROBLEM — R65.21 SHOCK, SEPTIC: Status: ACTIVE | Noted: 2025-01-01

## 2025-06-22 PROBLEM — I46.9 CARDIOPULMONARY ARREST: Status: ACTIVE | Noted: 2025-01-01

## 2025-06-22 PROBLEM — R65.10 SIRS (SYSTEMIC INFLAMMATORY RESPONSE SYNDROME): Status: ACTIVE | Noted: 2025-06-22

## 2025-06-22 NOTE — PLAN OF CARE
Problem: Adult Inpatient Plan of Care  Goal: Plan of Care Review  Outcome: Progressing  Flowsheets (Taken 6/22/2025 0510)  Progress: declining  Outcome Evaluation: -133 (up to 180s). transferred to  around 3 am.  titrating diltiazem up to control HR.  used wedges, but patient knocks them out.  waiting on results from CT scan.

## 2025-06-22 NOTE — CONSULTS
GENERAL SURGERY CONSULTATION NOTE    Patient Name:  Felix Bartlett Sr.  YOB: 1950  MRN: [id number]    Patient Care Team:  Keyon Dockery MD as PCP - General (Family Medicine)  Bassam Rosario MD (Inactive) as PCP - Family Medicine  Felix Ramos MD as Consulting Physician (Urology)  Donnell Cordero DO as Consulting Physician (Gastroenterology)  Edie Vigil APRN as Nurse Practitioner (Pulmonary Disease)    Date of Consultation: 06/22/25    Consulting Physician: Andrew Ghotra MD    Reason for Consult: Abdominal distention, fever, altered mental status, respiratory failure    History of Present Illness  Felix Bartlett Sr. is a 75 y.o. male that I am seeing in consultation for abdominal distention in the setting of a fever and elevated lactic acid.  The patient also had only made 100 cc of urine today.  He was initially admitted for diarrhea and DANIEL.  His creatinine improved with fluid hydration.  Thrombocytopenia has been worked up by hematology.  He was waiting to be discharged to a skilled nursing facility when he became encephalopathic and febrile.  White count was normal but lactic acid spiked at 4.2 and came down to 3.7 today.  I was consulted for evaluation and recommended a stat CT angiogram to rule out mesenteric ischemia.  After the CT angiogram was done, I presented to bedside examine the patient. Exam on the floor showed focal tenderness to the right lower quadrant, however with his encephalopathy, an accurate exam was difficult to obtain.  I discussed this case with the hospitalist and intensivist, and recommended transfer to the ICU.  As soon as he had arrived to the intensive care unit, he underwent a cardiac arrest and was immediately intubated.     Allergies   No Known Allergies    Medications  [Held by provider] escitalopram, 20 mg, Oral, Daily  ferric gluconate, 250 mg, Intravenous, Daily  [Held by provider] gabapentin, 300 mg, Oral, Q12H  insulin regular,  2-7 Units, Subcutaneous, Q6H  [Held by provider] losartan, 25 mg, Oral, Q24H  [Held by provider] magnesium oxide, 400 mg, Oral, Daily  [Held by provider] metoprolol tartrate, 50 mg, Oral, Q12H  [Held by provider] pantoprazole, 40 mg, Oral, Q AM  piperacillin-tazobactam, 4.5 g, Intravenous, Q8H  [Held by provider] primidone, 50 mg, Oral, TID  [Held by provider] saccharomyces boulardii, 250 mg, Oral, BID  [Held by provider] tamsulosin, 0.4 mg, Oral, Daily      dexmedetomidine, 0.2-1.5 mcg/kg/hr  norepinephrine, 0.02-0.3 mcg/kg/min, Last Rate: 0.12 mcg/kg/min (06/22/25 1510)  propofol, 5-50 mcg/kg/min, Last Rate: 5 mcg/kg/min (06/22/25 1557)  sodium bicarbonate 8.4 % 150 mEq in dextrose (D5W) 5 % 1,000 mL infusion (greater than 100 mEq), 150 mEq, Last Rate: 150 mEq (06/22/25 1531)  vasopressin, 0.03 Units/min, Last Rate: 0.03 Units/min (06/22/25 1433)      No current facility-administered medications on file prior to encounter.     Current Outpatient Medications on File Prior to Encounter   Medication Sig    acetaminophen (TYLENOL) 650 MG 8 hr tablet Take 1 tablet by mouth 2 (Two) Times a Day. (Patient taking differently: Take 1 tablet by mouth 2 (Two) Times a Day. Patient taking three times daily)    calcium-vitamin D (OSCAL-500) 500-200 MG-UNIT per tablet Take 1 tablet by mouth Daily.    cholecalciferol (VITAMIN D3) 10 MCG (400 UNIT) tablet Take 1 tablet by mouth Daily.    diphenhydrAMINE (BENADRYL) 25 mg capsule Take 1 capsule by mouth 2 (Two) Times a Day. (Patient taking differently: Take 1 capsule by mouth 2 (Two) Times a Day. Patient taking 1 capsule 3 times daily with the tylenol.)    escitalopram (LEXAPRO) 20 MG tablet Take 1 tablet by mouth Daily.    ferrous sulfate 325 (65 FE) MG tablet Take 1 tablet by mouth Daily With Breakfast.    fluticasone (FLONASE) 50 MCG/ACT nasal spray Administer 2 sprays into the nostril(s) as directed by provider Daily.    gabapentin (NEURONTIN) 300 MG capsule Take 1 capsule by  mouth 3 (Three) Times a Day. (Patient taking differently: Take 1 capsule by mouth 3 (Three) Times a Day. Patient taking 1 capsule twice daily)    KETOTIFEN FUMARATE OP Apply 1 drop to eye(s) as directed by provider As Needed.    losartan (COZAAR) 100 MG tablet Take 1 tablet by mouth Daily.    metoprolol tartrate (LOPRESSOR) 50 MG tablet Take 1 tablet by mouth Every 12 (Twelve) Hours.    Multiple Vitamins-Minerals (CENTRUM SILVER 50+MEN) tablet Take 1 tablet by mouth Daily.    omeprazole (priLOSEC) 20 MG capsule 1 capsule 2 (Two) Times a Day.    potassium chloride (Klor-Con M10) 10 MEQ CR tablet Take 1 tablet by mouth 2 (Two) Times a Day. (Patient taking differently: Take 1 tablet by mouth 2 (Two) Times a Day. Patient taking once daily)    primidone (MYSOLINE) 50 MG tablet Take 1 tablet by mouth 3 (Three) Times a Day. .    simvastatin (ZOCOR) 40 MG tablet Take 1 tablet by mouth Daily.    tamsulosin (FLOMAX) 0.4 MG capsule 24 hr capsule Take 1 capsule by mouth Daily.    O2 (OXYGEN) Inhale 1 (One) Time.       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  tremor-familial, alcohol  Rx-reviewed  Cozaar 50 mg one half a day  LAB-next time hepatitis A, B, C         History of TIA (transient ischemic attack) 02/24/2017    No Problem List  5.25.10/age 60     7.1..14/7.1.14 - [facility] - Letter  L eye nonarteritic anterior ischemic neuropathy..  6.30.14/7.1.14 CBC With Differential/Platelet; Sedimentation Rate-Westergren; C-Reactive Protein, Quant==WBC 12.0; RBC 3.60; Hemoglobin 11.7; Hematocrit 34.4; Neutrophils (Absolute) 7.1; Lymphs (Absolute) 3.6  nothing major here..his problems are alcohol, smoking now o    HL (hearing loss)     Hyperlipidemia     Hypertension     Hypopotassemia-diuretic 02/16/2017    Oxygen dependent     at hs    Peripheral neuropathy     Renal cancer 08/2014    Lt RALPart'l Nx; t1a Clear Cell with negative margin    RUQ pain 07/23/2013    S/P laparoscopic cholecystectomy 09/04/2012    Wellness examination-done 02/16/2017    PROCEDURES:  Prostate exam/Rachel/confirmed/1.27.16     Colonoscopy-nl/Gil//7-28-10/5y  EGD/P/Gil/3.4.16  Colonoscopy-polyp/Gil//3.18.16/5 yr  EGD-neg/Gil//5.1.18     SURGERIES:  T&A  Appendix/1959  Scrotum-cyst/1980's  Lap gb+liver bx/Raya/[facility]/7.23.12  L robotic partial nephrectomy (clear cell grade 1 P4cH2D4)/Rachel//8.14.14  Jejunt-xqhtxihg-xunbl+mesh+omen/Micah//5.9.   ,   Past Surgical History:   Procedure Laterality Date    APPENDECTOMY      CHOLECYSTECTOMY      ENDOSCOPY N/A 05/01/2018    Procedure: ESOPHAGOGASTRODUODENOSCOPY WITH ANESTHESIA;  Surgeon: Donnell Cordero DO;  Location: [facility] ENDOSCOPY;  Service: Gastroenterology    HERNIA REPAIR      KIDNEY SURGERY      LAPAROSCOPIC PARTIAL NEPHRECTOMY Left 08/14/2014    Robot Assisted     Family History   Problem Relation Age of Onset    Colon cancer Father     Heart disease Mother     Colon polyps Neg Hx     Esophageal cancer Neg Hx      Social History     Tobacco Use    Smoking status: Every Day     Current packs/day: 0.50     Average packs/day: 0.5 packs/day for 59.1 years (29.6 ttl pk-yrs)     Types: Cigarettes     Start date: 4/30/1966     Passive exposure: Never    Smokeless tobacco: Never   Vaping Use    Vaping status: Never Used   Substance Use Topics    Alcohol use: No     Drug use: No       The patient lives in [place], [place]    Current Facility-Administered Medications:     acetaminophen (TYLENOL) tablet 650 mg, 650 mg, Oral, Q4H PRN **OR** [DISCONTINUED] acetaminophen (TYLENOL) 160 MG/5ML oral solution 650 mg, 650 mg, Oral, Q4H PRN **OR** acetaminophen (TYLENOL) suppository 650 mg, 650 mg, Rectal, Q4H PRN, Gabby Wilder APRN, 650 mg at 06/22/25 0824    calcium carbonate (TUMS) chewable tablet 500 mg (200 mg elemental), 2 tablet, Oral, TID PRN, Blake Hernandez DO, 2 tablet at 06/22/25 0100    dexmedetomidine (PRECEDEX) 400 mcg in 100 mL NS infusion, 0.2-1.5 mcg/kg/hr, Intravenous, Titrated, Charles Rogers APRN    dextrose (D50W) (25 g/50 mL) IV injection 25 g, 25 g, Intravenous, Q15 Min PRN, Blake Hernandez DO    dextrose (GLUTOSE) oral gel 15 g, 15 g, Oral, Q15 Min PRN, Blake Hernandez DO    [Held by provider] escitalopram (LEXAPRO) tablet 20 mg, 20 mg, Oral, Daily, Gabby Wilder APRN, 20 mg at 06/21/25 0850    ferric gluconate (FERRLECIT) 250 MG in sodium chloride 0.9% 250 mL IVPB, 250 mg, Intravenous, Daily, Caprice Cali MD, Last Rate: 125 mL/hr at 06/22/25 1233, 250 mg at 06/22/25 1233    [Held by provider] gabapentin (NEURONTIN) capsule 300 mg, 300 mg, Oral, Q12H, Gabby Wilder APRN, 300 mg at 06/19/25 0839    glucagon (GLUCAGEN) injection 1 mg, 1 mg, Intramuscular, Q15 Min PRN, Blake Hernandez DO    insulin regular (humuLIN R,novoLIN R) injection 2-7 Units, 2-7 Units, Subcutaneous, Q6H, Blake Hernandez DO, 2 Units at 06/22/25 1233    loperamide (IMODIUM) capsule 4 mg, 4 mg, Oral, 4x Daily PRN, Devin Jackson MD    [Held by provider] losartan (COZAAR) tablet 25 mg, 25 mg, Oral, Q24H, Blake Hernandez DO, 25 mg at 06/21/25 1555    [Held by provider] magnesium oxide (MAG-OX) tablet 400 mg, 400 mg, Oral, Daily, Blake Hernandez DO, 400 mg at 06/21/25 1149    [Held by provider] metoprolol tartrate (LOPRESSOR) tablet 50 mg, 50 mg, Oral,  Q12H, Gabby Wilder APRN, 50 mg at 06/21/25 2002    Morphine sulfate (PF) injection 2 mg, 2 mg, Intravenous, Q1H PRN, Charles Rogers APRN, 2 mg at 06/22/25 1530    norepinephrine (LEVOPHED) 8 mg in 250 mL NS infusion (premix), 0.02-0.3 mcg/kg/min, Intravenous, Titrated, Charles Rogers APRN, Last Rate: 26.1 mL/hr at 06/22/25 1510, 0.12 mcg/kg/min at 06/22/25 1510    ondansetron ODT (ZOFRAN-ODT) disintegrating tablet 4 mg, 4 mg, Oral, Q6H PRN **OR** ondansetron (ZOFRAN) injection 4 mg, 4 mg, Intravenous, Q6H PRN, Gabby Wilder APRN, 4 mg at 06/21/25 2017    [Held by provider] pantoprazole (PROTONIX) EC tablet 40 mg, 40 mg, Oral, Q AM, Gabby Wilder APRN, 40 mg at 06/19/25 0548    piperacillin-tazobactam (ZOSYN) 4.5 g IVPB in 100 mL NS MBP (CD), 4.5 g, Intravenous, Q8H, Charles Rogers APRN    [Held by provider] primidone (MYSOLINE) tablet 50 mg, 50 mg, Oral, TID, Gabby Wilder APRN, 50 mg at 06/21/25 2002    propofol (DIPRIVAN) infusion 10 mg/mL 100 mL, 5-50 mcg/kg/min, Intravenous, Titrated, Charles Rogers APRN, Last Rate: 3.48 mL/hr at 06/22/25 1557, 5 mcg/kg/min at 06/22/25 1557    [Held by provider] saccharomyces boulardii (FLORASTOR) capsule 250 mg, 250 mg, Oral, BID, Devin Jackson MD, 250 mg at 06/21/25 2002    sodium bicarbonate 8.4 % 150 mEq in dextrose (D5W) 5 % 1,000 mL infusion (greater than 100 mEq), 150 mEq, Intravenous, Continuous, Charles Rogers APRN, Last Rate: 125 mL/hr at 06/22/25 1531, 150 mEq at 06/22/25 1531    [COMPLETED] Insert Peripheral IV, , , Once **AND** sodium chloride 0.9 % flush 10 mL, 10 mL, Intravenous, PRN, Diana Kumar MD    [Held by provider] tamsulosin (FLOMAX) 24 hr capsule 0.4 mg, 0.4 mg, Oral, Daily, Gabby Wilder APRN, 0.4 mg at 06/21/25 0850    vasopressin (PITRESSIN) 20 units in 100 mL NS infusion, 0.03 Units/min, Intravenous, Continuous, Charles Rogers APRN, Last Rate: 9 mL/hr at 06/22/25 1433, 0.03 Units/min at  06/22/25 1433      Current Facility-Administered Medications:     acetaminophen (TYLENOL) tablet 650 mg, 650 mg, Oral, Q4H PRN **OR** [DISCONTINUED] acetaminophen (TYLENOL) 160 MG/5ML oral solution 650 mg, 650 mg, Oral, Q4H PRN **OR** acetaminophen (TYLENOL) suppository 650 mg, 650 mg, Rectal, Q4H PRN, Gabby Wilder APRN, 650 mg at 06/22/25 0824    calcium carbonate (TUMS) chewable tablet 500 mg (200 mg elemental), 2 tablet, Oral, TID PRN, Blake Hernandez DO, 2 tablet at 06/22/25 0100    dexmedetomidine (PRECEDEX) 400 mcg in 100 mL NS infusion, 0.2-1.5 mcg/kg/hr, Intravenous, Titrated, Charles Rogers APRN    dextrose (D50W) (25 g/50 mL) IV injection 25 g, 25 g, Intravenous, Q15 Min PRN, Blake Hernandez DO    dextrose (GLUTOSE) oral gel 15 g, 15 g, Oral, Q15 Min PRN, Blake Hernandez DO    [Held by provider] escitalopram (LEXAPRO) tablet 20 mg, 20 mg, Oral, Daily, Gabby Wilder APRN, 20 mg at 06/21/25 0850    ferric gluconate (FERRLECIT) 250 MG in sodium chloride 0.9% 250 mL IVPB, 250 mg, Intravenous, Daily, Caprice Cali MD, Last Rate: 125 mL/hr at 06/22/25 1233, 250 mg at 06/22/25 1233    [Held by provider] gabapentin (NEURONTIN) capsule 300 mg, 300 mg, Oral, Q12H, Gabby Wilder APRN, 300 mg at 06/19/25 0839    glucagon (GLUCAGEN) injection 1 mg, 1 mg, Intramuscular, Q15 Min PRN, Blake Hernandez DO    insulin regular (humuLIN R,novoLIN R) injection 2-7 Units, 2-7 Units, Subcutaneous, Q6H, Blake Hernandez DO, 2 Units at 06/22/25 1233    loperamide (IMODIUM) capsule 4 mg, 4 mg, Oral, 4x Daily PRN, Devin Jackson MD    [Held by provider] losartan (COZAAR) tablet 25 mg, 25 mg, Oral, Q24H, Blake Hernandez DO, 25 mg at 06/21/25 1555    [Held by provider] magnesium oxide (MAG-OX) tablet 400 mg, 400 mg, Oral, Daily, Blake Hernandez DO, 400 mg at 06/21/25 1149    [Held by provider] metoprolol tartrate (LOPRESSOR) tablet 50 mg, 50 mg, Oral, Q12H, Gabby Wilder, APRN, 50 mg at  06/21/25 2002    Morphine sulfate (PF) injection 2 mg, 2 mg, Intravenous, Q1H PRN, Charles Rogers APRN, 2 mg at 06/22/25 1530    norepinephrine (LEVOPHED) 8 mg in 250 mL NS infusion (premix), 0.02-0.3 mcg/kg/min, Intravenous, Titrated, Charles Rogers APRN, Last Rate: 26.1 mL/hr at 06/22/25 1510, 0.12 mcg/kg/min at 06/22/25 1510    ondansetron ODT (ZOFRAN-ODT) disintegrating tablet 4 mg, 4 mg, Oral, Q6H PRN **OR** ondansetron (ZOFRAN) injection 4 mg, 4 mg, Intravenous, Q6H PRN, Gabby Wilder APRN, 4 mg at 06/21/25 2017    [Held by provider] pantoprazole (PROTONIX) EC tablet 40 mg, 40 mg, Oral, Q AM, Gabby Wilder APRN, 40 mg at 06/19/25 0548    piperacillin-tazobactam (ZOSYN) 4.5 g IVPB in 100 mL NS MBP (CD), 4.5 g, Intravenous, Q8H, Charles Rogers APRN    [Held by provider] primidone (MYSOLINE) tablet 50 mg, 50 mg, Oral, TID, Gabby Wilder APRN, 50 mg at 06/21/25 2002    propofol (DIPRIVAN) infusion 10 mg/mL 100 mL, 5-50 mcg/kg/min, Intravenous, Titrated, Charles Rogers APRN, Last Rate: 3.48 mL/hr at 06/22/25 1557, 5 mcg/kg/min at 06/22/25 1557    [Held by provider] saccharomyces boulardii (FLORASTOR) capsule 250 mg, 250 mg, Oral, BID, Devin Jackson MD, 250 mg at 06/21/25 2002    sodium bicarbonate 8.4 % 150 mEq in dextrose (D5W) 5 % 1,000 mL infusion (greater than 100 mEq), 150 mEq, Intravenous, Continuous, Charles Rogers APRN, Last Rate: 125 mL/hr at 06/22/25 1531, 150 mEq at 06/22/25 1531    [COMPLETED] Insert Peripheral IV, , , Once **AND** sodium chloride 0.9 % flush 10 mL, 10 mL, Intravenous, PRN, Diana Kumar MD    [Held by provider] tamsulosin (FLOMAX) 24 hr capsule 0.4 mg, 0.4 mg, Oral, Daily, Gabby Wilder, RUBIO, 0.4 mg at 06/21/25 0850    vasopressin (PITRESSIN) 20 units in 100 mL NS infusion, 0.03 Units/min, Intravenous, Continuous, Charles Rogers APRN, Last Rate: 9 mL/hr at 06/22/25 1433, 0.03 Units/min at 06/22/25 1433    Review of Systems  A  "comprehensive 14 point review of systems was performed and is negative unless otherwise noted.     Vital Signs  /51   Pulse 110   Temp (!) 103 °F (39.4 °C) (Axillary)   Resp (!) 30   Ht 182.9 cm (72\")   Wt 116 kg (255 lb)   SpO2 (!) 88%   BMI 34.58 kg/m²   Body mass index is 34.58 kg/m².     Intake/Output Summary (Last 24 hours) at 6/22/2025 1605  Last data filed at 6/22/2025 0047  Gross per 24 hour   Intake 240 ml   Output 800 ml   Net -560 ml       Physical Exam  Constitutional:       Comments: Unhealthy appearing elderly male, encephalopathic and in distress.  Normal development, obese body habitus.   HENT:      Head: Normocephalic and atraumatic.      Right Ear: External ear normal.      Left Ear: External ear normal.      Ears:      Comments: Nonresponsive to verbal commands     Nose: Nose normal.      Comments: Nares patent, no septal deviation, no drainage     Mouth/Throat:      Comments: Airway patent, dentition intact, mucus membranes moist  Eyes:      Comments: Eyes open.  Not tracking.  External eyelids grossly normal, vision intact, no scleral icterus   Neck:      Comments: Trachea midline  Cardiovascular:      Rate and Rhythm: Normal rate.      Comments: Tachycardic.  Pulmonary:      Comments: Increased work of breathing and labored breathing effort, tachypneic  Abdominal:      Comments: Protuberant and distended, tympanic, exquisitely tender to palpation in the right lower quadrant, scarring in the midline periumbilical region and right lower quadrant without any significant protruding contents through the umbilical hernia   Genitourinary:     Penis: Normal.    Musculoskeletal:         General: Normal range of motion.      Cervical back: Normal range of motion.   Skin:     Comments: Pale and diaphoretic.  Skin color is consistent with ethnicity   Neurological:      Mental Status: He is disoriented.   Psychiatric:      Comments: Obtunded.  The patient does not have decision-making capacity. "         Results:  Labs:  I personally reviewed all pertinent labs.   Imaging: I personally reviewed all pertinent imaging studies.     ASSESSMENT AND PLAN    Active Hospital Problems    Diagnosis     **Shock, septic     Cardiac arrest     Acute hypoxic respiratory failure     Celiac artery stenosis     DANIEL (acute kidney injury)     Transaminitis, acute     Dermatitis neglecta     Iron deficiency     Hypokalemia     Tremor        Felix Bartlett Sr. is a 75 y.o. male with abdominal distention possibly due to an ileus or less likely a small bowel obstruction.  Etiology is unclear, but I suspect he could have ischemic bowel despite CT angiogram showing patent mesenteric vessels.  Also no evidence of malperfusion such as portal venous gas, pneumatosis or thickening of the small bowel or colon.  His clinical condition could be representative of nonocclusive mesenteric ischemia from hypovolemia as the patient appears profoundly dehydrated.  The patient is status post cardiac arrest with ROSC and a massive emesis with possible aspiration.  He is on Levophed and vasopressin with a lactic acid of 8.5.  He appears to have a mixed metabolic respiratory acidosis with a pH of 7.039.  Creatinine is now 3 with electrolyte abnormalities, bicarb 15 with a base deficit of 14, and white count has risen to 21,000 with a platelet count of 42,000.    If the cause for the patient's decompensation is nonocclusive mesenteric ischemia, he will require aggressive fluid resuscitation and correction of his acidosis.  Differential includes embolism or arterial occlusion from aortoiliac atherosclerotic disease, however CT angiogram abdomen and pelvis only showed a 70% stenosis of the proximal celiac artery and a 50% stenosis of the left common iliac artery without any occlusive disease within the remainder mesenteric vessels. I certainly do believe that the inciting event for his fever and altered mental status this morning was an aspiration  event, as the CT from overnight showed a distended and fluid filled stomach.  At this time, he is too unstable to proceed with surgery regardless of the likely intra-abdominal source.  If the family chooses to pursue aggressive measures I would recommend continuing supportive care, keeping on broad-spectrum IV antibiotic, and weaning pressors.  Obtain a type and screen and have platelets available in case the patient will need to undergo emergent surgical intervention.  General surgery will continue to follow.    I discussed the patient's findings and my recommendations with the patient and/or family, as well as the nursing staff and primary care team.    Wu Nelson MD, FACS  General Surgery  6/22/2025  16:05 CDT    Please note that portions of this note were completed with a voice recognition program.

## 2025-06-22 NOTE — CONSULTS
[facility] Intensivist Consult  Consults    Date of Admission: 6/17/2025  Date of Consult: 06/22/25    Primary Care Physician: Keyon Dockery MD  Referring Physician: Blake Hernandez DO  Chief Complaint/Reason for Consultation: Sepsis, Respiratory Failure    Subjective   Weakness - Generalized    75-year-old male with COPD, renal cell carcinoma status post partial nephrectomy, hypertension, presented to the emergency department at [facility] on 6/17/2025 with complaints of diarrhea and generalized weakness.  Admitted to hospitalist service with DANIEL, transaminitis and lactic acidosis.  Initially worked up for infectious etiology for his diarrhea which proved to be negative.  Was given IV fluid bolus and started on IV fluids.  Initially treated with Rocephin Flagyl prior to negative diarrhea workup.  Patient had gradual improvement with his DANIEL with IV fluids.  Diarrhea resolved.  Plans being made for patient to discharge to nursing rehab facility due to baseline debilitation and lack of home care.  Overnight on 6/21-6/22, patient became tachycardic, had metabolic encephalopathy.  Initially thought to be atrial flutter and started on Cardizem drip.  EKG proved sinus tach and Cardizem was stopped.  He became febrile with that temp 104.5 °F.  His abdomen became distended and painful.  Lactic acid was elevated.  CT of abdomen pelvis indicated possible ileus versus enteritis.  General surgery consulted recommended CTA of abdomen pelvis to rule out ischemia given exam.  Patient continued to have worsening mentation and respiratory status and was transferred to the ICU.  Shortly after arriving ICU, patient experienced cardiopulmonary arrest.  Became hypoxic, leading to bradycardia and ultimately asystole.  As soon as patient lost pulse, large amounts of gastric contents expelled from patient's mouth.  This appeared to be stool.  CPR initiated.  Able to achieve ROSC.  During  resuscitative event patient was intubated, central line placed.  The same stomach contents were noted to come out of the ET tube after intubation indicating aspiration.  Patient found to be profoundly acidotic on initial ABG.  Temporized with sodium bicarb.  Required initiation of vasopressors.  Discussed case with general surgeon, Dr. Nelson, shortly after successful resuscitation event.  Tentatively planning for emergent OR trip for ex lap.  Suspecting possible ischemic process.    Review of Systems   Otherwise complete ROS is negative except as mentioned above.    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  6.30.14/7.1.14 CBC With Differential/Platelet; Sedimentation Rate-Westergren; C-Reactive Protein, Quant==WBC 12.0; RBC 3.60; Hemoglobin 11.7; Hematocrit 34.4; Neutrophils (Absolute) 7.1; Lymphs (Absolute) 3.6  nothing major here..his problems are alcohol, smoking now o    HL (hearing loss)     Hyperlipidemia     Hypertension     Hypopotassemia-diuretic 02/16/2017    Oxygen  dependent     at     Peripheral neuropathy     Renal cancer 08/2014    Lt RALPart'l Nx; t1a Clear Cell with negative margin    RUQ pain 07/23/2013    S/P laparoscopic cholecystectomy 09/04/2012    Wellness examination-done 02/16/2017    PROCEDURES:  Prostate exam/Rachel/confirmed/1.27.16     Colonoscopy-nl/[place]//7-28-10/5y  EGD/P/[place]/3.4.16  Colonoscopy-polyp/[place]//3.18.16/5 yr  EGD-neg/[place]//5.1.18     SURGERIES:  T&A  Appendix/1959  Scrotum-cyst/1980's  Lap gb+liver bx/Raya/[facility]/7.23.12  L robotic partial nephrectomy (clear cell grade 1 [id number])/[place]//8.14.14  Avuasy-csbpanyy-nxjfg+mesh+omen/[place]//5.9.     Past Surgical History:  Past Surgical History:   Procedure Laterality Date    APPENDECTOMY      CHOLECYSTECTOMY      ENDOSCOPY N/A 05/01/2018    Procedure: ESOPHAGOGASTRODUODENOSCOPY WITH ANESTHESIA;  Surgeon: Donnell Cordero DO;  Location: [facility] ENDOSCOPY;  Service: Gastroenterology    HERNIA REPAIR      KIDNEY SURGERY      LAPAROSCOPIC PARTIAL NEPHRECTOMY Left 08/14/2014    Robot Assisted     Social History:  reports that he has been smoking cigarettes. He started smoking about 59 years ago. He has a 29.6 pack-year smoking history. He has never been exposed to tobacco smoke. He has never used smokeless tobacco. He reports that he does not drink alcohol and does not use drugs.    Family History: family history includes Colon cancer in his father; Heart disease in his mother.     Allergies: No Known Allergies  Medications: Scheduled Meds:[Held by provider] escitalopram, 20 mg, Oral, Daily  ferric gluconate, 250 mg, Intravenous, Daily  [Held by provider] gabapentin, 300 mg, Oral, Q12H  insulin regular, 2-7 Units, Subcutaneous, Q6H  lactated ringers, 2,000 mL, Intravenous, Once  [Held by provider] losartan, 25 mg, Oral, Q24H  [Held by provider] magnesium oxide, 400 mg, Oral, Daily  [Held by provider] metoprolol tartrate, 50 mg, Oral, Q12H  [Held by provider] pantoprazole, 40 mg,  Oral, Q AM  piperacillin-tazobactam, 4.5 g, Intravenous, Once  piperacillin-tazobactam, 4.5 g, Intravenous, Q8H  [Held by provider] primidone, 50 mg, Oral, TID  [Held by provider] saccharomyces boulardii, 250 mg, Oral, BID  [Held by provider] tamsulosin, 0.4 mg, Oral, Daily      Continuous Infusions:norepinephrine, 0.02-0.3 mcg/kg/min, Last Rate: 0.24 mcg/kg/min (06/22/25 1441)  sodium bicarbonate 8.4 % 150 mEq in dextrose (D5W) 5 % 1,000 mL infusion (greater than 100 mEq), 150 mEq  vasopressin, 0.03 Units/min, Last Rate: 0.03 Units/min (06/22/25 1433)      PRN Meds:.  acetaminophen **OR** [DISCONTINUED] acetaminophen **OR** acetaminophen    calcium carbonate    dextrose    dextrose    glucagon (human recombinant)    loperamide    ondansetron ODT **OR** ondansetron    [COMPLETED] Insert Peripheral IV **AND** sodium chloride    I have utilized all available immediate resources to obtain, update, or review the patient's current medications (including all prescriptions, over-the-counter products, herbals, cannabis/cannabidiol products, and vitamin/mineral/dietary (nutritional) supplements).     Objective   Objective      Physical Exam:   Temp:  [98.2 °F (36.8 °C)-103.5 °F (39.7 °C)] 103 °F (39.4 °C)  Heart Rate:  [107-150] 112  Resp:  [20-36] 30  BP: ()/() 140/51  FiO2 (%):  [100 %] 100 %  Physical Exam  Constitutional:       Appearance: He is obese. He is toxic-appearing.      Interventions: He is intubated.   Eyes:      Pupils: Pupils are equal, round, and reactive to light.   Cardiovascular:      Rate and Rhythm: Tachycardia present.      Pulses: Normal pulses.      Heart sounds: Normal heart sounds.      Comments: Sinus tachycardia with PACs per telemetry monitor  Pulmonary:      Effort: He is intubated.      Comments: Breath sounds coarse crackles, rhonchi bilaterally  Abdominal:      General: There is distension.      Comments: Tympanic to percussion, OG tube with dark brown aspirate   Skin:      General: Skin is warm.   Neurological:      Comments: Unresponsive, no gag or corneal reflex immediately post resuscitation         Result Review:  I have personally reviewed the results from the time of this admission to 6/22/2025 14:27 CDT and agree with these findings:  [x]  Laboratory list / accordion  [x]  Microbiology  [x]  Radiology  [x]  EKG/Telemetry   []  Cardiology/Vascular   []  Pathology  [x]  Old records  []  Other:      Assessment / Plan   Assessment:   Active Hospital Problems    Diagnosis     **Shock, septic     Cardiopulmonary arrest     Acute hypoxic respiratory failure     DANIEL (acute kidney injury)     Transaminitis, acute     Dermatitis neglecta     Iron deficiency     Hypokalemia     Tremor    75-year-old male with septic shock from suspected acute abdominal process, acute hypoxic respiratory failure, cardiopulmonary arrest with successful resuscitation, DANIEL, in CCU for critical care management.    Cardiopulmonary arrest  - Able to achieve ROSC after 3 mg epinephrine and several rounds of CPR  - Likely secondary to severe acidosis and hypoxemia  - Emergently intubated and CVL placed during resuscitation, see CPR note    Septic shock, Metabolic acidosis  - secondary to acute abdominal process, highly suspicious for ischemic bowel  - Given 2 L fluid bolus stat, started vasopressor support with norepinephrine and vasopressin  - Titrating pressors to keep MAP 65 or greater  - Treating systemic acidosis with sodium bicarb  - Reviewed CT imaging with Dr. Nelson with general surgery, if remains stable plan for emergent ex lap  - Starting on empiric Zosyn    Ileus vs SBO vs ischemic bowel  - NGT to LIS  - General surgery evaluating, possible emergent ex-lap     Acute hypoxic respiratory failure  - Emergently intubated during resuscitative event  - Initial ABG with combined metabolic and respiratory acidosis, increasing respiratory rate to increase minute ventilation to combat acidosis  - Noted to have  gastric contents in trachea during intubation, already empirically covered with Zosyn as above  - Trend ABG, adjust ventilator settings as indicated    DANIEL  - prerenal, secondary to malperfusion, from septic shock and cardiac arrest  - hydrating with IVF bolus, ensuring perfusion with pressors  - strict I&O  - serial metabolic panels  - consider nephrology consult if acidosis worsens to the point of considering RRT    Thrombocytopenia  - hematology evalauting cause  - follow their workup and recs  - transfuse for plt <10k, or <20k if bleeding    VTE: SCDs given thrombocytopenia  GI: start PPI  NTN: NPO, OGT to LIS  Abx: Zosyn  Lines/Tubes: ETT, CVL, arterial line, Bradley placed 6/22  CODE STATUS: Full resuscitation    Disposition: Critical care management    Total critical care time: 60 minutes     Due to a high probability of clinically significant, life threatening deterioration, the patient required my highest level of preparedness to intervene emergently and I personally spent this critical care time directly and personally managing the patient.      This critical care time included obtaining a history; examining the patient; pulse oximetry; ordering and review of studies; arranging urgent treatment with development of a management plan; evaluation of patient's response to treatment; frequent reassessment; and, discussions with other providers.     This critical care time was performed to assess and manage the high probability of imminent, life-threatening deterioration that could result in multi-organ failure. It was exclusive of separately billable procedures and treating other patients and teaching time.     Please see MDM section and the rest of the note for further information on patient assessment and treatment.     Part of this note may be an electronic transcription/translation of spoken language to printed text using the Dragon dictation system      Electronically signed by RUBIO Ruby on  6/22/2025 at 15:09 CDT

## 2025-06-22 NOTE — CONSULTS
[facility] Oncology/Hematology Services  CONSULT NOTE    PATIENT NAME:  Felix Bartlett Sr.  YOB: 1950  PATIENT MRN:  [id number]    Date of Admission:  6/17/2025  Consultation Date:  6/22/2025  Referring Provider: No ref. provider found    Subjective     Chief complaint: Diarrhea and generalized weakness    Reason for Consultation: Thrombocytopenia    History of present illness: Felix Bartlett Sr. 75 y.o. male with a past medical history of COPD, kidney cancer status post left partial nephrectomy, hypertension, hypokalemia, is being hospitalized after presenting with generalized weakness and diarrhea.    Upon presentation, he had an DANIEL with creatinine of 3.01 compared to baseline of normal, and mild transaminitis, WBC of 12, hemoglobin 9.4, platelet of 120; CT abdomen and pelvis without acute findings. For DANIEL, his creatinine continues to improve with IV fluid hydration.  As for his diarrhea evaluation, it is reassuring that C. difficile testing as well as comprehensive GI panel PCR came back all unremarkable.    Hematology is consulted for thrombocytopenia; it is of note that the patient's platelet count prior to this admission were WNL, last checked before this admission was on 5/5/2025 where platelets were 220; upon admission, platelets were 120, and was trending down gradually and it is 54k today.  It is of note that he has a degree of anemia prior to admission, but hemoglobin ranging 9-10, and it is almost the same now.  WBC is normal.      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  tremor-familial, alcohol  Rx-reviewed  Cozaar 50 mg one half a day  LAB-next time hepatitis A, B, C         History of TIA (transient ischemic attack) 02/24/2017    No Problem List  5.25.10/age 60     7.1..14/7.1.14 - [facility] - Letter L eye nonarteritic anterior ischemic neuropathy..  6.30.14/7.1.14 CBC With Differential/Platelet; Sedimentation Rate-Westergren; C-Reactive Protein, Quant==WBC 12.0; RBC 3.60; Hemoglobin 11.7; Hematocrit 34.4; Neutrophils (Absolute) 7.1; Lymphs (Absolute) 3.6  nothing major here..his problems are alcohol, smoking now o    HL (hearing loss)     Hyperlipidemia     Hypertension     Hypopotassemia-diuretic 02/16/2017    Oxygen dependent     at hs    Peripheral neuropathy     Renal cancer 08/2014    Lt RALPart'l Nx; t1a Clear Cell with negative margin    RUQ pain 07/23/2013    S/P laparoscopic cholecystectomy 09/04/2012    Wellness examination-done 02/16/2017    PROCEDURES:  Prostate exam/[place]/confirmed/1.27.16     Colonoscopy-nl/[facility]//7-28-10/5y  EGD/P/[facility]/3.4.16  Colonoscopy-polyp/[facility]//3.18.16/5 yr  EGD-neg/[facility]//5.1.18     SURGERIES:  T&A  Appendix/1959  Scrotum-cyst/1980's  Lap gb+liver bx/[facility]/7.23.12  L robotic partial nephrectomy (clear cell grade 1 [id number])/[place]//8.14.14  Pisdsp-oubzwanx-vjnhb+mesh+omen/[place]//5.9.     Prior Surgeries:  Past Surgical History:   Procedure Laterality Date    APPENDECTOMY      CHOLECYSTECTOMY      ENDOSCOPY N/A 05/01/2018    Procedure: ESOPHAGOGASTRODUODENOSCOPY WITH ANESTHESIA;  Surgeon: Donnell Cordero DO;  Location: [facility] ENDOSCOPY;  Service: Gastroenterology    HERNIA REPAIR      KIDNEY SURGERY      LAPAROSCOPIC PARTIAL NEPHRECTOMY Left 08/14/2014    Robot  Assisted        Allergies:Patient has no known allergies.  PTA Meds:  Medications Prior to Admission   Medication Sig Dispense Refill Last Dose/Taking    acetaminophen (TYLENOL) 650 MG 8 hr tablet Take 1 tablet by mouth 2 (Two) Times a Day. (Patient taking differently: Take 1 tablet by mouth 2 (Two) Times a Day. Patient taking three times daily)   6/16/2025    calcium-vitamin D (OSCAL-500) 500-200 MG-UNIT per tablet Take 1 tablet by mouth Daily.   6/16/2025    cholecalciferol (VITAMIN D3) 10 MCG (400 UNIT) tablet Take 1 tablet by mouth Daily.   6/16/2025    diphenhydrAMINE (BENADRYL) 25 mg capsule Take 1 capsule by mouth 2 (Two) Times a Day. (Patient taking differently: Take 1 capsule by mouth 2 (Two) Times a Day. Patient taking 1 capsule 3 times daily with the tylenol.)   Taking Differently    escitalopram (LEXAPRO) 20 MG tablet Take 1 tablet by mouth Daily. 90 tablet 3 Taking    ferrous sulfate 325 (65 FE) MG tablet Take 1 tablet by mouth Daily With Breakfast.   Taking    fluticasone (FLONASE) 50 MCG/ACT nasal spray Administer 2 sprays into the nostril(s) as directed by provider Daily.   Taking    gabapentin (NEURONTIN) 300 MG capsule Take 1 capsule by mouth 3 (Three) Times a Day. (Patient taking differently: Take 1 capsule by mouth 3 (Three) Times a Day. Patient taking 1 capsule twice daily) 270 capsule 0 Taking Differently    KETOTIFEN FUMARATE OP Apply 1 drop to eye(s) as directed by provider As Needed.   Taking As Needed    losartan (COZAAR) 100 MG tablet Take 1 tablet by mouth Daily. 90 tablet 1 Taking    metoprolol tartrate (LOPRESSOR) 50 MG tablet Take 1 tablet by mouth Every 12 (Twelve) Hours. 180 tablet 3 6/16/2025    Multiple Vitamins-Minerals (CENTRUM SILVER 50+MEN) tablet Take 1 tablet by mouth Daily.   Taking    omeprazole (priLOSEC) 20 MG capsule 1 capsule 2 (Two) Times a Day.   Taking    potassium chloride (Klor-Con M10) 10 MEQ CR tablet Take 1 tablet by mouth 2 (Two) Times a Day. (Patient taking  differently: Take 1 tablet by mouth 2 (Two) Times a Day. Patient taking once daily) 180 tablet 3 Taking Differently    primidone (MYSOLINE) 50 MG tablet Take 1 tablet by mouth 3 (Three) Times a Day. . 270 tablet 3 Taking    simvastatin (ZOCOR) 40 MG tablet Take 1 tablet by mouth Daily. 90 tablet 1 Taking    tamsulosin (FLOMAX) 0.4 MG capsule 24 hr capsule Take 1 capsule by mouth Daily. 90 capsule 3 6/16/2025    O2 (OXYGEN) Inhale 1 (One) Time.         Current Meds:   Current Facility-Administered Medications   Medication Dose Route Frequency Provider Last Rate Last Admin    acetaminophen (TYLENOL) tablet 650 mg  650 mg Oral Q4H PRN Gabby Wilder APRN        Or    acetaminophen (TYLENOL) suppository 650 mg  650 mg Rectal Q4H PRN Gabby Wilder APRN        calcium carbonate (TUMS) chewable tablet 500 mg (200 mg elemental)  2 tablet Oral TID PRN Blake Hernandez DO   2 tablet at 06/22/25 0100    dilTIAZem (CARDIZEM) 125 mg in 125 mL D5W infusion  5-15 mg/hr Intravenous Titrated Devin Jackson MD 15 mL/hr at 06/22/25 0654 15 mg/hr at 06/22/25 0654    escitalopram (LEXAPRO) tablet 20 mg  20 mg Oral Daily Gabby Wilder APRN   20 mg at 06/21/25 0850    [Held by provider] gabapentin (NEURONTIN) capsule 300 mg  300 mg Oral Q12H Gabby Wilder APRN   300 mg at 06/19/25 0839    loperamide (IMODIUM) capsule 4 mg  4 mg Oral 4x Daily PRN Devin Jackson MD        losartan (COZAAR) tablet 25 mg  25 mg Oral Q24H Blake Hernandez DO   25 mg at 06/21/25 1555    magnesium oxide (MAG-OX) tablet 400 mg  400 mg Oral Daily Blake Hernandez DO   400 mg at 06/21/25 1149    metoprolol tartrate (LOPRESSOR) tablet 50 mg  50 mg Oral Q12H Gabby Wilder APRN   50 mg at 06/21/25 2002    ondansetron ODT (ZOFRAN-ODT) disintegrating tablet 4 mg  4 mg Oral Q6H PRN Gabby Wilder APRN        Or    ondansetron (ZOFRAN) injection 4 mg  4 mg Intravenous Q6H PRN Gabby Wilder APRN   4 mg at 06/21/25  2017    [Held by provider] pantoprazole (PROTONIX) EC tablet 40 mg  40 mg Oral Q AM Gabby Wilder, APRN   40 mg at 06/19/25 0548    potassium chloride (KLOR-CON M20) CR tablet 40 mEq  40 mEq Oral Daily Blake Hernandez, DO   40 mEq at 06/21/25 0850    primidone (MYSOLINE) tablet 50 mg  50 mg Oral TID Gabby Wilder, APRN   50 mg at 06/21/25 2002    saccharomyces boulardii (FLORASTOR) capsule 250 mg  250 mg Oral BID Devin Jackson MD   250 mg at 06/21/25 2002    sodium chloride 0.9 % flush 10 mL  10 mL Intravenous PRN Diana Kumar MD        tamsulosin (FLOMAX) 24 hr capsule 0.4 mg  0.4 mg Oral Daily Gabby Wilder, APRN   0.4 mg at 06/21/25 0850     Family History:family history includes Colon cancer in his father; Heart disease in his mother.   Social History: reports that he has been smoking cigarettes. He started smoking about 59 years ago. He has a 29.6 pack-year smoking history. He has never been exposed to tobacco smoke. He has never used smokeless tobacco. He reports that he does not drink alcohol and does not use drugs.    Objective      Vital Signs   Temp:  [98.2 °F (36.8 °C)-98.4 °F (36.9 °C)] 98.2 °F (36.8 °C)  Heart Rate:  [] 140  Resp:  [18-36] 34  BP: (107-174)/() 113/62    GENERAL: Alert and oriented, no apparent distress  HEENT: No scleral icterus  NECK: Supple  HEART: Regular rate and rhythm  LUNGS: Clear to auscultation bilaterally  ABDOMEN: Soft, nontender, nondistended  EXTREMITIES: Symmetric  SKIN: No jaundice  PSYCHIATRIC: Full affect  NEUROLOGIC: Stable gait    Results from last 7 days   Lab Units 06/22/25  0225 06/21/25  0551 06/20/25  0338   WBC 10*3/mm3 8.65 6.32 6.17   HEMOGLOBIN g/dL 9.9* 8.0* 7.9*   HEMATOCRIT % 29.2* 23.4* 24.4*   PLATELETS 10*3/mm3 54* 53* 68*     Results from last 7 days   Lab Units 06/22/25  0225 06/21/25  0551 06/20/25  0338   SODIUM mmol/L 143 142 143   POTASSIUM mmol/L 3.4* 2.9* 2.8*   CHLORIDE mmol/L 112* 111* 113*   CO2 mmol/L  16.0* 17.0* 17.0*   BUN mg/dL 39.7* 30.0* 35.9*   CREATININE mg/dL 1.82* 1.41* 1.81*   CALCIUM mg/dL 7.8* 7.3* 7.5*   BILIRUBIN mg/dL 0.7 0.6 0.5   ALK PHOS U/L 90 86 80   ALT (SGPT) U/L 81* 96* 82*   AST (SGOT) U/L 58* 87* 73*   GLUCOSE mg/dL 200* 163* 180*     Assessment/Plan      ASSESSMENT:   DANIEL  Acute thrombocytopenia  Baseline anemia  Iron deficiency  Mild transaminitis    PLAN:  - Hematology is consulted for thrombocytopenia; it is of note that the patient's platelet count prior to this admission were WNL, last checked before this admission was on 5/5/2025 where platelets were 220; upon admission, platelets were 120, and was trending down gradually and it is 54k today.   - In the setting of acute diarrhea and DANIEL, we will plan to rule out microangiopathic hemolysis, namely HUS; I will plan to obtain haptoglobin level and review peripheral blood smear; given T. bili is normal makes hemolysis less likely.    > I reviewed the patient's peripheral blood smear, and there are no clumping, and no signs of overt schistocytosis, which makes hemolysis less likely.    > Fibrinogen level is normal, which makes DIC less likely.    - Thrombocytopenia could also be related to medication use, as upon presentation, patient received antibiotics of ceftriaxone and Flagyl initially but then they were discontinued due to normal C. difficile and PCR GI panel.  - Plan to replace iron with parenteral iron infusion: Ferrlecit to 50 mg daily x 5 doses.  - Transfuse platelets for platelet count of less than 10 with no bleeding, or less than 20 if there is bleeding.  - So far, unclear etiology for thrombocytopenia, but could be related to SIRS or other comorbidities that is also affecting patient's health, including transaminitis, DANIEL or others, which also may result and bone marrow suppression and ultimately thrombocytopenia.  - Continue to monitor for now.    I discussed the patients findings and my recommendations with patient and  nursing staff    Caprice Cali MD  06/22/25

## 2025-06-22 NOTE — PROGRESS NOTES
RT EQUIPMENT DEVICE RELATED - SKIN ASSESSMENT    Marko Score:  Marko Score: 17     RT Medical Equipment/Device:     ETT Izquierdo/Anchorfast    Skin Assessment:      Cheek:  Intact  Neck:  Intact  Lips:  Intact  Mouth:  Intact    Device Skin Pressure Protection:  Skin-to-device areas padded:  Anchorfast    Nurse Notification:  Kaleigh Romeo, RRT

## 2025-06-22 NOTE — PROCEDURES
Insert Arterial Line    Date/Time: 6/22/2025 1:55 PM    Performed by: Charles Rogers APRN  Authorized by: Charles Rogers APRN    Universal Protocol:     Emergent situation      Risks and benefits discussed: immediately following cardiopulmonary arrest.      Patient identity confirmed:  Anonymous protocol, patient vented/unresponsive    Time out: Immediately prior to the procedure a time out was called    A time out verifies correct patient, procedure, equipment, support staff and site/side marked as required:   Preparation:     Preparation: Patient was prepped and draped in usual sterile fashion    Indications:     Indications: multiple ABGs and hemodynamic monitoring    Location:     Location:  Left radial  Anesthesia:     Patient sedated: No    Procedure Details:     Jalil's test normal?: Yes      Needle gauge:  20    Seldinger technique: Seldinger technique used      Number of attempts:  1  Post-procedure:     Post-procedure:  Dressing applied    Post-procedure CMS:  Normal     patient tolerated the procedure well with no immediate complications

## 2025-06-22 NOTE — CODE DOCUMENTATION
I was in patient's room evaluating him on initial transfer ICU when he became acutely hypoxic with clear respiratory distress.  While obtaining necessary medications and equipment for emergent intubation and mechanical ventilation, patient's hypoxia worsened, became bradycardic and went asystole.  Chest compressions were initiated by myself at 1305 until further personnel could arrive.  Once a full complement of staff was available after initiating CODE BLUE response, we proceeded with endotracheal intubation after copious amounts of dark stomach contents were suctioned from patient's oropharynx.  Patient underwent 3 rounds of CPR and epinephrine, ultimately giving the last dose down ET tube as there was concern for lack of venous access with peripheral IVs.  ROSC was obtained at 1314.  Patient received sodium bicarb during resuscitation event.  Endotracheal intubation and central venous line insertion occurred in her cessation of vent as well.  See operative reports.

## 2025-06-22 NOTE — SIGNIFICANT NOTE
Notified earlier of HR in 120. Given Lopressor 5 mg IVP at 2152  Despite this HR has remained elevated and RR 30-40s. Visited with patient. He appears pale and tachypneic.  irregular. Abdomen distended and tense, mildly tender. Rales at bases.   EKG sinus tachy with PAC, twice, P over T.     Give Cardizem 5 mg IVP once.   Checked Abg, CXR. Compensated metabolic acidosis    Add CT abd/pelvis. BMP, CBC. Lactic acid  Transfer to  for telemetry and cardizem drip.   Anticoagulation to be decide bases on CT results.     Electronically signed by Devin Jackson MD, 06/22/25, 2:22 AM CDT.

## 2025-06-22 NOTE — PROCEDURES
Insert Central Line At Bedside    Date/Time: 6/22/2025 1:20 PM    Performed by: Charles Rogers APRN  Authorized by: Charles Rogers APRN  Consent: The procedure was performed in an emergent situation  Risks and benefits discussed: during cardiopulmonary arrest, undergoing CPR with lack of venous access.  Patient identity confirmed: anonymous protocol, patient vented/unresponsive  Indications: vascular access    Sedation:  Patient sedated: no    Preparation: skin prepped with 2% chlorhexidine  Skin prep agent dried: skin prep agent completely dried prior to procedure  Sterile barriers: all five maximum sterile barriers used - cap, mask, sterile gown, sterile gloves, and large sterile sheet  Hand hygiene: hand hygiene performed prior to central venous catheter insertion  Location details: right femoral  Patient position: flat  Catheter type: triple lumen  Catheter size: 7 Fr  Pre-procedure: landmarks identified  Ultrasound guidance: no  Number of attempts: 1  Successful placement: yes  Post-procedure: line sutured and dressing applied  Assessment: blood return through all ports and free fluid flow      Operative Report: The patient was prepped and draped in sterile fashion as above.  An 18-gauge thinwall needle was  placed into the right femoral vein.  Dark red blood aspirated.  Wire threaded without difficulty.  Needle removed.  A skin nick was made and tissue and blood vessel dilated.  Then a 7 Cameroonian, triple-lumen central venous catheter was passed over the wire into the right femoral vein.  Wire removed.  All ports aspirated and flushed.  Catheter sutured in place and sterile dressing applied.

## 2025-06-22 NOTE — PLAN OF CARE
Problem: Adult Inpatient Plan of Care  Goal: Plan of Care Review  Outcome: Progressing  Flowsheets (Taken 6/22/2025 0503)  Progress: declining  Outcome Evaluation: -133 (up to 180s). transferred to  around 3 am.  titrating diltiazem up to control HR.  used wedges, but patient knocks them out.  waiting on results from CT scan.

## 2025-06-22 NOTE — PROCEDURES
Intubation    Date/Time: 6/22/2025 1:07 PM    Performed by: Charles Rogers APRN  Authorized by: Charles Rogers APRN  Consent: The procedure was performed in an emergent situation  Risks and benefits discussed: patient in cardiopulmonary arrest undergoing CPR.  Indications: respiratory failure and airway protection  Intubation method: video-assisted  Patient status: unconscious  Preoxygenation: BVM  Pretreatment medications: none  Laryngoscope size: Mac 3  Tube size: 8.0 mm  Tube type: cuffed  Number of attempts: 1  Cords visualized: yes  Post-procedure assessment: ETCO2 monitor  Breath sounds: equal  Cuff inflated: yes  ETT to teeth: 25 cm  Tube secured with: ETT lemon  Chest x-ray interpreted by me.  Chest x-ray findings: endotracheal tube in appropriate position

## 2025-06-22 NOTE — NURSING NOTE
Pt A/Ox4. HR elevated throughout shift. Tele monitor in place with periodic a-fib rhythm. Hospitalist updated on pt condition throughout shift.  IV metoprolol and magnesium given per orders. Swelling in abdomen noted with increasing RR throughout shift. Slight expiratory wheeze noted. IV cardizem given per orders. ABG drawn. Labs drawn. Chest x-ray and abdominal CT complete. Decision made by Dr. Jackson to transfer pt to . Report given to Blanca CALHOUN.

## 2025-06-22 NOTE — PROGRESS NOTES
ADDENDUM #2:  Patient's mental status continues to be poor although he is still opening his eyes to voice and following commands. He is not answering questions. Appears confused. Blood pressures have been drifting in the wrong direction.  Still febrile.  Abdominal exam is becoming more distended and concerning.  Case again discussed with Dr. Nelson.  Was additionally been discussed with Dr. Ghotra of ICU team at bedside.  At this time due to worsening status will transition to the ICU for ongoing care. Plan to start zosyn.     Electronically signed by Blake Hernandez DO, 06/22/25, 12:38 PM CDT.        ADDENDUM:  XR was in fact nonacute as read by myself prior.  Confirmed by radiology.  Urine was noninfectious.  Respiratory viral swab also without organism.  Still no clear cause of his fever and worsening condition.    Case discussed with Dr. Nelson, recommends CTA abd/pelv to r/o ischemia and for additional evaluation given 2 non-con scans prior.  Patient does have slightly worsening creatinine today but GFR still up near 40 and he has been fluid bolus and started on fluids.  Currently he is becoming more critical with increasing risks of morbidity and mortality.  Feel current benefits to contrast imaging outweigh risks given overall worsening condition.    Electronically signed by Blake Hernandez DO, 06/22/25, 10:57 AM CDT.      [facility] Medicine Services  INPATIENT PROGRESS NOTE    Patient Name: Felix Bartlett Sr.  Date of Admission: 6/17/2025  Today's Date: 06/22/25  Length of Stay: 5  Primary Care Physician: Keyon Dockery MD    Subjective   Chief Complaint: f/u raúl    HPI   I was called to bedside early this morning.  Last evening patient apparently was having tachycardia and confusion with concern for a flutter so he was transferred to for been started on a Cardizem drip.  EKGs I can find from last evening appeared to be sinus tach to me.  On bedside telemetry currently  appears to be sinus tach.  I had an EKG done as well again which appears to be sinus tach.  He is also febrile up to 104.5 currently by rectal temperature.  Unclear source.  Patient is following commands and nonfocal but confused and not a reliable historian today.  Not directly answering my questions about any discomfort or things that are bothering him.        Review of Systems   Unable to fully assess due to patient factors    Objective    Temp:  [98.2 °F (36.8 °C)-103.5 °F (39.7 °C)] 103.5 °F (39.7 °C)  Heart Rate:  [] 140  Resp:  [18-36] 34  BP: (107-174)/() 108/54  Physical Exam  GEN: Patient is awake and alert but slower to answer questions tdoay and seems a little confused, he is following commands. Appears mildly distressed w/ increased RR  appears ill and frail  HEENT: EOMI, Anicteric, Trachea midline  Lungs:  increased RR/shallow breathing, no wheezing/rales/rhonchi  Heart: increased rate, regular rhythm, +S1/s2, no rub  ABD: soft but protuberant, slightly more distended than yesterday, some generalized tenderness on abdominal palpation, possibly more around umbilical hernia, +BS, no rebound  Extremities: no peripheral edema or injuries noted  Skin: no petechiae  Neuro: resting tremor, TURK w/o obvious focal deficit, good  b/l, moving feet/legs when asked  Psych:  flat affect        Results Review:  I have reviewed the labs, radiology results, and diagnostic studies.    Laboratory Data:   Results from last 7 days   Lab Units 06/22/25 0225 06/21/25 0551 06/20/25  0338   WBC 10*3/mm3 8.65 6.32 6.17   HEMOGLOBIN g/dL 9.9* 8.0* 7.9*   HEMATOCRIT % 29.2* 23.4* 24.4*   PLATELETS 10*3/mm3 54* 53* 68*        Results from last 7 days   Lab Units 06/22/25 0225 06/21/25 0551 06/20/25  0338   SODIUM mmol/L 143 142 143   POTASSIUM mmol/L 3.4* 2.9* 2.8*   CHLORIDE mmol/L 112* 111* 113*   CO2 mmol/L 16.0* 17.0* 17.0*   BUN mg/dL 39.7* 30.0* 35.9*   CREATININE mg/dL 1.82* 1.41* 1.81*   CALCIUM mg/dL  "7.8* 7.3* 7.5*   BILIRUBIN mg/dL 0.7 0.6 0.5   ALK PHOS U/L 90 86 80   ALT (SGPT) U/L 81* 96* 82*   AST (SGOT) U/L 58* 87* 73*   GLUCOSE mg/dL 200* 163* 180*       Culture Data:   No results found for: \"BLOODCX\", \"URINECX\", \"WOUNDCX\", \"MRSACX\", \"RESPCX\", \"STOOLCX\"    Radiology Data:   Imaging Results (Last 24 Hours)       Procedure Component Value Units Date/Time    CT Abdomen Pelvis Without Contrast - In process [[id number]] Resulted: 06/22/25 0239     Updated: 06/22/25 0243    This result has not been signed. Information might be incomplete.      XR Chest 1 View - In process [[id number]] Resulted: 06/22/25 0140     Updated: 06/22/25 0142    This result has not been signed. Information might be incomplete.              I have reviewed the patient's current medications.     Assessment/Plan   Assessment  Active Hospital Problems    Diagnosis     **DANIEL (acute kidney injury)     SIRS (systemic inflammatory response syndrome)     Transaminitis, acute     Dermatitis neglecta     Iron deficiency     Hypokalemia     Tremor        Treatment Plan  #1 SIRS/-patient has become tachycardic overnight with increasing lactic acid.  He is now febrile this morning 103.5 orally and up to 104.5 rectally.  Unclear etiology currently.  Was being treated for diarrhea earlier in the hospital stay that was not infectious with negative C. difficile and GI PCR.  CT abdomen pelvis was done last evening with stat rad showing some mild distention of bowel loops in liquid stool in the bowels.  Possible ileus/enteritis.  Chest x-ray was done as well which is not been read but on personal review appears to be normal without any infiltrates.  Encephalopathic on exam today not answering questions clearly to help locate etiology.  Draw blood cultures x 2.  Repeat lactic acid. Will do a straight cath for UA to reflex to culture.  Do a viral swab.  Tylenol and apply cool blankets to get temperature down.  Patient is tachypneic suspect that he may be " rapid/shallow breathing from combination of mild abdominal distention and likely metabolic acidosis.      #2 DANIEL -improving throughout stay on IV fluids.  She also had mild rhabdo on arrival due to elevated CK that trended down.  Not clear what his CK was on admission.  Was checked later.  IV fluids were stopped yesterday.  Patient had been drinking fine all day but creatinine is worsening again today.  Patient is altered and will not be able to take p.o.  Will resume IV fluids and monitor.    #3 transaminitis -unclear etiology.  Has trended down slightly and flattened off but still elevated.  Was normal in May.  Denies any alcohol in 10 years.  Liver ultrasound nonacute.  Again possible mild rhabdomyolysis but that has improved and labs persist.  Patient does have thrombocytopenia as well.  Inferior.  Concern for potential etiology.    #4 hypokalemia -add IV fluids.  Monitor.    #5 anemia -H&H trended down initially with fluids but has stabilized.  No signs of active bleeding.  Continue to monitor..    #6 thrombocytopenia -unclear etiology.  Titer but no signs for infection to be consumption or DIC.  Now having a fever but platelet count stable from yesterday.  Again no reported liver history.  He is on Neurontin and Protonix both of which can cause this.  Have been holding meds with no improvement/some worsening after holding.  Hematology consulted for eval.  Awaiting recs.    #7 chronic tremor -has caregiver at home but only about 4 to 5 hours a day.  Lives at home with his spouse alone.  Spouse also hospitalized.  Therapies here are recommending placement.  Discussed potential for both he and his spouse was currently hospitalized as well to go to the same facility if able.   is aware.  Discussing with family.    #8 hypertension -tolerating metoprolol, low-dose losartan added yesterday.  Both also be on hold today given NPO.  If pressures stay stable will add scheduled IV Lopressor.    #9 abdominal  distention/pain -patient does seem to have some palpable discomfort on exam.  Belly is still soft.  Awaiting official CT report but stat read states mild distention of bowel loops with possible ileus versus diarrheal illness.  He did have a BM earlier today per nursing report.  On exam he seems to have a possible umbilical hernia that is bothering him but on CT imaging review I do not see any bowel loops coming through the abdominal wall.  Given overall events we will ask general surgery to evaluate for recommendations.    Medical Decision Making  Number and Complexity of problems: 4 acute, multiple chronic  Differential Diagnosis: as above    Conditions and Status  Acutely worsening, not at goal     MDM Data  External documents reviewed: none  Cardiac tracing (EKG, telemetry) interpretation: none  Radiology interpretation: CXR personally reviewed w/o infiltrate. CT abd reviewed, do not see any bowel loops coming through abdominal wall. Distended bowel loops present w/ liquid contents.   Labs reviewed: as above  Any tests that were considered but not ordered: none     Decision rules/scores evaluated (example [id number], [place], etc): none     Discussed with: patient, nursing, social workers     Care Planning  Shared decision making: Patient apprised of current labs, vitals, imaging and treatment plan.  They are agreeable with proceeding with plans as discussed.    Code status and discussions: full code    Disposition  Social Determinants of Health that impact treatment or disposition: none  I expect the patient to be discharged to short-term rehab.  He is not worsening however.  Discharge not imminent given current condition.        Electronically signed by Blake Hernandez DO, 06/22/25, 08:04 CDT.

## 2025-06-22 NOTE — PROGRESS NOTES
[facility]: Piperacillin-Tazobactam Consult  Felix Bartlett Sr. is a 75 y.o. male who has been consulted to dose Piperacillin-tazobactam (Zosyn) for Intra-Abdominal Infection.    Current Antimicrobial Therapy:  piperacillin-tazobactam (ZOSYN) 4.5 g IVPB in 100 mL NS MBP (CD)    Relevant clinical data and objective history reviewed:  Wt: 116 kg (255 lb); BMI: Body mass index is 34.58 kg/m².    Temp Readings from Last 1 Encounters:   06/22/25 (!) 103 °F (39.4 °C) (Axillary)        Estimated Creatinine Clearance: 44.7 mL/min (A) (by C-G formula based on SCr of 1.82 mg/dL (H)).    Results from last 7 days   Lab Units 06/22/25  0225 06/21/25  0551 06/20/25  0338   CREATININE mg/dL 1.82* 1.41* 1.81*   WBC 10*3/mm3 8.65 6.32 6.17       Microbiology Culture Results:        Assessment/Plan:  Initiate Piperacillin-tazobactam 4.5 g IV extended infusion every 8 hours  Pharmacy will continue to monitor renal function, clinical status and culture results and adjust the dose and/or frequency as needed.    Remy Ramos, PharmD  6/22/2025  12:56 CDT

## 2025-06-23 NOTE — PROGRESS NOTES
[facility]ens[facility]  INPATIENT PROGRESS NOTE    Patient Name: Felix Bartlett Sr.  Date of Admission: 6/17/2025  Today's Date: 06/23/25  Length of Stay: 6  Primary Care Physician: Keyon Dockery MD    ICU Summary   75-year-old male with acute abdominal process (ileus versus SBO versus mesenteric ischemia), septic shock, acute hypoxic respiratory failure, ARDS, status post cardiopulmonary arrest, DANIEL, in CCU for critical care management.  Patient transferred to ICU on 6/22 after having acute onset metabolic encephalopathy, respiratory distress, and worsening abdominal exam.  Patient had cardiopulmonary arrest shortly after arrival to intensive care.  Able to achieve ROSC, large amount of gastric and bowel contents vomited upon cardiac arrest and likely large amount of aspiration.  Patient required maximal ventilator support for ARDS.  Requiring vasopressor support with vasopressin and norepinephrine.    Interval update:  6/23  Patient remains critically ill.  Multiple vasopressors.  Maximal ventilator settings.  Sedated, paralyzed.  Still hypercapnic and hypoxic on ABG with combined respiratory metabolic acidosis despite maximal support.  On empiric Zosyn for intraabdominal infection, aspiration PNA.     Review of Systems   All pertinent negatives and positives are as above. All other systems have been reviewed and are negative unless otherwise stated.     Objective    Temp:  [96.5 °F (35.8 °C)-103.3 °F (39.6 °C)] 98.7 °F (37.1 °C)  Heart Rate:  [] 124  Resp:  [30] 30  BP: ()/() 156/61  FiO2 (%):  [100 %] 100 %  Physical Exam  Nursing note reviewed.   Constitutional:       Appearance: He is morbidly obese. He is ill-appearing.      Interventions: He is sedated, chemically paralyzed and intubated.   Eyes:      Pupils: Pupils are equal, round, and reactive to light.   Cardiovascular:      Rate and Rhythm: Regular rhythm. Tachycardia present.      Pulses:  Normal pulses.      Heart sounds: Normal heart sounds.   Pulmonary:      Effort: He is intubated.      Comments: Breath sounds globally diminished, rhonchi bilaterally, expiratory wheezes  Abdominal:      Comments: Distended firm abdomen, OG tube to LIS with dark brown aspirate   Musculoskeletal:      Comments: Decreased muscle tone   Skin:     General: Skin is warm.   Neurological:      Comments: Sedated and paralyzed       Results Review:  Lab Results (last 24 hours)       Procedure Component Value Units Date/Time    POC Glucose Once [[id number]]  (Abnormal) Collected: 06/23/25 0705    Specimen: Blood Updated: 06/23/25 0715     Glucose 218 mg/dL      Comment: : [id number] [facility] NicolasMeter ID: [id number]       CBC & Differential [[id number]]  (Abnormal) Collected: 06/23/25 0306    Specimen: Blood Updated: 06/23/25 0356    Narrative:      The following orders were created for panel order CBC & Differential.  Procedure                               Abnormality         Status                     ---------                               -----------         ------                     CBC Auto Differential[[id number]]        Abnormal            Final result                 Please view results for these tests on the individual orders.    CBC Auto Differential [[id number]]  (Abnormal) Collected: 06/23/25 0306    Specimen: Blood Updated: 06/23/25 0356     WBC 8.64 10*3/mm3      RBC 2.52 10*6/mm3      Hemoglobin 8.5 g/dL      Hematocrit 25.6 %      .6 fL      MCH 33.7 pg      MCHC 33.2 g/dL      RDW 15.3 %      RDW-SD 56.6 fl      MPV 15.2 fL      Platelets 42 10*3/mm3     Manual Differential [[id number]]  (Abnormal) Collected: 06/23/25 0306    Specimen: Blood Updated: 06/23/25 0356     Neutrophil % 2.2 %      Lymphocyte % 61.3 %      Monocyte % 2.2 %      Eosinophil % 0.0 %      Basophil % 0.0 %      Bands %  17.2 %      Metamyelocyte % 9.7 %      Myelocyte % 6.5 %      Plasma Cells % 1.1 %      Neutrophils Absolute  1.67 10*3/mm3      Lymphocytes Absolute 5.30 10*3/mm3      Monocytes Absolute 0.19 10*3/mm3      Eosinophils Absolute 0.00 10*3/mm3      Basophils Absolute 0.00 10*3/mm3      nRBC 6.5 /100 WBC      Anisocytosis Slight/1+     Poikilocytes Slight/1+     Polychromasia Slight/1+     Platelet Estimate Decreased     Giant Platelets Large/3+    Blood Gas, Arterial - [[id number]]  (Abnormal) Collected: 06/23/25 0351    Specimen: Arterial Blood Updated: 06/23/25 0353     Site Arterial Line     Jalil's Test N/A     pH, Arterial 7.178 pH units      Comment: 85 Value below critical limit        pCO2, Arterial 55.0 mm Hg      Comment: 83 Value above reference range        pO2, Arterial 56.5 mm Hg      Comment: 84 Value below reference range        HCO3, Arterial 20.4 mmol/L      Base Excess, Arterial -7.8 mmol/L      Comment: 84 Value below reference range        O2 Saturation, Arterial 83.5 %      Comment: 84 Value below reference range        Temperature 37.0     Barometric Pressure for Blood Gas 756 mmHg      Modality Ventilator     FIO2 100 %      Ventilator Mode AC     Set Tidal Volume 500.000     Set Mech Resp Rate 30.0     PEEP 10.0     Notified By CHINO Reina CRT     Collected by [id number]     Comment: Meter: [id number]     :  CHINO Reina CRT        pCO2, Temperature Corrected 55.0 mm Hg      pH, Temp Corrected 7.178 pH Units      pO2, Temperature Corrected 56.5 mm Hg      PO2/FIO2 57    Magnesium [[id number]]  (Normal) Collected: 06/23/25 0306    Specimen: Blood Updated: 06/23/25 0341     Magnesium 2.2 mg/dL     Comprehensive Metabolic Panel [[id number]]  (Abnormal) Collected: 06/23/25 0306    Specimen: Blood Updated: 06/23/25 0341     Glucose 192 mg/dL      BUN 62.6 mg/dL      Creatinine 3.70 mg/dL      Sodium 147 mmol/L      Potassium 3.2 mmol/L      Chloride 108 mmol/L      CO2 19.0 mmol/L      Calcium 6.9 mg/dL      Total Protein 4.4 g/dL      Albumin 2.4 g/dL      ALT (SGPT) 379 U/L      AST (SGOT) 792 U/L       Alkaline Phosphatase 67 U/L      Total Bilirubin 1.3 mg/dL      Globulin 2.0 gm/dL      A/G Ratio 1.2 g/dL      BUN/Creatinine Ratio 16.9     Anion Gap 20.0 mmol/L      eGFR 16.1 mL/min/1.73     Narrative:      GFR Categories in Chronic Kidney Disease (CKD)              GFR Category          GFR (mL/min/1.73)    Interpretation  G1                    90 or greater        Normal or high (1)  G2                    60-89                Mild decrease (1)  G3a                   45-59                Mild to moderate decrease  G3b                   30-44                Moderate to severe decrease  G4                    15-29                Severe decrease  G5                    14 or less           Kidney failure    (1)In the absence of evidence of kidney disease, neither GFR category G1 or G2 fulfill the criteria for CKD.    eGFR calculation 2021 CKD-EPI creatinine equation, which does not include race as a factor    Lactic Acid, Plasma [[id number]]  (Abnormal) Collected: 06/23/25 0306    Specimen: Blood Updated: 06/23/25 0330     Lactate 5.6 mmol/L     POC Glucose Once [[id number]]  (Abnormal) Collected: 06/22/25 2358    Specimen: Blood Updated: 06/23/25 0009     Glucose 193 mg/dL      Comment: : [id number] [facility] NicolasMeter ID: [id number]       Folate [[id number]]  (Normal) Collected: 06/22/25 0926    Specimen: Blood Updated: 06/22/25 2111     Folate >20.00 ng/mL     Narrative:      Results may be falsely increased if patient taking Biotin.      Haptoglobin [[id number]]  (Normal) Collected: 06/22/25 0225    Specimen: Blood Updated: 06/22/25 2047     Haptoglobin 146 mg/dL     Lactic Acid, Plasma [[id number]]  (Abnormal) Collected: 06/22/25 1756    Specimen: Blood Updated: 06/22/25 1846     Lactate 2.9 mmol/L     Manual Differential [[id number]]  (Abnormal) Collected: 06/22/25 1756    Specimen: Blood Updated: 06/22/25 1840     Neutrophil % 21.4 %      Lymphocyte % 63.1 %      Monocyte % 0.0 %      Eosinophil % 0.0 %       Basophil % 0.0 %      Bands %  11.7 %      Metamyelocyte % 1.0 %      Myelocyte % 1.0 %      Atypical Lymphocyte % 1.0 %      Plasma Cells % 1.0 %      Neutrophils Absolute 2.66 10*3/mm3      Lymphocytes Absolute 5.16 10*3/mm3      Monocytes Absolute 0.00 10*3/mm3      Eosinophils Absolute 0.00 10*3/mm3      Basophils Absolute 0.00 10*3/mm3      nRBC 6.8 /100 WBC      Anisocytosis Slight/1+     Platelet Estimate Decreased     Giant Platelets Large/3+    CBC & Differential [[id number]]  (Abnormal) Collected: 06/22/25 1756    Specimen: Blood Updated: 06/22/25 1840    Narrative:      The following orders were created for panel order CBC & Differential.  Procedure                               Abnormality         Status                     ---------                               -----------         ------                     CBC Auto Differential[[id number]]        Abnormal            Final result                 Please view results for these tests on the individual orders.    CBC Auto Differential [[id number]]  (Abnormal) Collected: 06/22/25 1756    Specimen: Blood Updated: 06/22/25 1840     WBC 8.05 10*3/mm3      RBC 2.50 10*6/mm3      Hemoglobin 8.3 g/dL      Hematocrit 24.8 %      MCV 99.2 fL      MCH 33.2 pg      MCHC 33.5 g/dL      RDW 14.9 %      RDW-SD 54.8 fl      MPV 14.6 fL      Platelets 48 10*3/mm3     Comprehensive Metabolic Panel [[id number]]  (Abnormal) Collected: 06/22/25 1756    Specimen: Blood Updated: 06/22/25 1835     Glucose 219 mg/dL      BUN 58.1 mg/dL      Creatinine 3.10 mg/dL      Sodium 147 mmol/L      Potassium 4.0 mmol/L      Chloride 111 mmol/L      CO2 21.0 mmol/L      Calcium 7.2 mg/dL      Total Protein 4.9 g/dL      Albumin 2.6 g/dL      ALT (SGPT) 233 U/L      AST (SGOT) 467 U/L      Alkaline Phosphatase 79 U/L      Total Bilirubin 0.8 mg/dL      Globulin 2.3 gm/dL      A/G Ratio 1.1 g/dL      BUN/Creatinine Ratio 18.7     Anion Gap 15.0 mmol/L      eGFR 19.9 mL/min/1.73      Narrative:      GFR Categories in Chronic Kidney Disease (CKD)              GFR Category          GFR (mL/min/1.73)    Interpretation  G1                    90 or greater        Normal or high (1)  G2                    60-89                Mild decrease (1)  G3a                   45-59                Mild to moderate decrease  G3b                   30-44                Moderate to severe decrease  G4                    15-29                Severe decrease  G5                    14 or less           Kidney failure    (1)In the absence of evidence of kidney disease, neither GFR category G1 or G2 fulfill the criteria for CKD.    eGFR calculation 2021 CKD-EPI creatinine equation, which does not include race as a factor    Blood Gas, Arterial - [[id number]]  (Abnormal) Collected: 06/22/25 1821    Specimen: Arterial Blood Updated: 06/22/25 1826     Site Arterial Line     Jalil's Test N/A     pH, Arterial 7.169 pH units      Comment: 85 Value below critical limit        pCO2, Arterial 60.4 mm Hg      Comment: 83 Value above reference range        pO2, Arterial 62.9 mm Hg      Comment: 84 Value below reference range        HCO3, Arterial 22.0 mmol/L      Base Excess, Arterial -6.6 mmol/L      Comment: 84 Value below reference range        O2 Saturation, Arterial 85.4 %      Comment: 84 Value below reference range        Temperature 37.0     Barometric Pressure for Blood Gas 755 mmHg      Modality Ventilator     FIO2 100 %      Ventilator Mode AC     Set Tidal Volume 500.000     Set Mech Resp Rate 30.0     PEEP 10.0     Notified By MURALI Meyer CRT     Collected by [id number]     Comment: Meter: [id number]     :  MURALI Meyer CRT        pCO2, Temperature Corrected 60.4 mm Hg      pH, Temp Corrected 7.169 pH Units      pO2, Temperature Corrected 62.9 mm Hg      PO2/FIO2 63    Blood Gas, Arterial - [[id number]]  (Abnormal) Collected: 06/22/25 1608    Specimen: Arterial Blood Updated: 06/22/25 1610     Site Arterial Line      Jalil's Test N/A     pH, Arterial 7.117 pH units      Comment: 85 Value below critical limit        pCO2, Arterial 68.7 mm Hg      Comment: 86 Value above critical limit        pO2, Arterial 62.1 mm Hg      Comment: 84 Value below reference range        HCO3, Arterial 22.2 mmol/L      Base Excess, Arterial -7.2 mmol/L      Comment: 84 Value below reference range        O2 Saturation, Arterial 81.1 %      Comment: 84 Value below reference range        Temperature 37.0     Barometric Pressure for Blood Gas 756 mmHg      Modality Ventilator     FIO2 100 %      Ventilator Mode AC     Set Tidal Volume 500.000     Set Mech Resp Rate 28.0     PEEP 10.0     Notified By Talon Vang, RRT     Collected by [id number]     Comment: Meter: [id number]     :  Talon Vang RRT        pCO2, Temperature Corrected 68.7 mm Hg      pH, Temp Corrected 7.117 pH Units      pO2, Temperature Corrected 62.1 mm Hg      PO2/FIO2 62    CBC & Differential [[id number]]  (Abnormal) Collected: 06/22/25 1402    Specimen: Blood Updated: 06/22/25 1445    Narrative:      The following orders were created for panel order CBC & Differential.  Procedure                               Abnormality         Status                     ---------                               -----------         ------                     CBC Auto Differential[[id number]]        Abnormal            Final result                 Please view results for these tests on the individual orders.    CBC Auto Differential [[id number]]  (Abnormal) Collected: 06/22/25 1402    Specimen: Blood Updated: 06/22/25 1445     WBC 21.07 10*3/mm3      RBC 2.24 10*6/mm3      Hemoglobin 7.4 g/dL      Hematocrit 22.9 %      .2 fL      MCH 33.0 pg      MCHC 32.3 g/dL      RDW 14.7 %      RDW-SD 54.8 fl      Platelets 42 10*3/mm3     Manual Differential [[id number]]  (Abnormal) Collected: 06/22/25 1402    Specimen: Blood Updated: 06/22/25 1445     Neutrophil % 26.5 %      Lymphocyte % 60.2  %      Monocyte % 1.0 %      Eosinophil % 0.0 %      Basophil % 0.0 %      Bands %  5.1 %      Myelocyte % 1.0 %      Atypical Lymphocyte % 6.1 %      Neutrophils Absolute 6.67 10*3/mm3      Lymphocytes Absolute 13.97 10*3/mm3      Monocytes Absolute 0.21 10*3/mm3      Eosinophils Absolute 0.00 10*3/mm3      Basophils Absolute 0.00 10*3/mm3      nRBC 3.1 /100 WBC      Anisocytosis Slight/1+     Poikilocytes Mod/2+     Polychromasia Slight/1+     Smudge Cells Mod/2+     Vacuolated Neutrophils Slight/1+     Platelet Estimate Decreased    Comprehensive Metabolic Panel [[id number]]  (Abnormal) Collected: 06/22/25 1402    Specimen: Blood Updated: 06/22/25 1433     Glucose 250 mg/dL      BUN 54.2 mg/dL      Creatinine 3.09 mg/dL      Sodium 146 mmol/L      Potassium 4.4 mmol/L      Chloride 111 mmol/L      CO2 15.0 mmol/L      Calcium 7.2 mg/dL      Total Protein 4.7 g/dL      Albumin 2.6 g/dL      ALT (SGPT) 105 U/L      AST (SGOT) 140 U/L      Alkaline Phosphatase 68 U/L      Total Bilirubin 0.4 mg/dL      Globulin 2.1 gm/dL      A/G Ratio 1.2 g/dL      BUN/Creatinine Ratio 17.5     Anion Gap 20.0 mmol/L      eGFR 20.0 mL/min/1.73     Narrative:      GFR Categories in Chronic Kidney Disease (CKD)              GFR Category          GFR (mL/min/1.73)    Interpretation  G1                    90 or greater        Normal or high (1)  G2                    60-89                Mild decrease (1)  G3a                   45-59                Mild to moderate decrease  G3b                   30-44                Moderate to severe decrease  G4                    15-29                Severe decrease  G5                    14 or less           Kidney failure    (1)In the absence of evidence of kidney disease, neither GFR category G1 or G2 fulfill the criteria for CKD.    eGFR calculation 2021 CKD-EPI creatinine equation, which does not include race as a factor    STAT Lactic Acid, Reflex [[id number]]  (Abnormal) Collected: 06/22/25  1402    Specimen: Blood Updated: 06/22/25 1433     Lactate 8.5 mmol/L     Protime-INR [[id number]]  (Abnormal) Collected: 06/22/25 1402    Specimen: Blood Updated: 06/22/25 1420     Protime 21.3 Seconds      INR 1.74    aPTT [[id number]]  (Normal) Collected: 06/22/25 1402    Specimen: Blood Updated: 06/22/25 1420     PTT 35.8 seconds     Narrative:      PTT = The equivalent PTT values for the therapeutic range of heparin levels at 0.3 to 0.7 U/ml are 77 - 99 seconds.    Blood Gas, Arterial - [[id number]]  (Abnormal) Collected: 06/22/25 1409    Specimen: Arterial Blood Updated: 06/22/25 1410     Site Arterial Line     Jalil's Test N/A     pH, Arterial 7.039 pH units      Comment: 85 Value below critical limit        pCO2, Arterial 56.6 mm Hg      Comment: 83 Value above reference range        pO2, Arterial 133.0 mm Hg      Comment: 83 Value above reference range        HCO3, Arterial 15.3 mmol/L      Comment: 84 Value below reference range        Base Excess, Arterial -14.6 mmol/L      Comment: 84 Value below reference range        O2 Saturation, Arterial 97.3 %      Temperature 37.0     Barometric Pressure for Blood Gas 756 mmHg      Modality Ventilator     FIO2 100 %      Ventilator Mode AC     Set Tidal Volume 500.000     Set Mech Resp Rate 20.0     PEEP 5.0     Notified By Talon Vang RRT     Collected by [id number]     Comment: Meter: [id number]     :  Talon Vang RRT        pCO2, Temperature Corrected 56.6 mm Hg      pH, Temp Corrected 7.039 pH Units      pO2, Temperature Corrected 133 mm Hg      PO2/FIO2 133    Blood Gas, Arterial With Co-Ox [[id number]]  (Abnormal) Collected: 06/22/25 1302    Specimen: Arterial Blood Updated: 06/22/25 1304     Site Right Brachial     Jalil's Test N/A     pH, Arterial 7.109 pH units      Comment: 85 Value below critical limit        pCO2, Arterial 55.5 mm Hg      Comment: 83 Value above reference range        pO2, Arterial 55.3 mm Hg      Comment: 84 Value below  "reference range        HCO3, Arterial 17.6 mmol/L      Comment: 84 Value below reference range        Base Excess, Arterial -11.5 mmol/L      Comment: 84 Value below reference range        O2 Saturation, Arterial 74.6 %      Comment: 84 Value below reference range        Hemoglobin, Blood Gas 9.1 g/dL      Comment: 84 Value below reference range        Hematocrit, Blood Gas 27.9 %      Comment: 84 Value below reference range        Oxyhemoglobin 73.4 %      Comment: 84 Value below reference range        Methemoglobin 0.50 %      Carboxyhemoglobin 1.0 %      Temperature 37.0     Sodium, Arterial 145 mmol/L      Potassium, Arterial 4.5 mmol/L      Barometric Pressure for Blood Gas 756 mmHg      Modality Nasal Cannula     Flow Rate 6.0 lpm      Ventilator Mode NA     Notified Who FUAD ANRP     Notified By Talon Vang, RRT     Notified Time 06/22/2025 13:03     Collected by [id number]     Comment: Meter: [id number]     :  Talon Vang, RRT        pH, Temp Corrected 7.109 pH Units      pCO2, Temperature Corrected 55.5 mm Hg      pO2, Temperature Corrected 55.3 mm Hg     Procalcitonin [[id number]]  (Abnormal) Collected: 06/22/25 0225    Specimen: Blood Updated: 06/22/25 1301     Procalcitonin 1.24 ng/mL     Narrative:      As a Marker for Sepsis (Non-Neonates):    1. <0.5 ng/mL represents a low risk of severe sepsis and/or septic shock.  2. >2 ng/mL represents a high risk of severe sepsis and/or septic shock.    As a Marker for Lower Respiratory Tract Infections that require antibiotic therapy:    PCT on Admission    Antibiotic Therapy       6-12 Hrs later    >0.5                Strongly Recommended  >0.25 - <0.5        Recommended   0.1 - 0.25          Discouraged              Remeasure/reassess PCT  <0.1                Strongly Discouraged     Remeasure/reassess PCT    As 28 day mortality risk marker: \"Change in Procalcitonin Result\" (>80% or <=80%) if Day 0 (or Day 1) and Day 4 values are available. Refer " to http://www.[facility]-pct-calculator.com    Change in PCT <=80%  A decrease of PCT levels below or equal to 80% defines a positive change in PCT test result representing a higher risk for 28-day all-cause mortality of patients diagnosed with severe sepsis for septic shock.    Change in PCT >80%  A decrease of PCT levels of more than 80% defines a negative change in PCT result representing a lower risk for 28-day all-cause mortality of patients diagnosed with severe sepsis or septic shock.       POC Glucose Once [[id number]]  (Abnormal) Collected: 06/22/25 1207    Specimen: Blood Updated: 06/22/25 1231     Glucose 190 mg/dL      Comment: : [id number] [place] HeatherMeter ID: [id number]       Blood Culture - Blood, Arm, Right [[id number]] Collected: 06/22/25 0926    Specimen: Blood from Arm, Right Updated: 06/22/25 1026    Blood Culture - Blood, Arm, Left [[id number]] Collected: 06/22/25 0936    Specimen: Blood from Arm, Left Updated: 06/22/25 1025    STAT Lactic Acid, Reflex [[id number]]  (Abnormal) Collected: 06/22/25 0926    Specimen: Blood Updated: 06/22/25 1010     Lactate 3.7 mmol/L     Fibrinogen [[id number]]  (Normal) Collected: 06/22/25 0926    Specimen: Blood Updated: 06/22/25 1004     Fibrinogen 255 mg/dL     Respiratory Panel PCR w/COVID-19(SARS-CoV-2) [facility] In-House, NP Swab in UTM/VTM, 2 HR TAT - Swab, Nasopharynx [[id number]]  (Normal) Collected: 06/22/25 0813    Specimen: Swab from Nasopharynx Updated: 06/22/25 0932     ADENOVIRUS, PCR Not Detected     Coronavirus 229E Not Detected     Coronavirus HKU1 Not Detected     Coronavirus NL63 Not Detected     Coronavirus OC43 Not Detected     COVID19 Not Detected     Human Metapneumovirus Not Detected     Human Rhinovirus/Enterovirus Not Detected     Influenza A PCR Not Detected     Influenza B PCR Not Detected     Parainfluenza Virus 1 Not Detected     Parainfluenza Virus 2 Not Detected     Parainfluenza Virus 3 Not Detected     Parainfluenza  Virus 4 Not Detected     RSV, PCR Not Detected     Bordetella pertussis pcr Not Detected     Bordetella parapertussis PCR Not Detected     Chlamydophila pneumoniae PCR Not Detected     Mycoplasma pneumo by PCR Not Detected    Narrative:      In the setting of a positive respiratory panel with a viral infection PLUS a negative procalcitonin without other underlying concern for bacterial infection, consider observing off antibiotics or discontinuation of antibiotics and continue supportive care. If the respiratory panel is positive for atypical bacterial infection (Bordetella pertussis, Chlamydophila pneumoniae, or Mycoplasma pneumoniae), consider antibiotic de-escalation to target atypical bacterial infection.    Urinalysis With Culture If Indicated - Straight Cath [[id number]]  (Abnormal) Collected: 06/22/25 0835    Specimen: Urine from Straight Cath Updated: 06/22/25 0915     Color, UA Yellow     Appearance, UA Clear     pH, UA 5.5     Specific Gravity, UA 1.015     Glucose, UA Negative     Ketones, UA Negative     Bilirubin, UA Negative     Blood, UA Moderate (2+)     Protein, UA 30 mg/dL (1+)     Leuk Esterase, UA Negative     Nitrite, UA Negative     Urobilinogen, UA 0.2 E.U./dL    Narrative:      In absence of clinical symptoms, the presence of pyuria, bacteria, and/or nitrites on the urinalysis result does not correlate with infection.    Urinalysis, Microscopic Only - Straight Cath [[id number]]  (Abnormal) Collected: 06/22/25 0835    Specimen: Urine from Straight Cath Updated: 06/22/25 0915     RBC, UA 11-20 /HPF      WBC, UA 0-2 /HPF      Comment: Urine culture not indicated.        Bacteria, UA None Seen /HPF      Squamous Epithelial Cells, UA 0-2 /HPF      Hyaline Casts, UA 0-2 /LPF      Methodology Automated Microscopy    Peripheral Blood Smear [[id number]] Collected: 06/22/25 0225    Specimen: Blood Updated: 06/22/25 0835    POC Glucose Once [[id number]]  (Abnormal) Collected: 06/22/25 0808    Specimen:  Blood Updated: 06/22/25 0829     Glucose 202 mg/dL      Comment: : [email or web address] TovaraMeter ID: [id number]       Lactate Dehydrogenase [[id number]]  (Abnormal) Collected: 06/22/25 0225    Specimen: Blood Updated: 06/22/25 0813      U/L              XR Chest 1 View  Result Date: 6/23/2025  1. No change in cardiopulmonary status since the previous study. 2. The endotracheal tube in place. The distal end of the gastric tube is not included in the study.   This report was signed and finalized on 6/23/2025 6:35 AM by Dr. Meg Jaquez MD.      XR Abdomen KUB  Result Date: 6/22/2025   1. NG tube tip at the gastric fundus.  This report was signed and finalized on 6/22/2025 2:08 PM by Dilan Sykes.      XR Chest 1 View  Result Date: 6/22/2025   1. ET tube tip above the marky. NG tube coiled in the neck. 2. Bilateral central opacities worrisome for edema.  This report was signed and finalized on 6/22/2025 2:07 PM by Dilan Sykes.      CT Angiogram Abdomen Pelvis  Result Date: 6/22/2025  1. Atherosclerosis of the abdominal aorta and branches with greater than 70% stenosis at the proximal celiac artery and 50% stenosis at the left common iliac artery. No occlusion is seen. 2. Stable prominent air and fluid-filled loops of stomach, small bowel, and colon without obvious transition point. No focal wall thickening or edema of the mesentery is seen.  This report was signed and finalized on 6/22/2025 12:26 PM by Dilan Sykes.      XR Chest 1 View  Result Date: 6/22/2025   1. No active disease in the chest.  This report was signed and finalized on 6/22/2025 8:08 AM by Dilan Sykes.      CT Abdomen Pelvis Without Contrast  Result Date: 6/22/2025  1. There are multiple prominent fluid and air-filled loops of small bowel and colon and also distention of the stomach without transition point or inflammatory change of the mesentery. This may represent an ileus. 2. Stable right renal cyst and left  renal angiomyolipoma. 3. Stable left adrenal nodularity. 4. Prostatic hypertrophy. 5. Inguinal hernias containing fat.  These findings are in agreement with the critical and emergent findings from the [facility] preliminary report.   This report was signed and finalized on 6/22/2025 8:03 AM by Dilan Sykes.        Result Review:  I have personally reviewed the results from the time of this admission to 6/23/2025 08:10 CDT and agree with these findings:  [x]  Laboratory list / accordion  [x]  Microbiology  [x]  Radiology  [x]  EKG/Telemetry   []  Cardiology/Vascular   []  Pathology  []  Old records  []  Other:    I have reviewed the patient's current medications.     Assessment/Plan   Assessment  Active Hospital Problems    Diagnosis     **Shock, septic     Cardiac arrest     Acute hypoxic respiratory failure     Celiac artery stenosis     DANIEL (acute kidney injury)     Transaminitis, acute     Dermatitis neglecta     Iron deficiency     Hypokalemia     Tremor    75-year-old male with septic shock from acute abdominal process, acute hypoxic respiratory failure, ARDS, aspiration pneumonia, cardiopulmonary arrest with successful resuscitation, DANIEL, in CCU for critical care management.     Cardiopulmonary arrest  - On 6/22 for approximately 10 minutes, hypoxic induced bradycardia leading to asystole, ROSC after 3 mg epinephrine and several rounds of CPR  - See code note     Septic shock, Metabolic acidosis  - secondary to acute abdominal process, ileus versus SBO, possible nonocclusive mesenteric ischemia  - Continue require multiple vasopressors, norepinephrine vasopressin  - Titrating pressors to keep MAP 65 or greater  - Treating systemic acidosis with sodium bicarb infusion  - Aspiration pneumonia/pneumonitis likely at this time as well contributing to further sepsis  - Continue empiric Zosyn, start empiric vancomycin today while awaiting MRSA PCR  - Further fluid volume resuscitation as indicated, received 2.5 L of  LR plus sodium bicarb infusion since arrival to ICU  -Cultures from morning of 6/22 pending, will order repeat blood cultures and respiratory culture today since large aspiration and cardiac arrest event  -Sepsis dose steroids     Ileus vs SBO vs ischemic bowel  - Continue NGT to LIS, 2.5 L of output since placing NG tube  - General surgery evaluating, patient too unstable for OR or further imaging  - Follow their recs   - Continue empiric antibiotics as above    Acute hypoxic respiratory failure, ARDS, aspiration pneumonia/pneumonitis  - Emergently intubated during resuscitative event, bowel and gastric contents noted in airway  - Has remained hypoxic and hypercarbic despite maximal ventilator support, currently on 10 of PEEP and high percent FiO2  - Will increase PEEP today to 12  - Utilize sedation and neuromuscular blockade for maximal ventilator compliance and recruiting  - Trend ABGs, adjust ventilator settings as indicated  - Antibiotics as above  -Scheduled DuoNeb, Pulmicort     DANIEL  - prerenal, secondary to malperfusion, from septic shock and cardiac arrest  - hydrating with IVF bolus, ensuring perfusion with pressors  - strict I&O  - serial metabolic panels  - consider nephrology consult if acidosis worsens to the point of considering RRT     Thrombocytopenia  - hematology evalauting cause  - follow their workup and recs  - transfuse for plt <10k, or <20k if bleeding     VTE: SCDs given thrombocytopenia  GI: PPI  NTN: NPO, OGT to LIS  Abx: Zosyn, vancomycin  Lines/Tubes: ETT, CVL, arterial line, Bradley placed 6/22  CODE STATUS: Full resuscitation    Disposition: Poor prognosis at this time given prolonged hypoxia, acidosis.  Continue critical care management.  Daughter to arrive at hospital today and will have further discussion of patient's clinical course and establish goals of care.    Total critical care time: 50 minutes     Due to a high probability of clinically significant, life threatening  deterioration, the patient required my highest level of preparedness to intervene emergently and I personally spent this critical care time directly and personally managing the patient.      This critical care time included obtaining a history; examining the patient; pulse oximetry; ordering and review of studies; arranging urgent treatment with development of a management plan; evaluation of patient's response to treatment; frequent reassessment; and, discussions with other providers.     This critical care time was performed to assess and manage the high probability of imminent, life-threatening deterioration that could result in multi-organ failure. It was exclusive of separately billable procedures and treating other patients and teaching time.     Please see MDM section and the rest of the note for further information on patient assessment and treatment.     Part of this note may be an electronic transcription/translation of spoken language to printed text using the Dragon dictation system      Electronically signed by RUBIO Ruby on 6/23/2025 at 08:48 CDT

## 2025-06-23 NOTE — PLAN OF CARE
Pt stable on current vent settings. No changes made at this time. Will work towards liberation as pt status improves.   Goal Outcome Evaluation:   Problem: Mechanical Ventilation Invasive  Goal: Effective Communication  Outcome: Progressing  Goal: Optimal Device Function  Outcome: Progressing  Goal: Mechanical Ventilation Liberation  Outcome: Progressing  Goal: Optimal Nutrition Delivery  Outcome: Progressing  Goal: Absence of Device-Related Skin and Tissue Injury  Outcome: Progressing  Goal: Absence of Ventilator-Induced Lung Injury  Outcome: Progressing  Goal: Effective Communication  Outcome: Progressing  Goal: Optimal Device Function  Outcome: Progressing  Goal: Mechanical Ventilation Liberation  Outcome: Progressing  Goal: Optimal Nutrition Delivery  Outcome: Progressing  Goal: Absence of Device-Related Skin and Tissue Injury  Outcome: Progressing  Goal: Absence of Ventilator-Induced Lung Injury  Outcome: Progressing

## 2025-06-23 NOTE — PLAN OF CARE
Goal Outcome Evaluation:  Plan of Care Reviewed With: caregiver      Progress: declining     Anticipated Discharge Disposition (SLP): unknown    Patient transferred to CCU and is now on mechanical ventilation. ST will Re-evaluate swallow ability as indicated, Please re-consult as indicated.    Luci Dunn, SLP 6/23/2025 10:53 CDT

## 2025-06-23 NOTE — PLAN OF CARE
Goal Outcome Evaluation:  Problem: Mechanical Ventilation Invasive  Goal: Effective Communication  Outcome: Progressing  Goal: Optimal Device Function  Outcome: Progressing  Intervention: Optimize Device Care and Function  Recent Flowsheet Documentation  Taken 6/23/2025 0602 by Robert Guevara CRT  Airway/Ventilation Management:   airway patency maintained   oxygen therapy provided   positive pressure ventilation provided   pulmonary hygiene promoted  Airway Safety Measures:   mask valve resuscitator at bedside   manual resuscitator/mask at bedside   in-line pressure limit valve   oxygen flowmeter at bedside   suction at bedside  Goal: Mechanical Ventilation Liberation  Outcome: Not Progressing  Goal: Absence of Device-Related Skin and Tissue Injury  Outcome: Progressing  Goal: Absence of Ventilator-Induced Lung Injury  Outcome: Progressing  Intervention: Prevent Ventilator-Associated Pneumonia  Recent Flowsheet Documentation  Taken 6/23/2025 0602 by Robert Guevara CRT  Head of Bed (HOB) Positioning: HOB at 30-45 degrees  Goal: Optimal Device Function  Intervention: Optimize Device Care and Function  Recent Flowsheet Documentation  Taken 6/23/2025 0602 by Robert Guevara CRT  Airway/Ventilation Management:   airway patency maintained   oxygen therapy provided   positive pressure ventilation provided   pulmonary hygiene promoted  Airway Safety Measures:   mask valve resuscitator at bedside   manual resuscitator/mask at bedside   in-line pressure limit valve   oxygen flowmeter at bedside   suction at bedside  Goal: Absence of Ventilator-Induced Lung Injury  Intervention: Prevent Ventilator-Associated Pneumonia  Recent Flowsheet Documentation  Taken 6/23/2025 0602 by Robert Guevara, CRT  Head of Bed (HOB) Positioning: HOB at 30-45 degrees

## 2025-06-23 NOTE — PROGRESS NOTES
[facility] Progress Note    Progress Note    Patient:  Felix Bartlett Sr.  YOB: 1950  Date of Service: 6/23/2025  MRN: [id number]   Acct: [id number]   Primary Care Physician: Keyon Dockery MD  Advance Directive:   Code Status and Medical Interventions: CPR (Attempt to Resuscitate); Full Support   Ordered at: 06/17/25 1523     Code Status (Patient has no pulse and is not breathing):    CPR (Attempt to Resuscitate)     Medical Interventions (Patient has pulse or is breathing):    Full Support     Admit Date: 6/17/2025       Hospital Day: 6      Subjective:     Chief Compliant: Unable to obtain from patient    Subjective: Unable to obtain from patient due to sedation, intubation and mechanical ventilation.  CCU nurse Bobby CALHOUN states that patient is unresponsive and with fixed dilated pupils in spite of being off sedation.  Remains on pressor support.  No bleeding or increased bruising noted by nursing staff.    Interval history:  Felix Bartlett Sr. 75 y.o. male with a past medical history of COPD, kidney cancer status post left partial nephrectomy, hypertension, hypokalemia, is being hospitalized after presenting with generalized weakness and diarrhea.     Upon presentation, he had an DANIEL with creatinine of 3.01 compared to baseline of normal, and mild transaminitis, WBC of 12, hemoglobin 9.4, platelet of 120; CT abdomen and pelvis without acute findings. For DANIEL, his creatinine continues to improve with IV fluid hydration.  As for his diarrhea evaluation, it is reassuring that C. difficile testing as well as comprehensive GI panel PCR came back all unremarkable.     Hematology is consulted for thrombocytopenia; it is of note that the patient's platelet count prior to this admission were WNL, last checked before this admission was on 5/5/2025 where platelets were 220; upon admission, platelets were 120, and was trending down gradually and it is 54k today.  It is of note that he has a degree of  "anemia prior to admission, but hemoglobin ranging 9-10, and it is almost the same now.  WBC is normal.    Review of Systems  Review of Systems:   Unobtainable from patient.  CCU nursing staff notes patient is \"very ill\".  Unresponsive in spite of being off pressors.    Vitals: /61   Pulse (!) 124   Temp 98.7 °F (37.1 °C) (Rectal)   Resp (!) 30   Ht 182.9 cm (72\")   Wt 131 kg (288 lb 2.3 oz)   SpO2 (!) 87%   BMI 39.08 kg/m²     Physical Exam  Physical Exam:  General appearance: Unresponsive.  Chronically ill-appearing.  Skin:  Skin color pale.  No rashes or lesions  HEENT: Endotracheally intubated.  Neck:  no adenopathy, no tenderness/mass/nodules  Lungs: Ventilator breath sounds  Heart: Regular tachycardia   Abdomen: Obese  Extremities: Symmetrical anasarca  Neurologic: Unresponsive.  Pupils dilated/fixed    24HR INTAKE/OUTPUT:    Intake/Output Summary (Last 24 hours) at 6/23/2025 0811  Last data filed at 6/23/2025 0755  Gross per 24 hour   Intake 5364.44 ml   Output 2675 ml   Net 2689.44 ml        Bradley Catheter: Yes    Diet: NPO Diet NPO Type: Strict NPO      Medications:   Scheduled Meds:artificial tears, , Both Eyes, Q4H  [Held by provider] escitalopram, 20 mg, Oral, Daily  ferric gluconate, 250 mg, Intravenous, Daily  [Held by provider] gabapentin, 300 mg, Oral, Q12H  hydrocortisone sodium succinate, 50 mg, Intravenous, Q6H  insulin regular, 2-7 Units, Subcutaneous, Q6H  [Held by provider] losartan, 25 mg, Oral, Q24H  [Held by provider] magnesium oxide, 400 mg, Oral, Daily  [Held by provider] metoprolol tartrate, 50 mg, Oral, Q12H  [Held by provider] pantoprazole, 40 mg, Oral, Q AM  piperacillin-tazobactam, 4.5 g, Intravenous, Q8H  [Held by provider] primidone, 50 mg, Oral, TID  [Held by provider] saccharomyces boulardii, 250 mg, Oral, BID  [Held by provider] senna-docusate sodium, 2 tablet, Oral, BID  [Held by provider] tamsulosin, 0.4 mg, Oral, Daily        Continuous Infusions:dexmedetomidine, " "0.2-1.5 mcg/kg/hr, Last Rate: Stopped (06/22/25 1834)  fentanyl 10 mcg/mL,  mcg/hr, Last Rate: Stopped (06/23/25 0130)  norepinephrine, 0.02-0.3 mcg/kg/min, Last Rate: 0.12 mcg/kg/min (06/23/25 0802)  phenylephrine, 0.5-3 mcg/kg/min, Last Rate: Stopped (06/23/25 0147)  propofol, 5-75 mcg/kg/min, Last Rate: 20 mcg/kg/min (06/23/25 0653)  sodium bicarbonate 8.4 % 150 mEq in dextrose (D5W) 5 % 1,000 mL infusion (greater than 100 mEq), 150 mEq, Last Rate: 150 mEq (06/23/25 0754)  vasopressin, 0.03 Units/min, Last Rate: 0.03 Units/min (06/22/25 2112)  vecuronium (NORCURON) 100 mg in sodium chloride 0.9 % 100 mL (1 mg/mL) infusion, 0.6-1.7 mcg/kg/min, Last Rate: 0.6 mcg/kg/min (06/23/25 0655)        Labs:     Results from last 7 days   Lab Units 06/23/25  0306 06/22/25  1756 06/22/25  1402   WBC 10*3/mm3 8.64 8.05 21.07*   HEMOGLOBIN g/dL 8.5* 8.3* 7.4*   HEMATOCRIT % 25.6* 24.8* 22.9*   PLATELETS 10*3/mm3 42* 48* 42*        Results from last 7 days   Lab Units 06/23/25  0306 06/22/25 1756 06/22/25  1402   SODIUM mmol/L 147* 147* 146*   POTASSIUM mmol/L 3.2* 4.0 4.4   CHLORIDE mmol/L 108* 111* 111*   CO2 mmol/L 19.0* 21.0* 15.0*   BUN mg/dL 62.6* 58.1* 54.2*   CREATININE mg/dL 3.70* 3.10* 3.09*   CALCIUM mg/dL 6.9* 7.2* 7.2*   BILIRUBIN mg/dL 1.3* 0.8 0.4   ALK PHOS U/L 67 79 68   ALT (SGPT) U/L 379* 233* 105*   AST (SGOT) U/L 792* 467* 140*   GLUCOSE mg/dL 192* 219* 250*       ABG:    pH, Arterial   Date Value Ref Range Status   06/23/2025 7.178 (C) 7.350 - 7.450 pH units Final     Comment:     85 Value below critical limit     pO2, Arterial   Date Value Ref Range Status   06/23/2025 56.5 (L) 83.0 - 108.0 mm Hg Final     Comment:     84 Value below reference range     pCO2, Arterial   Date Value Ref Range Status   06/23/2025 55.0 (H) 35.0 - 45.0 mm Hg Final     Comment:     83 Value above reference range       Culture Data:   No results found for: \"BLOODCX\", \"URINECX\", \"WOUNDCX\", \"MRSACX\", \"RESPCX\", " "\"STOOLCX\"    No results found for: \"PATHINTERP\"    Radiology:     Imaging Results (Last 7 Days)       Procedure Component Value Units Date/Time    XR Chest 1 View [[id number]] Collected: 06/23/25 0630     Updated: 06/23/25 0638    Narrative:      EXAMINATION: XR CHEST 1 VW-        HISTORY: Intubated Patient; N17.9-Acute kidney failure, unspecified;  E86.0-Dehydration; D64.9-Anemia, unspecified; D69.6-Thrombocytopenia,  unspecified; R19.7-Diarrhea, unspecified; R53.1-Weakness; Z74.09-Other  reduced mobility; R13.10-Dysphagia, unspecified; R50.9-Fever,  unspecified; R10.0-Acute abdomen; I46.9-Cardiac arrest, cause  unspecified     A frontal projection of the chest is compared with the previous study  dated 2/6/2022 2025.     The lungs are poorly expanded.     Left cardiac border and left lower lung are partly obscured by an opaque  artifact.     The bilateral lung opacities persist with no interval change. This may  represent pulm edema or inflammatory/infectious process.     Small biapical pleural thickening persist.     There is no pleural effusion. There is no pneumothorax.     The heart size is not optimally evaluated due to the AP projection.     An endotracheal tube is in place. A nasogastric tube is visualized. The  distal end of the tube is not in the field-of-view and not evaluated.     No acute bony abnormality.       Impression:      1. No change in cardiopulmonary status since the previous study.  2. The endotracheal tube in place. The distal end of the gastric tube is  not included in the study.        This report was signed and finalized on 6/23/2025 6:35 AM by Dr. Meg Jaquez MD.       XR Abdomen KUB [[id number]] Collected: 06/22/25 1407     Updated: 06/22/25 1411    Narrative:      EXAM: XR ABDOMEN KUB-         HISTORY: NG placement; N17.9-Acute kidney failure, unspecified;  E86.0-Dehydration; D64.9-Anemia, unspecified; D69.6-Thrombocytopenia,  unspecified; R19.7-Diarrhea, unspecified; " R53.1-Weakness; Z74.09-Other  reduced mobility; R13.10-Dysphagia, unspecified; R50.9-Fever,  unspecified; R10.0-Acute abdomen       COMPARISON: None available.     TECHNIQUE:   Frontal view of the abdomen. 1 image.     FINDINGS:    There is an NG tube in place tip at the fundus of the stomach. Bowel gas  pattern visualized is nonspecific with borderline air-filled loops of  bowel.          Impression:         1. NG tube tip at the gastric fundus.     This report was signed and finalized on 6/22/2025 2:08 PM by Dilan Sykes.       XR Chest 1 View [[id number]] Collected: 06/22/25 1406     Updated: 06/22/25 1410    Narrative:      EXAM: XR CHEST 1 VW-         HISTORY: intubation; N17.9-Acute kidney failure, unspecified;  E86.0-Dehydration; D64.9-Anemia, unspecified; D69.6-Thrombocytopenia,  unspecified; R19.7-Diarrhea, unspecified; R53.1-Weakness; Z74.09-Other  reduced mobility; R13.10-Dysphagia, unspecified; R50.9-Fever,  unspecified; R10.0-Acute abdomen       COMPARISON: 6/22/2025.     TECHNIQUE:  Frontal view(s) of the chest submitted.     FINDINGS:    There is an ET tube in place with tip 5 cm above the marky. Central  opacities are noted worrisome for edema. External pacing lead is noted.  No large effusion is seen. The left costophrenic angle is not included.  No pneumothorax is visualized. Probable NG tube is coiled in the neck.          Impression:         1. ET tube tip above the marky. NG tube coiled in the neck.  2. Bilateral central opacities worrisome for edema.     This report was signed and finalized on 6/22/2025 2:07 PM by Dilan Sykes.       CT Angiogram Abdomen Pelvis [[id number]] Collected: 06/22/25 1219     Updated: 06/22/25 1229    Narrative:      EXAM: CT ANGIOGRAM ABDOMEN PELVIS-      HISTORY: worsening abd pain/distension, r/o ischemia or other acute  etiology; N17.9-Acute kidney failure, unspecified; E86.0-Dehydration;  D64.9-Anemia, unspecified; D69.6-Thrombocytopenia,  unspecified;  R19.7-Diarrhea, unspecified; R53.1-Weakness; Z74.09-Other reduced  mobility; R13.10-Dysphagia, unspecified       COMPARISON: None available.     DOSE LENGTH PRODUCT: 3050 mGy cm. Automatic exposure control was  utilized to make radiation dose as low as reasonably achievable.     TECHNIQUE: Enhanced axial images of the abdomen and pelvis obtained with  multiplanar reformats. 3D postprocessing, including MIPs, performed and  images saved to PACS.     FINDINGS:  VISUALIZED CHEST: No pericardial or pleural effusion is identified.  There is mild atelectasis at the lung bases. There is fluid in the  distal esophagus without wall thickening. This may be due to reflux or  dysmotility.     LIVER/BILIARY: Hepatic parenchyma is unremarkable. The patient is status  post cholecystectomy. Bile ducts are unremarkable.     KIDNEYS/URETERS: There is an exophytic right renal cyst and left renal  angiomyolipoma measuring 3.0 cm. There is no hydronephrosis.     ADRENAL: Stable left adrenal nodularity.     SPLEEN: Unremarkable.     PANCREAS: Unremarkable.        PERITONEUM/RETROPERITONEUM: No retroperitoneal or mesenteric  lymphadenopathy is seen. No free fluid is identified.     GI TRACT: Dilated fluid-filled stomach is unchanged. There are multiple  dilated fluid and air-filled loops of bowel which are stable. No focal  wall thickening is seen.     PELVIS: There is prostatic hypertrophy. Urinary bladder is incompletely  distended. There are prominent air and fluid-filled loops of bowel in  the pelvis. No free fluid is seen.     SOFT TISSUES: There are inguinal hernias containing fat.     BONES: No acute or aggressive bony lesion.            VESSELS:     AORTA/ABDOMINAL-PELVIC VESSELS: There is atherosclerosis of the  abdominal aorta without aneurysm. There is narrowing at the proximal  celiac artery with greater than 70% stenosis however contrast is noted  more distally. The SMA is patent without stenosis or occlusion.  The LUIS ALBERTO  is patent without stenosis or occlusion. There are scattered areas of  atherosclerosis at the branches of the bilateral internal and external  iliac arteries but no significant stenosis is seen. There is  atherosclerosis at both common iliac arteries with 50% stenosis on the  left.             Impression:      1. Atherosclerosis of the abdominal aorta and branches with greater than  70% stenosis at the proximal celiac artery and 50% stenosis at the left  common iliac artery. No occlusion is seen.  2. Stable prominent air and fluid-filled loops of stomach, small bowel,  and colon without obvious transition point. No focal wall thickening or  edema of the mesentery is seen.     This report was signed and finalized on 6/22/2025 12:26 PM by Dilan Sykes.       XR Chest 1 View [[id number]] Collected: 06/22/25 0808     Updated: 06/22/25 0811    Narrative:      EXAM: XR CHEST 1 VW-         HISTORY: respiratory distress; N17.9-Acute kidney failure, unspecified;  E86.0-Dehydration; D64.9-Anemia, unspecified; D69.6-Thrombocytopenia,  unspecified; R19.7-Diarrhea, unspecified; R53.1-Weakness; Z74.09-Other  reduced mobility; R13.10-Dysphagia, unspecified       COMPARISON: 6/17/2025.     TECHNIQUE:  Frontal view(s) of the chest submitted.     FINDINGS:    Lungs are grossly clear. No effusion or pneumothorax is seen. Heart and  mediastinum are unremarkable.          Impression:         1. No active disease in the chest.     This report was signed and finalized on 6/22/2025 8:08 AM by Dilan Sykes.       CT Abdomen Pelvis Without Contrast [[id number]] Collected: 06/22/25 0752     Updated: 06/22/25 0806    Narrative:      EXAM: CT ABDOMEN PELVIS WO CONTRAST-         HISTORY: abdominal pain; N17.9-Acute kidney failure, unspecified;  E86.0-Dehydration; D64.9-Anemia, unspecified; D69.6-Thrombocytopenia,  unspecified; R19.7-Diarrhea, unspecified; R53.1-Weakness; Z74.09-Other  reduced mobility; R13.10-Dysphagia,  unspecified       COMPARISON: 6/17/2025.     DOSE LENGTH PRODUCT: 1024.13 mGy.cm Automatic exposure control was  utilized to make radiation dose as low as reasonably achievable.     TECHNIQUE: Noncontrast axial images of the abdomen and pelvis obtained  with multiplanar reformats.     FINDINGS:  VISUALIZED CHEST: No pericardial or pleural effusion is identified.  Linear opacities at the lung bases are due to atelectasis.     LIVER/BILE DUCTS: Patient is status post cholecystectomy. There are  calcified granulomas. Unenhanced hepatic parenchyma is unremarkable.  Bile ducts are unremarkable.     KIDNEYS/URETERS: There is a right renal cyst and no hydronephrosis. Left  renal angiomyolipoma measures 3.2 cm. No renal or ureteral calculi are  identified.     ADRENAL: There is stable nodularity of the left adrenal gland.        SPLEEN: Unremarkable.     PANCREAS: There is a splenule at the tail of the pancreas unchanged from  2015. Unenhanced pancreatic parenchyma is unremarkable.     MESENTERY: No retroperitoneal or mesenteric lymphadenopathy or  inflammatory changes are seen.     VASCULATURE: There is calcified atherosclerosis of the abdominal aorta  without aneurysm.     GI TRACT: There is a small hiatal hernia. There is fluid in the distal  esophagus may be related to reflux or dysmotility. Stomach is distended  with fluid and air and there are multiple loops of small and large bowel  which are distended with fluid and air and scattered air-fluid levels.  No obvious transition point is seen. Ileus is favored.     PELVIS: There is prostatic hypertrophy. Urinary bladder is unremarkable.  No pelvic lymphadenopathy or free fluid is seen.     SOFT TISSUES: There are bilateral inguinal hernias left larger than  right containing fat.     BONES: No acute or aggressive bony lesion.           Impression:      1. There are multiple prominent fluid and air-filled loops of small  bowel and colon and also distention of the stomach  without transition  point or inflammatory change of the mesentery. This may represent an  ileus.  2. Stable right renal cyst and left renal angiomyolipoma.  3. Stable left adrenal nodularity.  4. Prostatic hypertrophy.  5. Inguinal hernias containing fat.     These findings are in agreement with the critical and emergent findings  from the [facility] preliminary report.        This report was signed and finalized on 6/22/2025 8:03 AM by Dilan Syeks.       US Liver [[id number]] Collected: 06/18/25 1231     Updated: 06/18/25 1237    Narrative:      EXAM/TECHNIQUE: US LIVER-     INDICATION: acute transaminitis     COMPARISON: CT 6/17/2025     FINDINGS:     Visualized portion of the pancreas is unremarkable. The pancreatic tail  and known pancreatic tail lesion are not visualized on this exam.     Liver echotexture and echogenicity are within normal limits. No solid  liver lesion. Patent portal vein with appropriate directional flow.     Patient is status post cholecystectomy. No biliary ductal dilatation.  Common bile duct is 3 mm diameter.     Right kidney is 11.3 cm in length and demonstrates normal cortical  thickness and echogenicity. No shadowing renal calculi or  hydronephrosis. 3.3 cm exophytic right upper pole renal cyst without  complex features.       Impression:      1.  No sonographic evidence of cirrhosis or steatosis.  2.  No biliary ductal dilatation status post cholecystectomy.     This report was signed and finalized on 6/18/2025 12:34 PM by Dr. Fawad Valadez MD.       CT Abdomen Pelvis Without Contrast [[id number]] Collected: 06/17/25 1146     Updated: 06/17/25 1157    Narrative:      EXAMINATION: CT ABDOMEN PELVIS WO CONTRAST-     HISTORY: Abdominal pain. Generalized weakness. Diarrhea.     In order to have a CT radiation dose as low as reasonably achievable  Automated Exposure Control was utilized for adjustment of the mA and/or  KV according to patient size.     CT Dose DLP = 785.23  "mGy.cm.  (If there are multiple studies performed at the same time this  represents the total dose).     Images are stored in PACS per institutional protocol.        Noncontrast abdomen/pelvis CT.  Axial, sagittal, and coronal sequences.     COMPARISON:  11/22/2024 and 11/22/2016.     Normal heart size.  No acute abnormality at the lung bases.     Cholecystectomy clips are present.  Normal noncontrast appearance of the spleen.  Spleen = 13 x 6 x 7 cm. A few splenic calcifications are present.     Discrete oval solid mass in the distal pancreas tail measures 30 mm in  diameter on axial image 22. This finding measured 27 mm on 11/22/2024  and 20 mm on 11/22/2016.  The homogeneous density (matching adjacent spleen) and smooth margins  suggest that this is a splenule.     Stable LEFT adrenal nodule.  Normal RIGHT adrenal gland.  Unchanged partially exophytic 30 x 26 mm fat-containing LEFT renal  lesion.  Exophytic 31 x 30 mm RIGHT renal cyst compared with 32 x 32 mm on  11/22/2024 and 24 x 21 mm on 11/22/2016.  No renal stone or hydronephrosis.     Mild aortic calcification with no aneurysm.  No bowel dilation.  No sign of colitis or diverticulitis.  No pelvic mass or fluid.  Normal appearance of the urinary bladder.       Impression:      1. Stable renal findings.  2. Small solid \"mass\" at the tip of the pancreas tail has benign imaging  characteristics. Splenule suspected.  3. No fluid collection or inflammatory process.     This report was signed and finalized on 6/17/2025 11:54 AM by Dr. José Pizarro MD.       XR Chest 1 View [[id number]] Collected: 06/17/25 1116     Updated: 06/17/25 1120    Narrative:      EXAM: XR CHEST 1 VW-         HISTORY: weak       COMPARISON: 5/8/2016.     TECHNIQUE:  Frontal view(s) of the chest submitted.     FINDINGS:    The lungs are grossly clear bilaterally. No effusion or pneumothorax is  visualized. Heart and mediastinum are unremarkable.          Impression:         1. No active " disease in the chest.     This report was signed and finalized on 6/17/2025 11:17 AM by Dilan Sykes.                 Objective:       Problem list:     Shock, septic    Hypokalemia    Tremor    Iron deficiency    DANIEL (acute kidney injury)    Transaminitis, acute    Dermatitis neglecta    Cardiac arrest    Acute hypoxic respiratory failure    Celiac artery stenosis        Assessment/Plan:       ASSESSMENT:   Acute thrombocytopenia likely associated with sepsis  -No schistocytes on peripheral blood smear review by  (no MAHA to indicate TTP/HUS)  -6/22/2025-mildly elevated PT/INR, normal PTT, fibrinogen 255 (no DIC)  Septic shock.  Broad-spectrum antibiotics and pressor support.    Cardiopulmonary arrest.    Respiratory failure.   Currently intubated and mechanically ventilated  Anemia, components from iron deficiency and chronic disease  -6/22/2025-folate> 20, NERY negative,  (slightly elevated), haptoglobin 146  Diarrhea  Acute kidney injury       PLAN:  - Hematology is consulted for thrombocytopenia; it is of note that the patient's platelet count prior to this admission were WNL, last checked before this admission was on 5/5/2025 where platelets were 220; upon admission, platelets were 120, and was trending down gradually and it is 42k today.   - In the setting of acute diarrhea and DANIEL, there is no evidence for microangiopathic hemolysis (normal haptoglobin level, no schistocytes on peripheral blood smear; normal bilirubin, only slightly elevated LDH).  - Thrombocytopenia could also be related to medication use, as upon presentation, patient received antibiotics of ceftriaxone and Flagyl initially but then they were discontinued due to normal C. difficile and PCR GI panel.  - Started on iron infusion: Ferrlecit to 50 mg daily x 5 doses beginning 6/20/2025.  - Transfuse platelets for platelet count of less than 10 with no bleeding, or less than 50 if there is bleeding.  - So far, unclear  etiology for thrombocytopenia, but likely related to SIRS , which also may result in bone marrow suppression and ultimately thrombocytopenia.  - Continue other general medical management/support per primary team and other consulting services  - Manual blood smear review by pathology     I discussed the patients findings and my recommendations with his CCU nurse, Bobby CALHOUN    I spent ~35 minutes caring for Felix on this date of service. This time includes time spent by me in the following activities: preparing for the visit, reviewing tests, performing a medically appropriate examination and/or evaluation, counseling and educating the patient/family/caregiver, ordering medications, tests, or procedures and documenting information in the medical record

## 2025-06-23 NOTE — PROGRESS NOTES
[facility] Clinical Pharmacy Services: Vancomycin Consult  Felix Bartlett . is a 75 y.o. male who has been consulted to dose Vancomycin for Pneumonia.    Current Antimicrobial Therapy:  Pharmacy to dose vancomycin  piperacillin-tazobactam (ZOSYN) 4.5 g IVPB in 100 mL NS MBP (CD)  Vancomycin - 2-0.9 GM/500ML-%, 750-0.9 MG/250ML-%    Relevant clinical data and objective history reviewed:  Wt: 131 kg (288 lb 2.3 oz); BMI: Body mass index is 39.08 kg/m².    Temp Readings from Last 1 Encounters:   06/23/25 98.7 °F (37.1 °C) (Rectal)        Estimated Creatinine Clearance: 23.4 mL/min (A) (by C-G formula based on SCr of 3.7 mg/dL (H)).    Results from last 7 days   Lab Units 06/23/25  0306 06/22/25  1756 06/22/25  1402 06/22/25  0225   CREATININE mg/dL 3.70* 3.10* 3.09* 1.82*   WBC 10*3/mm3 8.64 8.05 21.07* 8.65   PROCALCITONIN ng/mL  --   --   --  1.24*       Microbiology Culture Results:        Assessment/Plan:  Acute kidney failure, unstable, s/p cardiac arrest - mechanically ventilated  Unstable renal function - caution advised  MRSA Nasal PCR ordered: Yes (Vancomycin indication is pneumonia, bone/joint, SSTI or IAI)  PK/PD Target: -600 mg/L.hr   Loading Dose of Vancomycin: 2000 mg (15 mg/kg)  Initiate Vancomycin 750 mg IV every 24 hours, which provides the following predicted parameters:  Loading dose: 2000 mg at 09:00 06/23/2025.  Regimen: 750 mg IV every 24 hours.  Start time: 09:00 on 06/24/2025  Exposure target: AUC24 (range) 400-600 mg/L.hr   AUC24,ss: 479 mg/L.hr  Probability of AUC24 > 400: 70 %  Ctrough,ss: 17.4 mg/L  Probability of Ctrough,ss > 20: 36 %  Probability of nephrotoxicity (Lodise SOUMYA 2009): 13 %  For monitoring, Vanc Random planned when indicated  Pharmacy will continue to monitor renal function, clinical status and culture results and adjust the dose and/or frequency as needed.    Ambrose Nagy, PharmD  6/23/2025  08:44 CDT

## 2025-06-23 NOTE — PROGRESS NOTES
RT EQUIPMENT DEVICE RELATED - SKIN ASSESSMENT    Marko Score:  Marko Score: 17     RT Medical Equipment/Device:     ETT Izquierdo/Anchorfast    Skin Assessment:      Cheek:  Intact  Neck:  Intact  Lips:  Intact    Device Skin Pressure Protection:  Skin-to-device areas padded:  Anchorfast    Nurse Notification:  No    Kelsie Mart, RRT

## 2025-06-23 NOTE — CASE MANAGEMENT/SOCIAL WORK
Continued Stay Note  CARLY Ruffin     Patient Name: Felix Bartlett Sr.  MRN: [id number]  Today's Date: 6/23/2025    Admit Date: 6/17/2025    Plan: SNF Referrals   Discharge Plan       Row Name 06/23/25 1257       Plan    Plan Comments Events noted. Pt is currently in unit and on ventilator. Will continue to follow.                   Discharge Codes    No documentation.                 Expected Discharge Date and Time       Expected Discharge Date Expected Discharge Time    Jun 24, 2025               NGUYỄN Swift

## 2025-06-23 NOTE — PROGRESS NOTES
[facility] GENERAL SURGERY    PROGRESS NOTE    Today's Date: 06/23/25    Patient Name: Felix Bartlett Sr.  MRN: [id number]  CSN: [id number]  PCP: Keyon Dockery MD  Attending Provider: Andrew Ghotra MD  Length of Stay: 6    SUBJECTIVE     Felix Bartlett Sr. is currently receiving care in CCU.  He is intubated and sedated and paralyzed.  He continues on multiple vasopressors. Reports no neurological response during sedation vacation. He is receiving Zosyn. Lactate is increased to 5.6 from 2.9 yesterday. WBC is normal at 8.64 with neutrophil of 2.2 and bands of 17.2, hemoglobin is 6.5, hematocrit is 25.6 and platelets are 42. He has multiple electrolyte imbalances.     Visit Dx:    ICD-10-CM ICD-9-CM   1. DANIEL (acute kidney injury)  N17.9 584.9   2. Dehydration  E86.0 276.51   3. Anemia, unspecified type  D64.9 285.9   4. Thrombocytopenia  D69.6 287.5   5. Diarrhea, unspecified type  R19.7 787.91   6. Generalized weakness  R53.1 780.79   7. Impaired mobility [Z74.09]  Z74.09 799.89   8. Dysphagia, unspecified type  R13.10 787.20   9. FUO (fever of unknown origin)  R50.9 780.60   10. Acute abdomen  R10.0 789.00   11. 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  6.30.14/7.1.14 CBC With Differential/Platelet; Sedimentation Rate-Westergren; C-Reactive Protein, Quant==WBC 12.0; RBC 3.60; Hemoglobin 11.7; Hematocrit 34.4; Neutrophils (Absolute) 7.1; Lymphs (Absolute) 3.6  nothing major here..his problems are alcohol, smoking now o    HL (hearing loss)     Hyperlipidemia     Hypertension     Hypopotassemia-diuretic 02/16/2017    Oxygen dependent     at     Peripheral neuropathy     Renal cancer 08/2014    Lt RALPart'l Nx; t1a Clear Cell with negative margin    RUQ pain 07/23/2013    S/P laparoscopic cholecystectomy 09/04/2012    Wellness examination-done 02/16/2017    PROCEDURES:  Prostate exam/[place]/confirmed/1.27.16     Colonoscopy-nl/Gil//7-28-10/5y  EGD/P/Gil/3.4.16  Colonoscopy-polyp/Gil//3.18.16/5 yr  EGD-neg/Gil//5.1.18     SURGERIES:  T&A  Appendix/1959  Scrotum-cyst/1980's  Lap gb+liver bx/Raya/[facility]/7.23.12  L robotic partial nephrectomy (clear cell grade 1 [id number])/Rachel//8.14.14  Wmlpzn-fieerxok-artur+mesh+omen/Micah//5.9.     Past Surgical History:   Procedure Laterality Date    APPENDECTOMY      CHOLECYSTECTOMY      ENDOSCOPY N/A 05/01/2018    Procedure: ESOPHAGOGASTRODUODENOSCOPY WITH ANESTHESIA;  Surgeon: Donnell Cordero DO;  Location: [facility] ENDOSCOPY;  Service: Gastroenterology    HERNIA REPAIR      KIDNEY SURGERY      LAPAROSCOPIC PARTIAL NEPHRECTOMY Left 08/14/2014    Robot Assisted     Social History     Socioeconomic History    Marital status:      Spouse name:  Melanie    Number of children: 1    Years of education: 12   Tobacco Use    Smoking status: Every Day     Current packs/day: 0.50     Average packs/day: 0.5 packs/day for 59.1 years (29.6 ttl pk-yrs)     Types: Cigarettes     Start date: 4/30/1966     Passive exposure: Never    Smokeless tobacco: Never   Vaping Use    Vaping status: Never Used   Substance and Sexual Activity    Alcohol use: No    Drug use: No    Sexual activity: Defer       Allergies:  No Known Allergies    Medications:    Current Facility-Administered Medications:     acetaminophen (TYLENOL) tablet 650 mg, 650 mg, Oral, Q4H PRN **OR** [DISCONTINUED] acetaminophen (TYLENOL) 160 MG/5ML oral solution 650 mg, 650 mg, Oral, Q4H PRN **OR** acetaminophen (TYLENOL) suppository 650 mg, 650 mg, Rectal, Q4H PRN, Gabby Wilder APRN, 650 mg at 06/22/25 1655    artificial tears ophthalmic ointment, , Both Eyes, Q4H, Charles Rogers APRN, Given at 06/23/25 0817    [Held by provider] sennosides-docusate (PERICOLACE) 8.6-50 MG per tablet 2 tablet, 2 tablet, Oral, BID **AND** [Held by provider] polyethylene glycol (MIRALAX) packet 17 g, 17 g, Oral, Daily PRN **AND** [Held by provider] bisacodyl (DULCOLAX) EC tablet 5 mg, 5 mg, Oral, Daily PRN **AND** bisacodyl (DULCOLAX) suppository 10 mg, 10 mg, Rectal, Daily PRN, Charles Rogers APRN    budesonide (PULMICORT) nebulizer solution 0.25 mg, 0.25 mg, Nebulization, BID - RT, Charles Rogers APRN    calcium carbonate (TUMS) chewable tablet 500 mg (200 mg elemental), 2 tablet, Oral, TID PRN, Blake Hernandez DO, 2 tablet at 06/22/25 0100    dexmedetomidine (PRECEDEX) 400 mcg in 100 mL NS infusion, 0.2-1.5 mcg/kg/hr, Intravenous, Titrated, Charles Rogers APRN, Held at 06/22/25 1834    dextrose (D50W) (25 g/50 mL) IV injection 25 g, 25 g, Intravenous, Q15 Min PRN, Blake Hernandez, DO    dextrose (GLUTOSE) oral gel 15 g, 15 g, Oral, Q15 Min PRN, Blake Hernandez, DO    [Held by provider] escitalopram (LEXAPRO)  tablet 20 mg, 20 mg, Oral, Daily, Gabby Wilder APRN, 20 mg at 06/21/25 0850    fentaNYL 2500 mcg in 250 mL NS infusion,  mcg/hr, Intravenous, Titrated, Charles Rogers APRN, Stopped at 06/23/25 0130    ferric gluconate (FERRLECIT) 250 MG in sodium chloride 0.9% 250 mL IVPB, 250 mg, Intravenous, Daily, Caprice Cali MD, Last Rate: 125 mL/hr at 06/23/25 0816, 250 mg at 06/23/25 0816    [Held by provider] gabapentin (NEURONTIN) capsule 300 mg, 300 mg, Oral, Q12H, Gabby Wilder APRN, 300 mg at 06/19/25 0839    glucagon (GLUCAGEN) injection 1 mg, 1 mg, Intramuscular, Q15 Min PRN, Blake Hernandez DO    Hydrocortisone Sod Suc (PF) (Solu-CORTEF) injection 100 mg, 100 mg, Intravenous, Q6H, Charles Rogers APRN    insulin regular (humuLIN R,novoLIN R) injection 2-7 Units, 2-7 Units, Subcutaneous, Q6H, Blake Hernandez DO, 3 Units at 06/23/25 0722    ipratropium-albuterol (DUO-NEB) nebulizer solution 3 mL, 3 mL, Nebulization, 4x Daily - RT, Charles Rogers APRN    loperamide (IMODIUM) capsule 4 mg, 4 mg, Oral, 4x Daily PRN, Devin Jackson MD    [Held by provider] losartan (COZAAR) tablet 25 mg, 25 mg, Oral, Q24H, Blake Hernandez DO, 25 mg at 06/21/25 1555    [Held by provider] magnesium oxide (MAG-OX) tablet 400 mg, 400 mg, Oral, Daily, Blake Hernandez DO, 400 mg at 06/21/25 1149    [Held by provider] metoprolol tartrate (LOPRESSOR) tablet 50 mg, 50 mg, Oral, Q12H, Gabby Wilder APRN, 50 mg at 06/21/25 2002    Morphine sulfate (PF) injection 2 mg, 2 mg, Intravenous, Q1H PRN, Charles Rogers APRN, 2 mg at 06/22/25 1530    norepinephrine (LEVOPHED) 8 mg in 250 mL NS infusion (premix), 0.02-0.3 mcg/kg/min, Intravenous, Titrated, Charles Rogers APRN, Last Rate: 26.1 mL/hr at 06/23/25 0802, 0.12 mcg/kg/min at 06/23/25 0802    ondansetron ODT (ZOFRAN-ODT) disintegrating tablet 4 mg, 4 mg, Oral, Q6H PRN **OR** ondansetron (ZOFRAN) injection 4 mg, 4 mg, Intravenous, Q6H PRN,  Gabby Wilder APRN, 4 mg at 06/21/25 2017    [Held by provider] pantoprazole (PROTONIX) EC tablet 40 mg, 40 mg, Oral, Q AM, Gabby Wilder APRN, 40 mg at 06/19/25 0548    phenylephrine (YESI-SYNEPHRINE) 50 mg in 250 mL NS infusion, 0.5-3 mcg/kg/min, Intravenous, Titrated, Rosendo Santana PA-C, Stopped at 06/23/25 0147    piperacillin-tazobactam (ZOSYN) 4.5 g IVPB in 100 mL NS MBP (CD), 4.5 g, Intravenous, Q8H, Charles Rogers APRN, 4.5 g at 06/23/25 0505    [Held by provider] primidone (MYSOLINE) tablet 50 mg, 50 mg, Oral, TID, Gabby Wilder APRN, 50 mg at 06/21/25 2002    propofol (DIPRIVAN) infusion 10 mg/mL 100 mL, 5-75 mcg/kg/min, Intravenous, Titrated, Charles Rogers APRN, Last Rate: 13.92 mL/hr at 06/23/25 0653, 20 mcg/kg/min at 06/23/25 0653    [Held by provider] saccharomyces boulardii (FLORASTOR) capsule 250 mg, 250 mg, Oral, BID, Devin Jackson MD, 250 mg at 06/21/25 2002    sodium bicarbonate 8.4 % 150 mEq in dextrose (D5W) 5 % 1,000 mL infusion (greater than 100 mEq), 150 mEq, Intravenous, Continuous, Charles Rogers APRN, Last Rate: 125 mL/hr at 06/23/25 0754, 150 mEq at 06/23/25 0754    [COMPLETED] Insert Peripheral IV, , , Once **AND** sodium chloride 0.9 % flush 10 mL, 10 mL, Intravenous, PRN, Diana Kumar MD    [Held by provider] tamsulosin (FLOMAX) 24 hr capsule 0.4 mg, 0.4 mg, Oral, Daily, Gabby Wilder APRN, 0.4 mg at 06/21/25 0850    vasopressin (PITRESSIN) 20 units in 100 mL NS infusion, 0.03 Units/min, Intravenous, Continuous, Charles Rogers APRN, Last Rate: 9 mL/hr at 06/22/25 2112, 0.03 Units/min at 06/22/25 2112    vecuronium (NORCURON) 100 mg in sodium chloride 0.9 % 100 mL (1 mg/mL) infusion, 0.6-1.7 mcg/kg/min, Intravenous, Titrated, Charles Rogers APRN, Last Rate: 4.18 mL/hr at 06/23/25 0655, 0.6 mcg/kg/min at 06/23/25 0655      OBJECTIVE     Vitals:    06/23/25 0757   BP:    Pulse:    Resp:    Temp: 98.7 °F (37.1 °C)   SpO2:         Temp:  [96.5 °F (35.8 °C)-103.3 °F (39.6 °C)] 98.7 °F (37.1 °C)  Heart Rate:  [] 124  Resp:  [30] 30  BP: ()/() 156/61  FiO2 (%):  [100 %] 100 %     PHYSICAL EXAM   Physical Exam  Vitals and nursing note reviewed.   Constitutional:       Appearance: He is obese. He is ill-appearing.      Interventions: He is sedated, chemically paralyzed and intubated.      Comments: Body mass index is 39.08 kg/m².    HENT:      Head: Normocephalic and atraumatic.   Eyes:      General: Lids are normal.   Cardiovascular:      Rate and Rhythm: Regular rhythm. Tachycardia present.   Pulmonary:      Effort: No respiratory distress. He is intubated.   Abdominal:      General: Abdomen is protuberant. There is distension.   Musculoskeletal:      Cervical back: Normal range of motion and neck supple.   Skin:     General: Skin is warm and dry.   Neurological:      Mental Status: He is unresponsive.            Results Review:  Lab Results (last 48 hours)       Procedure Component Value Units Date/Time    POC Glucose Once [[id number]]  (Abnormal) Collected: 06/23/25 0705    Specimen: Blood Updated: 06/23/25 0715     Glucose 218 mg/dL      Comment: : [id number] [facility] NicolasMeter ID: [id number]       CBC & Differential [[id number]]  (Abnormal) Collected: 06/23/25 0306    Specimen: Blood Updated: 06/23/25 0356    Narrative:      The following orders were created for panel order CBC & Differential.  Procedure                               Abnormality         Status                     ---------                               -----------         ------                     CBC Auto Differential[[id number]]        Abnormal            Final result                 Please view results for these tests on the individual orders.    CBC Auto Differential [[id number]]  (Abnormal) Collected: 06/23/25 0306    Specimen: Blood Updated: 06/23/25 0356     WBC 8.64 10*3/mm3      RBC 2.52 10*6/mm3      Hemoglobin 8.5 g/dL      Hematocrit  25.6 %      .6 fL      MCH 33.7 pg      MCHC 33.2 g/dL      RDW 15.3 %      RDW-SD 56.6 fl      MPV 15.2 fL      Platelets 42 10*3/mm3     Manual Differential [[id number]]  (Abnormal) Collected: 06/23/25 0306    Specimen: Blood Updated: 06/23/25 0356     Neutrophil % 2.2 %      Lymphocyte % 61.3 %      Monocyte % 2.2 %      Eosinophil % 0.0 %      Basophil % 0.0 %      Bands %  17.2 %      Metamyelocyte % 9.7 %      Myelocyte % 6.5 %      Plasma Cells % 1.1 %      Neutrophils Absolute 1.67 10*3/mm3      Lymphocytes Absolute 5.30 10*3/mm3      Monocytes Absolute 0.19 10*3/mm3      Eosinophils Absolute 0.00 10*3/mm3      Basophils Absolute 0.00 10*3/mm3      nRBC 6.5 /100 WBC      Anisocytosis Slight/1+     Poikilocytes Slight/1+     Polychromasia Slight/1+     Platelet Estimate Decreased     Giant Platelets Large/3+    Blood Gas, Arterial - [[id number]]  (Abnormal) Collected: 06/23/25 0351    Specimen: Arterial Blood Updated: 06/23/25 0353     Site Arterial Line     Jalil's Test N/A     pH, Arterial 7.178 pH units      Comment: 85 Value below critical limit        pCO2, Arterial 55.0 mm Hg      Comment: 83 Value above reference range        pO2, Arterial 56.5 mm Hg      Comment: 84 Value below reference range        HCO3, Arterial 20.4 mmol/L      Base Excess, Arterial -7.8 mmol/L      Comment: 84 Value below reference range        O2 Saturation, Arterial 83.5 %      Comment: 84 Value below reference range        Temperature 37.0     Barometric Pressure for Blood Gas 756 mmHg      Modality Ventilator     FIO2 100 %      Ventilator Mode AC     Set Tidal Volume 500.000     Set Mech Resp Rate 30.0     PEEP 10.0     Notified By CHINO Reina CRT     Collected by [id number]     Comment: Meter: [id number]     :  CHINO Reina CRT        pCO2, Temperature Corrected 55.0 mm Hg      pH, Temp Corrected 7.178 pH Units      pO2, Temperature Corrected 56.5 mm Hg      PO2/FIO2 57    Magnesium [[id number]]  (Normal)  Collected: 06/23/25 0306    Specimen: Blood Updated: 06/23/25 0341     Magnesium 2.2 mg/dL     Comprehensive Metabolic Panel [[id number]]  (Abnormal) Collected: 06/23/25 0306    Specimen: Blood Updated: 06/23/25 0341     Glucose 192 mg/dL      BUN 62.6 mg/dL      Creatinine 3.70 mg/dL      Sodium 147 mmol/L      Potassium 3.2 mmol/L      Chloride 108 mmol/L      CO2 19.0 mmol/L      Calcium 6.9 mg/dL      Total Protein 4.4 g/dL      Albumin 2.4 g/dL      ALT (SGPT) 379 U/L      AST (SGOT) 792 U/L      Alkaline Phosphatase 67 U/L      Total Bilirubin 1.3 mg/dL      Globulin 2.0 gm/dL      A/G Ratio 1.2 g/dL      BUN/Creatinine Ratio 16.9     Anion Gap 20.0 mmol/L      eGFR 16.1 mL/min/1.73     Narrative:      GFR Categories in Chronic Kidney Disease (CKD)              GFR Category          GFR (mL/min/1.73)    Interpretation  G1                    90 or greater        Normal or high (1)  G2                    60-89                Mild decrease (1)  G3a                   45-59                Mild to moderate decrease  G3b                   30-44                Moderate to severe decrease  G4                    15-29                Severe decrease  G5                    14 or less           Kidney failure    (1)In the absence of evidence of kidney disease, neither GFR category G1 or G2 fulfill the criteria for CKD.    eGFR calculation 2021 CKD-EPI creatinine equation, which does not include race as a factor    Lactic Acid, Plasma [[id number]]  (Abnormal) Collected: 06/23/25 0306    Specimen: Blood Updated: 06/23/25 0330     Lactate 5.6 mmol/L     POC Glucose Once [[id number]]  (Abnormal) Collected: 06/22/25 2358    Specimen: Blood Updated: 06/23/25 0009     Glucose 193 mg/dL      Comment: : [id number] [facility] NicolasMeter ID: [id number]       Folate [[id number]]  (Normal) Collected: 06/22/25 0926    Specimen: Blood Updated: 06/22/25 2111     Folate >20.00 ng/mL     Narrative:      Results may be falsely increased if  patient taking Biotin.      Haptoglobin [[id number]]  (Normal) Collected: 06/22/25 0225    Specimen: Blood Updated: 06/22/25 2047     Haptoglobin 146 mg/dL     Lactic Acid, Plasma [[id number]]  (Abnormal) Collected: 06/22/25 1756    Specimen: Blood Updated: 06/22/25 1846     Lactate 2.9 mmol/L     Manual Differential [[id number]]  (Abnormal) Collected: 06/22/25 1756    Specimen: Blood Updated: 06/22/25 1840     Neutrophil % 21.4 %      Lymphocyte % 63.1 %      Monocyte % 0.0 %      Eosinophil % 0.0 %      Basophil % 0.0 %      Bands %  11.7 %      Metamyelocyte % 1.0 %      Myelocyte % 1.0 %      Atypical Lymphocyte % 1.0 %      Plasma Cells % 1.0 %      Neutrophils Absolute 2.66 10*3/mm3      Lymphocytes Absolute 5.16 10*3/mm3      Monocytes Absolute 0.00 10*3/mm3      Eosinophils Absolute 0.00 10*3/mm3      Basophils Absolute 0.00 10*3/mm3      nRBC 6.8 /100 WBC      Anisocytosis Slight/1+     Platelet Estimate Decreased     Giant Platelets Large/3+    CBC & Differential [[id number]]  (Abnormal) Collected: 06/22/25 1756    Specimen: Blood Updated: 06/22/25 1840    Narrative:      The following orders were created for panel order CBC & Differential.  Procedure                               Abnormality         Status                     ---------                               -----------         ------                     CBC Auto Differential[[id number]]        Abnormal            Final result                 Please view results for these tests on the individual orders.    CBC Auto Differential [[id number]]  (Abnormal) Collected: 06/22/25 1756    Specimen: Blood Updated: 06/22/25 1840     WBC 8.05 10*3/mm3      RBC 2.50 10*6/mm3      Hemoglobin 8.3 g/dL      Hematocrit 24.8 %      MCV 99.2 fL      MCH 33.2 pg      MCHC 33.5 g/dL      RDW 14.9 %      RDW-SD 54.8 fl      MPV 14.6 fL      Platelets 48 10*3/mm3     Comprehensive Metabolic Panel [[id number]]  (Abnormal) Collected: 06/22/25 1756    Specimen: Blood Updated:  06/22/25 1835     Glucose 219 mg/dL      BUN 58.1 mg/dL      Creatinine 3.10 mg/dL      Sodium 147 mmol/L      Potassium 4.0 mmol/L      Chloride 111 mmol/L      CO2 21.0 mmol/L      Calcium 7.2 mg/dL      Total Protein 4.9 g/dL      Albumin 2.6 g/dL      ALT (SGPT) 233 U/L      AST (SGOT) 467 U/L      Alkaline Phosphatase 79 U/L      Total Bilirubin 0.8 mg/dL      Globulin 2.3 gm/dL      A/G Ratio 1.1 g/dL      BUN/Creatinine Ratio 18.7     Anion Gap 15.0 mmol/L      eGFR 19.9 mL/min/1.73     Narrative:      GFR Categories in Chronic Kidney Disease (CKD)              GFR Category          GFR (mL/min/1.73)    Interpretation  G1                    90 or greater        Normal or high (1)  G2                    60-89                Mild decrease (1)  G3a                   45-59                Mild to moderate decrease  G3b                   30-44                Moderate to severe decrease  G4                    15-29                Severe decrease  G5                    14 or less           Kidney failure    (1)In the absence of evidence of kidney disease, neither GFR category G1 or G2 fulfill the criteria for CKD.    eGFR calculation 2021 CKD-EPI creatinine equation, which does not include race as a factor    Blood Gas, Arterial - [[id number]]  (Abnormal) Collected: 06/22/25 1821    Specimen: Arterial Blood Updated: 06/22/25 1826     Site Arterial Line     Jalil's Test N/A     pH, Arterial 7.169 pH units      Comment: 85 Value below critical limit        pCO2, Arterial 60.4 mm Hg      Comment: 83 Value above reference range        pO2, Arterial 62.9 mm Hg      Comment: 84 Value below reference range        HCO3, Arterial 22.0 mmol/L      Base Excess, Arterial -6.6 mmol/L      Comment: 84 Value below reference range        O2 Saturation, Arterial 85.4 %      Comment: 84 Value below reference range        Temperature 37.0     Barometric Pressure for Blood Gas 755 mmHg      Modality Ventilator     FIO2 100 %       Ventilator Mode AC     Set Tidal Volume 500.000     Set [facility] Resp Rate 30.0     PEEP 10.0     Notified By MURALI Meyer CRT     Collected by [id number]     Comment: Meter: [id number]     :  MURALI Meyer CRT        pCO2, Temperature Corrected 60.4 mm Hg      pH, Temp Corrected 7.169 pH Units      pO2, Temperature Corrected 62.9 mm Hg      PO2/FIO2 63    Blood Gas, Arterial - [[id number]]  (Abnormal) Collected: 06/22/25 1608    Specimen: Arterial Blood Updated: 06/22/25 1610     Site Arterial Line     Jalil's Test N/A     pH, Arterial 7.117 pH units      Comment: 85 Value below critical limit        pCO2, Arterial 68.7 mm Hg      Comment: 86 Value above critical limit        pO2, Arterial 62.1 mm Hg      Comment: 84 Value below reference range        HCO3, Arterial 22.2 mmol/L      Base Excess, Arterial -7.2 mmol/L      Comment: 84 Value below reference range        O2 Saturation, Arterial 81.1 %      Comment: 84 Value below reference range        Temperature 37.0     Barometric Pressure for Blood Gas 756 mmHg      Modality Ventilator     FIO2 100 %      Ventilator Mode AC     Set Tidal Volume 500.000     Set [facility] Resp Rate 28.0     PEEP 10.0     Notified By Talon Vang, RRT     Collected by [id number]     Comment: Meter: [id number]     :  Talon Vang, AVA        pCO2, Temperature Corrected 68.7 mm Hg      pH, Temp Corrected 7.117 pH Units      pO2, Temperature Corrected 62.1 mm Hg      PO2/FIO2 62    CBC & Differential [[id number]]  (Abnormal) Collected: 06/22/25 1402    Specimen: Blood Updated: 06/22/25 1446    Narrative:      The following orders were created for panel order CBC & Differential.  Procedure                               Abnormality         Status                     ---------                               -----------         ------                     CBC Auto Differential[[id number]]        Abnormal            Final result                 Please view results for these tests on the  individual orders.    CBC Auto Differential [[id number]]  (Abnormal) Collected: 06/22/25 1402    Specimen: Blood Updated: 06/22/25 1445     WBC 21.07 10*3/mm3      RBC 2.24 10*6/mm3      Hemoglobin 7.4 g/dL      Hematocrit 22.9 %      .2 fL      MCH 33.0 pg      MCHC 32.3 g/dL      RDW 14.7 %      RDW-SD 54.8 fl      Platelets 42 10*3/mm3     Manual Differential [[id number]]  (Abnormal) Collected: 06/22/25 1402    Specimen: Blood Updated: 06/22/25 1445     Neutrophil % 26.5 %      Lymphocyte % 60.2 %      Monocyte % 1.0 %      Eosinophil % 0.0 %      Basophil % 0.0 %      Bands %  5.1 %      Myelocyte % 1.0 %      Atypical Lymphocyte % 6.1 %      Neutrophils Absolute 6.67 10*3/mm3      Lymphocytes Absolute 13.97 10*3/mm3      Monocytes Absolute 0.21 10*3/mm3      Eosinophils Absolute 0.00 10*3/mm3      Basophils Absolute 0.00 10*3/mm3      nRBC 3.1 /100 WBC      Anisocytosis Slight/1+     Poikilocytes Mod/2+     Polychromasia Slight/1+     Smudge Cells Mod/2+     Vacuolated Neutrophils Slight/1+     Platelet Estimate Decreased    Comprehensive Metabolic Panel [[id number]]  (Abnormal) Collected: 06/22/25 1402    Specimen: Blood Updated: 06/22/25 1433     Glucose 250 mg/dL      BUN 54.2 mg/dL      Creatinine 3.09 mg/dL      Sodium 146 mmol/L      Potassium 4.4 mmol/L      Chloride 111 mmol/L      CO2 15.0 mmol/L      Calcium 7.2 mg/dL      Total Protein 4.7 g/dL      Albumin 2.6 g/dL      ALT (SGPT) 105 U/L      AST (SGOT) 140 U/L      Alkaline Phosphatase 68 U/L      Total Bilirubin 0.4 mg/dL      Globulin 2.1 gm/dL      A/G Ratio 1.2 g/dL      BUN/Creatinine Ratio 17.5     Anion Gap 20.0 mmol/L      eGFR 20.0 mL/min/1.73     Narrative:      GFR Categories in Chronic Kidney Disease (CKD)              GFR Category          GFR (mL/min/1.73)    Interpretation  G1                    90 or greater        Normal or high (1)  G2                    60-89                Mild decrease (1)  G3a                   45-59                 Mild to moderate decrease  G3b                   30-44                Moderate to severe decrease  G4                    15-29                Severe decrease  G5                    14 or less           Kidney failure    (1)In the absence of evidence of kidney disease, neither GFR category G1 or G2 fulfill the criteria for CKD.    eGFR calculation 2021 CKD-EPI creatinine equation, which does not include race as a factor    STAT Lactic Acid, Reflex [[id number]]  (Abnormal) Collected: 06/22/25 1402    Specimen: Blood Updated: 06/22/25 1433     Lactate 8.5 mmol/L     Protime-INR [[id number]]  (Abnormal) Collected: 06/22/25 1402    Specimen: Blood Updated: 06/22/25 1420     Protime 21.3 Seconds      INR 1.74    aPTT [[id number]]  (Normal) Collected: 06/22/25 1402    Specimen: Blood Updated: 06/22/25 1420     PTT 35.8 seconds     Narrative:      PTT = The equivalent PTT values for the therapeutic range of heparin levels at 0.3 to 0.7 U/ml are 77 - 99 seconds.    Blood Gas, Arterial - [[id number]]  (Abnormal) Collected: 06/22/25 1409    Specimen: Arterial Blood Updated: 06/22/25 1410     Site Arterial Line     Jalil's Test N/A     pH, Arterial 7.039 pH units      Comment: 85 Value below critical limit        pCO2, Arterial 56.6 mm Hg      Comment: 83 Value above reference range        pO2, Arterial 133.0 mm Hg      Comment: 83 Value above reference range        HCO3, Arterial 15.3 mmol/L      Comment: 84 Value below reference range        Base Excess, Arterial -14.6 mmol/L      Comment: 84 Value below reference range        O2 Saturation, Arterial 97.3 %      Temperature 37.0     Barometric Pressure for Blood Gas 756 mmHg      Modality Ventilator     FIO2 100 %      Ventilator Mode AC     Set Tidal Volume 500.000     Set [facility] Resp Rate 20.0     PEEP 5.0     Notified By Talon Vang, RRT     Collected by [id number]     Comment: Meter: [id number]     :  Talon Vang, AVA        pCO2, Temperature Corrected  56.6 mm Hg      pH, Temp Corrected 7.039 pH Units      pO2, Temperature Corrected 133 mm Hg      PO2/FIO2 133    Blood Gas, Arterial With Co-Ox [[id number]]  (Abnormal) Collected: 06/22/25 1302    Specimen: Arterial Blood Updated: 06/22/25 1304     Site Right Brachial     Jalil's Test N/A     pH, Arterial 7.109 pH units      Comment: 85 Value below critical limit        pCO2, Arterial 55.5 mm Hg      Comment: 83 Value above reference range        pO2, Arterial 55.3 mm Hg      Comment: 84 Value below reference range        HCO3, Arterial 17.6 mmol/L      Comment: 84 Value below reference range        Base Excess, Arterial -11.5 mmol/L      Comment: 84 Value below reference range        O2 Saturation, Arterial 74.6 %      Comment: 84 Value below reference range        Hemoglobin, Blood Gas 9.1 g/dL      Comment: 84 Value below reference range        Hematocrit, Blood Gas 27.9 %      Comment: 84 Value below reference range        Oxyhemoglobin 73.4 %      Comment: 84 Value below reference range        Methemoglobin 0.50 %      Carboxyhemoglobin 1.0 %      Temperature 37.0     Sodium, Arterial 145 mmol/L      Potassium, Arterial 4.5 mmol/L      Barometric Pressure for Blood Gas 756 mmHg      Modality Nasal Cannula     Flow Rate 6.0 lpm      Ventilator Mode NA     Notified Who FUAD ANRP     Notified By Talon Vang, RRT     Notified Time 06/22/2025 13:03     Collected by [id number]     Comment: Meter: [id number]     :  Talon Vang, RRT        pH, Temp Corrected 7.109 pH Units      pCO2, Temperature Corrected 55.5 mm Hg      pO2, Temperature Corrected 55.3 mm Hg     Procalcitonin [[id number]]  (Abnormal) Collected: 06/22/25 0225    Specimen: Blood Updated: 06/22/25 1301     Procalcitonin 1.24 ng/mL     Narrative:      As a Marker for Sepsis (Non-Neonates):    1. <0.5 ng/mL represents a low risk of severe sepsis and/or septic shock.  2. >2 ng/mL represents a high risk of severe sepsis and/or septic  "shock.    As a Marker for Lower Respiratory Tract Infections that require antibiotic therapy:    PCT on Admission    Antibiotic Therapy       6-12 Hrs later    >0.5                Strongly Recommended  >0.25 - <0.5        Recommended   0.1 - 0.25          Discouraged              Remeasure/reassess PCT  <0.1                Strongly Discouraged     Remeasure/reassess PCT    As 28 day mortality risk marker: \"Change in Procalcitonin Result\" (>80% or <=80%) if Day 0 (or Day 1) and Day 4 values are available. Refer to [email or web address]    Change in PCT <=80%  A decrease of PCT levels below or equal to 80% defines a positive change in PCT test result representing a higher risk for 28-day all-cause mortality of patients diagnosed with severe sepsis for septic shock.    Change in PCT >80%  A decrease of PCT levels of more than 80% defines a negative change in PCT result representing a lower risk for 28-day all-cause mortality of patients diagnosed with severe sepsis or septic shock.       POC Glucose Once [[id number]]  (Abnormal) Collected: 06/22/25 1207    Specimen: Blood Updated: 06/22/25 1231     Glucose 190 mg/dL      Comment: : [id number] Oktaha HeatherMeter ID: [id number]       Blood Culture - Blood, Arm, Right [[id number]] Collected: 06/22/25 0926    Specimen: Blood from Arm, Right Updated: 06/22/25 1026    Blood Culture - Blood, Arm, Left [[id number]] Collected: 06/22/25 0936    Specimen: Blood from Arm, Left Updated: 06/22/25 1025    STAT Lactic Acid, Reflex [[id number]]  (Abnormal) Collected: 06/22/25 0926    Specimen: Blood Updated: 06/22/25 1010     Lactate 3.7 mmol/L     Fibrinogen [[id number]]  (Normal) Collected: 06/22/25 0926    Specimen: Blood Updated: 06/22/25 1004     Fibrinogen 255 mg/dL     Respiratory Panel PCR w/COVID-19(SARS-CoV-2) [facility] In-House, NP Swab in UTM/VTM, 2 HR TAT - Swab, Nasopharynx [[id number]]  (Normal) Collected: 06/22/25 0813    Specimen: Swab from " Nasopharynx Updated: 06/22/25 0932     ADENOVIRUS, PCR Not Detected     Coronavirus 229E Not Detected     Coronavirus HKU1 Not Detected     Coronavirus NL63 Not Detected     Coronavirus OC43 Not Detected     COVID19 Not Detected     Human Metapneumovirus Not Detected     Human Rhinovirus/Enterovirus Not Detected     Influenza A PCR Not Detected     Influenza B PCR Not Detected     Parainfluenza Virus 1 Not Detected     Parainfluenza Virus 2 Not Detected     Parainfluenza Virus 3 Not Detected     Parainfluenza Virus 4 Not Detected     RSV, PCR Not Detected     Bordetella pertussis pcr Not Detected     Bordetella parapertussis PCR Not Detected     Chlamydophila pneumoniae PCR Not Detected     Mycoplasma pneumo by PCR Not Detected    Narrative:      In the setting of a positive respiratory panel with a viral infection PLUS a negative procalcitonin without other underlying concern for bacterial infection, consider observing off antibiotics or discontinuation of antibiotics and continue supportive care. If the respiratory panel is positive for atypical bacterial infection (Bordetella pertussis, Chlamydophila pneumoniae, or Mycoplasma pneumoniae), consider antibiotic de-escalation to target atypical bacterial infection.    Urinalysis With Culture If Indicated - Straight Cath [[id number]]  (Abnormal) Collected: 06/22/25 0835    Specimen: Urine from Straight Cath Updated: 06/22/25 0915     Color, UA Yellow     Appearance, UA Clear     pH, UA 5.5     Specific Gravity, UA 1.015     Glucose, UA Negative     Ketones, UA Negative     Bilirubin, UA Negative     Blood, UA Moderate (2+)     Protein, UA 30 mg/dL (1+)     Leuk Esterase, UA Negative     Nitrite, UA Negative     Urobilinogen, UA 0.2 E.U./dL    Narrative:      In absence of clinical symptoms, the presence of pyuria, bacteria, and/or nitrites on the urinalysis result does not correlate with infection.    Urinalysis, Microscopic Only - Straight Cath [[id number]]  (Abnormal)  Collected: 06/22/25 0835    Specimen: Urine from Straight Cath Updated: 06/22/25 0915     RBC, UA 11-20 /HPF      WBC, UA 0-2 /HPF      Comment: Urine culture not indicated.        Bacteria, UA None Seen /HPF      Squamous Epithelial Cells, UA 0-2 /HPF      Hyaline Casts, UA 0-2 /LPF      Methodology Automated Microscopy    Peripheral Blood Smear [[id number]] Collected: 06/22/25 0225    Specimen: Blood Updated: 06/22/25 0835    POC Glucose Once [[id number]]  (Abnormal) Collected: 06/22/25 0808    Specimen: Blood Updated: 06/22/25 0829     Glucose 202 mg/dL      Comment: : [email or web address] Praaksh TovarLaverne ID: [id number]       Lactate Dehydrogenase [[id number]]  (Abnormal) Collected: 06/22/25 0225    Specimen: Blood Updated: 06/22/25 0813      U/L     STAT Lactic Acid, Reflex [[id number]]  (Abnormal) Collected: 06/22/25 0634    Specimen: Blood Updated: 06/22/25 0705     Lactate 4.2 mmol/L     CBC & Differential [[id number]]  (Abnormal) Collected: 06/22/25 0225    Specimen: Blood Updated: 06/22/25 0351    Narrative:      The following orders were created for panel order CBC & Differential.  Procedure                               Abnormality         Status                     ---------                               -----------         ------                     CBC Auto Differential[[id number]]        Abnormal            Final result                 Please view results for these tests on the individual orders.    CBC Auto Differential [[id number]]  (Abnormal) Collected: 06/22/25 0225    Specimen: Blood Updated: 06/22/25 0351     WBC 8.65 10*3/mm3      RBC 2.99 10*6/mm3      Hemoglobin 9.9 g/dL      Hematocrit 29.2 %      MCV 97.7 fL      MCH 33.1 pg      MCHC 33.9 g/dL      RDW 14.6 %      RDW-SD 52.5 fl      MPV 14.8 fL      Platelets 54 10*3/mm3     Manual Differential [[id number]]  (Abnormal) Collected: 06/22/25 0225    Specimen: Blood Updated: 06/22/25 0351     Neutrophil % 28.9 %      Lymphocyte % 32.5 %       Monocyte % 6.1 %      Eosinophil % 0.0 %      Basophil % 0.0 %      Bands %  6.1 %      Atypical Lymphocyte % 25.4 %      Plasma Cells % 0.9 %      Neutrophils Absolute 3.04 10*3/mm3      Lymphocytes Absolute 5.01 10*3/mm3      Monocytes Absolute 0.53 10*3/mm3      Eosinophils Absolute 0.00 10*3/mm3      Basophils Absolute 0.00 10*3/mm3      nRBC 0.9 /100 WBC      Crenated RBC's Mod/2+     Ovalocytes Slight/1+     Poikilocytes Slight/1+     Polychromasia Slight/1+     Vacuolated Neutrophils Slight/1+     Platelet Estimate Decreased     Giant Platelets Slight/1+    Lactic Acid, Plasma [[id number]]  (Abnormal) Collected: 06/22/25 0225    Specimen: Blood Updated: 06/22/25 0247     Lactate 2.2 mmol/L     Magnesium [[id number]]  (Normal) Collected: 06/22/25 0225    Specimen: Blood Updated: 06/22/25 0247     Magnesium 2.2 mg/dL     Comprehensive Metabolic Panel [[id number]]  (Abnormal) Collected: 06/22/25 0225    Specimen: Blood Updated: 06/22/25 0247     Glucose 200 mg/dL      BUN 39.7 mg/dL      Creatinine 1.82 mg/dL      Sodium 143 mmol/L      Potassium 3.4 mmol/L      Chloride 112 mmol/L      CO2 16.0 mmol/L      Calcium 7.8 mg/dL      Total Protein 6.1 g/dL      Albumin 3.3 g/dL      ALT (SGPT) 81 U/L      AST (SGOT) 58 U/L      Alkaline Phosphatase 90 U/L      Total Bilirubin 0.7 mg/dL      Globulin 2.8 gm/dL      A/G Ratio 1.2 g/dL      BUN/Creatinine Ratio 21.8     Anion Gap 15.0 mmol/L      eGFR 37.8 mL/min/1.73     Narrative:      GFR Categories in Chronic Kidney Disease (CKD)              GFR Category          GFR (mL/min/1.73)    Interpretation  G1                    90 or greater        Normal or high (1)  G2                    60-89                Mild decrease (1)  G3a                   45-59                Mild to moderate decrease  G3b                   30-44                Moderate to severe decrease  G4                    15-29                Severe decrease  G5                    14 or less            Kidney failure    (1)In the absence of evidence of kidney disease, neither GFR category G1 or G2 fulfill the criteria for CKD.    eGFR calculation 2021 CKD-EPI creatinine equation, which does not include race as a factor    Phosphorus [[id number]]  (Abnormal) Collected: 06/22/25 0225    Specimen: Blood Updated: 06/22/25 0247     Phosphorus 2.2 mg/dL     Blood Gas, Arterial - [[id number]]  (Abnormal) Collected: 06/22/25 0120    Specimen: Arterial Blood Updated: 06/22/25 0122     Site Right Brachial     Jalil's Test N/A     pH, Arterial 7.418 pH units      pCO2, Arterial 25.3 mm Hg      Comment: 84 Value below reference range        pO2, Arterial 76.2 mm Hg      Comment: 84 Value below reference range        HCO3, Arterial 16.3 mmol/L      Comment: 84 Value below reference range        Base Excess, Arterial -6.9 mmol/L      Comment: 84 Value below reference range        O2 Saturation, Arterial 96.1 %      Temperature 37.0     Barometric Pressure for Blood Gas 754 mmHg      Modality Room Air     Ventilator Mode NA     Collected by [id number]     Comment: Meter: [id number]     :  Kelsie Mart, AVA        pCO2, Temperature Corrected 25.3 mm Hg      pH, Temp Corrected 7.418 pH Units      pO2, Temperature Corrected 76.2 mm Hg           Imaging Results (Last 72 Hours)       Procedure Component Value Units Date/Time    XR Chest 1 View [[id number]] Collected: 06/23/25 0630     Updated: 06/23/25 0638    Narrative:      EXAMINATION: XR CHEST 1 VW-        HISTORY: Intubated Patient; N17.9-Acute kidney failure, unspecified;  E86.0-Dehydration; D64.9-Anemia, unspecified; D69.6-Thrombocytopenia,  unspecified; R19.7-Diarrhea, unspecified; R53.1-Weakness; Z74.09-Other  reduced mobility; R13.10-Dysphagia, unspecified; R50.9-Fever,  unspecified; R10.0-Acute abdomen; I46.9-Cardiac arrest, cause  unspecified     A frontal projection of the chest is compared with the previous study  dated 2/6/2022 2025.     The lungs are poorly  expanded.     Left cardiac border and left lower lung are partly obscured by an opaque  artifact.     The bilateral lung opacities persist with no interval change. This may  represent pulm edema or inflammatory/infectious process.     Small biapical pleural thickening persist.     There is no pleural effusion. There is no pneumothorax.     The heart size is not optimally evaluated due to the AP projection.     An endotracheal tube is in place. A nasogastric tube is visualized. The  distal end of the tube is not in the field-of-view and not evaluated.     No acute bony abnormality.       Impression:      1. No change in cardiopulmonary status since the previous study.  2. The endotracheal tube in place. The distal end of the gastric tube is  not included in the study.        This report was signed and finalized on 6/23/2025 6:35 AM by Dr. Meg Jaquez MD.       XR Abdomen KUB [[id number]] Collected: 06/22/25 1407     Updated: 06/22/25 1411    Narrative:      EXAM: XR ABDOMEN KUB-         HISTORY: NG placement; N17.9-Acute kidney failure, unspecified;  E86.0-Dehydration; D64.9-Anemia, unspecified; D69.6-Thrombocytopenia,  unspecified; R19.7-Diarrhea, unspecified; R53.1-Weakness; Z74.09-Other  reduced mobility; R13.10-Dysphagia, unspecified; R50.9-Fever,  unspecified; R10.0-Acute abdomen       COMPARISON: None available.     TECHNIQUE:   Frontal view of the abdomen. 1 image.     FINDINGS:    There is an NG tube in place tip at the fundus of the stomach. Bowel gas  pattern visualized is nonspecific with borderline air-filled loops of  bowel.          Impression:         1. NG tube tip at the gastric fundus.     This report was signed and finalized on 6/22/2025 2:08 PM by Dilan Sykes.       XR Chest 1 View [[id number]] Collected: 06/22/25 1406     Updated: 06/22/25 1410    Narrative:      EXAM: XR CHEST 1 VW-         HISTORY: intubation; N17.9-Acute kidney failure, unspecified;  E86.0-Dehydration; D64.9-Anemia,  unspecified; D69.6-Thrombocytopenia,  unspecified; R19.7-Diarrhea, unspecified; R53.1-Weakness; Z74.09-Other  reduced mobility; R13.10-Dysphagia, unspecified; R50.9-Fever,  unspecified; R10.0-Acute abdomen       COMPARISON: 6/22/2025.     TECHNIQUE:  Frontal view(s) of the chest submitted.     FINDINGS:    There is an ET tube in place with tip 5 cm above the marky. Central  opacities are noted worrisome for edema. External pacing lead is noted.  No large effusion is seen. The left costophrenic angle is not included.  No pneumothorax is visualized. Probable NG tube is coiled in the neck.          Impression:         1. ET tube tip above the marky. NG tube coiled in the neck.  2. Bilateral central opacities worrisome for edema.     This report was signed and finalized on 6/22/2025 2:07 PM by Dilan Sykes.       CT Angiogram Abdomen Pelvis [[id number]] Collected: 06/22/25 1219     Updated: 06/22/25 1229    Narrative:      EXAM: CT ANGIOGRAM ABDOMEN PELVIS-      HISTORY: worsening abd pain/distension, r/o ischemia or other acute  etiology; N17.9-Acute kidney failure, unspecified; E86.0-Dehydration;  D64.9-Anemia, unspecified; D69.6-Thrombocytopenia, unspecified;  R19.7-Diarrhea, unspecified; R53.1-Weakness; Z74.09-Other reduced  mobility; R13.10-Dysphagia, unspecified       COMPARISON: None available.     DOSE LENGTH PRODUCT: 3050 mGy cm. Automatic exposure control was  utilized to make radiation dose as low as reasonably achievable.     TECHNIQUE: Enhanced axial images of the abdomen and pelvis obtained with  multiplanar reformats. 3D postprocessing, including MIPs, performed and  images saved to PACS.     FINDINGS:  VISUALIZED CHEST: No pericardial or pleural effusion is identified.  There is mild atelectasis at the lung bases. There is fluid in the  distal esophagus without wall thickening. This may be due to reflux or  dysmotility.     LIVER/BILIARY: Hepatic parenchyma is unremarkable. The patient is  status  post cholecystectomy. Bile ducts are unremarkable.     KIDNEYS/URETERS: There is an exophytic right renal cyst and left renal  angiomyolipoma measuring 3.0 cm. There is no hydronephrosis.     ADRENAL: Stable left adrenal nodularity.     SPLEEN: Unremarkable.     PANCREAS: Unremarkable.        PERITONEUM/RETROPERITONEUM: No retroperitoneal or mesenteric  lymphadenopathy is seen. No free fluid is identified.     GI TRACT: Dilated fluid-filled stomach is unchanged. There are multiple  dilated fluid and air-filled loops of bowel which are stable. No focal  wall thickening is seen.     PELVIS: There is prostatic hypertrophy. Urinary bladder is incompletely  distended. There are prominent air and fluid-filled loops of bowel in  the pelvis. No free fluid is seen.     SOFT TISSUES: There are inguinal hernias containing fat.     BONES: No acute or aggressive bony lesion.            VESSELS:     AORTA/ABDOMINAL-PELVIC VESSELS: There is atherosclerosis of the  abdominal aorta without aneurysm. There is narrowing at the proximal  celiac artery with greater than 70% stenosis however contrast is noted  more distally. The SMA is patent without stenosis or occlusion. The LUIS ALBERTO  is patent without stenosis or occlusion. There are scattered areas of  atherosclerosis at the branches of the bilateral internal and external  iliac arteries but no significant stenosis is seen. There is  atherosclerosis at both common iliac arteries with 50% stenosis on the  left.             Impression:      1. Atherosclerosis of the abdominal aorta and branches with greater than  70% stenosis at the proximal celiac artery and 50% stenosis at the left  common iliac artery. No occlusion is seen.  2. Stable prominent air and fluid-filled loops of stomach, small bowel,  and colon without obvious transition point. No focal wall thickening or  edema of the mesentery is seen.     This report was signed and finalized on 6/22/2025 12:26 PM by  Dilan Sykes.       XR Chest 1 View [[id number]] Collected: 06/22/25 0808     Updated: 06/22/25 0811    Narrative:      EXAM: XR CHEST 1 VW-         HISTORY: respiratory distress; N17.9-Acute kidney failure, unspecified;  E86.0-Dehydration; D64.9-Anemia, unspecified; D69.6-Thrombocytopenia,  unspecified; R19.7-Diarrhea, unspecified; R53.1-Weakness; Z74.09-Other  reduced mobility; R13.10-Dysphagia, unspecified       COMPARISON: 6/17/2025.     TECHNIQUE:  Frontal view(s) of the chest submitted.     FINDINGS:    Lungs are grossly clear. No effusion or pneumothorax is seen. Heart and  mediastinum are unremarkable.          Impression:         1. No active disease in the chest.     This report was signed and finalized on 6/22/2025 8:08 AM by Dilan Sykes.       CT Abdomen Pelvis Without Contrast [[id number]] Collected: 06/22/25 0752     Updated: 06/22/25 0806    Narrative:      EXAM: CT ABDOMEN PELVIS WO CONTRAST-         HISTORY: abdominal pain; N17.9-Acute kidney failure, unspecified;  E86.0-Dehydration; D64.9-Anemia, unspecified; D69.6-Thrombocytopenia,  unspecified; R19.7-Diarrhea, unspecified; R53.1-Weakness; Z74.09-Other  reduced mobility; R13.10-Dysphagia, unspecified       COMPARISON: 6/17/2025.     DOSE LENGTH PRODUCT: 1024.13 mGy.cm Automatic exposure control was  utilized to make radiation dose as low as reasonably achievable.     TECHNIQUE: Noncontrast axial images of the abdomen and pelvis obtained  with multiplanar reformats.     FINDINGS:  VISUALIZED CHEST: No pericardial or pleural effusion is identified.  Linear opacities at the lung bases are due to atelectasis.     LIVER/BILE DUCTS: Patient is status post cholecystectomy. There are  calcified granulomas. Unenhanced hepatic parenchyma is unremarkable.  Bile ducts are unremarkable.     KIDNEYS/URETERS: There is a right renal cyst and no hydronephrosis. Left  renal angiomyolipoma measures 3.2 cm. No renal or ureteral calculi are  identified.      ADRENAL: There is stable nodularity of the left adrenal gland.        SPLEEN: Unremarkable.     PANCREAS: There is a splenule at the tail of the pancreas unchanged from  2015. Unenhanced pancreatic parenchyma is unremarkable.     MESENTERY: No retroperitoneal or mesenteric lymphadenopathy or  inflammatory changes are seen.     VASCULATURE: There is calcified atherosclerosis of the abdominal aorta  without aneurysm.     GI TRACT: There is a small hiatal hernia. There is fluid in the distal  esophagus may be related to reflux or dysmotility. Stomach is distended  with fluid and air and there are multiple loops of small and large bowel  which are distended with fluid and air and scattered air-fluid levels.  No obvious transition point is seen. Ileus is favored.     PELVIS: There is prostatic hypertrophy. Urinary bladder is unremarkable.  No pelvic lymphadenopathy or free fluid is seen.     SOFT TISSUES: There are bilateral inguinal hernias left larger than  right containing fat.     BONES: No acute or aggressive bony lesion.           Impression:      1. There are multiple prominent fluid and air-filled loops of small  bowel and colon and also distention of the stomach without transition  point or inflammatory change of the mesentery. This may represent an  ileus.  2. Stable right renal cyst and left renal angiomyolipoma.  3. Stable left adrenal nodularity.  4. Prostatic hypertrophy.  5. Inguinal hernias containing fat.     These findings are in agreement with the critical and emergent findings  from the [facility] preliminary report.        This report was signed and finalized on 6/22/2025 8:03 AM by Dilan Sykes.                  ASSESSMENT/PLAN     Active Hospital Problems    Diagnosis     **Shock, septic     Cardiac arrest     Acute hypoxic respiratory failure     Celiac artery stenosis     DANIEL (acute kidney injury)     Transaminitis, acute     Dermatitis neglecta     Iron deficiency     Hypokalemia     Tremor         PLAN:   Patient is a 75-year-old male who presents with septic shock.  He is currently being treated in CCU intubated and sedated with vasopressor support.  Patient was admitted with abdominal distention with etiology unclear.  There are concerns of possible ischemic bowel despite CT angiogram showing patent mesenteric vessels.  The patient is status post cardiac arrest with ROSC and massive emesis with possible aspiration.  There is possibility that this could be nonocclusive mesenteric ischemia.    Being followed by General Surgery with no plans for surgical intervention at this time.  Patient is too unstable for OR at this time. Suggest to continue broad spectrum antibiotics and supportive care with weaning of pressors when appropriate.     This document has been electronically signed by RUBIO Horvath on 6/23/2025 08:21 CDT

## 2025-06-23 NOTE — PLAN OF CARE
Goal Outcome Evaluation:         On 100% FiO2, peep 10, O2 sats in 80's all shift. Pupils fixed, round and equal. TOF 0/4, PA aware. Sedation and paralytic off since around 0100. Levo at 0.12, vaso at 0.03, neosynephrine off. Fluctuating temps throughout shift, tmax 99.9. Urine output 75 mLs. 500 mL output from OG at low intermittent suction. Platelets 42, lactic 5.6, PA aware. One time dose of albumin ordered. Started on solu-cortef q6h. Daughter Nadya notified of patient's condition, who stated she was coming from out of town to see patient today.

## 2025-06-23 NOTE — SIGNIFICANT NOTE
Daughter, Nadya Shirley, arrived at bedside today.  Updated her on patient's clinical course and current critical status.  We discussed his likely poor prognosis secondary to ongoing ARDS, hypoxic respiratory failure, septic shock despite maximal medical management.  Team is understanding of all this with good insight.  We discussed goals of care.  She would like to make him a DO NOT RESUSCITATE, no CPR, no cardioversion and continue medical management in the interim.  She understands the likelihood of further deterioration and poor outcome.  Once she is able to contact more family including her mother, the patient's wife, and her , she indicated they may want to discuss transition to comfort as goal of care at that time.

## 2025-06-23 NOTE — PROGRESS NOTES
RT EQUIPMENT DEVICE RELATED - SKIN ASSESSMENT    Marko Score:  Marko Score: 11     RT Medical Equipment/Device:     ETT Izquierdo/Anchorfast    Skin Assessment:      Cheek:  Intact  Mouth:  Intact    Device Skin Pressure Protection:  Skin-to-device areas padded:  None Required    Nurse Notification:  Kaleigh Guevara, CRT

## 2025-06-23 NOTE — PLAN OF CARE
Goal Outcome Evaluation:         Pt arrived to me from  at approximately 1300 unresponsive and agonal breathing. NGT placed by other RN's at bedside while this RN was receiving report from floor RN. Pt ultimately lost pulse and code documented. Pt vomited approx 2L of dark bowel looking content during the code. NGT to LIS. Pt now on levophed @ 0.3, vaso @ 0.03, phenylephrine @ 0.5, propofol @ 60, fentanyl @ 300, vecuronium gtt @ 0.6, and bicarb gtt. X2 amps bicarb given after code. L radial arterial line and R femoral CVC placed during code by RUBIO Ruby. RUBIO was also able to update pt's daughter after multiple attempts to contact.Ventilator at 100% fio2 and 10 PEEP with O2 saturation of 84%. Intensivist team aware.

## 2025-06-23 NOTE — PLAN OF CARE
Problem: Adult Inpatient Plan of Care  Goal: Plan of Care Review  Outcome: Not Progressing  Goal: Patient-Specific Goal (Individualized)  Outcome: Not Progressing  Goal: Absence of Hospital-Acquired Illness or Injury  Outcome: Not Progressing  Intervention: Identify and Manage Fall Risk  Recent Flowsheet Documentation  Taken 6/23/2025 1800 by Kandace Burrows RN  Safety Promotion/Fall Prevention:   safety round/check completed   fall prevention program maintained  Taken 6/23/2025 1700 by Kandace Burrows RN  Safety Promotion/Fall Prevention:   safety round/check completed   fall prevention program maintained  Taken 6/23/2025 1600 by Kandace Burrows RN  Safety Promotion/Fall Prevention:   safety round/check completed   fall prevention program maintained  Taken 6/23/2025 1500 by Kandace Burrows RN  Safety Promotion/Fall Prevention:   safety round/check completed   fall prevention program maintained  Taken 6/23/2025 1400 by Kandace Burrows RN  Safety Promotion/Fall Prevention:   safety round/check completed   fall prevention program maintained  Taken 6/23/2025 1300 by Kandace Burrows RN  Safety Promotion/Fall Prevention:   safety round/check completed   fall prevention program maintained  Taken 6/23/2025 1200 by Kandace Burrows RN  Safety Promotion/Fall Prevention:   safety round/check completed   fall prevention program maintained  Taken 6/23/2025 1100 by Kandace Burrows RN  Safety Promotion/Fall Prevention:   safety round/check completed   fall prevention program maintained  Taken 6/23/2025 1000 by Kandace Burrows RN  Safety Promotion/Fall Prevention:   safety round/check completed   fall prevention program maintained  Taken 6/23/2025 0900 by Kandace Burrows RN  Safety Promotion/Fall Prevention:   safety round/check completed   fall prevention program maintained  Taken 6/23/2025 0800 by Kandace Burrows RN  Safety Promotion/Fall Prevention:   safety round/check completed   fall prevention program maintained  Taken 6/23/2025 0700 by Kandace Burrows  RN  Safety Promotion/Fall Prevention:   safety round/check completed   fall prevention program maintained  Intervention: Prevent Skin Injury  Recent Flowsheet Documentation  Taken 6/23/2025 1700 by Kandace Burrows RN  Body Position:   turned   right  Taken 6/23/2025 1500 by Kandace Burrows RN  Body Position:   turned   left  Taken 6/23/2025 1300 by Kandace Burrows RN  Body Position:   turned   supine  Taken 6/23/2025 1200 by Kandace Burrows RN  Skin Protection: transparent dressing maintained  Taken 6/23/2025 1100 by Kandace Burrows RN  Body Position:   turned   right  Taken 6/23/2025 0900 by Kandace Burrows RN  Body Position:   turned   left  Taken 6/23/2025 0800 by Kandace Burrows RN  Skin Protection: transparent dressing maintained  Taken 6/23/2025 0700 by Kandace Burrows RN  Body Position:   turned   supine  Intervention: Prevent and Manage VTE (Venous Thromboembolism) Risk  Recent Flowsheet Documentation  Taken 6/23/2025 1600 by Kandace Burrows RN  VTE Prevention/Management:   bilateral   SCDs (sequential compression devices) on  Taken 6/23/2025 0800 by Kandace Burrows RN  VTE Prevention/Management:   bilateral   SCDs (sequential compression devices) on  Intervention: Prevent Infection  Recent Flowsheet Documentation  Taken 6/23/2025 1600 by Kandace Burrows RN  Infection Prevention:   rest/sleep promoted   single patient room provided  Goal: Optimal Comfort and Wellbeing  Outcome: Not Progressing  Intervention: Provide Person-Centered Care  Recent Flowsheet Documentation  Taken 6/23/2025 1600 by aKndace Burrows RN  Trust Relationship/Rapport:   care explained   choices provided  Taken 6/23/2025 1200 by Kandace Burrows RN  Trust Relationship/Rapport:   care explained   choices provided  Taken 6/23/2025 0800 by Kandace Burrows RN  Trust Relationship/Rapport: care explained  Goal: Readiness for Transition of Care  Outcome: Not Progressing     Problem: Skin Injury Risk Increased  Goal: Skin Health and Integrity  Outcome: Not Progressing  Intervention:  Optimize Skin Protection  Recent Flowsheet Documentation  Taken 6/23/2025 1800 by Kandace Burrows RN  Activity Management: bedrest  Taken 6/23/2025 1700 by Kandace Burrows RN  Activity Management: bedrest  Head of Bed (HOB) Positioning: HOB at 30-45 degrees  Taken 6/23/2025 1600 by Kandace Burrows RN  Activity Management: bedrest  Taken 6/23/2025 1500 by Kandace Burrows RN  Activity Management: bedrest  Head of Bed (HOB) Positioning: HOB at 30-45 degrees  Taken 6/23/2025 1400 by Kandace Burrows RN  Activity Management: bedrest  Taken 6/23/2025 1300 by Kandace Burrows RN  Activity Management: bedrest  Head of Bed (HOB) Positioning: HOB at 30-45 degrees  Taken 6/23/2025 1200 by Kandace Burrows RN  Activity Management: bedrest  Pressure Reduction Techniques:   weight shift assistance provided   heels elevated off bed   pressure points protected  Pressure Reduction Devices: pressure-redistributing mattress utilized  Skin Protection: transparent dressing maintained  Taken 6/23/2025 1100 by Kandace Burrows RN  Activity Management: bedrest  Head of Bed (HOB) Positioning: HOB at 30-45 degrees  Taken 6/23/2025 1000 by Kandace Burrows RN  Activity Management: bedrest  Taken 6/23/2025 0900 by Kandace Burrows RN  Activity Management: bedrest  Head of Bed (HOB) Positioning: HOB at 30-45 degrees  Taken 6/23/2025 0800 by Kandace Burrows RN  Activity Management: bedrest  Pressure Reduction Techniques:   weight shift assistance provided   heels elevated off bed   pressure points protected  Pressure Reduction Devices: pressure-redistributing mattress utilized  Skin Protection: transparent dressing maintained  Taken 6/23/2025 0700 by Kandace Burrows RN  Activity Management: bedrest  Head of Bed (HOB) Positioning: HOB at 30-45 degrees     Problem: Fall Injury Risk  Goal: Absence of Fall and Fall-Related Injury  Outcome: Not Progressing  Intervention: Promote Injury-Free Environment  Recent Flowsheet Documentation  Taken 6/23/2025 1800 by Kandace Burrows RN  Safety  Promotion/Fall Prevention:   safety round/check completed   fall prevention program maintained  Taken 6/23/2025 1700 by Kandace Burrows RN  Safety Promotion/Fall Prevention:   safety round/check completed   fall prevention program maintained  Taken 6/23/2025 1600 by Kandace Burrows RN  Safety Promotion/Fall Prevention:   safety round/check completed   fall prevention program maintained  Taken 6/23/2025 1500 by Kandace Burrows RN  Safety Promotion/Fall Prevention:   safety round/check completed   fall prevention program maintained  Taken 6/23/2025 1400 by Kandace Burrows RN  Safety Promotion/Fall Prevention:   safety round/check completed   fall prevention program maintained  Taken 6/23/2025 1300 by Kandace Burrows RN  Safety Promotion/Fall Prevention:   safety round/check completed   fall prevention program maintained  Taken 6/23/2025 1200 by Kandace Burrows RN  Safety Promotion/Fall Prevention:   safety round/check completed   fall prevention program maintained  Taken 6/23/2025 1100 by Kandace Burrows RN  Safety Promotion/Fall Prevention:   safety round/check completed   fall prevention program maintained  Taken 6/23/2025 1000 by Kandace Burrows RN  Safety Promotion/Fall Prevention:   safety round/check completed   fall prevention program maintained  Taken 6/23/2025 0900 by Kandace Burrows RN  Safety Promotion/Fall Prevention:   safety round/check completed   fall prevention program maintained  Taken 6/23/2025 0800 by Kandace Burrows RN  Safety Promotion/Fall Prevention:   safety round/check completed   fall prevention program maintained  Taken 6/23/2025 0700 by Kandace Burrows RN  Safety Promotion/Fall Prevention:   safety round/check completed   fall prevention program maintained     Problem: Sepsis/Septic Shock  Goal: Optimal Coping  Outcome: Not Progressing  Intervention: Support Patient and Family Response  Recent Flowsheet Documentation  Taken 6/23/2025 1600 by Kandace Burrows RN  Family/Support System Care: support provided  Taken 6/23/2025  0800 by Kandace Burrows RN  Family/Support System Care: support provided  Goal: Absence of Bleeding  Outcome: Not Progressing  Goal: Blood Glucose Level Within Target Range  Outcome: Not Progressing  Goal: Absence of Infection Signs and Symptoms  Outcome: Not Progressing  Intervention: Initiate Sepsis Management  Recent Flowsheet Documentation  Taken 6/23/2025 1600 by Kandace Burrows RN  Infection Prevention:   rest/sleep promoted   single patient room provided  Intervention: Promote Stabilization  Recent Flowsheet Documentation  Taken 6/23/2025 0800 by Kandace Burrows RN  Fluid/Electrolyte Management: fluids provided  Intervention: Promote Recovery  Recent Flowsheet Documentation  Taken 6/23/2025 1800 by Kandace Burrows RN  Activity Management: bedrest  Taken 6/23/2025 1700 by Kandace Burrows RN  Activity Management: bedrest  Taken 6/23/2025 1600 by Kandace Burrows RN  Activity Management: bedrest  Taken 6/23/2025 1500 by Kandace Burrows RN  Activity Management: bedrest  Taken 6/23/2025 1400 by Kandace Burrows RN  Activity Management: bedrest  Taken 6/23/2025 1300 by Kandace Burrows RN  Activity Management: bedrest  Taken 6/23/2025 1200 by Kandace Burrows RN  Activity Management: bedrest  Taken 6/23/2025 1100 by Kandace Burrows RN  Activity Management: bedrest  Taken 6/23/2025 1000 by Kandace Burrows RN  Activity Management: bedrest  Taken 6/23/2025 0900 by Kandace Burrows RN  Activity Management: bedrest  Taken 6/23/2025 0800 by Kandace Burrows RN  Activity Management: bedrest  Taken 6/23/2025 0700 by Kandace Burrows RN  Activity Management: bedrest  Goal: Optimal Nutrition Delivery  Outcome: Not Progressing     Problem: Mechanical Ventilation Invasive  Goal: Effective Communication  Outcome: Not Progressing  Intervention: Ensure Effective Communication  Recent Flowsheet Documentation  Taken 6/23/2025 1600 by Kandace Burrows RN  Trust Relationship/Rapport:   care explained   choices provided  Diversional Activities: television  Family/Support System Care:  support provided  Communication Enhancement Strategies:   one-step directions provided   repetition utilized  Taken 6/23/2025 1200 by Kandace Burrows RN  Trust Relationship/Rapport:   care explained   choices provided  Communication Enhancement Strategies:   one-step directions provided   repetition utilized  Taken 6/23/2025 0800 by Kandace Burrows RN  Trust Relationship/Rapport: care explained  Diversional Activities: television  Family/Support System Care: support provided  Communication Enhancement Strategies:   repetition utilized   one-step directions provided  Goal: Optimal Device Function  Outcome: Not Progressing  Intervention: Optimize Device Care and Function  Recent Flowsheet Documentation  Taken 6/23/2025 1600 by Kandace Burrows RN  Oral Care: swabbed with antiseptic solution  Airway Safety Measures:   suction at bedside   mask valve resuscitator at bedside  Taken 6/23/2025 1200 by Kandace Burrows RN  Oral Care: swabbed with antiseptic solution  Taken 6/23/2025 0800 by Kandace Burrows RN  Oral Care: swabbed with antiseptic solution  Goal: Mechanical Ventilation Liberation  Outcome: Not Progressing  Goal: Optimal Nutrition Delivery  Outcome: Not Progressing  Goal: Absence of Device-Related Skin and Tissue Injury  Outcome: Not Progressing  Intervention: Maintain Skin and Tissue Health  Recent Flowsheet Documentation  Taken 6/23/2025 1200 by Kandace Burrows RN  Device Skin Pressure Protection:   tubing/devices free from skin contact   skin-to-skin areas padded   skin-to-device areas padded   pressure points protected  Taken 6/23/2025 0800 by Kandace Burrows RN  Device Skin Pressure Protection:   tubing/devices free from skin contact   skin-to-skin areas padded   skin-to-device areas padded   pressure points protected   adhesive use limited   absorbent pad utilized/changed  Goal: Absence of Ventilator-Induced Lung Injury  Outcome: Not Progressing  Intervention: Prevent Ventilator-Associated Pneumonia  Recent Flowsheet  Documentation  Taken 6/23/2025 1700 by Kandace Burrows RN  Head of Bed ([facility]) Positioning: HOB at 30-45 degrees  Taken 6/23/2025 1600 by Kandace Burrows RN  Oral Care: swabbed with antiseptic solution  Taken 6/23/2025 1500 by Kandace Burrows RN  Head of Bed ([facility]) Positioning: HOB at 30-45 degrees  Taken 6/23/2025 1300 by Kandace Burrows RN  Head of Bed ([facility]) Positioning: HOB at 30-45 degrees  Taken 6/23/2025 1200 by Kandace Burrows RN  Oral Care: swabbed with antiseptic solution  Taken 6/23/2025 1100 by Kandace Burrows RN  Head of Bed ([facility]) Positioning: HOB at 30-45 degrees  Taken 6/23/2025 0900 by Kandace Burrows RN  Head of Bed ([facility]) Positioning: HOB at 30-45 degrees  Taken 6/23/2025 0800 by Kandace Burrows RN  VAP Prevention Measures: completed  Oral Care: swabbed with antiseptic solution  Taken 6/23/2025 0700 by Kandace Burrows RN  Head of Bed ([facility]) Positioning: HOB at 30-45 degrees  Goal: Effective Communication  Outcome: Not Progressing  Intervention: Ensure Effective Communication  Recent Flowsheet Documentation  Taken 6/23/2025 1600 by Kandace Burrows RN  Trust Relationship/Rapport:   care explained   choices provided  Diversional Activities: television  Family/Support System Care: support provided  Communication Enhancement Strategies:   one-step directions provided   repetition utilized  Taken 6/23/2025 1200 by Kandace Burrows RN  Trust Relationship/Rapport:   care explained   choices provided  Communication Enhancement Strategies:   one-step directions provided   repetition utilized  Taken 6/23/2025 0800 by Kandace Burrows RN  Trust Relationship/Rapport: care explained  Diversional Activities: television  Family/Support System Care: support provided  Communication Enhancement Strategies:   repetition utilized   one-step directions provided  Goal: Optimal Device Function  Outcome: Not Progressing  Intervention: Optimize Device Care and Function  Recent Flowsheet Documentation  Taken 6/23/2025 1600 by Kandace Burrows RN  Oral Care: swabbed with  antiseptic solution  Airway Safety Measures:   suction at bedside   mask valve resuscitator at bedside  Taken 6/23/2025 1200 by Kandace Burrows RN  Oral Care: swabbed with antiseptic solution  Taken 6/23/2025 0800 by Kandace Burrows RN  Oral Care: swabbed with antiseptic solution  Goal: Mechanical Ventilation Liberation  Outcome: Not Progressing  Goal: Optimal Nutrition Delivery  Outcome: Not Progressing  Goal: Absence of Device-Related Skin and Tissue Injury  Outcome: Not Progressing  Intervention: Maintain Skin and Tissue Health  Recent Flowsheet Documentation  Taken 6/23/2025 1200 by Kandace Burrows RN  Device Skin Pressure Protection:   tubing/devices free from skin contact   skin-to-skin areas padded   skin-to-device areas padded   pressure points protected  Taken 6/23/2025 0800 by Kandace Burrows RN  Device Skin Pressure Protection:   tubing/devices free from skin contact   skin-to-skin areas padded   skin-to-device areas padded   pressure points protected   adhesive use limited   absorbent pad utilized/changed  Goal: Absence of Ventilator-Induced Lung Injury  Outcome: Not Progressing  Intervention: Prevent Ventilator-Associated Pneumonia  Recent Flowsheet Documentation  Taken 6/23/2025 1700 by Kandace Burrows RN  Head of Bed ([facility]) Positioning: HOB at 30-45 degrees  Taken 6/23/2025 1600 by Kandace Burrows RN  Oral Care: swabbed with antiseptic solution  Taken 6/23/2025 1500 by Kandace Burrows RN  Head of Bed ([facility]) Positioning: HOB at 30-45 degrees  Taken 6/23/2025 1300 by Kandace Burrows RN  Head of Bed ([facility]) Positioning: HOB at 30-45 degrees  Taken 6/23/2025 1200 by Kandace Burrows RN  Oral Care: swabbed with antiseptic solution  Taken 6/23/2025 1100 by Kandace Burrows RN  Head of Bed ([facility]) Positioning: HOB at 30-45 degrees  Taken 6/23/2025 0900 by Kandace Burrows RN  Head of Bed ([facility]) Positioning: HOB at 30-45 degrees  Taken 6/23/2025 0800 by Kandace Burrows RN  VAP Prevention Measures: completed  Oral Care: swabbed with antiseptic  solution  Taken 6/23/2025 0700 by Kandace Burrows, RN  Head of Bed (HOB) Positioning: HOB at 30-45 degrees   Goal Outcome Evaluation:

## 2025-06-23 NOTE — NURSING NOTE
[facility]  INPATIENT WOUND & OSTOMY CARE    Today's Date: 06/23/25    Patient Name: Felix Bartlett Sr.  MRN: [id number]  [facility]: [id number]  PCP: Keyon Dockery MD  Attending Provider: Andrew Ghotra MD  Length of Stay: 6    Wound care consulted for possible wound to gluteus . Nursing has uploaded picture to chart. Patient was admitted with septic shock on 6/17/2025. Patient is currently lying in bed with no family at bedside.     Unable to assess patient at this time due to him being too unstable to turn. Will attempt assessment at a later time.      Inpatient wound care will continue to follow during hospital stay.  Please contact if any issues or concerns arise.     This document has been electronically signed by Magdalena Nicholas RN on 6/23/2025 11:53 CDT

## 2025-06-24 NOTE — THERAPY DISCHARGE NOTE
Acute Care - Speech Language Pathology Discharge Summary  [facility]       Patient Name: Felix Bartlett Sr.  : 1950  MRN: [id number]    Today's Date: 2025                   Admit Date: 2025      SLP Recommendation and Plan    Visit Dx:    ICD-10-CM ICD-9-CM   1. DANIEL (acute kidney injury)  N17.9 584.9   2. Dehydration  E86.0 276.51   3. Anemia, unspecified type  D64.9 285.9   4. Thrombocytopenia  D69.6 287.5   5. Diarrhea, unspecified type  R19.7 787.91   6. Generalized weakness  R53.1 780.79   7. Impaired mobility [Z74.09]  Z74.09 799.89   8. Dysphagia, unspecified type  R13.10 787.20   9. FUO (fever of unknown origin)  R50.9 780.60   10. Acute abdomen  R10.0 789.00   11. Cardiopulmonary arrest  I46.9 427.5                SLP GOALS       Row Name 25 1526             (LTG) Swallow    (LTG) Swallow Pt will tolerate least restrictive diet with no overt s/s of aspiration.  -MB      Cavalier (Swallow Long Term Goal) with minimal cues (75-90% accuracy)  -MB      Time Frame (Swallow Long Term Goal) by discharge  -MB      Barriers (Swallow Long Term Goal) previous functional deficits  -MB      Progress/Outcomes (Swallow Long Term Goal) goal not met  -MB      Comment (Swallow Long Term Goal) n/a  -MB         (STG) Patient will tolerate trials of    Consistencies Trialed (Tolerate trials) soft to chew (ground) textures;thin liquids  -MB      Desired Outcome (Tolerate trials) without signs/symptoms of aspiration;with adequate oral prep/transit/clearance  -MB      Cavalier (Tolerate trials) with minimal cues (75-90% accuracy)  -MB      Time Frame (Tolerate trials) by discharge  -MB      Progress/Outcomes (Tolerate trials) goal not met  -MB                User Key  (r) = Recorded By, (t) = Taken By, (c) = Cosigned By      Initials Name Provider Type    Jeimy Araiza CCC-SLP Speech and Language Pathologist                    [email or web address]      SLP Discharge Summary  Anticipated Discharge  Disposition (SLP): unknown  Reason for Discharge: other (see comments) (Pt )  Progress Toward Achieving Short/long Term Goals: other (see comments) (Pt )  Discharge Destination: other (see comments) (Pt )      Jeimy Johnston, CCC-SLP  2025

## 2025-06-24 NOTE — PROGRESS NOTES
RT EQUIPMENT DEVICE RELATED - SKIN ASSESSMENT    Marko Score:  Marko Score: 11     RT Medical Equipment/Device:     ETT Izquierdo/Anchorfast    Skin Assessment:      Cheek:  Intact  Lips:  Intact    Device Skin Pressure Protection:  Skin-to-device areas padded:  Anchorfast    Nurse Notification:  Kaleigh Schmidt, CRT

## 2025-06-24 NOTE — PLAN OF CARE
Problem: Adult Inpatient Plan of Care  Goal: Plan of Care Review  Outcome: Progressing  Flowsheets (Taken 6/24/2025 0638)  Progress: no change  Plan of Care Reviewed With: patient  Goal: Patient-Specific Goal (Individualized)  Outcome: Progressing  Goal: Absence of Hospital-Acquired Illness or Injury  Outcome: Progressing  Intervention: Identify and Manage Fall Risk  Recent Flowsheet Documentation  Taken 6/24/2025 0600 by Temo Toro RN  Safety Promotion/Fall Prevention: safety round/check completed  Taken 6/24/2025 0500 by Temo Toro RN  Safety Promotion/Fall Prevention: safety round/check completed  Taken 6/24/2025 0400 by Temo Toro RN  Safety Promotion/Fall Prevention: safety round/check completed  Taken 6/24/2025 0300 by Temo Toro RN  Safety Promotion/Fall Prevention: safety round/check completed  Taken 6/24/2025 0200 by Temo Toro RN  Safety Promotion/Fall Prevention: safety round/check completed  Taken 6/24/2025 0100 by Temo Toro RN  Safety Promotion/Fall Prevention: safety round/check completed  Taken 6/24/2025 0000 by Temo Toro RN  Safety Promotion/Fall Prevention: safety round/check completed  Taken 6/23/2025 2300 by Temo Toro RN  Safety Promotion/Fall Prevention: safety round/check completed  Taken 6/23/2025 2200 by Temo Toro RN  Safety Promotion/Fall Prevention: safety round/check completed  Taken 6/23/2025 2100 by Temo Toro RN  Safety Promotion/Fall Prevention: safety round/check completed  Taken 6/23/2025 2000 by Temo Toro RN  Safety Promotion/Fall Prevention: safety round/check completed  Taken 6/23/2025 1900 by Temo Toro RN  Safety Promotion/Fall Prevention: safety round/check completed  Intervention: Prevent Skin Injury  Recent Flowsheet Documentation  Taken 6/24/2025 0551 by Temo Toro RN  Body Position: left  Taken 6/24/2025 0500 by Temo Toro RN  Body Position:   turned   left  Taken 6/24/2025 0400 by  Temo Toro RN  Body Position: supine  Taken 6/24/2025 0300 by Temo Toro RN  Body Position:   turned   supine  Taken 6/24/2025 0200 by Temo Toro RN  Body Position: right  Taken 6/24/2025 0100 by Temo Toro RN  Body Position:   turned   right  Taken 6/24/2025 0000 by Temo Toro RN  Body Position: supine  Taken 6/23/2025 2300 by Temo Toro RN  Body Position:   turned   supine  Taken 6/23/2025 2200 by Temo Toro RN  Body Position: left  Taken 6/23/2025 2100 by Temo Toro RN  Body Position:   turned   left  Taken 6/23/2025 2000 by Temo Toro RN  Body Position: supine  Taken 6/23/2025 1900 by Teom Toro RN  Body Position:   turned   supine  Intervention: Prevent and Manage VTE (Venous Thromboembolism) Risk  Recent Flowsheet Documentation  Taken 6/23/2025 2000 by Temo Toro RN  VTE Prevention/Management:   SCDs (sequential compression devices) on   bilateral  Intervention: Prevent Infection  Recent Flowsheet Documentation  Taken 6/24/2025 0400 by Temo Toro RN  Infection Prevention: single patient room provided  Taken 6/24/2025 0000 by Temo Toro RN  Infection Prevention: single patient room provided  Taken 6/23/2025 2000 by Temo Toro RN  Infection Prevention: rest/sleep promoted  Goal: Optimal Comfort and Wellbeing  Outcome: Progressing  Intervention: Provide Person-Centered Care  Recent Flowsheet Documentation  Taken 6/23/2025 2000 by Temo Toro RN  Trust Relationship/Rapport:   care explained   reassurance provided   thoughts/feelings acknowledged  Goal: Readiness for Transition of Care  Outcome: Progressing     Problem: Skin Injury Risk Increased  Goal: Skin Health and Integrity  Outcome: Progressing  Intervention: Optimize Skin Protection  Recent Flowsheet Documentation  Taken 6/24/2025 0500 by Temo Toro RN  Head of Bed (HOB) Positioning: HOB at 30 degrees  Taken 6/24/2025 0300 by Temo Toro RN  Head of Bed (HOB)  Positioning: HOB at 30 degrees  Taken 6/24/2025 0100 by Temo Toro RN  Head of Bed (HOB) Positioning: HOB at 30 degrees  Taken 6/23/2025 2300 by Temo Toro RN  Head of Bed (HOB) Positioning: HOB at 30 degrees  Taken 6/23/2025 2000 by Temo Toro RN  Head of Bed (HOB) Positioning: HOB at 30 degrees     Problem: Fall Injury Risk  Goal: Absence of Fall and Fall-Related Injury  Outcome: Progressing  Intervention: Identify and Manage Contributors  Recent Flowsheet Documentation  Taken 6/24/2025 0400 by Temo Toro RN  Medication Review/Management: medications reviewed  Taken 6/24/2025 0000 by Temo Toro RN  Medication Review/Management: medications reviewed  Taken 6/23/2025 2000 by Temo Toro RN  Medication Review/Management: medications reviewed  Intervention: Promote Injury-Free Environment  Recent Flowsheet Documentation  Taken 6/24/2025 0600 by Temo Toro RN  Safety Promotion/Fall Prevention: safety round/check completed  Taken 6/24/2025 0500 by Temo Toro RN  Safety Promotion/Fall Prevention: safety round/check completed  Taken 6/24/2025 0400 by Temo Toro RN  Safety Promotion/Fall Prevention: safety round/check completed  Taken 6/24/2025 0300 by Temo Toro RN  Safety Promotion/Fall Prevention: safety round/check completed  Taken 6/24/2025 0200 by Temo Toro RN  Safety Promotion/Fall Prevention: safety round/check completed  Taken 6/24/2025 0100 by Temo Toro RN  Safety Promotion/Fall Prevention: safety round/check completed  Taken 6/24/2025 0000 by Temo Toro RN  Safety Promotion/Fall Prevention: safety round/check completed  Taken 6/23/2025 2300 by Temo Toro RN  Safety Promotion/Fall Prevention: safety round/check completed  Taken 6/23/2025 2200 by Temo Toro RN  Safety Promotion/Fall Prevention: safety round/check completed  Taken 6/23/2025 2100 by Temo Toro RN  Safety Promotion/Fall Prevention: safety round/check  completed  Taken 6/23/2025 2000 by Temo Toro RN  Safety Promotion/Fall Prevention: safety round/check completed  Taken 6/23/2025 1900 by Temo Toro RN  Safety Promotion/Fall Prevention: safety round/check completed     Problem: Sepsis/Septic Shock  Goal: Optimal Coping  Outcome: Progressing  Intervention: Support Patient and Family Response  Recent Flowsheet Documentation  Taken 6/23/2025 2000 by Temo Toro RN  Family/Support System Care: support provided  Goal: Absence of Bleeding  Outcome: Progressing  Goal: Blood Glucose Level Within Target Range  Outcome: Progressing  Goal: Absence of Infection Signs and Symptoms  Outcome: Progressing  Intervention: Initiate Sepsis Management  Recent Flowsheet Documentation  Taken 6/24/2025 0400 by Temo Toro RN  Infection Prevention: single patient room provided  Taken 6/24/2025 0000 by Temo Toro RN  Infection Prevention: single patient room provided  Taken 6/23/2025 2000 by Temo Toro RN  Infection Prevention: rest/sleep promoted  Goal: Optimal Nutrition Delivery  Outcome: Progressing     Problem: Mechanical Ventilation Invasive  Goal: Effective Communication  Outcome: Progressing  Intervention: Ensure Effective Communication  Recent Flowsheet Documentation  Taken 6/23/2025 2000 by Temo Toro RN  Trust Relationship/Rapport:   care explained   reassurance provided   thoughts/feelings acknowledged  Diversional Activities: television  Family/Support System Care: support provided  Goal: Optimal Device Function  Outcome: Progressing  Intervention: Optimize Device Care and Function  Recent Flowsheet Documentation  Taken 6/24/2025 0600 by Temo Toro RN  Oral Care:   oral rinse provided   suction provided   swabbed with antiseptic solution  Taken 6/24/2025 0400 by Temo Toro RN  Oral Care:   oral rinse provided   suction provided   swabbed with antiseptic solution  Airway Safety Measures:   mask valve resuscitator at bedside    oxygen flowmeter at bedside   suction at bedside  Taken 6/24/2025 0200 by Temo Toro RN  Oral Care:   oral rinse provided   suction provided   swabbed with antiseptic solution  Taken 6/24/2025 0000 by Temo Toro RN  Oral Care:   oral rinse provided   suction provided   swabbed with antiseptic solution  Airway Safety Measures:   mask valve resuscitator at bedside   manual resuscitator/mask at bedside   oxygen flowmeter at bedside   suction at bedside  Taken 6/23/2025 2000 by Temo Toro RN  Oral Care:   oral rinse provided   suction provided   swabbed with antiseptic solution  Airway Safety Measures:   manual resuscitator/mask at bedside   oxygen flowmeter at bedside   suction at bedside  Goal: Mechanical Ventilation Liberation  Outcome: Progressing  Intervention: Promote Extubation and Mechanical Ventilation Liberation  Recent Flowsheet Documentation  Taken 6/24/2025 0400 by Temo Toro RN  Medication Review/Management: medications reviewed  Taken 6/24/2025 0000 by Temo Toro RN  Medication Review/Management: medications reviewed  Taken 6/23/2025 2000 by Temo Toro RN  Medication Review/Management: medications reviewed  Goal: Optimal Nutrition Delivery  Outcome: Progressing  Goal: Absence of Device-Related Skin and Tissue Injury  Outcome: Progressing  Goal: Absence of Ventilator-Induced Lung Injury  Outcome: Progressing  Intervention: Prevent Ventilator-Associated Pneumonia  Recent Flowsheet Documentation  Taken 6/24/2025 0600 by Temo Toro RN  Oral Care:   oral rinse provided   suction provided   swabbed with antiseptic solution  Taken 6/24/2025 0500 by Temo Toro RN  Head of Bed (HOB) Positioning: HOB at 30 degrees  Taken 6/24/2025 0400 by Temo Toro RN  Oral Care:   oral rinse provided   suction provided   swabbed with antiseptic solution  Taken 6/24/2025 0300 by Temo Toro RN  Head of Bed (HOB) Positioning: HOB at 30 degrees  Taken 6/24/2025 0200 by  Toro, Temo, RN  Oral Care:   oral rinse provided   suction provided   swabbed with antiseptic solution  Taken 6/24/2025 0100 by Temo Toro RN  Head of Bed (\[facility]\"") Positioning: HOB at 30 degrees  Taken 6/24/2025 0000 by Temo Toro RN  Oral Care:   oral rinse provided   suction provided   swabbed with antiseptic solution  Taken 6/23/2025 2300 by Temo Toro RN  Head of Bed (\[facility]\"") Positioning: HOB at 30 degrees  Taken 6/23/2025 2000 by Temo Toro RN  Head of Bed (\[facility]") Positioning: HOB at 30 degrees  Oral Care:   oral rinse provided   suction provided   swabbed with antiseptic solution  Goal: Effective Communication  Outcome: Progressing  Intervention: Ensure Effective Communication  Recent Flowsheet Documentation  Taken 6/23/2025 2000 by Temo Toro RN  Trust Relationship/Rapport:   care explained   reassurance provided   thoughts/feelings acknowledged  Diversional Activities: television  Family/Support System Care: support provided  Goal: Optimal Device Function  Outcome: Progressing  Intervention: Optimize Device Care and Function  Recent Flowsheet Documentation  Taken 6/24/2025 0600 by Temo Toro RN  Oral Care:   oral rinse provided   suction provided   swabbed with antiseptic solution  Taken 6/24/2025 0400 by Temo Toro RN  Oral Care:   oral rinse provided   suction provided   swabbed with antiseptic solution  Airway Safety Measures:   mask valve resuscitator at bedside   oxygen flowmeter at bedside   suction at bedside  Taken 6/24/2025 0200 by Temo Toro RN  Oral Care:   oral rinse provided   suction provided   swabbed with antiseptic solution  Taken 6/24/2025 0000 by Temo Toro RN  Oral Care:   oral rinse provided   suction provided   swabbed with antiseptic solution  Airway Safety Measures:   mask valve resuscitator at bedside   manual resuscitator/mask at bedside   oxygen flowmeter at bedside   suction at bedside  Taken 6/23/2025 2000 by Temo Toro  RN  Oral Care:   oral rinse provided   suction provided   swabbed with antiseptic solution  Airway Safety Measures:   manual resuscitator/mask at bedside   oxygen flowmeter at bedside   suction at bedside  Goal: Mechanical Ventilation Liberation  Outcome: Progressing  Goal: Optimal Nutrition Delivery  Outcome: Progressing  Goal: Absence of Device-Related Skin and Tissue Injury  Outcome: Progressing  Goal: Absence of Ventilator-Induced Lung Injury  Outcome: Progressing  Intervention: Prevent Ventilator-Associated Pneumonia  Recent Flowsheet Documentation  Taken 6/24/2025 0600 by Temo Toro RN  Oral Care:   oral rinse provided   suction provided   swabbed with antiseptic solution  Taken 6/24/2025 0500 by Temo Toro RN  Head of Bed ([facility]) Positioning: HOB at 30 degrees  Taken 6/24/2025 0400 by Temo Toro RN  Oral Care:   oral rinse provided   suction provided   swabbed with antiseptic solution  Taken 6/24/2025 0300 by Temo Toro RN  Head of Bed ([facility]) Positioning: HOB at 30 degrees  Taken 6/24/2025 0200 by Temo Toro RN  Oral Care:   oral rinse provided   suction provided   swabbed with antiseptic solution  Taken 6/24/2025 0100 by Temo Toro RN  Head of Bed ([facility]) Positioning: HOB at 30 degrees  Taken 6/24/2025 0000 by Temo Toro RN  Oral Care:   oral rinse provided   suction provided   swabbed with antiseptic solution  Taken 6/23/2025 2300 by Tmeo Toro RN  Head of Bed ([facility]) Positioning: HOB at 30 degrees  Taken 6/23/2025 2000 by Temo Toro RN  Head of Bed ([facility]) Positioning: HOB at 30 degrees  Oral Care:   oral rinse provided   suction provided   swabbed with antiseptic solution   Goal Outcome Evaluation:  Plan of Care Reviewed With: patient        Progress: no change

## 2025-06-24 NOTE — THERAPY DISCHARGE NOTE
Acute Care - Physical Therapy Discharge Summary  [facility]       Patient Name: Felix Bartlett Sr.  : 1950  MRN: [id number]    Today's Date: 2025                 Admit Date: 2025      PT Recommendation and Plan    Visit Dx:    ICD-10-CM ICD-9-CM   1. DANIEL (acute kidney injury)  N17.9 584.9   2. Dehydration  E86.0 276.51   3. Anemia, unspecified type  D64.9 285.9   4. Thrombocytopenia  D69.6 287.5   5. Diarrhea, unspecified type  R19.7 787.91   6. Generalized weakness  R53.1 780.79   7. Impaired mobility [Z74.09]  Z74.09 799.89   8. Dysphagia, unspecified type  R13.10 787.20   9. FUO (fever of unknown origin)  R50.9 780.60   10. Acute abdomen  R10.0 789.00   11. Cardiopulmonary arrest  I46.9 427.5        Outcome Measures       Row Name 25 1543             How much help from another person do you currently need...    Turning from your back to your side while in flat bed without using bedrails? 3  -JOSE      Moving from lying on back to sitting on the side of a flat bed without bedrails? 3  -JOSE      Moving to and from a bed to a chair (including a wheelchair)? 2  -JOSE      Standing up from a chair using your arms (e.g., wheelchair, bedside chair)? 2  -JOSE      Climbing 3-5 steps with a railing? 2  -JOSE      To walk in hospital room? 2  -JOSE      AM-PAC 6 Clicks Score (PT) 14  -JOSE         Functional Assessment    Outcome Measure Options AM-PAC 6 Clicks Basic Mobility (PT)  -JOSE                User Key  (r) = Recorded By, (t) = Taken By, (c) = Cosigned By      Initials Name Provider Type    Josesito Cardenas, PTA Physical Therapist Assistant                     PT Charges       Row Name 25 0858             Time Calculation    Start Time 0831  -WK      Stop Time 0858  -WK      Time Calculation (min) 27 min  -WK      PT Received On 25  -WK         Time Calculation- PT    Total Timed Code Minutes- PT 27 minute(s)  -WK         Timed Charges    35003 - PT Therapeutic Exercise Minutes 27  -WK          Total Minutes    Timed Charges Total Minutes 27  -WK       Total Minutes 27  -WK                User Key  (r) = Recorded By, (t) = Taken By, (c) = Cosigned By      Initials Name Provider Type    WK Lizzette Wilkins PTA Physical Therapist Assistant                     PT Rehab Goals       Row Name 06/24/25 1526             Bed Mobility Goal 1 (PT)    Activity/Assistive Device (Bed Mobility Goal 1, PT) rolling to left;rolling to right;sit to supine;supine to sit  -NW      [place] Level/Cues Needed (Bed Mobility Goal 1, PT) modified independence  -NW      Time Frame (Bed Mobility Goal 1, PT) long term goal (LTG);10 days  -NW      Progress/Outcomes (Bed Mobility Goal 1, PT) goal not met  -NW         Transfer Goal 1 (PT)    Activity/Assistive Device (Transfer Goal 1, PT) sit-to-stand/stand-to-sit;bed-to-chair/chair-to-bed;toilet;wheelchair transfer  -NW      [place] Level/Cues Needed (Transfer Goal 1, PT) minimum assist (75% or more patient effort)  -NW      Time Frame (Transfer Goal 1, PT) long term goal (LTG);10 days  -NW      Progress/Outcome (Transfer Goal 1, PT) goal not met  -NW         Gait Training Goal 1 (PT)    Activity/Assistive Device (Gait Training Goal 1, PT) gait (walking locomotion);decrease asymmetrical patterns;decrease fall risk;diminish gait deviation;forward stepping;improve balance and speed;increase endurance/gait distance;increase energy conservation;assistive device use;walker, rolling;other (see comments)  AD as needed  -NW      [place] Level (Gait Training Goal 1, PT) minimum assist (75% or more patient effort)  -NW      Distance (Gait Training Goal 1, PT) 10'  -NW      Time Frame (Gait Training Goal 1, PT) long term goal (LTG);10 days  -NW      Progress/Outcome (Gait Training Goal 1, PT) goal not met  -NW         Balance Goal 1 (PT)    Activity/Assistive Device (Balance Goal) sitting static balance;sitting dynamic balance;sit to stand dynamic balance;standing static  balance;standing dynamic balance;with functional activities/occupations;with functional mobility activities;with functional reaching activities;unsupported  -NW      [place] Level/Cues Needed (Balance Goal 1, PT) contact guard required  -NW      Time Frame (Balance Goal 1, PT) long-term goal (LTG);by discharge  -NW      Progress/Outcomes (Balance Goal 1, PT) goal not met  -NW                User Key  (r) = Recorded By, (t) = Taken By, (c) = Cosigned By      Initials Name Provider Type Discipline    NW Christina Carroll PTA Physical Therapist Assistant PT                        PT Discharge Summary  Anticipated Discharge Disposition (PT): other (see comments)  Reason for Discharge: Patient   Outcomes Achieved: Other  Discharge Destination: other (comment)      Christina Carroll PTA   2025

## 2025-06-24 NOTE — THERAPY TREATMENT NOTE
Acute Care - Physical Therapy Treatment Note  [facility]     Patient Name: Felix Bartlett Sr.  : 1950  MRN: [id number]  Today's Date: 2025      Visit Dx:     ICD-10-CM ICD-9-CM   1. DANIEL (acute kidney injury)  N17.9 584.9   2. Dehydration  E86.0 276.51   3. Anemia, unspecified type  D64.9 285.9   4. Thrombocytopenia  D69.6 287.5   5. Diarrhea, unspecified type  R19.7 787.91   6. Generalized weakness  R53.1 780.79   7. Impaired mobility [Z74.09]  Z74.09 799.89   8. Dysphagia, unspecified type  R13.10 787.20   9. FUO (fever of unknown origin)  R50.9 780.60   10. Acute abdomen  R10.0 789.00   11. Cardiopulmonary arrest  I46.9 427.5     Patient Active Problem List   Diagnosis    COPD (chronic obstructive pulmonary disease)    Prediabetes    Anxiety    Vertebral compression fracture    Gastroesophageal reflux disease    Edema leg-CHF/d, obesity, copd, cirrhosis    Fatty liver    Hyperlipidemia-statin    Hypertension    Hypokalemia    Nocturnal hypoxia    Macrocytosis    Osteoporosis bd 3. ? 2y    Tremor    Posttraumatic stress disorder    Chronic back pain    Congestive heart failure-diastolic    Tobacco use: 6/12m    Anemia    Gait difficulty    Iron deficiency    Elevated ferritin-hetroz hemo    Hereditary hemochromatosis    DANIEL (acute kidney injury)    Transaminitis, acute    Diarrhea of presumed infectious origin    Dermatitis neglecta    SIRS (systemic inflammatory response syndrome)    FUO (fever of unknown origin)    Shock, septic    Cardiac arrest    Acute hypoxic respiratory failure    Celiac artery stenosis     Past Medical History:   Diagnosis Date    Acid reflux     Arthritis     Asthma     Bile salt-induced diarrhea 2013    COPD (chronic obstructive pulmonary disease)     CTS (carpal tunnel syndrome)     Family history of colon cancer 2021    father      History of adenomatous polyp of colon 2021    History of alcohol abuse 2017    History of renal cell  cancer-sony 02/16/2017    9.4.14/9.8.14 - Dr Ramos - Office Notes-path low grade renal cell..negative margins..doing well     9.10.14..ro visit...surgery follow up  hypertension-  post op L nephrectomy-incidential renal cell found on CT  abdominal pain-feeling better  fatty liver-bx proven...  tremor-familial, alcohol  Rx-reviewed  Cozaar 50 mg one half a day  LAB-next time hepatitis A, B, C         History of TIA (transient ischemic attack) 02/24/2017    No Problem List  5.25.10/age 60     7.1..14/7.1.14 - [facility] - Letter L eye nonarteritic anterior ischemic neuropathy..  6.30.14/7.1.14 CBC With Differential/Platelet; Sedimentation Rate-Westergren; C-Reactive Protein, Quant==WBC 12.0; RBC 3.60; Hemoglobin 11.7; Hematocrit 34.4; Neutrophils (Absolute) 7.1; Lymphs (Absolute) 3.6  nothing major here..his problems are alcohol, smoking now o    HL (hearing loss)     Hyperlipidemia     Hypertension     Hypopotassemia-diuretic 02/16/2017    Oxygen dependent     at hs    Peripheral neuropathy     Renal cancer 08/2014    Lt RALPart'l Nx; t1a Clear Cell with negative margin    RUQ pain 07/23/2013    S/P laparoscopic cholecystectomy 09/04/2012    Wellness examination-done 02/16/2017    PROCEDURES:  Prostate exam/[place]/confirmed/1.27.16     Colonoscopy-nl/Gil//7-28-10/5y  EGD/BHP/Gil/3.4.16  Colonoscopy-polyp/Gil//3.18.16/5 yr  EGD-neg/Gil//5.1.18     SURGERIES:  T&A  Appendix/1959  Scrotum-cyst/1980's  Lap gb+liver bx/Raya/[facility]/7.23.12  L robotic partial nephrectomy (clear cell grade 1 [id number])/[place]//8.14.14  Xxkjeu-gdryvbsn-mfvzb+mesh+omen/[place]//5.9.     Past Surgical History:   Procedure Laterality Date    APPENDECTOMY      CHOLECYSTECTOMY      ENDOSCOPY N/A 05/01/2018    Procedure: ESOPHAGOGASTRODUODENOSCOPY WITH ANESTHESIA;  Surgeon: Donnell Cordero DO;  Location: [facility] ENDOSCOPY;  Service: Gastroenterology    HERNIA REPAIR      KIDNEY SURGERY      LAPAROSCOPIC PARTIAL  NEPHRECTOMY Left 08/14/2014    Robot Assisted     PT Assessment (Last 12 Hours)       PT Evaluation and Treatment       Row Name 06/24/25 0831          Physical Therapy Time and Intention    Subjective Information --  vent/ sedated  -WK     Document Type therapy note (daily note)  -WK     Mode of Treatment physical therapy  -WK     Comment ROM performed, Will need re-eval when pt is able to follow commands/ off vent  -WK       Row Name 06/24/25 0831          General Information    Existing Precautions/Restrictions fall  -WK       Row Name 06/24/25 0831          Pain Scale: FACES Pre/Post-Treatment    Pain: FACES Scale, Pretreatment 0-->no hurt  -WK     Posttreatment Pain Rating 0-->no hurt  -WK       Row Name 06/24/25 0831          Motor Skills    Comments, Therapeutic Exercise PROM performed BUE and BLE  -WK       Row Name             Wound 06/22/25 0337 medial coccyx    Wound - Properties Group Placement Date: 06/22/25  -SH Placement Time: 0337  -SH Present on Original Admission: N  -SH Orientation: medial  -SH Location: coccyx  -SH    Retired Wound - Properties Group Placement Date: 06/22/25  -SH Placement Time: 0337  -SH Present on Original Admission: N  -SH Orientation: medial  -SH Location: coccyx  -SH    Retired Wound - Properties Group Placement Date: 06/22/25  -SH Placement Time: 0337  -SH Present on Original Admission: N  -SH Orientation: medial  -SH Location: coccyx  -SH    Retired Wound - Properties Group Date first assessed: 06/22/25  -SH Time first assessed: 0337  -SH Present on Original Admission: N  -SH Location: coccyx  -SH      Row Name 06/24/25 0831          Plan of Care Review    Plan of Care Reviewed With patient  -WK     Outcome Evaluation PROM performed BUE and BLE. HR ranged 125-145.  -WK       Row Name 06/24/25 0831          Vital Signs    Pre Systolic BP Rehab 132  -WK     Pre Treatment Diastolic BP 54  -WK     Intra Systolic BP Rehab 143  some increase in  BP with ROM and monitor  -WK      Intra Treatment Diastolic BP 63  -WK     Post Systolic BP Rehab 159  nsg aware  -WK     Post Treatment Diastolic BP 47  -WK     Pretreatment Heart Rate (beats/min) 135   nsg state still ok for ROM, -145 is his normal  -WK     Intratreatment Heart Rate (beats/min) 125  -WK     Posttreatment Heart Rate (beats/min) 140  -WK     Pre SpO2 (%) 94  -WK     O2 Delivery Pre Treatment ventilator  -WK     Intra SpO2 (%) 96  -WK     O2 Delivery Intra Treatment ventilator  -WK       Row Name 06/24/25 0831          Positioning and Restraints    Pre-Treatment Position in bed  -WK     Post Treatment Position bed  -WK     In Bed fowlers;patient within staff view  -WK               User Key  (r) = Recorded By, (t) = Taken By, (c) = Cosigned By      Initials Name Provider Type    Lizzette Rodriguez PTA Physical Therapist Assistant     Blanca Koo RN Registered Nurse                    Physical Therapy Education       Title: PT OT SLP Therapies (In Progress)       Topic: Physical Therapy (Done)       Point: Mobility training (Done)       Learning Progress Summary            Patient Acceptance, E, VU by ALMA at 6/18/2025 1045    Comment: role of PT, fall risk, d/c planning                      Point: Home exercise program (Done)       Learning Progress Summary            Patient Acceptance, E, VU by ALMA at 6/18/2025 1045    Comment: role of PT, fall risk, d/c planning                      Point: Body mechanics (Done)       Learning Progress Summary            Patient Acceptance, E, VU by ALMA at 6/18/2025 1045    Comment: role of PT, fall risk, d/c planning                      Point: Precautions (Done)       Learning Progress Summary            Patient Acceptance, E, VU by  at 6/18/2025 1045    Comment: role of PT, fall risk, d/c planning                                      User Key       Initials Effective Dates Name Provider Type [facility] 08/15/24 -  Ferny Gibson, PT DPT Physical Therapist PT                  PT  Recommendation and Plan     Plan of Care Reviewed With: patient  Outcome Evaluation: PROM performed BUE and BLE. HR ranged 125-145.   Outcome Measures       Row Name 06/21/25 1543             How much help from another person do you currently need...    Turning from your back to your side while in flat bed without using bedrails? 3  -JOSE      Moving from lying on back to sitting on the side of a flat bed without bedrails? 3  -JOSE      Moving to and from a bed to a chair (including a wheelchair)? 2  -JOSE      Standing up from a chair using your arms (e.g., wheelchair, bedside chair)? 2  -JOSE      Climbing 3-5 steps with a railing? 2  -JOSE      To walk in hospital room? 2  -JOSE      AM-PAC 6 Clicks Score (PT) 14  -JOSE         Functional Assessment    Outcome Measure Options AM-PAC 6 Clicks Basic Mobility (PT)  -JOSE                User Key  (r) = Recorded By, (t) = Taken By, (c) = Cosigned By      Initials Name Provider Type    JOSE Josesito Solomon PTA Physical Therapist Assistant                     Time Calculation:    PT Charges       Row Name 06/24/25 0858             Time Calculation    Start Time 0831  -WK      Stop Time 0858  -WK      Time Calculation (min) 27 min  -WK      PT Received On 06/24/25  -WK         Time Calculation- PT    Total Timed Code Minutes- PT 27 minute(s)  -WK         Timed Charges    56903 - PT Therapeutic Exercise Minutes 27  -WK         Total Minutes    Timed Charges Total Minutes 27  -WK       Total Minutes 27  -WK                User Key  (r) = Recorded By, (t) = Taken By, (c) = Cosigned By      Initials Name Provider Type    WK Lizzette Wilkins PTA Physical Therapist Assistant                  Therapy Charges for Today       Code Description Service Date Service Provider Modifiers Qty    [id number]  PT THER PROC EA 15 MIN 6/24/2025 Lizzette Wilkins PTA GP 2            PT G-Codes  Outcome Measure Options: AM-PAC 6 Clicks Basic Mobility (PT)  AM-PAC 6 Clicks Score (PT): 6  AM-PAC 6 Clicks  Score (OT): 14    Lizzette Wilkins, PTA  6/24/2025

## 2025-06-24 NOTE — PROGRESS NOTES
Patient Name: Felix Bartlett Sr.  YOB: 1950  MRN: [id number]  Admission date: 6/17/2025  Reason for Encounter: [facility] Clinical Nutrition Assessment     Subjective    Subjective Information     6/24: NPO. POC notes reviewed. Intubated, propofol stopped. No plan for nutrition support. Last BM three days ago. Will monitor per protocol. If nutrition support warranted, RD available per consult.     Assessment    H&P and Current Problems      H&P  Past Medical History:   Diagnosis Date    Acid reflux     Arthritis     Asthma     Bile salt-induced diarrhea 07/23/2013    COPD (chronic obstructive pulmonary disease)     CTS (carpal tunnel syndrome)     Family history of colon cancer 04/09/2021    father      History of adenomatous polyp of colon 04/09/2021    History of alcohol abuse 02/16/2017    History of renal cell cancer-sony 02/16/2017    9.4.14/9.8.14 - Dr Ramos - Office Notes-path low grade renal cell..negative margins..doing well     9.10.14..ro visit...surgery follow up  hypertension-  post op L nephrectomy-[place]idential renal cell found on CT  abdominal pain-feeling better  fatty liver-bx proven...  tremor-familial, alcohol  Rx-reviewed  Cozaar 50 mg one half a day  LAB-next time hepatitis A, B, C         History of TIA (transient ischemic attack) 02/24/2017    No Problem List  5.25.10/age 60     7.1..14/7.1.14 - [facility] - Letter L eye nonarteritic anterior ischemic neuropathy..  6.30.14/7.1.14 CBC With Differential/Platelet; Sedimentation Rate-Westergren; C-Reactive Protein, Quant==WBC 12.0; RBC 3.60; Hemoglobin 11.7; Hematocrit 34.4; Neutrophils (Absolute) 7.1; Lymphs (Absolute) 3.6  nothing major here..his problems are alcohol, smoking now o    HL (hearing loss)     Hyperlipidemia     Hypertension     Hypopotassemia-diuretic 02/16/2017    Oxygen dependent     at hs    Peripheral neuropathy     Renal cancer 08/2014    Lt RALPart'l Nx; t1a Clear Cell with negative margin     "RUQ pain 07/23/2013    S/P laparoscopic cholecystectomy 09/04/2012    Wellness examination-done 02/16/2017    PROCEDURES:  Prostate exam/[place]/confirmed/1.27.16     Colonoscopy-nl/Gil/LH/7-28-10/5y  EGD/BHP/[place]/3.4.16  Colonoscopy-polyp/Gil//3.18.16/5 yr  EGD-neg/Gil//5.1.18     SURGERIES:  T&A  Appendix/1959  Scrotum-cyst/1980's  Lap gb+liver bx/[place]/[facility]/7.23.12  L robotic partial nephrectomy (clear cell grade 1 [id number])/[place]//8.14.14  Obezls-xfqwtbte-cgbxh+mesh+omen/[place]//5.9.      Past Surgical History:   Procedure Laterality Date    APPENDECTOMY      CHOLECYSTECTOMY      ENDOSCOPY N/A 05/01/2018    Procedure: ESOPHAGOGASTRODUODENOSCOPY WITH ANESTHESIA;  Surgeon: Donnell Cordero DO;  Location: [facility] ENDOSCOPY;  Service: Gastroenterology    HERNIA REPAIR      KIDNEY SURGERY      LAPAROSCOPIC PARTIAL NEPHRECTOMY Left 08/14/2014    Robot Assisted      Current Problems   Admission Diagnosis:  DANIEL (acute kidney injury) [N17.9]    Problem List:    Shock, septic    Hypokalemia    Tremor    Iron deficiency    DANIEL (acute kidney injury)    Transaminitis, acute    Dermatitis neglecta    Cardiac arrest    Acute hypoxic respiratory failure    Celiac artery stenosis      Other Applicable Nutrition Information:   No family present during visit; no food allergies per EHR     Anthropometrics      BMI, Height, Weight Estimated body mass index is 39.08 kg/m² as calculated from the following:    Height as of this encounter: 182.9 cm (72\").    Weight as of this encounter: 131 kg (288 lb 2.3 oz).    Weight Method: Bed scale       Trending Weight Changes Changes as per below; suspect fluctuations due to method and fluid balance. No significant changes       Weight History  Wt Readings from Last 20 Encounters:   06/23/25 0757 131 kg (288 lb 2.3 oz)   06/17/25 0930 116 kg (255 lb)   06/06/25 0957 125 kg (276 lb)   05/05/25 0942 125 kg (276 lb)   03/06/25 1003 127 kg (280 lb)   02/28/25 0944 127 kg (280 lb) "   01/23/25 0935 127 kg (279 lb 3.2 oz)   12/04/24 1048 129 kg (284 lb)   10/01/24 1013 129 kg (284 lb 9.6 oz)   08/29/24 0949 128 kg (283 lb)   07/25/24 0926 125 kg (276 lb)   06/24/24 1049 125 kg (274 lb 12.8 oz)   05/16/24 0900 122 kg (270 lb)   04/23/24 0927 124 kg (274 lb)   04/02/24 1237 122 kg (270 lb)   03/28/24 1018 126 kg (277 lb)   03/11/24 0943 126 kg (277 lb)   02/08/24 0925 125 kg (275 lb)   11/13/23 0932 127 kg (281 lb)   11/07/23 1014 126 kg (278 lb)   10/24/23 0942 127 kg (280 lb 9.6 oz)            Labs      Comment: Pt not stable for imaging of abdomen; suspect ileus v. SBO v. Ischemic bowel     Results from last 7 days   Lab Units 06/24/25  0505 06/24/25  0230 06/23/25  2335 06/23/25  1935 06/23/25  1634 06/23/25  1129 06/23/25  0842 06/23/25  0306 06/22/25  0634 06/22/25  0225 06/19/25  0325 06/18/25  0553   SODIUM mmol/L 145  --   --   --  147* 146*  --  147*   < > 143   < > 143   SODIUM, ARTERIAL   --   --   --   --   --   --    < >  --    < >  --   --   --    POTASSIUM mmol/L 4.4  --   --   --  4.2 4.0  --  3.2*   < > 3.4*   < > 3.7   GLUCOSE mg/dL 222*  --   --   --  212* 195*  --  192*   < > 200*   < > 167*   BUN mg/dL 71.6*  --   --   --  64.7* 62.3*  --  62.6*   < > 39.7*   < > 36.6*   CREATININE mg/dL 4.09*  --   --   --  3.89* 3.77*  --  3.70*   < > 1.82*   < > 2.54*   CALCIUM mg/dL 6.2*  --   --   --  6.5* 6.4*  --  6.9*   < > 7.8*   < > 8.1*   PHOSPHORUS mg/dL  --   --   --   --  7.5* 6.8*  --   --   --  2.2*  --   --    MAGNESIUM mg/dL  --   --   --   --  2.1 2.0  --  2.2  --  2.2   < >  --    ALBUMIN g/dL 2.4*  --   --   --  2.5* 2.5*  --  2.4*   < > 3.3*   < > 3.4*   LACTATE mmol/L 7.8* 7.3* 7.6*   < > 7.8* 7.0*  --  5.6*   < > 2.2*  --   --    BILIRUBIN mg/dL 2.2*  --   --   --  2.2* 1.9*  --  1.3*   < > 0.7   < > 0.4   ALK PHOS U/L 54  --   --   --  46 46  --  67   < > 90   < > 95   AST (SGOT) U/L 819*  --   --   --  1,099* 1,121*  --  792*   < > 58*   < > 118*   ALT (SGPT) U/L  521*  --   --   --  549* 539*  --  379*   < > 81*   < > 90*   TRIGLYCERIDES mg/dL  --   --   --   --   --   --   --   --   --   --   --  351*    < > = values in this interval not displayed.     Results from last 7 days   Lab Units 06/24/25  0611 06/23/25  0306 06/22/25  1756 06/19/25  0325 06/18/25  0553   PLATELETS 10*3/mm3 19* 42* 48*   < > 87*   HEMOGLOBIN g/dL 7.3* 8.5* 8.3*   < > 8.2*   HEMATOCRIT % 21.0* 25.6* 24.8*   < > 25.5*   IRON mcg/dL  --   --   --   --  57*    < > = values in this interval not displayed.     Lab Results   Component Value Date    HGBA1C 6.20 (H) 06/18/2025          Medications       Scheduled Medications artificial tears, , Both Eyes, Q4H  budesonide, 0.25 mg, Nebulization, BID - RT  Chlorhexidine Gluconate Cloth, 1 Application, Topical, Q24H  [Held by provider] escitalopram, 20 mg, Oral, Daily  ferric gluconate, 250 mg, Intravenous, Daily  [Held by provider] gabapentin, 300 mg, Oral, Q12H  hydrocortisone sodium succinate, 100 mg, Intravenous, Q6H  insulin regular, 2-7 Units, Subcutaneous, Q6H  ipratropium-albuterol, 3 mL, Nebulization, 4x Daily - RT  [Held by provider] losartan, 25 mg, Oral, Q24H  [Held by provider] magnesium oxide, 400 mg, Oral, Daily  [Held by provider] metoprolol tartrate, 50 mg, Oral, Q12H  mupirocin, 1 Application, Each Nare, BID  [Held by provider] pantoprazole, 40 mg, Oral, Q AM  pantoprazole, 40 mg, Intravenous, Q AM  piperacillin-tazobactam, 4.5 g, Intravenous, Q8H  [Held by provider] primidone, 50 mg, Oral, TID  [Held by provider] saccharomyces boulardii, 250 mg, Oral, BID  [Held by provider] senna-docusate sodium, 2 tablet, Oral, BID  [Held by provider] tamsulosin, 0.4 mg, Oral, Daily        Infusions dexmedetomidine, 0.2-1.5 mcg/kg/hr, Last Rate: Stopped (06/22/25 3074)  fentanyl 10 mcg/mL,  mcg/hr, Last Rate: Stopped (06/24/25 0829)  norepinephrine, 0.02-0.3 mcg/kg/min, Last Rate: 0.12 mcg/kg/min (06/24/25 0642)  phenylephrine, 0.5-3 mcg/kg/min, Last  Rate: Stopped (06/23/25 0147)  propofol, 5-75 mcg/kg/min, Last Rate: Stopped (06/24/25 0830)  sodium bicarbonate 8.4 % 150 mEq in dextrose (D5W) 5 % 1,000 mL infusion (greater than 100 mEq), 150 mEq, Last Rate: 150 mEq (06/24/25 0724)  vasopressin, 0.03 Units/min, Last Rate: 0.03 Units/min (06/24/25 0642)  vecuronium (NORCURON) 100 mg in sodium chloride 0.9 % 100 mL (1 mg/mL) infusion, 0.6-1.7 mcg/kg/min, Last Rate: Stopped (06/24/25 0730)        PRN Medications   acetaminophen **OR** [DISCONTINUED] acetaminophen **OR** acetaminophen    [Held by provider] senna-docusate sodium **AND** [Held by provider] polyethylene glycol **AND** [Held by provider] bisacodyl **AND** bisacodyl    calcium carbonate    dextrose    dextrose    glucagon (human recombinant)    loperamide    Morphine    ondansetron ODT **OR** ondansetron    [COMPLETED] Insert Peripheral IV **AND** sodium chloride     Physical Findings      Chewing/Swallowing  Teeth Status: Mouth/Teeth WDL: .WDL except, teeth Teeth Symptoms: teeth absent  Chewing/Swallowing Issues: Other:intubated   Edema       Arm, Left Edema: 2+ (Mild) Arm, Right Edema: 2+ (Mild)       Ankle, Left Edema: 2+ (Mild) Ankle, Right Edema: 2+ (Mild)  Foot, Left Edema: 2+ (Mild) Foot, Right Edema: 2+ (Mild)   Bowel Function  Stool Output  Stool Unmeasured Occurrence: 1 (06/21/25 1805)  Bowel Incontinence: Yes (06/21/25 1805)  Stool Amount: Medium (06/21/25 1805)  Stool Consistency: clots present (06/23/25 2000)  Perineal Care: catheter care provided, perineum cleansed (06/24/25 0400)  Last Bowel Movement: 06/21/25   I/Os  Intake & Output (last 3 days)         06/21 0701 06/22 0700 06/22 0701 06/23 0700 06/23 0701  06/24 0700 06/24 0701  06/25 0700    P.O. 240       I.V. (mL/kg) 1000 (8.6) 4592 (39.6) 3621.1 (27.6) 729.4 (5.6)    Blood    253    IV Piggyback  300 2111.6 350    Total Intake(mL/kg) 1240 (10.7) 4892 (42.2) 5732.7 (43.8) 1332.4 (10.2)    Urine (mL/kg/hr) 800 (0.3) 75 (0) 45 (0)      Emesis/NG output  2500 100     Stool 0       Total Output 800 2575 145     Net +440 +2317 +5587.7 +1332.4            Stool Unmeasured Occurrence 2 x              Lines, Drains, Airways, & Wounds       Active LDAs       Name Placement date Placement time Site Days Last dressing change    CVC Triple Lumen 06/22/25 Right Femoral 06/22/25  1320  Femoral  1 06/23/25 2000 (13.19 hrs)    Peripheral IV 06/22/25 1600 20 G Posterior;Right Hand 06/22/25  1600  Hand  1     Peripheral IV 06/22/25 1605 20 G Posterior;Right Hand 06/22/25  1605  Hand  1     NG/OG Tube Orogastric Center mouth 06/22/25  1500  Center mouth  1     Urethral Catheter 06/22/25  1800  -- 1     Hi-Lo Evac ETT 8 06/22/25  1307  Oral;Right  1     Arterial Line 06/22/25 Left Radial 06/22/25  1500  Radial  1 06/23/25 2000 (13.19 hrs)    Wound 06/22/25 0337 medial coccyx 06/22/25  0337  -- 2 6/24: wound care RN unable to perform assessment; unstable for turning                      Nutrition Focused Physical Exam     Trending NFPE 6/24: unable to perform; unstable for assessment     Malnutrition Severity Assessment              Estimated Needs      Energy Requirements    Weight for Calculation 131kg   Method for Estimation  11 kcals/kg   Daily Needs (kcal/day) 1441   Protein Requirements    Weight for Calculation 80.91kg (IBW)   Method for Estimation 1.0-1.2 gm/kg   Daily Needs (g/day) 81-97   Fluid Requirements     Method for Estimation 1 mL/kcal    Daily Needs (mL/day) 1441     Current Nutrition Orders & Evaluation of Intake      Oral Nutrition     Food Allergies  and Intolerances NKFA per EHR; unable to review with pt   Current PO Diet NPO Diet NPO Type: Strict NPO   Oral Nutrition Supplement None     Trending % PO Intake 0-25% during LOS; required assistance with feeding     Enteral Nutrition     Current EN Order Patient isn't on Tube Feeding  Patient doesn't have any tube feeding modular orders     EN Route      EN Tolerance     EN Observation/Intake          Parenteral Nutrition     Current TPN Order    TPN Route    Lipids (mL/%/frequency)     Total # Days on TPN    TPN Observation/Intake       Assessment & Plan   Nutrition Diagnosis and Goals       Nutrition Diagnosis 1 Inadequate Energy Intake related to sepsis, cardiopulmonary arrest, shock, metabolic acidosis, ileus v. SBO v. Ischemic bowel, ARDS with vent, aspiration PNA, DANIEL (prerenal) as evidenced by NPO, intubated, no plan for nutrition support, PO prior to critical condition was 0-25%.        Goal(s) Initiate EN , Initiate PN , Meets Estimated Needs , Nutrition Related Labs Improve , No Significant Weight Loss , and Goals of Care are Established      Nutrition Intervention and Prescription       Intervention  Other:Nutrition support within the next 48-72 hours if medically appropriate      Diet Prescription     Supplement Prescription     Education Provided       Enteral Prescription        TPN Prescription      Monitoring/Evaluation       Monitor/Evaluation Per Protocol     RD Follow-Up Encounter 1-2 days     Electronically signed by:  Medina Onofre RDN, CATA  06/24/25 09:11 CDT

## 2025-06-24 NOTE — PLAN OF CARE
Problem: Mechanical Ventilation Invasive  Goal: Effective Communication  Outcome: Progressing  Goal: Optimal Device Function  Outcome: Progressing  Intervention: Optimize Device Care and Function  Recent Flowsheet Documentation  Taken 6/23/2025 2247 by Adrianna Meyer CRT  Airway/Ventilation Management:   airway patency maintained   positive pressure ventilation provided  Airway Safety Measures:   mask valve resuscitator at bedside   manual resuscitator/mask at bedside   oxygen flowmeter at bedside   suction at bedside  Taken 6/23/2025 1831 by Adrianna Meyer CRT  Airway/Ventilation Management:   airway patency maintained   oxygen therapy provided   positive pressure ventilation provided  Airway Safety Measures:   mask valve resuscitator at bedside   manual resuscitator/mask at bedside   oxygen flowmeter at bedside   suction at bedside  Goal: Mechanical Ventilation Liberation  Outcome: Progressing  Goal: Optimal Nutrition Delivery  Outcome: Progressing  Goal: Absence of Device-Related Skin and Tissue Injury  Outcome: Progressing  Intervention: Maintain Skin and Tissue Health  Recent Flowsheet Documentation  Taken 6/24/2025 0243 by Adrianna Meyer CRT  Device Skin Pressure Protection:   skin-to-device areas padded   tubing/devices free from skin contact  Taken 6/23/2025 2247 by Adrianna Meyer CRT  Device Skin Pressure Protection:   skin-to-device areas padded   tubing/devices free from skin contact  Taken 6/23/2025 1831 by Adrianna Meyer CRT  Device Skin Pressure Protection:   skin-to-device areas padded   tubing/devices free from skin contact  Goal: Absence of Ventilator-Induced Lung Injury  Outcome: Progressing  Intervention: Prevent Ventilator-Associated Pneumonia  Recent Flowsheet Documentation  Taken 6/23/2025 2247 by Adrianna Meyer CRT  Head of Bed (HOB) Positioning: HOB at 30 degrees  Taken 6/23/2025 1831 by Adrianna Meyer CRT  Head of Bed (HOB) Positioning: HOB at 30 degrees  Goal: Effective  Communication  Outcome: Progressing  Goal: Optimal Device Function  Outcome: Progressing  Intervention: Optimize Device Care and Function  Recent Flowsheet Documentation  Taken 6/23/2025 2247 by Adrianna Meyer CRT  Airway/Ventilation Management:   airway patency maintained   positive pressure ventilation provided  Airway Safety Measures:   mask valve resuscitator at bedside   manual resuscitator/mask at bedside   oxygen flowmeter at bedside   suction at bedside  Taken 6/23/2025 1831 by Adrianna Meyer CRT  Airway/Ventilation Management:   airway patency maintained   oxygen therapy provided   positive pressure ventilation provided  Airway Safety Measures:   mask valve resuscitator at bedside   manual resuscitator/mask at bedside   oxygen flowmeter at bedside   suction at bedside  Goal: Mechanical Ventilation Liberation  Outcome: Progressing  Goal: Absence of Device-Related Skin and Tissue Injury  Outcome: Progressing  Intervention: Maintain Skin and Tissue Health  Recent Flowsheet Documentation  Taken 6/24/2025 0243 by Adrianna Meyer CRT  Device Skin Pressure Protection:   skin-to-device areas padded   tubing/devices free from skin contact  Taken 6/23/2025 2247 by Adrianna Meyer CRT  Device Skin Pressure Protection:   skin-to-device areas padded   tubing/devices free from skin contact  Taken 6/23/2025 1831 by Adrianna Meyer CRT  Device Skin Pressure Protection:   skin-to-device areas padded   tubing/devices free from skin contact  Goal: Absence of Ventilator-Induced Lung Injury  Outcome: Progressing  Intervention: Prevent Ventilator-Associated Pneumonia  Recent Flowsheet Documentation  Taken 6/23/2025 2247 by Adrianna Meyer CRT  Head of Bed (HOB) Positioning: HOB at 30 degrees  Taken 6/23/2025 1831 by Adrianna Meyer CRT  Head of Bed (HOB) Positioning: HOB at 30 degrees     Problem: Skin Injury Risk Increased  Goal: Skin Health and Integrity  Intervention: Optimize Skin Protection  Recent Flowsheet Documentation  Taken  6/23/2025 2247 by Adrianna Meyer CRT  Head of Bed (HOB) Positioning: HOB at 30 degrees  Taken 6/23/2025 1831 by Adrianna Meyer CRT  Head of Bed ([facility]) Positioning: HOB at 30 degrees     Problem: Sepsis/Septic Shock  Goal: Absence of Infection Signs and Symptoms  Intervention: Promote Recovery  Recent Flowsheet Documentation  Taken 6/23/2025 2247 by Adrianna Meyer CRT  Airway/Ventilation Management:   airway patency maintained   positive pressure ventilation provided  Taken 6/23/2025 1831 by Adrianna Meyer CRT  Airway/Ventilation Management:   airway patency maintained   oxygen therapy provided   positive pressure ventilation provided   Goal Outcome Evaluation:   Pt stable on current vent settings. No changes made at this time. Will work towards liberation as pt status improves.

## 2025-06-24 NOTE — DISCHARGE SUMMARY
[facility] Intensivist Services  DISCHARGE SUMMARY       Date of Admission: 6/17/2025  Date of Discharge:  6/24/2025  Primary Care Physician: Keyon Dockery MD    Presenting Problem/History of Present Illness:    Hospital Medicine HPI 6/17/2025  Felix Bartlett is a 75-year-old male followed by local [facility] administration.  He has a history of bile salt induced diarrhea status postcholecystectomy, COPD, renal cell carcinoma status post partial nephrectomy of the left, hypertension, hearing loss, hypokalemia secondary to diuretics.  Patient presented to [facility] ED with complaints of generalized weakness and diarrhea since Friday.  He reports to this provider that this morning he had to have a bowel movement and could not get off of the toilet.  Caregiver could not get him up either therefore EMS was called.  Patient was transported to [facility] emergency department for evaluation.  Workup revealed potassium 3.1, CO2 15, and ion gap 18, BUN 30.1, creatinine 3.01 with a baseline of 1.1, glucose 211, , ALT 85-no history of transaminitis, lactic acid 3.3, now 2.0, lipase 13, WBC 12.07 hemoglobin 9.4, hematocrit 27.5, platelets 120,000, 11.1 bandemia, urinalysis without acute findings, stool studies ordered chest x-ray without acute findings, CT of the abdomen pelvis without contrast stable without acute findings.  Patient has severe tremors and has difficulty with speech unsure if secondary to tremors or profound dry mouth.  He states he is unsure when the last time he ate.  He is an extremely poor historian.  He did receive normal saline 500 mL bolus in the ED.  Additional liter ordered, will continue hydration with 100 mL/hour and repeat labs in the a.m.  Patient denies pain.  He just complains of weakness.  He is asking for his night medications gabapentin and primidone stating that is his treatment for his tremors.  He is admitted for further evaluation  treatment    Intensivist [facility] 6/22/2025  75-year-old male with COPD, renal cell carcinoma status post partial nephrectomy, hypertension, presented to the emergency department at [facility] on 6/17/2025 with complaints of diarrhea and generalized weakness.  Admitted to hospitalist service with DANIEL, transaminitis and lactic acidosis.  Initially worked up for infectious etiology for his diarrhea which proved to be negative.  Was given IV fluid bolus and started on IV fluids.  Initially treated with Rocephin Flagyl prior to negative diarrhea workup.  Patient had gradual improvement with his DANIEL with IV fluids.  Diarrhea resolved.  Plans being made for patient to discharge to nursing rehab facility due to baseline debilitation and lack of home care.  Overnight on 6/21-6/22, patient became tachycardic, had metabolic encephalopathy.  Initially thought to be atrial flutter and started on Cardizem drip.  EKG proved sinus tach and Cardizem was stopped.  He became febrile with that temp 104.5 °F.  His abdomen became distended and painful.  Lactic acid was elevated.  CT of abdomen pelvis indicated possible ileus versus enteritis.  General surgery consulted recommended CTA of abdomen pelvis to rule out ischemia given exam.  Patient continued to have worsening mentation and respiratory status and was transferred to the ICU.  Shortly after arriving ICU, patient experienced cardiopulmonary arrest.  Became hypoxic, leading to bradycardia and ultimately asystole.  As soon as patient lost pulse, large amounts of gastric contents expelled from patient's mouth.  This appeared to be stool.  CPR initiated.  Able to achieve ROSC.  During resuscitative event patient was intubated, central line placed.  The same stomach contents were noted to come out of the ET tube after intubation indicating aspiration.  Patient found to be profoundly acidotic on initial ABG.  Temporized with sodium bicarb.  Required initiation of vasopressors.   Discussed case with general surgeon, Dr. Nelson, shortly after successful resuscitation event.  Tentatively planning for emergent OR trip for ex lap.  Suspecting possible ischemic process.     Final Discharge Diagnoses:  Active Hospital Problems    Diagnosis     **Shock, septic     Cardiac arrest     Acute hypoxic respiratory failure     Celiac artery stenosis     DANIEL (acute kidney injury)     Transaminitis, acute     Dermatitis neglecta     Iron deficiency     Hypokalemia     Tremor        Consults:     Hematology  Consults by Caprice Cali MD (06/22/2025 07:12)     General Surgery  Consults by Wu Nelson MD (06/22/2025 12:49)     Palliative Care Medicine  Consults by Elisha Iniguez APRN (06/24/2025 10:13)     Procedures Performed:   Intubation (06/22/2025 13:07)   Insert Central Line At Bedside (06/22/2025 13:20)   Procedures by Charles Rogers APRN (06/22/2025 14:19)     Hospital Course:   75-year-old male is admitted to the hospital on 6/17/2025 with complaints of diarrhea, weakness, DANIEL and a lactic acidosis.  Patient initially treated for infectious diarrhea, stool studies were negative.  Kidney injury improved with IV fluid hydration.  Patient was weak and not deemed safe to return home to self-care as his wife was also admitted to the hospital.  Patient was being arranged to discharge to a skilled nursing facility.  Prior to this, patient became tachycardic, encephalopathic overnight on 6/21.  Developed respiratory distress, fever and apparent intra-abdominal process of suspected to be ileus versus SBO, and was transferred to the critical care units for further workup and management.  Shortly after arriving to his room in CCU, patient became hypoxic, bradycardic leading to asystole cardiac arrest.  Patient underwent CPR, ACLS for approximately 10 minutes prior to achieving ROSC.  Large volume emesis occurred during arrest and resuscitative event.  Apparent aspiration as gastric contents found in  trachea during intubation.  In the immediate postarrest period, patient had worsening oxygenation and ventilation.  Became severely hypotensive.  He required high ventilator support, sedation and neuromuscular blockade for his ARDS.  Required multiple vasopressors, norepinephrine and vasopressin.  He was treated empirically with vancomycin and Zosyn.  His lactic acidosis continued mount despite maximal medical support.  There was suspicion for nonocclusive mesenteric ischemia by general surgery.  Patient too unstable for any further imaging or procedures.  Patient's daughter arrived at bedside on .  Clinical course discussed with her, including current poor prognosis.  Patient made DNR on .  Very little if any improvement by the morning of .  Lactic acid still very elevated.  Worsening renal failure, apparent shock liver and continued severe acute hypoxic respiratory failure/ARDS.  Palliative care medicine consulted on .  After meeting with family, decision made to transition to comfort measures only.  Patient was palliatively extubated, given palliative analgesia and sedation.  Patient  shortly after withdrawal of care.  Time of death 1347 on 2025.    Pertinent Test Results:   Results for orders placed during the hospital encounter of 25    Adult Transthoracic Echo Complete W/ Cont if Necessary Per Protocol    Interpretation Summary    Left ventricular ejection fraction appears to be 56 - 60%.    Left ventricular wall thickness is consistent with mild concentric hypertrophy.    Left ventricular diastolic function was normal.    Estimated right ventricular systolic pressure from tricuspid regurgitation is normal (<35 mmHg).      Imaging Results (All)       Procedure Component Value Units Date/Time    XR Chest 1 View [[id number]] Collected: 25 0733     Updated: 25 0737    Narrative:      EXAM: XR CHEST 1 VW-         HISTORY: Intubated Patient; N17.9-Acute kidney failure,  unspecified;  E86.0-Dehydration; D64.9-Anemia, unspecified; D69.6-Thrombocytopenia,  unspecified; R19.7-Diarrhea, unspecified; R53.1-Weakness; Z74.09-Other  reduced mobility; R13.10-Dysphagia, unspecified; R50.9-Fever,  unspecified; R10.0-Acute abdomen; I46.9-Cardiac arrest, cause  unspecified       COMPARISON: 6/23/2025.     TECHNIQUE:  Frontal view(s) of the chest submitted.     FINDINGS:    There is an ET tube in place with tip 4.8 cm above the marky. NG tube  extends below the level of the diaphragm. Patchy bilateral opacities are  slightly improved. This may be due to edema or multifocal pneumonia or  ARDS. No large effusion or pneumothorax is seen. There are multiple  overlying wires and leads.          Impression:         1. Slightly improved patchy bilateral opacities and properly positioned  support tubes and lines.     This report was signed and finalized on 6/24/2025 7:34 AM by Dilan Sykes.       XR Chest 1 View [[id number]] Collected: 06/23/25 0630     Updated: 06/23/25 0638    Narrative:      EXAMINATION: XR CHEST 1 VW-        HISTORY: Intubated Patient; N17.9-Acute kidney failure, unspecified;  E86.0-Dehydration; D64.9-Anemia, unspecified; D69.6-Thrombocytopenia,  unspecified; R19.7-Diarrhea, unspecified; R53.1-Weakness; Z74.09-Other  reduced mobility; R13.10-Dysphagia, unspecified; R50.9-Fever,  unspecified; R10.0-Acute abdomen; I46.9-Cardiac arrest, cause  unspecified     A frontal projection of the chest is compared with the previous study  dated 2/6/2022 2025.     The lungs are poorly expanded.     Left cardiac border and left lower lung are partly obscured by an opaque  artifact.     The bilateral lung opacities persist with no interval change. This may  represent pulm edema or inflammatory/infectious process.     Small biapical pleural thickening persist.     There is no pleural effusion. There is no pneumothorax.     The heart size is not optimally evaluated due to the AP projection.      An endotracheal tube is in place. A nasogastric tube is visualized. The  distal end of the tube is not in the field-of-view and not evaluated.     No acute bony abnormality.       Impression:      1. No change in cardiopulmonary status since the previous study.  2. The endotracheal tube in place. The distal end of the gastric tube is  not included in the study.        This report was signed and finalized on 6/23/2025 6:35 AM by Dr. Meg Jaquez MD.       XR Abdomen KUB [[id number]] Collected: 06/22/25 1407     Updated: 06/22/25 1411    Narrative:      EXAM: XR ABDOMEN KUB-         HISTORY: NG placement; N17.9-Acute kidney failure, unspecified;  E86.0-Dehydration; D64.9-Anemia, unspecified; D69.6-Thrombocytopenia,  unspecified; R19.7-Diarrhea, unspecified; R53.1-Weakness; Z74.09-Other  reduced mobility; R13.10-Dysphagia, unspecified; R50.9-Fever,  unspecified; R10.0-Acute abdomen       COMPARISON: None available.     TECHNIQUE:   Frontal view of the abdomen. 1 image.     FINDINGS:    There is an NG tube in place tip at the fundus of the stomach. Bowel gas  pattern visualized is nonspecific with borderline air-filled loops of  bowel.          Impression:         1. NG tube tip at the gastric fundus.     This report was signed and finalized on 6/22/2025 2:08 PM by Dilan Sykes.       XR Chest 1 View [[id number]] Collected: 06/22/25 1406     Updated: 06/22/25 1410    Narrative:      EXAM: XR CHEST 1 VW-         HISTORY: intubation; N17.9-Acute kidney failure, unspecified;  E86.0-Dehydration; D64.9-Anemia, unspecified; D69.6-Thrombocytopenia,  unspecified; R19.7-Diarrhea, unspecified; R53.1-Weakness; Z74.09-Other  reduced mobility; R13.10-Dysphagia, unspecified; R50.9-Fever,  unspecified; R10.0-Acute abdomen       COMPARISON: 6/22/2025.     TECHNIQUE:  Frontal view(s) of the chest submitted.     FINDINGS:    There is an ET tube in place with tip 5 cm above the marky. Central  opacities are noted worrisome for  edema. External pacing lead is noted.  No large effusion is seen. The left costophrenic angle is not included.  No pneumothorax is visualized. Probable NG tube is coiled in the neck.          Impression:         1. ET tube tip above the marky. NG tube coiled in the neck.  2. Bilateral central opacities worrisome for edema.     This report was signed and finalized on 6/22/2025 2:07 PM by Dilan Sykes.       CT Angiogram Abdomen Pelvis [[id number]] Collected: 06/22/25 1219     Updated: 06/22/25 1229    Narrative:      EXAM: CT ANGIOGRAM ABDOMEN PELVIS-      HISTORY: worsening abd pain/distension, r/o ischemia or other acute  etiology; N17.9-Acute kidney failure, unspecified; E86.0-Dehydration;  D64.9-Anemia, unspecified; D69.6-Thrombocytopenia, unspecified;  R19.7-Diarrhea, unspecified; R53.1-Weakness; Z74.09-Other reduced  mobility; R13.10-Dysphagia, unspecified       COMPARISON: None available.     DOSE LENGTH PRODUCT: 3050 mGy cm. Automatic exposure control was  utilized to make radiation dose as low as reasonably achievable.     TECHNIQUE: Enhanced axial images of the abdomen and pelvis obtained with  multiplanar reformats. 3D postprocessing, including MIPs, performed and  images saved to PACS.     FINDINGS:  VISUALIZED CHEST: No pericardial or pleural effusion is identified.  There is mild atelectasis at the lung bases. There is fluid in the  distal esophagus without wall thickening. This may be due to reflux or  dysmotility.     LIVER/BILIARY: Hepatic parenchyma is unremarkable. The patient is status  post cholecystectomy. Bile ducts are unremarkable.     KIDNEYS/URETERS: There is an exophytic right renal cyst and left renal  angiomyolipoma measuring 3.0 cm. There is no hydronephrosis.     ADRENAL: Stable left adrenal nodularity.     SPLEEN: Unremarkable.     PANCREAS: Unremarkable.        PERITONEUM/RETROPERITONEUM: No retroperitoneal or mesenteric  lymphadenopathy is seen. No free fluid is identified.      GI TRACT: Dilated fluid-filled stomach is unchanged. There are multiple  dilated fluid and air-filled loops of bowel which are stable. No focal  wall thickening is seen.     PELVIS: There is prostatic hypertrophy. Urinary bladder is incompletely  distended. There are prominent air and fluid-filled loops of bowel in  the pelvis. No free fluid is seen.     SOFT TISSUES: There are inguinal hernias containing fat.     BONES: No acute or aggressive bony lesion.            VESSELS:     AORTA/ABDOMINAL-PELVIC VESSELS: There is atherosclerosis of the  abdominal aorta without aneurysm. There is narrowing at the proximal  celiac artery with greater than 70% stenosis however contrast is noted  more distally. The SMA is patent without stenosis or occlusion. The LUIS ALBERTO  is patent without stenosis or occlusion. There are scattered areas of  atherosclerosis at the branches of the bilateral internal and external  iliac arteries but no significant stenosis is seen. There is  atherosclerosis at both common iliac arteries with 50% stenosis on the  left.             Impression:      1. Atherosclerosis of the abdominal aorta and branches with greater than  70% stenosis at the proximal celiac artery and 50% stenosis at the left  common iliac artery. No occlusion is seen.  2. Stable prominent air and fluid-filled loops of stomach, small bowel,  and colon without obvious transition point. No focal wall thickening or  edema of the mesentery is seen.     This report was signed and finalized on 6/22/2025 12:26 PM by Dilan Sykes.       XR Chest 1 View [[id number]] Collected: 06/22/25 0808     Updated: 06/22/25 0811    Narrative:      EXAM: XR CHEST 1 VW-         HISTORY: respiratory distress; N17.9-Acute kidney failure, unspecified;  E86.0-Dehydration; D64.9-Anemia, unspecified; D69.6-Thrombocytopenia,  unspecified; R19.7-Diarrhea, unspecified; R53.1-Weakness; Z74.09-Other  reduced mobility; R13.10-Dysphagia, unspecified       COMPARISON:  6/17/2025.     TECHNIQUE:  Frontal view(s) of the chest submitted.     FINDINGS:    Lungs are grossly clear. No effusion or pneumothorax is seen. Heart and  mediastinum are unremarkable.          Impression:         1. No active disease in the chest.     This report was signed and finalized on 6/22/2025 8:08 AM by Dilan Sykes.       CT Abdomen Pelvis Without Contrast [[id number]] Collected: 06/22/25 0752     Updated: 06/22/25 0806    Narrative:      EXAM: CT ABDOMEN PELVIS WO CONTRAST-         HISTORY: abdominal pain; N17.9-Acute kidney failure, unspecified;  E86.0-Dehydration; D64.9-Anemia, unspecified; D69.6-Thrombocytopenia,  unspecified; R19.7-Diarrhea, unspecified; R53.1-Weakness; Z74.09-Other  reduced mobility; R13.10-Dysphagia, unspecified       COMPARISON: 6/17/2025.     DOSE LENGTH PRODUCT: 1024.13 mGy.cm Automatic exposure control was  utilized to make radiation dose as low as reasonably achievable.     TECHNIQUE: Noncontrast axial images of the abdomen and pelvis obtained  with multiplanar reformats.     FINDINGS:  VISUALIZED CHEST: No pericardial or pleural effusion is identified.  Linear opacities at the lung bases are due to atelectasis.     LIVER/BILE DUCTS: Patient is status post cholecystectomy. There are  calcified granulomas. Unenhanced hepatic parenchyma is unremarkable.  Bile ducts are unremarkable.     KIDNEYS/URETERS: There is a right renal cyst and no hydronephrosis. Left  renal angiomyolipoma measures 3.2 cm. No renal or ureteral calculi are  identified.     ADRENAL: There is stable nodularity of the left adrenal gland.        SPLEEN: Unremarkable.     PANCREAS: There is a splenule at the tail of the pancreas unchanged from  2015. Unenhanced pancreatic parenchyma is unremarkable.     MESENTERY: No retroperitoneal or mesenteric lymphadenopathy or  inflammatory changes are seen.     VASCULATURE: There is calcified atherosclerosis of the abdominal aorta  without aneurysm.     GI TRACT:  There is a small hiatal hernia. There is fluid in the distal  esophagus may be related to reflux or dysmotility. Stomach is distended  with fluid and air and there are multiple loops of small and large bowel  which are distended with fluid and air and scattered air-fluid levels.  No obvious transition point is seen. Ileus is favored.     PELVIS: There is prostatic hypertrophy. Urinary bladder is unremarkable.  No pelvic lymphadenopathy or free fluid is seen.     SOFT TISSUES: There are bilateral inguinal hernias left larger than  right containing fat.     BONES: No acute or aggressive bony lesion.           Impression:      1. There are multiple prominent fluid and air-filled loops of small  bowel and colon and also distention of the stomach without transition  point or inflammatory change of the mesentery. This may represent an  ileus.  2. Stable right renal cyst and left renal angiomyolipoma.  3. Stable left adrenal nodularity.  4. Prostatic hypertrophy.  5. Inguinal hernias containing fat.     These findings are in agreement with the critical and emergent findings  from the [facility] preliminary report.        This report was signed and finalized on 6/22/2025 8:03 AM by Dilan Sykes.       US Liver [[id number]] Collected: 06/18/25 1231     Updated: 06/18/25 1237    Narrative:      EXAM/TECHNIQUE: US LIVER-     INDICATION: acute transaminitis     COMPARISON: CT 6/17/2025     FINDINGS:     Visualized portion of the pancreas is unremarkable. The pancreatic tail  and known pancreatic tail lesion are not visualized on this exam.     Liver echotexture and echogenicity are within normal limits. No solid  liver lesion. Patent portal vein with appropriate directional flow.     Patient is status post cholecystectomy. No biliary ductal dilatation.  Common bile duct is 3 mm diameter.     Right kidney is 11.3 cm in length and demonstrates normal cortical  thickness and echogenicity. No shadowing renal calculi  or  hydronephrosis. 3.3 cm exophytic right upper pole renal cyst without  complex features.       Impression:      1.  No sonographic evidence of cirrhosis or steatosis.  2.  No biliary ductal dilatation status post cholecystectomy.     This report was signed and finalized on 6/18/2025 12:34 PM by Dr. Fawad Valaedz MD.       CT Abdomen Pelvis Without Contrast [[id number]] Collected: 06/17/25 1146     Updated: 06/17/25 1157    Narrative:      EXAMINATION: CT ABDOMEN PELVIS WO CONTRAST-     HISTORY: Abdominal pain. Generalized weakness. Diarrhea.     In order to have a CT radiation dose as low as reasonably achievable  Automated Exposure Control was utilized for adjustment of the mA and/or  KV according to patient size.     CT Dose DLP = 785.23 mGy.cm.  (If there are multiple studies performed at the same time this  represents the total dose).     Images are stored in PACS per institutional protocol.        Noncontrast abdomen/pelvis CT.  Axial, sagittal, and coronal sequences.     COMPARISON:  11/22/2024 and 11/22/2016.     Normal heart size.  No acute abnormality at the lung bases.     Cholecystectomy clips are present.  Normal noncontrast appearance of the spleen.  Spleen = 13 x 6 x 7 cm. A few splenic calcifications are present.     Discrete oval solid mass in the distal pancreas tail measures 30 mm in  diameter on axial image 22. This finding measured 27 mm on 11/22/2024  and 20 mm on 11/22/2016.  The homogeneous density (matching adjacent spleen) and smooth margins  suggest that this is a splenule.     Stable LEFT adrenal nodule.  Normal RIGHT adrenal gland.  Unchanged partially exophytic 30 x 26 mm fat-containing LEFT renal  lesion.  Exophytic 31 x 30 mm RIGHT renal cyst compared with 32 x 32 mm on  11/22/2024 and 24 x 21 mm on 11/22/2016.  No renal stone or hydronephrosis.     Mild aortic calcification with no aneurysm.  No bowel dilation.  No sign of colitis or diverticulitis.  No pelvic mass or  "fluid.  Normal appearance of the urinary bladder.       Impression:      1. Stable renal findings.  2. Small solid \"mass\" at the tip of the pancreas tail has benign imaging  characteristics. Splenule suspected.  3. No fluid collection or inflammatory process.     This report was signed and finalized on 2025 11:54 AM by Dr. José Pizarro MD.       XR Chest 1 View [[id number]] Collected: 25 1116     Updated: 25 1120    Narrative:      EXAM: XR CHEST 1 VW-         HISTORY: weak       COMPARISON: 2016.     TECHNIQUE:  Frontal view(s) of the chest submitted.     FINDINGS:    The lungs are grossly clear bilaterally. No effusion or pneumothorax is  visualized. Heart and mediastinum are unremarkable.          Impression:         1. No active disease in the chest.     This report was signed and finalized on 2025 11:17 AM by Dilan Sykes.               Result Review    Result Review:  I have personally reviewed the results from the time of this admission to 2025 14:08 CDT and agree with these findings:  [x]  Laboratory list / accordion  [x]  Microbiology  [x]  Radiology  [x]  EKG/Telemetry   [x]  Cardiology/Vascular   []  Pathology  []  Old records  []  Other:      Physical Exam on Discharge:  BP 0  Pulse (!) 0   Temp 99 °F (37.2 °C) (Rectal)   Resp (!) 0   Ht 182.9 cm (72\")   Wt 131 kg (288 lb 2.3 oz)     BMI 39.08 kg/m²     Condition on Discharge:       Electronically signed by RUBIO Ruby on 2025 at 14:24 CDT    Time: 35 minutes.         "

## 2025-06-24 NOTE — PROGRESS NOTES
[facility] Progress Note    Progress Note    Patient:  Felix Bartlett Sr.  YOB: 1950  Date of Service: 6/24/2025  MRN: [id number]   Acct: [id number]   Primary Care Physician: Keyon Dockery MD  Advance Directive:   Code Status and Medical Interventions: No CPR (Do Not Attempt to Resuscitate); Limited Support; No cardioversion   Ordered at: 06/23/25 1452     Code Status (Patient has no pulse and is not breathing):    No CPR (Do Not Attempt to Resuscitate)     Medical Interventions (Patient has pulse or is breathing):    Limited Support     Medical Intervention Limits:    No cardioversion     Admit Date: 6/17/2025       Hospital Day: 7      Subjective:     Chief Compliant: Unable to obtain from patient    Subjective: Still unable to obtain from patient due to sedation, paralytics, intubation/mechanical ventilation.  CCU nurse Kandace CALHOUN states no neurologic changes in spite of sedation break yesterday with sluggishly responsive pupils.  Remains on pressor support.  Again no bleeding or increased bruising noted by nursing staff.  Surgery with no plans for OR due to patient being too ill and unstable.  Requiring maximal ventilator support due to ARDS/aspiration pneumonitis.  Remains critically ill.  Empiric IV antibiotics for intra-abdominal infection/ischemia.  Family discussing option of transition to comfort care.    Interval history:  Felix Bartlett Sr. 75 y.o. male with a past medical history of COPD, kidney cancer status post left partial nephrectomy, hypertension, hypokalemia, is being hospitalized after presenting with generalized weakness and diarrhea.     Upon presentation, he had an DANIEL with creatinine of 3.01 compared to baseline of normal, and mild transaminitis, WBC of 12, hemoglobin 9.4, platelet of 120; CT abdomen and pelvis without acute findings. For DANIEL, his creatinine continues to improve with IV fluid hydration.  As for his diarrhea evaluation, it is reassuring that C.  "difficile testing as well as comprehensive GI panel PCR came back all unremarkable.     Hematology consulted for thrombocytopenia; it is of note that the patient's platelet count prior to this admission were WNL, last checked before this admission was on 5/5/2025 where platelets were 220; upon admission, platelets were 120, and was trending down gradually and it is 54k today.  It is of note that he has a degree of anemia prior to admission, but hemoglobin ranging 9-10, and it is almost the same now.  WBC is normal.    Review of Systems  Review of Systems:   Unobtainable from patient.  No change in neurologic status in spite of sedation break yesterday.    Vitals: /58   Pulse (!) 141   Temp 99.1 °F (37.3 °C) (Rectal)   Resp (!) 30   Ht 182.9 cm (72\")   Wt 131 kg (288 lb 2.3 oz)   SpO2 97%   BMI 39.08 kg/m²     Physical Exam  Physical Exam:  General appearance: Unresponsive.  Chronically and critically ill-appearing.  Skin:  Skin color pale.  No rashes or lesions  HEENT: Endotracheally intubated/ventilated.  Neck:  no adenopathy, no tenderness/mass/nodules  Lungs: Ventilator breath sounds  Heart: Regular tachycardia   Abdomen: Obese  Extremities: Symmetrical anasarca  Neurologic: Unresponsive.     24HR INTAKE/OUTPUT:    Intake/Output Summary (Last 24 hours) at 6/24/2025 0719  Last data filed at 6/24/2025 0426  Gross per 24 hour   Intake 5732.71 ml   Output 145 ml   Net 5587.71 ml        Bradley Catheter: Yes    Diet: NPO Diet NPO Type: Strict NPO      Medications:   Scheduled Meds:artificial tears, , Both Eyes, Q4H  budesonide, 0.25 mg, Nebulization, BID - RT  Chlorhexidine Gluconate Cloth, 1 Application, Topical, Q24H  [Held by provider] escitalopram, 20 mg, Oral, Daily  ferric gluconate, 250 mg, Intravenous, Daily  [Held by provider] gabapentin, 300 mg, Oral, Q12H  hydrocortisone sodium succinate, 100 mg, Intravenous, Q6H  insulin regular, 2-7 Units, Subcutaneous, Q6H  ipratropium-albuterol, 3 mL, " Nebulization, 4x Daily - RT  [Held by provider] losartan, 25 mg, Oral, Q24H  [Held by provider] magnesium oxide, 400 mg, Oral, Daily  [Held by provider] metoprolol tartrate, 50 mg, Oral, Q12H  mupirocin, 1 Application, Each Nare, BID  [Held by provider] pantoprazole, 40 mg, Oral, Q AM  pantoprazole, 40 mg, Intravenous, Q AM  piperacillin-tazobactam, 4.5 g, Intravenous, Q8H  [Held by provider] primidone, 50 mg, Oral, TID  [Held by provider] saccharomyces boulardii, 250 mg, Oral, BID  [Held by provider] senna-docusate sodium, 2 tablet, Oral, BID  [Held by provider] tamsulosin, 0.4 mg, Oral, Daily        Continuous Infusions:dexmedetomidine, 0.2-1.5 mcg/kg/hr, Last Rate: Stopped (06/22/25 1834)  fentanyl 10 mcg/mL,  mcg/hr, Last Rate: 50 mcg/hr (06/23/25 0915)  norepinephrine, 0.02-0.3 mcg/kg/min, Last Rate: 0.12 mcg/kg/min (06/24/25 0642)  phenylephrine, 0.5-3 mcg/kg/min, Last Rate: Stopped (06/23/25 0147)  propofol, 5-75 mcg/kg/min, Last Rate: 30 mcg/kg/min (06/24/25 0357)  sodium bicarbonate 8.4 % 150 mEq in dextrose (D5W) 5 % 1,000 mL infusion (greater than 100 mEq), 150 mEq, Last Rate: 150 mEq (06/23/25 2333)  vasopressin, 0.03 Units/min, Last Rate: 0.03 Units/min (06/24/25 0642)  vecuronium (NORCURON) 100 mg in sodium chloride 0.9 % 100 mL (1 mg/mL) infusion, 0.6-1.7 mcg/kg/min, Last Rate: 0.6 mcg/kg/min (06/23/25 1829)        Labs:     Results from last 7 days   Lab Units 06/24/25  0611 06/23/25  0306 06/22/25  1756   WBC 10*3/mm3 12.63* 8.64 8.05   HEMOGLOBIN g/dL 7.3* 8.5* 8.3*   HEMATOCRIT % 21.0* 25.6* 24.8*   PLATELETS 10*3/mm3 19* 42* 48*        Results from last 7 days   Lab Units 06/24/25  0505 06/23/25  1634 06/23/25  1129   SODIUM mmol/L 145 147* 146*   POTASSIUM mmol/L 4.4 4.2 4.0   CHLORIDE mmol/L 99 104 105   CO2 mmol/L 22.0 20.0* 20.0*   BUN mg/dL 71.6* 64.7* 62.3*   CREATININE mg/dL 4.09* 3.89* 3.77*   CALCIUM mg/dL 6.2* 6.5* 6.4*   BILIRUBIN mg/dL 2.2* 2.2* 1.9*   ALK PHOS U/L 54 46 46  "  ALT (SGPT) U/L 521* 549* 539*   AST (SGOT) U/L 819* 1,099* 1,121*   GLUCOSE mg/dL 222* 212* 195*       ABG:    pH, Arterial   Date Value Ref Range Status   06/24/2025 7.328 (L) 7.350 - 7.450 pH units Final     Comment:     84 Value below reference range     pO2, Arterial   Date Value Ref Range Status   06/24/2025 103.0 83.0 - 108.0 mm Hg Final     pCO2, Arterial   Date Value Ref Range Status   06/24/2025 44.3 35.0 - 45.0 mm Hg Final       Culture Data:   No results found for: \"BLOODCX\", \"URINECX\", \"WOUNDCX\", \"MRSACX\", \"RESPCX\", \"STOOLCX\"    Lab Results   Component Value Date    PATHINTERP  06/23/2025     Moderate macrocytic anemia    Normal total white blood cell count with the following manual differential:  Neutrophils 12%  Bands 10%  Lymphocytes 60%  Monocytes 4%  Metamyelocytes 10%  Myelocytes 4%  Nucleated red blood cells 5  Marked peripheral thrombocytopenia    No schistocytes identified  No platelet clumps identified  No morulae seen in granulocytes or monocytes    Granulocytes are vacuolated which can be seen in sepsis or inflammatory states.       Radiology:     Imaging Results (Last 7 Days)       Procedure Component Value Units Date/Time    XR Chest 1 View - In process [[id number]] Resulted: 06/24/25 0429     Updated: 06/24/25 0429    This result has not been signed. Information might be incomplete.      XR Chest 1 View [[id number]] Collected: 06/23/25 0630     Updated: 06/23/25 0638    Narrative:      EXAMINATION: XR CHEST 1 VW-        HISTORY: Intubated Patient; N17.9-Acute kidney failure, unspecified;  E86.0-Dehydration; D64.9-Anemia, unspecified; D69.6-Thrombocytopenia,  unspecified; R19.7-Diarrhea, unspecified; R53.1-Weakness; Z74.09-Other  reduced mobility; R13.10-Dysphagia, unspecified; R50.9-Fever,  unspecified; R10.0-Acute abdomen; I46.9-Cardiac arrest, cause  unspecified     A frontal projection of the chest is compared with the previous study  dated 2/6/2022 2025.     The lungs are poorly " expanded.     Left cardiac border and left lower lung are partly obscured by an opaque  artifact.     The bilateral lung opacities persist with no interval change. This may  represent pulm edema or inflammatory/infectious process.     Small biapical pleural thickening persist.     There is no pleural effusion. There is no pneumothorax.     The heart size is not optimally evaluated due to the AP projection.     An endotracheal tube is in place. A nasogastric tube is visualized. The  distal end of the tube is not in the field-of-view and not evaluated.     No acute bony abnormality.       Impression:      1. No change in cardiopulmonary status since the previous study.  2. The endotracheal tube in place. The distal end of the gastric tube is  not included in the study.        This report was signed and finalized on 6/23/2025 6:35 AM by Dr. Meg Jaquez MD.       XR Abdomen KUB [[id number]] Collected: 06/22/25 1407     Updated: 06/22/25 1411    Narrative:      EXAM: XR ABDOMEN KUB-         HISTORY: NG placement; N17.9-Acute kidney failure, unspecified;  E86.0-Dehydration; D64.9-Anemia, unspecified; D69.6-Thrombocytopenia,  unspecified; R19.7-Diarrhea, unspecified; R53.1-Weakness; Z74.09-Other  reduced mobility; R13.10-Dysphagia, unspecified; R50.9-Fever,  unspecified; R10.0-Acute abdomen       COMPARISON: None available.     TECHNIQUE:   Frontal view of the abdomen. 1 image.     FINDINGS:    There is an NG tube in place tip at the fundus of the stomach. Bowel gas  pattern visualized is nonspecific with borderline air-filled loops of  bowel.          Impression:         1. NG tube tip at the gastric fundus.     This report was signed and finalized on 6/22/2025 2:08 PM by Dilan Sykes.       XR Chest 1 View [[id number]] Collected: 06/22/25 1406     Updated: 06/22/25 1410    Narrative:      EXAM: XR CHEST 1 VW-         HISTORY: intubation; N17.9-Acute kidney failure, unspecified;  E86.0-Dehydration; D64.9-Anemia,  unspecified; D69.6-Thrombocytopenia,  unspecified; R19.7-Diarrhea, unspecified; R53.1-Weakness; Z74.09-Other  reduced mobility; R13.10-Dysphagia, unspecified; R50.9-Fever,  unspecified; R10.0-Acute abdomen       COMPARISON: 6/22/2025.     TECHNIQUE:  Frontal view(s) of the chest submitted.     FINDINGS:    There is an ET tube in place with tip 5 cm above the marky. Central  opacities are noted worrisome for edema. External pacing lead is noted.  No large effusion is seen. The left costophrenic angle is not included.  No pneumothorax is visualized. Probable NG tube is coiled in the neck.          Impression:         1. ET tube tip above the marky. NG tube coiled in the neck.  2. Bilateral central opacities worrisome for edema.     This report was signed and finalized on 6/22/2025 2:07 PM by Dilan Sykes.       CT Angiogram Abdomen Pelvis [[id number]] Collected: 06/22/25 1219     Updated: 06/22/25 1229    Narrative:      EXAM: CT ANGIOGRAM ABDOMEN PELVIS-      HISTORY: worsening abd pain/distension, r/o ischemia or other acute  etiology; N17.9-Acute kidney failure, unspecified; E86.0-Dehydration;  D64.9-Anemia, unspecified; D69.6-Thrombocytopenia, unspecified;  R19.7-Diarrhea, unspecified; R53.1-Weakness; Z74.09-Other reduced  mobility; R13.10-Dysphagia, unspecified       COMPARISON: None available.     DOSE LENGTH PRODUCT: 3050 mGy cm. Automatic exposure control was  utilized to make radiation dose as low as reasonably achievable.     TECHNIQUE: Enhanced axial images of the abdomen and pelvis obtained with  multiplanar reformats. 3D postprocessing, including MIPs, performed and  images saved to PACS.     FINDINGS:  VISUALIZED CHEST: No pericardial or pleural effusion is identified.  There is mild atelectasis at the lung bases. There is fluid in the  distal esophagus without wall thickening. This may be due to reflux or  dysmotility.     LIVER/BILIARY: Hepatic parenchyma is unremarkable. The patient is  status  post cholecystectomy. Bile ducts are unremarkable.     KIDNEYS/URETERS: There is an exophytic right renal cyst and left renal  angiomyolipoma measuring 3.0 cm. There is no hydronephrosis.     ADRENAL: Stable left adrenal nodularity.     SPLEEN: Unremarkable.     PANCREAS: Unremarkable.        PERITONEUM/RETROPERITONEUM: No retroperitoneal or mesenteric  lymphadenopathy is seen. No free fluid is identified.     GI TRACT: Dilated fluid-filled stomach is unchanged. There are multiple  dilated fluid and air-filled loops of bowel which are stable. No focal  wall thickening is seen.     PELVIS: There is prostatic hypertrophy. Urinary bladder is incompletely  distended. There are prominent air and fluid-filled loops of bowel in  the pelvis. No free fluid is seen.     SOFT TISSUES: There are inguinal hernias containing fat.     BONES: No acute or aggressive bony lesion.            VESSELS:     AORTA/ABDOMINAL-PELVIC VESSELS: There is atherosclerosis of the  abdominal aorta without aneurysm. There is narrowing at the proximal  celiac artery with greater than 70% stenosis however contrast is noted  more distally. The SMA is patent without stenosis or occlusion. The LUIS ALBERTO  is patent without stenosis or occlusion. There are scattered areas of  atherosclerosis at the branches of the bilateral internal and external  iliac arteries but no significant stenosis is seen. There is  atherosclerosis at both common iliac arteries with 50% stenosis on the  left.             Impression:      1. Atherosclerosis of the abdominal aorta and branches with greater than  70% stenosis at the proximal celiac artery and 50% stenosis at the left  common iliac artery. No occlusion is seen.  2. Stable prominent air and fluid-filled loops of stomach, small bowel,  and colon without obvious transition point. No focal wall thickening or  edema of the mesentery is seen.     This report was signed and finalized on 6/22/2025 12:26 PM by  Dilan Sykes.       XR Chest 1 View [[id number]] Collected: 06/22/25 0808     Updated: 06/22/25 0811    Narrative:      EXAM: XR CHEST 1 VW-         HISTORY: respiratory distress; N17.9-Acute kidney failure, unspecified;  E86.0-Dehydration; D64.9-Anemia, unspecified; D69.6-Thrombocytopenia,  unspecified; R19.7-Diarrhea, unspecified; R53.1-Weakness; Z74.09-Other  reduced mobility; R13.10-Dysphagia, unspecified       COMPARISON: 6/17/2025.     TECHNIQUE:  Frontal view(s) of the chest submitted.     FINDINGS:    Lungs are grossly clear. No effusion or pneumothorax is seen. Heart and  mediastinum are unremarkable.          Impression:         1. No active disease in the chest.     This report was signed and finalized on 6/22/2025 8:08 AM by Dilan Sykes.       CT Abdomen Pelvis Without Contrast [[id number]] Collected: 06/22/25 0752     Updated: 06/22/25 0806    Narrative:      EXAM: CT ABDOMEN PELVIS WO CONTRAST-         HISTORY: abdominal pain; N17.9-Acute kidney failure, unspecified;  E86.0-Dehydration; D64.9-Anemia, unspecified; D69.6-Thrombocytopenia,  unspecified; R19.7-Diarrhea, unspecified; R53.1-Weakness; Z74.09-Other  reduced mobility; R13.10-Dysphagia, unspecified       COMPARISON: 6/17/2025.     DOSE LENGTH PRODUCT: 1024.13 mGy.cm Automatic exposure control was  utilized to make radiation dose as low as reasonably achievable.     TECHNIQUE: Noncontrast axial images of the abdomen and pelvis obtained  with multiplanar reformats.     FINDINGS:  VISUALIZED CHEST: No pericardial or pleural effusion is identified.  Linear opacities at the lung bases are due to atelectasis.     LIVER/BILE DUCTS: Patient is status post cholecystectomy. There are  calcified granulomas. Unenhanced hepatic parenchyma is unremarkable.  Bile ducts are unremarkable.     KIDNEYS/URETERS: There is a right renal cyst and no hydronephrosis. Left  renal angiomyolipoma measures 3.2 cm. No renal or ureteral calculi are  identified.      ADRENAL: There is stable nodularity of the left adrenal gland.        SPLEEN: Unremarkable.     PANCREAS: There is a splenule at the tail of the pancreas unchanged from  2015. Unenhanced pancreatic parenchyma is unremarkable.     MESENTERY: No retroperitoneal or mesenteric lymphadenopathy or  inflammatory changes are seen.     VASCULATURE: There is calcified atherosclerosis of the abdominal aorta  without aneurysm.     GI TRACT: There is a small hiatal hernia. There is fluid in the distal  esophagus may be related to reflux or dysmotility. Stomach is distended  with fluid and air and there are multiple loops of small and large bowel  which are distended with fluid and air and scattered air-fluid levels.  No obvious transition point is seen. Ileus is favored.     PELVIS: There is prostatic hypertrophy. Urinary bladder is unremarkable.  No pelvic lymphadenopathy or free fluid is seen.     SOFT TISSUES: There are bilateral inguinal hernias left larger than  right containing fat.     BONES: No acute or aggressive bony lesion.           Impression:      1. There are multiple prominent fluid and air-filled loops of small  bowel and colon and also distention of the stomach without transition  point or inflammatory change of the mesentery. This may represent an  ileus.  2. Stable right renal cyst and left renal angiomyolipoma.  3. Stable left adrenal nodularity.  4. Prostatic hypertrophy.  5. Inguinal hernias containing fat.     These findings are in agreement with the critical and emergent findings  from the [facility] preliminary report.        This report was signed and finalized on 6/22/2025 8:03 AM by Dilan Sykes.       US Liver [[id number]] Collected: 06/18/25 1231     Updated: 06/18/25 1237    Narrative:      EXAM/TECHNIQUE: US LIVER-     INDICATION: acute transaminitis     COMPARISON: CT 6/17/2025     FINDINGS:     Visualized portion of the pancreas is unremarkable. The pancreatic tail  and known pancreatic tail  lesion are not visualized on this exam.     Liver echotexture and echogenicity are within normal limits. No solid  liver lesion. Patent portal vein with appropriate directional flow.     Patient is status post cholecystectomy. No biliary ductal dilatation.  Common bile duct is 3 mm diameter.     Right kidney is 11.3 cm in length and demonstrates normal cortical  thickness and echogenicity. No shadowing renal calculi or  hydronephrosis. 3.3 cm exophytic right upper pole renal cyst without  complex features.       Impression:      1.  No sonographic evidence of cirrhosis or steatosis.  2.  No biliary ductal dilatation status post cholecystectomy.     This report was signed and finalized on 6/18/2025 12:34 PM by Dr. Fawad Valadez MD.       CT Abdomen Pelvis Without Contrast [[id number]] Collected: 06/17/25 1146     Updated: 06/17/25 1157    Narrative:      EXAMINATION: CT ABDOMEN PELVIS WO CONTRAST-     HISTORY: Abdominal pain. Generalized weakness. Diarrhea.     In order to have a CT radiation dose as low as reasonably achievable  Automated Exposure Control was utilized for adjustment of the mA and/or  KV according to patient size.     CT Dose DLP = 785.23 mGy.cm.  (If there are multiple studies performed at the same time this  represents the total dose).     Images are stored in PACS per institutional protocol.        Noncontrast abdomen/pelvis CT.  Axial, sagittal, and coronal sequences.     COMPARISON:  11/22/2024 and 11/22/2016.     Normal heart size.  No acute abnormality at the lung bases.     Cholecystectomy clips are present.  Normal noncontrast appearance of the spleen.  Spleen = 13 x 6 x 7 cm. A few splenic calcifications are present.     Discrete oval solid mass in the distal pancreas tail measures 30 mm in  diameter on axial image 22. This finding measured 27 mm on 11/22/2024  and 20 mm on 11/22/2016.  The homogeneous density (matching adjacent spleen) and smooth margins  suggest that this is a  "splenule.     Stable LEFT adrenal nodule.  Normal RIGHT adrenal gland.  Unchanged partially exophytic 30 x 26 mm fat-containing LEFT renal  lesion.  Exophytic 31 x 30 mm RIGHT renal cyst compared with 32 x 32 mm on  11/22/2024 and 24 x 21 mm on 11/22/2016.  No renal stone or hydronephrosis.     Mild aortic calcification with no aneurysm.  No bowel dilation.  No sign of colitis or diverticulitis.  No pelvic mass or fluid.  Normal appearance of the urinary bladder.       Impression:      1. Stable renal findings.  2. Small solid \"mass\" at the tip of the pancreas tail has benign imaging  characteristics. Splenule suspected.  3. No fluid collection or inflammatory process.     This report was signed and finalized on 6/17/2025 11:54 AM by Dr. José Pizarro MD.       XR Chest 1 View [[id number]] Collected: 06/17/25 1116     Updated: 06/17/25 1120    Narrative:      EXAM: XR CHEST 1 VW-         HISTORY: weak       COMPARISON: 5/8/2016.     TECHNIQUE:  Frontal view(s) of the chest submitted.     FINDINGS:    The lungs are grossly clear bilaterally. No effusion or pneumothorax is  visualized. Heart and mediastinum are unremarkable.          Impression:         1. No active disease in the chest.     This report was signed and finalized on 6/17/2025 11:17 AM by Dilan Sykes.                 Objective:       Problem list:     Shock, septic    Hypokalemia    Tremor    Iron deficiency    DANIEL (acute kidney injury)    Transaminitis, acute    Dermatitis neglecta    Cardiac arrest    Acute hypoxic respiratory failure    Celiac artery stenosis        Assessment/Plan:       ASSESSMENT:   Acute thrombocytopenia likely associated with sepsis  -No schistocytes on peripheral blood smear review by  on 6/22/2025 (no MAHA to indicate TTP/HUS)  -6/22/2025-mildly elevated PT/INR, normal PTT, fibrinogen 255 (no DIC)  -6/23/2025-no schistocytes identified on manual blood smear review by Dr. Crowder of pathology.  Septic shock.  " Broad-spectrum antibiotics and pressor support.    Cardiopulmonary arrest.    Respiratory failure requiring intubation and mechanical ventilation  Anemia, components from iron deficiency and chronic disease  -6/22/2025-folate> 20, NERY negative,  (slightly elevated), haptoglobin 146  Ileus and ischemic bowel  Acute kidney injury  Shock liver  9.   Poor prognosis     PLAN:  - Hematology is consulted for thrombocytopenia; it is of note that the patient's platelet count prior to this admission were WNL, last checked before this admission was on 5/5/2025 where platelets were 220; upon admission, platelets were 120, and was trending down gradually and it is 19k today.   - So far etiology for thrombocytopenia is likely related to SIRS resulting in bone marrow suppression.  There is no evidence for microangiopathic hemolysis (normal haptoglobin level, no schistocytes on peripheral blood smears x2; slightly elevated LDH).  - Thrombocytopenia could also be related to medication use-currently on Zosyn.  Previously ceftriaxone and Flagyl initially, subsequently discontinued due to normal C. difficile and PCR GI panel.   - Ferrlecit to 50 mg daily x 5 doses beginning 6/20/2025.  - Transfuse platelets for platelet count of less than 10 with no bleeding, or less than 50 if there is bleeding.  - Continue other general medical management/support per primary team and other consulting services  - Obtain fibrinogen, fibrin split products, PT/PTT to reassess for DIC.    I discussed the patients findings and my recommendations with his CCU nurse, Kandace CALHOUN    I spent ~37 minutes caring for Felix on this date of service. This time includes time spent by me in the following activities: preparing for the visit, reviewing tests, performing a medically appropriate examination and/or evaluation, counseling and educating the patient/family/caregiver, ordering medications, tests, or procedures and documenting information in the medical  record

## 2025-06-24 NOTE — PROGRESS NOTES
[facility] Intensivist Services  INPATIENT PROGRESS NOTE    Patient Name: Felix Bartlett Sr.  Date of Admission: 6/17/2025  Today's Date: 06/24/25  Length of Stay: 7  Primary Care Physician: Keyon Dockery MD    ICU Summary   75-year-old male with acute abdominal process (ileus versus SBO versus mesenteric ischemia), septic shock, acute hypoxic respiratory failure, ARDS, status post cardiopulmonary arrest, DANIEL, in CCU for critical care management.  Patient transferred to ICU on 6/22 after having acute onset metabolic encephalopathy, respiratory distress, and worsening abdominal exam.  Patient had cardiopulmonary arrest shortly after arrival to intensive care.  Able to achieve ROSC, large amount of gastric and bowel contents vomited upon cardiac arrest and likely large amount of aspiration.  Patient required maximal ventilator support for ARDS.  Requiring vasopressor support with vasopressin and norepinephrine.    Interval update:  6/23  Patient remains critically ill.  Multiple vasopressors.  Maximal ventilator settings.  Sedated, paralyzed.  Still hypercapnic and hypoxic on ABG with combined respiratory metabolic acidosis despite maximal support.  On empiric Zosyn for intraabdominal infection, aspiration PNA.     6/24  Remains critical.  ARDS on very high ventilator support.  Septic shock on multiple vasopressors.  Renal failure, metabolic acidosis requiring sodium bicarb infusion.  Shock liver.  Anemic and thrombocytopenic this morning, receiving PRBC and platelet transfusion.  Leukocytosis.    Review of Systems   All pertinent negatives and positives are as above. All other systems have been reviewed and are negative unless otherwise stated.     Objective    Temp:  [97.2 °F (36.2 °C)-99.1 °F (37.3 °C)] 98.3 °F (36.8 °C)  Heart Rate:  [105-153] 138  Resp:  [30] 30  BP: (101-171)/() 150/53  FiO2 (%):  [80 %-100 %] 80 %  Physical Exam  Nursing note reviewed.    Constitutional:       Appearance: He is morbidly obese. He is ill-appearing.      Interventions: He is intubated.   Eyes:      Pupils: Pupils are equal, round, and reactive to light.   Cardiovascular:      Rate and Rhythm: Tachycardia present. Rhythm irregular.      Pulses: Normal pulses.      Heart sounds: Normal heart sounds.   Pulmonary:      Effort: He is intubated.      Comments: Breath sounds globally diminished, rhonchi bilaterally, fine crackles  Abdominal:      Comments: Distended firm abdomen, OG tube to LIS with minimal dark brown aspirate, absent bowel sounds   Musculoskeletal:      Comments: Decreased muscle tone   Skin:     General: Skin is warm.   Neurological:      Comments: Sedated and paralyzed       Results Review:  Lab Results (last 24 hours)       Procedure Component Value Units Date/Time    Blood Culture - Blood, Arm, Left [[id number]]  (Normal) Collected: 06/22/25 0936    Specimen: Blood from Arm, Left Updated: 06/24/25 1031     Blood Culture No growth at 2 days    Blood Culture - Blood, Arm, Right [[id number]]  (Normal) Collected: 06/22/25 0926    Specimen: Blood from Arm, Right Updated: 06/24/25 1031     Blood Culture No growth at 2 days    Blood Culture - Blood, Arm, Right [[id number]]  (Normal) Collected: 06/23/25 0917    Specimen: Blood from Arm, Right Updated: 06/24/25 0946     Blood Culture No growth at 24 hours    Blood Culture - Blood, Hand, Left [[id number]]  (Normal) Collected: 06/23/25 0912    Specimen: Blood from Hand, Left Updated: 06/24/25 0946     Blood Culture No growth at 24 hours    Fibrin Split Products [[id number]]  (Abnormal) Collected: 06/24/25 0806    Specimen: Blood Updated: 06/24/25 0848     Fibrin Split Products Greater than or equal to 5, but less than 20 mcg/mL    Fibrinogen [[id number]]  (Abnormal) Collected: 06/24/25 0806    Specimen: Blood Updated: 06/24/25 0830     Fibrinogen 468 mg/dL     Protime-INR [[id number]]  (Abnormal) Collected: 06/24/25 0806    Specimen:  Blood Updated: 06/24/25 0830     Protime 22.5 Seconds      INR 1.86    aPTT [[id number]]  (Abnormal) Collected: 06/24/25 0806    Specimen: Blood Updated: 06/24/25 0830     PTT 36.3 seconds     Narrative:      PTT = The equivalent PTT values for the therapeutic range of heparin levels at 0.3 to 0.7 U/ml are 77 - 99 seconds.    CBC & Differential [[id number]]  (Abnormal) Collected: 06/24/25 0611    Specimen: Blood Updated: 06/24/25 0705    Narrative:      The following orders were created for panel order CBC & Differential.  Procedure                               Abnormality         Status                     ---------                               -----------         ------                     CBC Auto Differential[[id number]]        Abnormal            Final result                 Please view results for these tests on the individual orders.    CBC Auto Differential [[id number]]  (Abnormal) Collected: 06/24/25 0611    Specimen: Blood Updated: 06/24/25 0705     WBC 12.63 10*3/mm3      RBC 2.10 10*6/mm3      Hemoglobin 7.3 g/dL      Hematocrit 21.0 %      .0 fL      MCH 34.8 pg      MCHC 34.8 g/dL      RDW 15.2 %      RDW-SD 55.1 fl      MPV 15.7 fL      Platelets 19 10*3/mm3     Manual Differential [[id number]]  (Abnormal) Collected: 06/24/25 0611    Specimen: Blood Updated: 06/24/25 0705     Neutrophil % 7.4 %      Lymphocyte % 28.4 %      Monocyte % 0.0 %      Eosinophil % 0.0 %      Basophil % 0.0 %      Bands %  27.4 %      Metamyelocyte % 26.3 %      Myelocyte % 10.5 %      Neutrophils Absolute 4.39 10*3/mm3      Lymphocytes Absolute 3.59 10*3/mm3      Monocytes Absolute 0.00 10*3/mm3      Eosinophils Absolute 0.00 10*3/mm3      Basophils Absolute 0.00 10*3/mm3      Crenated RBC's Large/3+     Rouleaux Slight/1+     Target Cells Slight/1+     Smudge Cells Large/3+     Vacuolated Neutrophils Slight/1+     Platelet Estimate Decreased     Giant Platelets Slight/1+    Comprehensive Metabolic Panel [[id number]]   (Abnormal) Collected: 06/24/25 0505    Specimen: Blood Updated: 06/24/25 0549     Glucose 222 mg/dL      BUN 71.6 mg/dL      Creatinine 4.09 mg/dL      Sodium 145 mmol/L      Potassium 4.4 mmol/L      Chloride 99 mmol/L      CO2 22.0 mmol/L      Calcium 6.2 mg/dL      Total Protein 4.5 g/dL      Albumin 2.4 g/dL      ALT (SGPT) 521 U/L      AST (SGOT) 819 U/L      Alkaline Phosphatase 54 U/L      Total Bilirubin 2.2 mg/dL      Globulin 2.1 gm/dL      A/G Ratio 1.1 g/dL      BUN/Creatinine Ratio 17.5     Anion Gap 24.0 mmol/L      eGFR 14.3 mL/min/1.73     Narrative:      GFR Categories in Chronic Kidney Disease (CKD)              GFR Category          GFR (mL/min/1.73)    Interpretation  G1                    90 or greater        Normal or high (1)  G2                    60-89                Mild decrease (1)  G3a                   45-59                Mild to moderate decrease  G3b                   30-44                Moderate to severe decrease  G4                    15-29                Severe decrease  G5                    14 or less           Kidney failure    (1)In the absence of evidence of kidney disease, neither GFR category G1 or G2 fulfill the criteria for CKD.    eGFR calculation 2021 CKD-EPI creatinine equation, which does not include race as a factor    Lactic Acid, Plasma [[id number]]  (Abnormal) Collected: 06/24/25 0505    Specimen: Blood Updated: 06/24/25 0546     Lactate 7.8 mmol/L     POC Glucose Once [[id number]]  (Abnormal) Collected: 06/24/25 0511    Specimen: Blood Updated: 06/24/25 0522     Glucose 228 mg/dL      Comment: : [id number] Cortez Henleytana ID: [id number]       Blood Gas, Arterial - [[id number]]  (Abnormal) Collected: 06/24/25 0326    Specimen: Arterial Blood Updated: 06/24/25 0329     Site Arterial Line     Jalil's Test N/A     pH, Arterial 7.328 pH units      Comment: 84 Value below reference range        pCO2, Arterial 44.3 mm Hg      pO2, Arterial 103.0 mm Hg      HCO3,  Arterial 23.2 mmol/L      Base Excess, Arterial -2.6 mmol/L      Comment: 84 Value below reference range        O2 Saturation, Arterial 97.8 %      Temperature 37.0     Barometric Pressure for Blood Gas 757 mmHg      Modality Ventilator     FIO2 90 %      Ventilator Mode AC     Set Tidal Volume 550.000     Set Mech Resp Rate 30.0     PEEP 12.0     Collected by [id number]     Comment: Meter: [id number]     :  MURALI Meyer, CRT        pCO2, Temperature Corrected 44.3 mm Hg      pH, Temp Corrected 7.328 pH Units      pO2, Temperature Corrected 103 mm Hg      PO2/FIO2 114    STAT Lactic Acid, Reflex [[id number]]  (Abnormal) Collected: 06/24/25 0230    Specimen: Blood Updated: 06/24/25 0257     Lactate 7.3 mmol/L     STAT Lactic Acid, Reflex [[id number]]  (Abnormal) Collected: 06/23/25 2335    Specimen: Blood Updated: 06/24/25 0011     Lactate 7.6 mmol/L     POC Glucose Once [[id number]]  (Abnormal) Collected: 06/23/25 2345    Specimen: Blood Updated: 06/23/25 2356     Glucose 232 mg/dL      Comment: : [id number] Cortez PlascenciaMeter ID: [id number]       STAT Lactic Acid, Reflex [[id number]]  (Abnormal) Collected: 06/23/25 1935    Specimen: Blood Updated: 06/23/25 2002     Lactate 7.3 mmol/L     Respiratory Culture - Aspirate, ET Suction [[id number]] Collected: 06/23/25 1414    Specimen: Aspirate from ET Suction Updated: 06/23/25 1858     Gram Stain Few (2+) Epithelial cells seen      Moderate (3+) Yeast    POC Glucose Once [[id number]]  (Abnormal) Collected: 06/23/25 1829    Specimen: Blood Updated: 06/23/25 1845     Glucose 230 mg/dL      Comment: : [id number] Stormy (Devang) JanessurinderMeter ID: [id number]       Lactic Acid, Plasma [[id number]]  (Abnormal) Collected: 06/23/25 1634    Specimen: Blood Updated: 06/23/25 1736     Lactate 7.8 mmol/L     Magnesium [[id number]]  (Normal) Collected: 06/23/25 1634    Specimen: Blood Updated: 06/23/25 1729     Magnesium 2.1 mg/dL     Comprehensive Metabolic Panel [[id number]]   (Abnormal) Collected: 06/23/25 1634    Specimen: Blood Updated: 06/23/25 1729     Glucose 212 mg/dL      BUN 64.7 mg/dL      Creatinine 3.89 mg/dL      Sodium 147 mmol/L      Potassium 4.2 mmol/L      Chloride 104 mmol/L      CO2 20.0 mmol/L      Calcium 6.5 mg/dL      Total Protein 4.4 g/dL      Albumin 2.5 g/dL      ALT (SGPT) 549 U/L      AST (SGOT) 1,099 U/L      Alkaline Phosphatase 46 U/L      Total Bilirubin 2.2 mg/dL      Globulin 1.9 gm/dL      A/G Ratio 1.3 g/dL      BUN/Creatinine Ratio 16.6     Anion Gap 23.0 mmol/L      eGFR 15.2 mL/min/1.73     Narrative:      GFR Categories in Chronic Kidney Disease (CKD)              GFR Category          GFR (mL/min/1.73)    Interpretation  G1                    90 or greater        Normal or high (1)  G2                    60-89                Mild decrease (1)  G3a                   45-59                Mild to moderate decrease  G3b                   30-44                Moderate to severe decrease  G4                    15-29                Severe decrease  G5                    14 or less           Kidney failure    (1)In the absence of evidence of kidney disease, neither GFR category G1 or G2 fulfill the criteria for CKD.    eGFR calculation 2021 CKD-EPI creatinine equation, which does not include race as a factor    Phosphorus [[id number]]  (Abnormal) Collected: 06/23/25 1634    Specimen: Blood Updated: 06/23/25 1719     Phosphorus 7.5 mg/dL     Blood Gas, Arterial - [[id number]]  (Abnormal) Collected: 06/23/25 1706    Specimen: Arterial Blood Updated: 06/23/25 1708     Site Arterial Line     Jalil's Test N/A     pH, Arterial 7.269 pH units      Comment: 84 Value below reference range        pCO2, Arterial 47.3 mm Hg      Comment: 83 Value above reference range        pO2, Arterial 65.2 mm Hg      Comment: 84 Value below reference range        HCO3, Arterial 21.7 mmol/L      Base Excess, Arterial -5.0 mmol/L      Comment: 84 Value below reference range         O2 Saturation, Arterial 89.9 %      Comment: 84 Value below reference range        Temperature 37.0     Barometric Pressure for Blood Gas 757 mmHg      Modality Ventilator     FIO2 90 %      Ventilator Mode AC     Set Tidal Volume 550.000     Set Mech Resp Rate 30.0     PEEP 12.0     Collected by [id number]     Comment: Meter: [id number]     :  Robert Guevara, CRT        pCO2, Temperature Corrected 47.3 mm Hg      pH, Temp Corrected 7.269 pH Units      pO2, Temperature Corrected 65.2 mm Hg      PO2/FIO2 72    Peripheral Blood Smear [[id number]] Collected: 06/22/25 0225    Specimen: Blood Updated: 06/23/25 1637     Performed by: Emilie Crowder MD     Pathologist Interpretation --     Moderate macrocytic anemia    Normal total white blood cell count with the following manual differential:  Neutrophils 42% (occasional Dohle bodies seen)  Bands 6%  Lymphocytes 49%  Monocytes 1%  Atypical lymphocytes 2%  Nucleated red blood cells 1  Marked peripheral thrombocytopenia    No schistocytes identified  No platelet clumps identified  No morulae seen in granulocytes or monocytes    Peripheral Blood Smear [[id number]] Collected: 06/23/25 0306    Specimen: Blood Updated: 06/23/25 1318     Performed by: Emilie Crowder MD     Pathologist Interpretation --     Moderate macrocytic anemia    Normal total white blood cell count with the following manual differential:  Neutrophils 12%  Bands 10%  Lymphocytes 60%  Monocytes 4%  Metamyelocytes 10%  Myelocytes 4%  Nucleated red blood cells 5  Marked peripheral thrombocytopenia    No schistocytes identified  No platelet clumps identified  No morulae seen in granulocytes or monocytes    Granulocytes are vacuolated which can be seen in sepsis or inflammatory states.    POC Glucose Once [[id number]]  (Abnormal) Collected: 06/23/25 1307    Specimen: Blood Updated: 06/23/25 1318     Glucose 206 mg/dL      Comment: : [id number] Stormy (Devang) SarahMeter ID: [id number]        Comprehensive Metabolic Panel [[id number]]  (Abnormal) Collected: 06/23/25 1129    Specimen: Blood Updated: 06/23/25 1204     Glucose 195 mg/dL      BUN 62.3 mg/dL      Creatinine 3.77 mg/dL      Sodium 146 mmol/L      Potassium 4.0 mmol/L      Chloride 105 mmol/L      CO2 20.0 mmol/L      Calcium 6.4 mg/dL      Total Protein 4.3 g/dL      Albumin 2.5 g/dL      ALT (SGPT) 539 U/L      AST (SGOT) 1,121 U/L      Alkaline Phosphatase 46 U/L      Total Bilirubin 1.9 mg/dL      Globulin 1.8 gm/dL      A/G Ratio 1.4 g/dL      BUN/Creatinine Ratio 16.5     Anion Gap 21.0 mmol/L      eGFR 15.8 mL/min/1.73     Narrative:      GFR Categories in Chronic Kidney Disease (CKD)              GFR Category          GFR (mL/min/1.73)    Interpretation  G1                    90 or greater        Normal or high (1)  G2                    60-89                Mild decrease (1)  G3a                   45-59                Mild to moderate decrease  G3b                   30-44                Moderate to severe decrease  G4                    15-29                Severe decrease  G5                    14 or less           Kidney failure    (1)In the absence of evidence of kidney disease, neither GFR category G1 or G2 fulfill the criteria for CKD.    eGFR calculation 2021 CKD-EPI creatinine equation, which does not include race as a factor    Phosphorus [[id number]]  (Abnormal) Collected: 06/23/25 1129    Specimen: Blood Updated: 06/23/25 1204     Phosphorus 6.8 mg/dL     Magnesium [[id number]]  (Normal) Collected: 06/23/25 1129    Specimen: Blood Updated: 06/23/25 1159     Magnesium 2.0 mg/dL     Lactic Acid, Plasma [[id number]]  (Abnormal) Collected: 06/23/25 1129    Specimen: Blood Updated: 06/23/25 1159     Lactate 7.0 mmol/L              XR Chest 1 View  Result Date: 6/24/2025   1. Slightly improved patchy bilateral opacities and properly positioned support tubes and lines.  This report was signed and finalized on 6/24/2025 7:34 AM by  Dilan Sykes.      XR Chest 1 View  Result Date: 6/23/2025  1. No change in cardiopulmonary status since the previous study. 2. The endotracheal tube in place. The distal end of the gastric tube is not included in the study.   This report was signed and finalized on 6/23/2025 6:35 AM by Dr. Meg Jaquez MD.      XR Abdomen KUB  Result Date: 6/22/2025   1. NG tube tip at the gastric fundus.  This report was signed and finalized on 6/22/2025 2:08 PM by Dilan Sykes.      XR Chest 1 View  Result Date: 6/22/2025   1. ET tube tip above the marky. NG tube coiled in the neck. 2. Bilateral central opacities worrisome for edema.  This report was signed and finalized on 6/22/2025 2:07 PM by Dilan Sykes.      CT Angiogram Abdomen Pelvis  Result Date: 6/22/2025  1. Atherosclerosis of the abdominal aorta and branches with greater than 70% stenosis at the proximal celiac artery and 50% stenosis at the left common iliac artery. No occlusion is seen. 2. Stable prominent air and fluid-filled loops of stomach, small bowel, and colon without obvious transition point. No focal wall thickening or edema of the mesentery is seen.  This report was signed and finalized on 6/22/2025 12:26 PM by Dilan Sykes.        Result Review:  I have personally reviewed the results from the time of this admission to 6/24/2025 11:07 CDT and agree with these findings:  [x]  Laboratory list / accordion  [x]  Microbiology  [x]  Radiology  [x]  EKG/Telemetry   []  Cardiology/Vascular   []  Pathology  []  Old records  []  Other:    I have reviewed the patient's current medications.     Assessment/Plan   Assessment  Active Hospital Problems    Diagnosis     **Shock, septic     Cardiac arrest     Acute hypoxic respiratory failure     Celiac artery stenosis     DANIEL (acute kidney injury)     Transaminitis, acute     Dermatitis neglecta     Iron deficiency     Hypokalemia     Tremor    75-year-old male with septic shock from acute abdominal  process, acute hypoxic respiratory failure, ARDS, aspiration pneumonia, cardiopulmonary arrest with successful resuscitation, DANIEL, in CCU for critical care management.     Cardiopulmonary arrest  - On 6/22 for approximately 10 minutes, hypoxic induced bradycardia leading to asystole, ROSC after 3 mg epinephrine and several rounds of CPR  - See code note     Septic shock, Metabolic acidosis  - secondary to acute abdominal process, ileus versus SBO, possible nonocclusive mesenteric ischemia  - Continues to have elevated lactic, vasopressor requirement with norepinephrine and vasopressin  - Titrating pressors to keep MAP 65 or greater  - Treating systemic acidosis with sodium bicarb infusion  - Aspiration pneumonia/pneumonitis likely at this time as well contributing to further sepsis  - Continue empiric Zosyn, vancomycin DC'd after negative MRSA PCR  - Cultures with no growth to date  - Continue sepsis dose Solu-Cortef     Ileus vs SBO vs ischemic bowel  - Continue NGT to LIS, minimal output now  - General surgery evaluating, patient remains too unstable for further imaging or interventions  - Follow their recs   - Continue empiric antibiotics as above    Acute hypoxic respiratory failure, ARDS, aspiration pneumonia/pneumonitis  - Emergently intubated during resuscitative event, bowel and gastric contents noted in airway  - Slightly improved ABG today, currently on 12 PEEP and 80% FiO2 with sats in the low 90s  - Will attempt to liberate from neuromuscular blockade and wean sedation as tolerated  - Trend ABGs, adjust ventilator settings as indicated  - Antibiotics as above  -Scheduled DuoNeb, Pulmicort     DANIEL  - prerenal, secondary to malperfusion, from septic shock and cardiac arrest  - hydrating with IVF, bicarb infusion, ensuring perfusion with pressors  - No significant electrolyte abnormalities at this time  - strict I&O  - serial metabolic panels  - consider nephrology consult if acidosis or volume overload  worsens to the point of considering RRT     Thrombocytopenia, anemia  - hematology evalauting cause, they suspect severe bone marrow suppression secondary to SIRS/sepsis  - follow their workup and recs  - transfuse for plt <10k, or <50k if bleeding  -Transfusing 1 PRBC and 1 platelet today with the above labs     VTE: SCDs given thrombocytopenia  GI: PPI  NTN: NPO, OGT to LIS  Abx: Zosyn, vancomycin  Lines/Tubes: ETT, CVL, arterial line, Bradley placed 6/22  CODE STATUS: DNR    Disposition: Poor prognosis at this time given stagnant clinical progression and severe critical illness, organ dysfunction and persistent shock.  Continue critical care management.  Palliative care medicine discussed goals of care with daughter today.    Total critical care time: 45 minutes     Due to a high probability of clinically significant, life threatening deterioration, the patient required my highest level of preparedness to intervene emergently and I personally spent this critical care time directly and personally managing the patient.      This critical care time included obtaining a history; examining the patient; pulse oximetry; ordering and review of studies; arranging urgent treatment with development of a management plan; evaluation of patient's response to treatment; frequent reassessment; and, discussions with other providers.     This critical care time was performed to assess and manage the high probability of imminent, life-threatening deterioration that could result in multi-organ failure. It was exclusive of separately billable procedures and treating other patients and teaching time.     Please see MDM section and the rest of the note for further information on patient assessment and treatment.     Part of this note may be an electronic transcription/translation of spoken language to printed text using the Dragon dictation system      Electronically signed by RUBIO Ruby on 6/24/2025 at 11:20 CDT

## 2025-06-24 NOTE — CONSULTS
[facility]    Palliative Care Initial Consult   Attending Physician: Andrew Ghotra MD  Referring Provider: Charles Rogers APRN     Reason for Referral: assistance with clarification of goals of care  Family/Support: Daughter, Nadya    Code Status and Medical Interventions: No CPR (Do Not Attempt to Resuscitate); Limited Support; No cardioversion   Ordered at: 06/23/25 1452     Code Status (Patient has no pulse and is not breathing):    No CPR (Do Not Attempt to Resuscitate)     Medical Interventions (Patient has pulse or is breathing):    Limited Support     Medical Intervention Limits:    No cardioversion     Goals of Care: TBD.    HPI:   75 y.o. male has a past medical history of COPD, carpal tunnel syndrome,  adenomatous polyp of colon, History of alcohol abuse, renal cell cancer-s/p partial nephrectomy sony, History of TIA, hearing loss, Hyperlipidemia, Hypertension, Oxygen dependent, and Peripheral neuropathy. Patient presented to [facility] on 6/17/2025 related to diarrhea and generalized weakness.  Admitted to hospitalist service with acute kidney injury, transaminitis, and lactic acidosis.  Treated with IV fluids, empiric antibiotics.  Developed tachycardia and metabolic encephalopathy thought to have atrial flutter and started on Cardizem drip night of 6/21-6/22, became febrile.  CT imaging of abdomen pelvis showed possible ileus versus enteritis.  General surgery consulted and CTA of abdomen pelvis completed to rule out ischemia.  6/22 transferred to critical care unit and experienced cardiopulmonary arrest, received CPR, intubated, and achieved ROSC after 9 minutes.  Concerns of aspiration as stomach contents coming out of ET tube.  Required multiple vasopressor support.  Hematology/oncology following for acute thrombocytopenia felt secondary to sepsis.  No plans for surgical intervention given instability.  Intensivist hospitalist spoke  with daughter with regard to medical priorities and elected to de-escalate CODE STATUS to no CPR/limited support interventions, no cardioversion.  There has been note of possible discussion for transition to comfort measures.  Worsening kidney failure and thrombocytopenia.  Transaminitis trending downward.  Remains intubated on ventilator support FiO2 80%, PEEP 12.  Currently sedation on hold for vacation.  Remains on Levophed and vasopressin.  Receiving 1 unit PRBCs. Palliative Care Spoke With: family spoke with daughterNadya who reports patient and spouse both had caregivers in the home regularly. Due to the Palliative Care Topics Discussed: palliative care, goals of care, care options, and resuscitation status we will establish an advance care plan.   Advance Care Planning   Advance Care Planning Discussion: Spoke with Nadya brody with regard to events leading to current hospitalization, cardiopulmonary arrest, continued ventilator support needs, and poor overall prognosis.  Difficult to keep on topic as her mother is also currently hospitalized and she is understandably overwhelmed.  States she has been in contact with her other siblings however they are making no plans to come to the hospital, leaving her as medical decision-maker.  She understands patient's poor prognosis and is attempting to identify VA documentation today in order to have financial support for  and burial arrangements.  We further reviewed medical priorities and daughter states no overly aggressive care interventions including no CPR/limited support interventions, no cardioversion, no dialysis.  We further discussed concerns of possible ischemic bowel, anemia and thrombocytopenia requiring blood products in the setting of cardiopulmonary arrest and potential for futile treatments.  She plans to speak further with her  for more support but leaning toward transitions of care with focus of comfort and seems to realize  patient has limited life expectancy unfortunately.  Questions encouraged and support given.     Review of Systems   Unable to perform ROS: Intubated     1- Pain Assessment  CPOT Facial Expression: 0-->relaxed, neutral  CPOT Body Movements: 0-->absence of movements  CPOT Muscle Tension: 0-->relaxed  Ventilator Compliance/Vocalization: 0-->tolerating ventilator or movement  CPOT Score: 0  PAINAD Breathin-->normal  PAINAD Negative Vocalization: 0-->none  PAINAD Facial Expression: 0-->smiling or inexpressive  PAINAD Body Language: 0-->relaxed  PAINAD Consolability: 0-->no need to console  PAINAD Score: 0  Pain Location: abdomen    Past Medical History:   Diagnosis Date    Acid reflux     Arthritis     Asthma     Bile salt-induced diarrhea 2013    COPD (chronic obstructive pulmonary disease)     CTS (carpal tunnel syndrome)     Family history of colon cancer 2021    father      History of adenomatous polyp of colon 2021    History of alcohol abuse 2017    History of renal cell cancer-sony 2017    9.4.14/9.8.14 - Dr Ramos - Office Notes-path low grade renal cell..negative margins..doing well     9.10.14..ro visit...surgery follow up  hypertension-  post op L nephrectomy-incidential renal cell found on CT  abdominal pain-feeling better  fatty liver-bx proven...  tremor-familial, alcohol  Rx-reviewed  Cozaar 50 mg one half a day  LAB-next time hepatitis A, B, C         History of TIA (transient ischemic attack) 2017    No Problem List  5.25.10/age 60     7.1..14/7.14 - [facility] - Letter L eye nonarteritic anterior ischemic neuropathy..  6.30.14/714 CBC With Differential/Platelet; Sedimentation Rate-Westergren; C-Reactive Protein, Quant==WBC 12.0; RBC 3.60; Hemoglobin 11.7; Hematocrit 34.4; Neutrophils (Absolute) 7.1; Lymphs (Absolute) 3.6  nothing major here..his problems are alcohol, smoking now o    HL (hearing loss)     Hyperlipidemia     Hypertension      Hypopotassemia-diuretic 02/16/2017    Oxygen dependent     at hs    Peripheral neuropathy     Renal cancer 08/2014    Lt RALPart'l Nx; t1a Clear Cell with negative margin    RUQ pain 07/23/2013    S/P laparoscopic cholecystectomy 09/04/2012    Wellness examination-done 02/16/2017    PROCEDURES:  Prostate exam/[facility]/confirmed/1.27.16     Colonoscopy-nl/[facility]//7-28-10/5y  EGD/P/[place]/3.4.16  Colonoscopy-polyp/[place]//3.18.16/5 yr  EGD-neg/[place]//5.1.18     SURGERIES:  T&A  Appendix/1959  Scrotum-cyst/1980's  Lap gb+liver bx/[facility]/7.23.12  L robotic partial nephrectomy (clear cell grade 1 S2lP2C3)/[place]//8.14.14  Pcnqfd-fuqgpofk-yirij+mesh+omen/[place]//5.9.     Past Surgical History:   Procedure Laterality Date    APPENDECTOMY      CHOLECYSTECTOMY      ENDOSCOPY N/A 05/01/2018    Procedure: ESOPHAGOGASTRODUODENOSCOPY WITH ANESTHESIA;  Surgeon: Donnell Cordero DO;  Location: [facility] ENDOSCOPY;  Service: Gastroenterology    HERNIA REPAIR      KIDNEY SURGERY      LAPAROSCOPIC PARTIAL NEPHRECTOMY Left 08/14/2014    Robot Assisted     Social History     Socioeconomic History    Marital status:      Spouse name: Melanie    Number of children: 1    Years of education: 12   Tobacco Use    Smoking status: Every Day     Current packs/day: 0.50     Average packs/day: 0.5 packs/day for 59.2 years (29.6 ttl pk-yrs)     Types: Cigarettes     Start date: 4/30/1966     Passive exposure: Never    Smokeless tobacco: Never   Vaping Use    Vaping status: Never Used   Substance and Sexual Activity    Alcohol use: No    Drug use: No    Sexual activity: Defer         Current Facility-Administered Medications:     acetaminophen (TYLENOL) tablet 650 mg, 650 mg, Oral, Q4H PRN **OR** [DISCONTINUED] acetaminophen (TYLENOL) 160 MG/5ML oral solution 650 mg, 650 mg, Oral, Q4H PRN **OR** acetaminophen (TYLENOL) suppository 650 mg, 650 mg, Rectal, Q4H PRN, Gabby Wilder APRN, 650 mg at 06/22/25 2161    artificial tears  ophthalmic ointment, , Both Eyes, Q4H, Charles Rogers APRN, Given at 06/24/25 0824    [Held by provider] sennosides-docusate (PERICOLACE) 8.6-50 MG per tablet 2 tablet, 2 tablet, Oral, BID **AND** [Held by provider] polyethylene glycol (MIRALAX) packet 17 g, 17 g, Oral, Daily PRN **AND** [Held by provider] bisacodyl (DULCOLAX) EC tablet 5 mg, 5 mg, Oral, Daily PRN **AND** bisacodyl (DULCOLAX) suppository 10 mg, 10 mg, Rectal, Daily PRN, Charles Rogers APRN    budesonide (PULMICORT) nebulizer solution 0.25 mg, 0.25 mg, Nebulization, BID - RT, Charles Rogers APRN, 0.25 mg at 06/24/25 0551    calcium carbonate (TUMS) chewable tablet 500 mg (200 mg elemental), 2 tablet, Oral, TID PRN, Blake Hernandez DO, 2 tablet at 06/22/25 0100    calcium gluconate 1000 Mg/50ml 0.675% NaCl IV SOLN, 1,000 mg, Intravenous, Once, Charles Rogers APRN    Chlorhexidine Gluconate Cloth 2 % pads 1 Application, 1 Application, Topical, Q24H, Charles Rogers APRN, 1 Application at 06/24/25 0428    dexmedetomidine (PRECEDEX) 400 mcg in 100 mL NS infusion, 0.2-1.5 mcg/kg/hr, Intravenous, Titrated, Charles Rogers APRN, Held at 06/22/25 1834    dextrose (D50W) (25 g/50 mL) IV injection 25 g, 25 g, Intravenous, Q15 Min PRN, Blake Hernandez DO    dextrose (GLUTOSE) oral gel 15 g, 15 g, Oral, Q15 Min PRN, Blake Hernandez DO    [Held by provider] escitalopram (LEXAPRO) tablet 20 mg, 20 mg, Oral, Daily, Gabby Wilder APRN, 20 mg at 06/21/25 0850    fentaNYL 2500 mcg in 250 mL NS infusion,  mcg/hr, Intravenous, Titrated, Charles Rogers, APRN, Stopped at 06/24/25 0829    ferric gluconate (FERRLECIT) 250 MG in sodium chloride 0.9% 250 mL IVPB, 250 mg, Intravenous, Daily, Caprice Cali MD, Last Rate: 125 mL/hr at 06/24/25 0824, 250 mg at 06/24/25 0824    [Held by provider] gabapentin (NEURONTIN) capsule 300 mg, 300 mg, Oral, Q12H, Gabby Wilder, APRN, 300 mg at 06/19/25 0839    glucagon (GLUCAGEN) injection 1 mg, 1  mg, Intramuscular, Q15 Min PRN, Blake Hernandez DO    Hydrocortisone Sod Suc (PF) (Solu-CORTEF) injection 100 mg, 100 mg, Intravenous, Q6H, Charles Rogers APRN, 100 mg at 06/24/25 0426    insulin regular (humuLIN R,novoLIN R) injection 2-7 Units, 2-7 Units, Subcutaneous, Q6H, Blake Hernandez DO, 3 Units at 06/24/25 0513    ipratropium-albuterol (DUO-NEB) nebulizer solution 3 mL, 3 mL, Nebulization, 4x Daily - RT, Charles Rogers APRN, 3 mL at 06/24/25 0551    loperamide (IMODIUM) capsule 4 mg, 4 mg, Oral, 4x Daily PRN, Devin Jackson MD    [Held by provider] losartan (COZAAR) tablet 25 mg, 25 mg, Oral, Q24H, Blake Hernandez DO, 25 mg at 06/21/25 1555    [Held by provider] magnesium oxide (MAG-OX) tablet 400 mg, 400 mg, Oral, Daily, Blake Hernandez DO, 400 mg at 06/21/25 1149    [Held by provider] metoprolol tartrate (LOPRESSOR) tablet 50 mg, 50 mg, Oral, Q12H, Gabby Wilder APRN, 50 mg at 06/21/25 2002    Morphine sulfate (PF) injection 2 mg, 2 mg, Intravenous, Q1H PRN, Charles Rogers APRN, 2 mg at 06/22/25 1530    mupirocin (BACTROBAN) 2 % nasal ointment 1 Application, 1 Application, Each Nare, BID, Charles Rogers APRN, 1 Application at 06/24/25 0824    norepinephrine (LEVOPHED) 8 mg in 250 mL NS infusion (premix), 0.02-0.3 mcg/kg/min, Intravenous, Titrated, Charles Rogers APRN, Last Rate: 29.5 mL/hr at 06/24/25 0642, 0.12 mcg/kg/min at 06/24/25 0642    ondansetron ODT (ZOFRAN-ODT) disintegrating tablet 4 mg, 4 mg, Oral, Q6H PRN **OR** ondansetron (ZOFRAN) injection 4 mg, 4 mg, Intravenous, Q6H PRN, Gabby Wilder APRN, 4 mg at 06/21/25 2017    [Held by provider] pantoprazole (PROTONIX) EC tablet 40 mg, 40 mg, Oral, Q AM, Gabby Wilder APRN, 40 mg at 06/19/25 0548    pantoprazole (PROTONIX) injection 40 mg, 40 mg, Intravenous, Q AM, Charles Rogers APRN, 40 mg at 06/24/25 0514    phenylephrine (YESI-SYNEPHRINE) 50 mg in 250 mL NS infusion, 0.5-3 mcg/kg/min,  Intravenous, Titrated, Rosendo Santana PA-C, Stopped at 06/23/25 0147    piperacillin-tazobactam (ZOSYN) 4.5 g IVPB in 100 mL NS MBP (CD), 4.5 g, Intravenous, Q8H, Charles Rogers APRN, 4.5 g at 06/24/25 0426    [Held by provider] primidone (MYSOLINE) tablet 50 mg, 50 mg, Oral, TID, Gabby Wilder APRN, 50 mg at 06/21/25 2002    propofol (DIPRIVAN) infusion 10 mg/mL 100 mL, 5-75 mcg/kg/min, Intravenous, Titrated, Charles Rogers APRN, Stopped at 06/24/25 0830    [Held by provider] saccharomyces boulardii (FLORASTOR) capsule 250 mg, 250 mg, Oral, BID, Devin Jackson MD, 250 mg at 06/21/25 2002    sodium bicarbonate 8.4 % 150 mEq in dextrose (D5W) 5 % 1,000 mL infusion (greater than 100 mEq), 150 mEq, Intravenous, Continuous, Charles Rogers APRN, Last Rate: 125 mL/hr at 06/24/25 0724, 150 mEq at 06/24/25 0724    [COMPLETED] Insert Peripheral IV, , , Once **AND** sodium chloride 0.9 % flush 10 mL, 10 mL, Intravenous, PRN, Diana Kumar MD    [Held by provider] tamsulosin (FLOMAX) 24 hr capsule 0.4 mg, 0.4 mg, Oral, Daily, Gabby Wilder APRN, 0.4 mg at 06/21/25 0850    vasopressin (PITRESSIN) 20 units in 100 mL NS infusion, 0.03 Units/min, Intravenous, Continuous, Charles Rogers APRN, Last Rate: 9 mL/hr at 06/24/25 0642, 0.03 Units/min at 06/24/25 0642    vecuronium (NORCURON) 100 mg in sodium chloride 0.9 % 100 mL (1 mg/mL) infusion, 0.6-1.7 mcg/kg/min, Intravenous, Titrated, Charles Rogers APRN, Stopped at 06/24/25 0730    No Known Allergies  I have utilized all available immediate resources to obtain, update, or review the patient's current medications (including all prescriptions, over-the-counter products, herbals, cannabis/cannabidiol products, and vitamin/mineral/dietary (nutritional) supplements) for name, route of administration, type, dose and frequency.      Intake/Output Summary (Last 24 hours) at 6/24/2025 1013  Last data filed at 6/24/2025 0900  Gross per 24 hour  "  Intake 6592.71 ml   Output 45 ml   Net 6547.71 ml       Physical Exam:    Diagnostics: Reviewed  /53   Pulse (!) 140   Temp 98.3 °F (36.8 °C)   Resp (!) 30   Ht 182.9 cm (72\")   Wt 131 kg (288 lb 2.3 oz)   SpO2 92%   BMI 39.08 kg/m²     Vitals and nursing note reviewed.   Constitutional:       Appearance: Ill-appearing and acutely ill-appearing.      Interventions: Intubated.      Comments: Patient currently on hold.  Vasopressor support infusing.  1 unit PRBC infusing.   HENT:      Head: Normocephalic.   Pulmonary:      Effort: Intubated.   Cardiovascular:      Tachycardia present.   Pulses:     No decreased pulses.   Edema:     Peripheral edema present.  Abdominal:      General: Bowel sounds are absent. There is distension.   Skin:     General: Skin is warm.   Genitourinary:     Comments: Bradley catheter in place.  No urine output this date per nursing.  Neurological:      Mental Status: Unresponsive.     Patient status: Disease state: Deteriorating despite treatments.  Current Functional status: Palliative Performance Scale Score: Performance 10% based on the following measures: Ambulation: Totally bed bound, Activity and Evidence of Disease: Unable to do any work, extensive evidence of disease, Self-Care: Total care required,  Intake: Mouth care only, LOC: Drowsy or comatose   Nutritional status: Albumin 2.4 Body mass index is 39.08 kg/m².         Hospital Problem List      Shock, septic    Hypokalemia    Tremor    Iron deficiency    DANIEL (acute kidney injury)    Transaminitis, acute    Dermatitis neglecta    Cardiac arrest    Acute hypoxic respiratory failure    Celiac artery stenosis    Impression/Problem List:    Cardiopulmonary arrest  Acute hypoxic respiratory failure, ARDS, aspiration pneumonia/pneumonitis  Shock, septic  Acute kidney injury  Transaminitis, acute  Acute thrombocytopenia likely secondary to #3  Anemia  Ileus versus ischemic bowel  COPD, oxygen dependent  Adenomatous polyp of " colon  Renal cell cancer-s/p partial nephrectomy  History of TIA  Hearing loss  Hyperlipidemia  Hypertension  Peripheral neuropathy  History of alcohol abuse      Recommendations/Plan:  1. plan: Goals of care include CODE STATUS no CPR/limited support interventions.    Family support: The patient receives support from his daughter..  Advance Directives: Advance Directive Status: Patient does not have advance directive   POA/Healthcare surrogate-daughterNadya.    2.  Palliative care encounter  - Prognosis is poor long-term secondary to cardiopulmonary arrest, acute hypoxic respiratory failure, arts, aspiration pneumonia, septic shock, acute kidney injury, acute thrombocytopenia, suspected ileus versus ischemic bowel, and multiple comorbidities.  -Family appears to have fair prognostic awareness.     -Admitted to hospitalist service with acute kidney injury, transaminitis, and lactic acidosis.  Treated with IV fluids, empiric antibiotics.   6/21-6/22-Developed tachycardia and metabolic encephalopathy thought to have atrial flutter and started on Cardizem drip night, became febrile.    -General surgery consulted and CTA of abdomen pelvis completed to rule out ischemia.    6/22-transferred to critical care unit and experienced cardiopulmonary arrest, received CPR, intubated, and achieved ROSC after 9 minutes.  Concerns of aspiration as stomach contents coming out of ET tube.  Required multiple vasopressor support.    -Hematology/oncology following for acute thrombocytopenia felt secondary to sepsis.    -No plans for surgical intervention given instability.    -Intensivist hospitalist spoke with daughter with regard to medical priorities and elected to de-escalate CODE STATUS to no CPR/limited support interventions, no cardioversion.  There has been note of possible discussion for transition to comfort measures.      6/24-CODE STATUS changes, no CPR/limited support interventions, no cardioversion, no dialysis  -poor  overall prognosis  - Additional family making no plans no plans to come to the hospital, per daughter  -attempting to identify VA documentation today in order to have financial support for  and burial arrangements.   -discussed concerns of possible ischemic bowel, not a surgical candidate, anemia and thrombocytopenia requiring blood products in the setting of cardiopulmonary arrest and potential for futile treatments.  She plans to speak further with her  for more support but leaning toward transitions of care with focus of comfort and seems to realize patient has limited life expectancy unfortunately.        Thank you for this consult and allowing us to participate in patient's plan of care. Palliative Care Team will continue to follow patient.     ADDENDUM at 1:30 PM notified by nursing that family wished to pursue comfort measures.  Spoke with daughter and extended family at bedside including son-in-law, brother-in-law, nephew who are all in agreements for comfort measures and extubation from ventilator support.  Expectations discussed and support given.   notified for spiritual support.  Comfort care orders placed.    40 minutes spent on advance care planning-discussing with patient and/or family, answering questions, providing some guidance about a plan and documentation of care, and coordinating care face to face.    Elisha Iniguez, APRN  2025  10:13 CDT

## 2025-06-25 LAB
BACTERIA SPEC RESP CULT: ABNORMAL
BACTERIA SPEC RESP CULT: ABNORMAL
BH BB BLOOD EXPIRATION DATE: NORMAL
BH BB BLOOD EXPIRATION DATE: NORMAL
BH BB BLOOD TYPE BARCODE: 5100
BH BB BLOOD TYPE BARCODE: 6200
BH BB DISPENSE STATUS: NORMAL
BH BB DISPENSE STATUS: NORMAL
BH BB PRODUCT CODE: NORMAL
BH BB PRODUCT CODE: NORMAL
BH BB UNIT NUMBER: NORMAL
BH BB UNIT NUMBER: NORMAL
CROSSMATCH INTERPRETATION: NORMAL
GRAM STN SPEC: ABNORMAL
GRAM STN SPEC: ABNORMAL
QT INTERVAL: 340 MS
QTC INTERVAL: 468 MS
UNIT  ABO: NORMAL
UNIT  ABO: NORMAL
UNIT  RH: NORMAL
UNIT  RH: NORMAL

## 2025-06-25 NOTE — PAYOR COMM NOTE
"REF:  [id number]     [facility]  FAX  [phone]      Yaneth Bartlett Sr. (Dcsd. Male)       Date of Birth   1950    Social Security Number       Address   [address] [place] [address]    Home Phone   [phone]    MRN   [id number]       [place]   [facility]    Marital Status                               Admission Date   2025    Admission Type   Emergency    Admitting Provider   Andrew Ghotra MD    Attending Provider       Department, Room/Bed   [facility] CARDIAC CARE, C005/1       Discharge Date   2025    Discharge Disposition       Discharge Destination                                 Attending Provider: (none)   Allergies: No Known Allergies    Isolation: None   Infection: None   Code Status: Prior    Ht: 182.9 cm (72\")   Wt: 131 kg (288 lb 2.3 oz)    Admission Cmt: None   Principal Problem: Shock, septic [A41.9,R65.21]                   Active Insurance as of 2025       Primary Coverage       Payor Plan Insurance Group Employer/Plan Group    [facility] CCN OPTUM        Payor Plan Address Payor Plan Phone Number Payor Plan Fax Number Effective Dates    [address] [phone]  2023 - None Entered    [facility] [id number]         Subscriber Name Subscriber Birth Date Member ID       YANETH BARTLETT SR. 1950                      Emergency Contacts        (Rel.) Home Phone Work Phone Mobile Phone    Nadya Shirley (Daughter) -- -- [phone]                 Discharge Summary        Charles Rogers APRN at 25 1408       Attestation signed by Andrew Ghotra MD at 25 1441      I have reviewed this documentation and agree.                            [facility] Intensivist Services  DISCHARGE SUMMARY       Date of Admission: 2025  Date of Discharge:  2025  Primary Care Physician: Keyon Dockery MD    Presenting Problem/History of " Present Illness:    Hospital Medicine HPI 6/17/2025  Felix Bartlett is a 75-year-old male followed by local [facility].  He has a history of bile salt induced diarrhea status postcholecystectomy, COPD, renal cell carcinoma status post partial nephrectomy of the left, hypertension, hearing loss, hypokalemia secondary to diuretics.  Patient presented to [facility] ED with complaints of generalized weakness and diarrhea since Friday.  He reports to this provider that this morning he had to have a bowel movement and could not get off of the toilet.  Caregiver could not get him up either therefore EMS was called.  Patient was transported to [facility] emergency department for evaluation.  Workup revealed potassium 3.1, CO2 15, and ion gap 18, BUN 30.1, creatinine 3.01 with a baseline of 1.1, glucose 211, , ALT 85-no history of transaminitis, lactic acid 3.3, now 2.0, lipase 13, WBC 12.07 hemoglobin 9.4, hematocrit 27.5, platelets 120,000, 11.1 bandemia, urinalysis without acute findings, stool studies ordered chest x-ray without acute findings, CT of the abdomen pelvis without contrast stable without acute findings.  Patient has severe tremors and has difficulty with speech unsure if secondary to tremors or profound dry mouth.  He states he is unsure when the last time he ate.  He is an extremely poor historian.  He did receive normal saline 500 mL bolus in the ED.  Additional liter ordered, will continue hydration with 100 mL/hour and repeat labs in the a.m.  Patient denies pain.  He just complains of weakness.  He is asking for his night medications gabapentin and primidone stating that is his treatment for his tremors.  He is admitted for further evaluation treatment    Intensivist HPI 6/22/2025  75-year-old male with COPD, renal cell carcinoma status post partial nephrectomy, hypertension, presented to the emergency department at [facility] on 6/17/2025 with complaints of  diarrhea and generalized weakness.  Admitted to hospitalist service with DANIEL, transaminitis and lactic acidosis.  Initially worked up for infectious etiology for his diarrhea which proved to be negative.  Was given IV fluid bolus and started on IV fluids.  Initially treated with Rocephin Flagyl prior to negative diarrhea workup.  Patient had gradual improvement with his DANIEL with IV fluids.  Diarrhea resolved.  Plans being made for patient to discharge to nursing rehab facility due to baseline debilitation and lack of home care.  Overnight on 6/21-6/22, patient became tachycardic, had metabolic encephalopathy.  Initially thought to be atrial flutter and started on Cardizem drip.  EKG proved sinus tach and Cardizem was stopped.  He became febrile with that temp 104.5 °F.  His abdomen became distended and painful.  Lactic acid was elevated.  CT of abdomen pelvis indicated possible ileus versus enteritis.  General surgery consulted recommended CTA of abdomen pelvis to rule out ischemia given exam.  Patient continued to have worsening mentation and respiratory status and was transferred to the ICU.  Shortly after arriving ICU, patient experienced cardiopulmonary arrest.  Became hypoxic, leading to bradycardia and ultimately asystole.  As soon as patient lost pulse, large amounts of gastric contents expelled from patient's mouth.  This appeared to be stool.  CPR initiated.  Able to achieve ROSC.  During resuscitative event patient was intubated, central line placed.  The same stomach contents were noted to come out of the ET tube after intubation indicating aspiration.  Patient found to be profoundly acidotic on initial ABG.  Temporized with sodium bicarb.  Required initiation of vasopressors.  Discussed case with general surgeon, Dr. Nelson, shortly after successful resuscitation event.  Tentatively planning for emergent OR trip for ex lap.  Suspecting possible ischemic process.     Final Discharge Diagnoses:  Active  Hospital Problems    Diagnosis     **Shock, septic     Cardiac arrest     Acute hypoxic respiratory failure     Celiac artery stenosis     DANIEL (acute kidney injury)     Transaminitis, acute     Dermatitis neglecta     Iron deficiency     Hypokalemia     Tremor        Consults:     Hematology  Consults by Caprice Cali MD (06/22/2025 07:12)     General Surgery  Consults by Wu Nelson MD (06/22/2025 12:49)     Palliative Care Medicine  Consults by Elisha Iniguez APRN (06/24/2025 10:13)     Procedures Performed:   Intubation (06/22/2025 13:07)   Insert Central Line At Bedside (06/22/2025 13:20)   Procedures by Charles Rogers APRN (06/22/2025 14:19)     Hospital Course:   75-year-old male is admitted to the hospital on 6/17/2025 with complaints of diarrhea, weakness, DANIEL and a lactic acidosis.  Patient initially treated for infectious diarrhea, stool studies were negative.  Kidney injury improved with IV fluid hydration.  Patient was weak and not deemed safe to return home to self-care as his wife was also admitted to the hospital.  Patient was being arranged to discharge to a skilled nursing facility.  Prior to this, patient became tachycardic, encephalopathic overnight on 6/21.  Developed respiratory distress, fever and apparent intra-abdominal process of suspected to be ileus versus SBO, and was transferred to the critical care units for further workup and management.  Shortly after arriving to his room in CCU, patient became hypoxic, bradycardic leading to asystole cardiac arrest.  Patient underwent CPR, ACLS for approximately 10 minutes prior to achieving ROSC.  Large volume emesis occurred during arrest and resuscitative event.  Apparent aspiration as gastric contents found in trachea during intubation.  In the immediate postarrest period, patient had worsening oxygenation and ventilation.  Became severely hypotensive.  He required high ventilator support, sedation and neuromuscular blockade for his  ARDS.  Required multiple vasopressors, norepinephrine and vasopressin.  He was treated empirically with vancomycin and Zosyn.  His lactic acidosis continued mount despite maximal medical support.  There was suspicion for nonocclusive mesenteric ischemia by general surgery.  Patient too unstable for any further imaging or procedures.  Patient's daughter arrived at bedside on .  Clinical course discussed with her, including current poor prognosis.  Patient made DNR on .  Very little if any improvement by the morning of .  Lactic acid still very elevated.  Worsening renal failure, apparent shock liver and continued severe acute hypoxic respiratory failure/ARDS.  Palliative care medicine consulted on .  After meeting with family, decision made to transition to comfort measures only.  Patient was palliatively extubated, given palliative analgesia and sedation.  Patient  shortly after withdrawal of care.  Time of death 1347 on 2025.    Pertinent Test Results:   Results for orders placed during the hospital encounter of 25    Adult Transthoracic Echo Complete W/ Cont if Necessary Per Protocol    Interpretation Summary    Left ventricular ejection fraction appears to be 56 - 60%.    Left ventricular wall thickness is consistent with mild concentric hypertrophy.    Left ventricular diastolic function was normal.    Estimated right ventricular systolic pressure from tricuspid regurgitation is normal (<35 mmHg).      Imaging Results (All)       Procedure Component Value Units Date/Time    XR Chest 1 View [[id number]] Collected: 25 0733     Updated: 25 0737    Narrative:      EXAM: XR CHEST 1 VW-         HISTORY: Intubated Patient; N17.9-Acute kidney failure, unspecified;  E86.0-Dehydration; D64.9-Anemia, unspecified; D69.6-Thrombocytopenia,  unspecified; R19.7-Diarrhea, unspecified; R53.1-Weakness; Z74.09-Other  reduced mobility; R13.10-Dysphagia, unspecified;  R50.9-Fever,  unspecified; R10.0-Acute abdomen; I46.9-Cardiac arrest, cause  unspecified       COMPARISON: 6/23/2025.     TECHNIQUE:  Frontal view(s) of the chest submitted.     FINDINGS:    There is an ET tube in place with tip 4.8 cm above the marky. NG tube  extends below the level of the diaphragm. Patchy bilateral opacities are  slightly improved. This may be due to edema or multifocal pneumonia or  ARDS. No large effusion or pneumothorax is seen. There are multiple  overlying wires and leads.          Impression:         1. Slightly improved patchy bilateral opacities and properly positioned  support tubes and lines.     This report was signed and finalized on 6/24/2025 7:34 AM by Dilan Sykes.       XR Chest 1 View [[id number]] Collected: 06/23/25 0630     Updated: 06/23/25 0638    Narrative:      EXAMINATION: XR CHEST 1 VW-        HISTORY: Intubated Patient; N17.9-Acute kidney failure, unspecified;  E86.0-Dehydration; D64.9-Anemia, unspecified; D69.6-Thrombocytopenia,  unspecified; R19.7-Diarrhea, unspecified; R53.1-Weakness; Z74.09-Other  reduced mobility; R13.10-Dysphagia, unspecified; R50.9-Fever,  unspecified; R10.0-Acute abdomen; I46.9-Cardiac arrest, cause  unspecified     A frontal projection of the chest is compared with the previous study  dated 2/6/2022 2025.     The lungs are poorly expanded.     Left cardiac border and left lower lung are partly obscured by an opaque  artifact.     The bilateral lung opacities persist with no interval change. This may  represent pulm edema or inflammatory/infectious process.     Small biapical pleural thickening persist.     There is no pleural effusion. There is no pneumothorax.     The heart size is not optimally evaluated due to the AP projection.     An endotracheal tube is in place. A nasogastric tube is visualized. The  distal end of the tube is not in the field-of-view and not evaluated.     No acute bony abnormality.       Impression:      1. No  change in cardiopulmonary status since the previous study.  2. The endotracheal tube in place. The distal end of the gastric tube is  not included in the study.        This report was signed and finalized on 6/23/2025 6:35 AM by Dr. Meg Jaquez MD.       XR Abdomen KUB [[id number]] Collected: 06/22/25 1407     Updated: 06/22/25 1411    Narrative:      EXAM: XR ABDOMEN KUB-         HISTORY: NG placement; N17.9-Acute kidney failure, unspecified;  E86.0-Dehydration; D64.9-Anemia, unspecified; D69.6-Thrombocytopenia,  unspecified; R19.7-Diarrhea, unspecified; R53.1-Weakness; Z74.09-Other  reduced mobility; R13.10-Dysphagia, unspecified; R50.9-Fever,  unspecified; R10.0-Acute abdomen       COMPARISON: None available.     TECHNIQUE:   Frontal view of the abdomen. 1 image.     FINDINGS:    There is an NG tube in place tip at the fundus of the stomach. Bowel gas  pattern visualized is nonspecific with borderline air-filled loops of  bowel.          Impression:         1. NG tube tip at the gastric fundus.     This report was signed and finalized on 6/22/2025 2:08 PM by Dilan Sykes.       XR Chest 1 View [[id number]] Collected: 06/22/25 1406     Updated: 06/22/25 1410    Narrative:      EXAM: XR CHEST 1 VW-         HISTORY: intubation; N17.9-Acute kidney failure, unspecified;  E86.0-Dehydration; D64.9-Anemia, unspecified; D69.6-Thrombocytopenia,  unspecified; R19.7-Diarrhea, unspecified; R53.1-Weakness; Z74.09-Other  reduced mobility; R13.10-Dysphagia, unspecified; R50.9-Fever,  unspecified; R10.0-Acute abdomen       COMPARISON: 6/22/2025.     TECHNIQUE:  Frontal view(s) of the chest submitted.     FINDINGS:    There is an ET tube in place with tip 5 cm above the marky. Central  opacities are noted worrisome for edema. External pacing lead is noted.  No large effusion is seen. The left costophrenic angle is not included.  No pneumothorax is visualized. Probable NG tube is coiled in the neck.          Impression:          1. ET tube tip above the marky. NG tube coiled in the neck.  2. Bilateral central opacities worrisome for edema.     This report was signed and finalized on 6/22/2025 2:07 PM by Dilan Sykes.       CT Angiogram Abdomen Pelvis [[id number]] Collected: 06/22/25 1219     Updated: 06/22/25 1229    Narrative:      EXAM: CT ANGIOGRAM ABDOMEN PELVIS-      HISTORY: worsening abd pain/distension, r/o ischemia or other acute  etiology; N17.9-Acute kidney failure, unspecified; E86.0-Dehydration;  D64.9-Anemia, unspecified; D69.6-Thrombocytopenia, unspecified;  R19.7-Diarrhea, unspecified; R53.1-Weakness; Z74.09-Other reduced  mobility; R13.10-Dysphagia, unspecified       COMPARISON: None available.     DOSE LENGTH PRODUCT: 3050 mGy cm. Automatic exposure control was  utilized to make radiation dose as low as reasonably achievable.     TECHNIQUE: Enhanced axial images of the abdomen and pelvis obtained with  multiplanar reformats. 3D postprocessing, including MIPs, performed and  images saved to PACS.     FINDINGS:  VISUALIZED CHEST: No pericardial or pleural effusion is identified.  There is mild atelectasis at the lung bases. There is fluid in the  distal esophagus without wall thickening. This may be due to reflux or  dysmotility.     LIVER/BILIARY: Hepatic parenchyma is unremarkable. The patient is status  post cholecystectomy. Bile ducts are unremarkable.     KIDNEYS/URETERS: There is an exophytic right renal cyst and left renal  angiomyolipoma measuring 3.0 cm. There is no hydronephrosis.     ADRENAL: Stable left adrenal nodularity.     SPLEEN: Unremarkable.     PANCREAS: Unremarkable.        PERITONEUM/RETROPERITONEUM: No retroperitoneal or mesenteric  lymphadenopathy is seen. No free fluid is identified.     GI TRACT: Dilated fluid-filled stomach is unchanged. There are multiple  dilated fluid and air-filled loops of bowel which are stable. No focal  wall thickening is seen.     PELVIS: There is prostatic  hypertrophy. Urinary bladder is incompletely  distended. There are prominent air and fluid-filled loops of bowel in  the pelvis. No free fluid is seen.     SOFT TISSUES: There are inguinal hernias containing fat.     BONES: No acute or aggressive bony lesion.            VESSELS:     AORTA/ABDOMINAL-PELVIC VESSELS: There is atherosclerosis of the  abdominal aorta without aneurysm. There is narrowing at the proximal  celiac artery with greater than 70% stenosis however contrast is noted  more distally. The SMA is patent without stenosis or occlusion. The LUIS ALBERTO  is patent without stenosis or occlusion. There are scattered areas of  atherosclerosis at the branches of the bilateral internal and external  iliac arteries but no significant stenosis is seen. There is  atherosclerosis at both common iliac arteries with 50% stenosis on the  left.             Impression:      1. Atherosclerosis of the abdominal aorta and branches with greater than  70% stenosis at the proximal celiac artery and 50% stenosis at the left  common iliac artery. No occlusion is seen.  2. Stable prominent air and fluid-filled loops of stomach, small bowel,  and colon without obvious transition point. No focal wall thickening or  edema of the mesentery is seen.     This report was signed and finalized on 6/22/2025 12:26 PM by Dilan Sykes.       XR Chest 1 View [[id number]] Collected: 06/22/25 0808     Updated: 06/22/25 0811    Narrative:      EXAM: XR CHEST 1 VW-         HISTORY: respiratory distress; N17.9-Acute kidney failure, unspecified;  E86.0-Dehydration; D64.9-Anemia, unspecified; D69.6-Thrombocytopenia,  unspecified; R19.7-Diarrhea, unspecified; R53.1-Weakness; Z74.09-Other  reduced mobility; R13.10-Dysphagia, unspecified       COMPARISON: 6/17/2025.     TECHNIQUE:  Frontal view(s) of the chest submitted.     FINDINGS:    Lungs are grossly clear. No effusion or pneumothorax is seen. Heart and  mediastinum are unremarkable.           Impression:         1. No active disease in the chest.     This report was signed and finalized on 6/22/2025 8:08 AM by Dilan Sykes.       CT Abdomen Pelvis Without Contrast [[id number]] Collected: 06/22/25 0752     Updated: 06/22/25 0806    Narrative:      EXAM: CT ABDOMEN PELVIS WO CONTRAST-         HISTORY: abdominal pain; N17.9-Acute kidney failure, unspecified;  E86.0-Dehydration; D64.9-Anemia, unspecified; D69.6-Thrombocytopenia,  unspecified; R19.7-Diarrhea, unspecified; R53.1-Weakness; Z74.09-Other  reduced mobility; R13.10-Dysphagia, unspecified       COMPARISON: 6/17/2025.     DOSE LENGTH PRODUCT: 1024.13 mGy.cm Automatic exposure control was  utilized to make radiation dose as low as reasonably achievable.     TECHNIQUE: Noncontrast axial images of the abdomen and pelvis obtained  with multiplanar reformats.     FINDINGS:  VISUALIZED CHEST: No pericardial or pleural effusion is identified.  Linear opacities at the lung bases are due to atelectasis.     LIVER/BILE DUCTS: Patient is status post cholecystectomy. There are  calcified granulomas. Unenhanced hepatic parenchyma is unremarkable.  Bile ducts are unremarkable.     KIDNEYS/URETERS: There is a right renal cyst and no hydronephrosis. Left  renal angiomyolipoma measures 3.2 cm. No renal or ureteral calculi are  identified.     ADRENAL: There is stable nodularity of the left adrenal gland.        SPLEEN: Unremarkable.     PANCREAS: There is a splenule at the tail of the pancreas unchanged from  2015. Unenhanced pancreatic parenchyma is unremarkable.     MESENTERY: No retroperitoneal or mesenteric lymphadenopathy or  inflammatory changes are seen.     VASCULATURE: There is calcified atherosclerosis of the abdominal aorta  without aneurysm.     GI TRACT: There is a small hiatal hernia. There is fluid in the distal  esophagus may be related to reflux or dysmotility. Stomach is distended  with fluid and air and there are multiple loops of small and  large bowel  which are distended with fluid and air and scattered air-fluid levels.  No obvious transition point is seen. Ileus is favored.     PELVIS: There is prostatic hypertrophy. Urinary bladder is unremarkable.  No pelvic lymphadenopathy or free fluid is seen.     SOFT TISSUES: There are bilateral inguinal hernias left larger than  right containing fat.     BONES: No acute or aggressive bony lesion.           Impression:      1. There are multiple prominent fluid and air-filled loops of small  bowel and colon and also distention of the stomach without transition  point or inflammatory change of the mesentery. This may represent an  ileus.  2. Stable right renal cyst and left renal angiomyolipoma.  3. Stable left adrenal nodularity.  4. Prostatic hypertrophy.  5. Inguinal hernias containing fat.     These findings are in agreement with the critical and emergent findings  from the [facility] preliminary report.        This report was signed and finalized on 6/22/2025 8:03 AM by Dilan Sykes.       US Liver [[id number]] Collected: 06/18/25 1231     Updated: 06/18/25 1237    Narrative:      EXAM/TECHNIQUE: US LIVER-     INDICATION: acute transaminitis     COMPARISON: CT 6/17/2025     FINDINGS:     Visualized portion of the pancreas is unremarkable. The pancreatic tail  and known pancreatic tail lesion are not visualized on this exam.     Liver echotexture and echogenicity are within normal limits. No solid  liver lesion. Patent portal vein with appropriate directional flow.     Patient is status post cholecystectomy. No biliary ductal dilatation.  Common bile duct is 3 mm diameter.     Right kidney is 11.3 cm in length and demonstrates normal cortical  thickness and echogenicity. No shadowing renal calculi or  hydronephrosis. 3.3 cm exophytic right upper pole renal cyst without  complex features.       Impression:      1.  No sonographic evidence of cirrhosis or steatosis.  2.  No biliary ductal dilatation status  "post cholecystectomy.     This report was signed and finalized on 6/18/2025 12:34 PM by Dr. Fawad Valadez MD.       CT Abdomen Pelvis Without Contrast [[id number]] Collected: 06/17/25 1146     Updated: 06/17/25 1157    Narrative:      EXAMINATION: CT ABDOMEN PELVIS WO CONTRAST-     HISTORY: Abdominal pain. Generalized weakness. Diarrhea.     In order to have a CT radiation dose as low as reasonably achievable  Automated Exposure Control was utilized for adjustment of the mA and/or  KV according to patient size.     CT Dose DLP = 785.23 mGy.cm.  (If there are multiple studies performed at the same time this  represents the total dose).     Images are stored in PACS per institutional protocol.        Noncontrast abdomen/pelvis CT.  Axial, sagittal, and coronal sequences.     COMPARISON:  11/22/2024 and 11/22/2016.     Normal heart size.  No acute abnormality at the lung bases.     Cholecystectomy clips are present.  Normal noncontrast appearance of the spleen.  Spleen = 13 x 6 x 7 cm. A few splenic calcifications are present.     Discrete oval solid mass in the distal pancreas tail measures 30 mm in  diameter on axial image 22. This finding measured 27 mm on 11/22/2024  and 20 mm on 11/22/2016.  The homogeneous density (matching adjacent spleen) and smooth margins  suggest that this is a splenule.     Stable LEFT adrenal nodule.  Normal RIGHT adrenal gland.  Unchanged partially exophytic 30 x 26 mm fat-containing LEFT renal  lesion.  Exophytic 31 x 30 mm RIGHT renal cyst compared with 32 x 32 mm on  11/22/2024 and 24 x 21 mm on 11/22/2016.  No renal stone or hydronephrosis.     Mild aortic calcification with no aneurysm.  No bowel dilation.  No sign of colitis or diverticulitis.  No pelvic mass or fluid.  Normal appearance of the urinary bladder.       Impression:      1. Stable renal findings.  2. Small solid \"mass\" at the tip of the pancreas tail has benign imaging  characteristics. Splenule suspected.  3. No " "fluid collection or inflammatory process.     This report was signed and finalized on 2025 11:54 AM by Dr. José Pizarro MD.       XR Chest 1 View [[id number]] Collected: 25 1116     Updated: 25 1120    Narrative:      EXAM: XR CHEST 1 VW-         HISTORY: weak       COMPARISON: 2016.     TECHNIQUE:  Frontal view(s) of the chest submitted.     FINDINGS:    The lungs are grossly clear bilaterally. No effusion or pneumothorax is  visualized. Heart and mediastinum are unremarkable.          Impression:         1. No active disease in the chest.     This report was signed and finalized on 2025 11:17 AM by Dilan Sykes.               Result Review    Result Review:  I have personally reviewed the results from the time of this admission to 2025 14:08 CDT and agree with these findings:  [x]  Laboratory list / accordion  [x]  Microbiology  [x]  Radiology  [x]  EKG/Telemetry   [x]  Cardiology/Vascular   []  Pathology  []  Old records  []  Other:      Physical Exam on Discharge:  BP 0  Pulse (!) 0   Temp 99 °F (37.2 °C) (Rectal)   Resp (!) 0   Ht 182.9 cm (72\")   Wt 131 kg (288 lb 2.3 oz)     BMI 39.08 kg/m²     Condition on Discharge:       Electronically signed by RUBIO Ruby on 2025 at 14:24 CDT    Time: 35 minutes.           Electronically signed by Andrew Ghotra MD at 25 1441       Discharge Order (From admission, onward)      None          "

## 2025-06-27 LAB
BACTERIA SPEC AEROBE CULT: NORMAL
BACTERIA SPEC AEROBE CULT: NORMAL

## 2025-06-28 LAB
BACTERIA SPEC AEROBE CULT: ABNORMAL
BACTERIA SPEC AEROBE CULT: NORMAL
GRAM STN SPEC: ABNORMAL
ISOLATED FROM: ABNORMAL

## (undated) DEVICE — TBG SMPL FLTR LINE NASL 02/C02 A/ BX/100

## (undated) DEVICE — THE CHANNEL CLEANING BRUSH IS A NYLON FLEXI BRUSH ATTACHED TO A FLEXIBLE PLASTIC SHEATH DESIGNED TO SAFELY REMOVE DEBRIS FROM FLEXIBLE ENDOSCOPES.

## (undated) DEVICE — CUFF,BP,DISP,1 TUBE,ADULT,HP: Brand: MEDLINE

## (undated) DEVICE — ENDOGATOR AUXILIARY WATER JET CONNECTOR: Brand: ENDOGATOR

## (undated) DEVICE — SENSR O2 OXIMAX FNGR A/ 18IN NONSTR

## (undated) DEVICE — FRCP BX RADJAW4 NDL 2.8 240 STD OG

## (undated) DEVICE — CONMED SCOPE SAVER BITE BLOCK, 20X27 MM: Brand: SCOPE SAVER

## (undated) DEVICE — Device: Brand: DEFENDO AIR/WATER/SUCTION AND BIOPSY VALVE